# Patient Record
Sex: FEMALE | URBAN - METROPOLITAN AREA
[De-identification: names, ages, dates, MRNs, and addresses within clinical notes are randomized per-mention and may not be internally consistent; named-entity substitution may affect disease eponyms.]

---

## 2016-03-12 RX ORDER — BUDESONIDE AND FORMOTEROL FUMARATE DIHYDRATE 160; 4.5 UG/1; UG/1
2 AEROSOL RESPIRATORY (INHALATION)
Qty: 0 | Refills: 0 | COMMUNITY
Start: 2016-03-12

## 2016-03-30 RX ORDER — MIDODRINE HYDROCHLORIDE 2.5 MG/1
1 TABLET ORAL
Qty: 0 | Refills: 0 | COMMUNITY
Start: 2016-03-30

## 2017-10-17 ENCOUNTER — INPATIENT (INPATIENT)
Facility: HOSPITAL | Age: 82
LOS: 5 days | Discharge: HOME | DRG: 393 | End: 2017-10-23
Attending: INTERNAL MEDICINE | Admitting: INTERNAL MEDICINE
Payer: COMMERCIAL

## 2017-10-17 VITALS
SYSTOLIC BLOOD PRESSURE: 128 MMHG | TEMPERATURE: 98 F | HEART RATE: 77 BPM | RESPIRATION RATE: 19 BRPM | DIASTOLIC BLOOD PRESSURE: 78 MMHG

## 2017-10-17 DIAGNOSIS — I77.0 ARTERIOVENOUS FISTULA, ACQUIRED: Chronic | ICD-10-CM

## 2017-10-17 DIAGNOSIS — Z29.9 ENCOUNTER FOR PROPHYLACTIC MEASURES, UNSPECIFIED: ICD-10-CM

## 2017-10-17 DIAGNOSIS — Z96.652 PRESENCE OF LEFT ARTIFICIAL KNEE JOINT: Chronic | ICD-10-CM

## 2017-10-17 DIAGNOSIS — D49.0 NEOPLASM OF UNSPECIFIED BEHAVIOR OF DIGESTIVE SYSTEM: Chronic | ICD-10-CM

## 2017-10-17 DIAGNOSIS — K92.2 GASTROINTESTINAL HEMORRHAGE, UNSPECIFIED: ICD-10-CM

## 2017-10-17 DIAGNOSIS — Z95.3 PRESENCE OF XENOGENIC HEART VALVE: Chronic | ICD-10-CM

## 2017-10-17 DIAGNOSIS — N18.6 END STAGE RENAL DISEASE: ICD-10-CM

## 2017-10-17 DIAGNOSIS — I48.2 CHRONIC ATRIAL FIBRILLATION: ICD-10-CM

## 2017-10-17 DIAGNOSIS — Z98.89 OTHER SPECIFIED POSTPROCEDURAL STATES: Chronic | ICD-10-CM

## 2017-10-17 DIAGNOSIS — M1A.9XX0 CHRONIC GOUT, UNSPECIFIED, WITHOUT TOPHUS (TOPHI): ICD-10-CM

## 2017-10-17 DIAGNOSIS — K62.5 HEMORRHAGE OF ANUS AND RECTUM: ICD-10-CM

## 2017-10-17 LAB
ALBUMIN SERPL ELPH-MCNC: 4.1 G/DL — SIGNIFICANT CHANGE UP (ref 3.3–5)
ALP SERPL-CCNC: 108 U/L — SIGNIFICANT CHANGE UP (ref 40–120)
ALT FLD-CCNC: 15 U/L RC — SIGNIFICANT CHANGE UP (ref 10–45)
ANION GAP SERPL CALC-SCNC: 15 MMOL/L — SIGNIFICANT CHANGE UP (ref 5–17)
APTT BLD: 29.2 SEC — SIGNIFICANT CHANGE UP (ref 27.5–37.4)
AST SERPL-CCNC: 22 U/L — SIGNIFICANT CHANGE UP (ref 10–40)
BASE EXCESS BLDV CALC-SCNC: 4.6 MMOL/L — HIGH (ref -2–2)
BASOPHILS # BLD AUTO: 0.1 K/UL — SIGNIFICANT CHANGE UP (ref 0–0.2)
BASOPHILS NFR BLD AUTO: 0.9 % — SIGNIFICANT CHANGE UP (ref 0–2)
BILIRUB SERPL-MCNC: 0.6 MG/DL — SIGNIFICANT CHANGE UP (ref 0.2–1.2)
BLD GP AB SCN SERPL QL: NEGATIVE — SIGNIFICANT CHANGE UP
BUN SERPL-MCNC: 34 MG/DL — HIGH (ref 7–23)
CA-I SERPL-SCNC: 1.11 MMOL/L — LOW (ref 1.12–1.3)
CALCIUM SERPL-MCNC: 8.6 MG/DL — SIGNIFICANT CHANGE UP (ref 8.4–10.5)
CHLORIDE BLDV-SCNC: 100 MMOL/L — SIGNIFICANT CHANGE UP (ref 96–108)
CHLORIDE SERPL-SCNC: 96 MMOL/L — SIGNIFICANT CHANGE UP (ref 96–108)
CO2 BLDV-SCNC: 33 MMOL/L — HIGH (ref 22–30)
CO2 SERPL-SCNC: 29 MMOL/L — SIGNIFICANT CHANGE UP (ref 22–31)
CREAT SERPL-MCNC: 5.84 MG/DL — HIGH (ref 0.5–1.3)
EOSINOPHIL # BLD AUTO: 0.2 K/UL — SIGNIFICANT CHANGE UP (ref 0–0.5)
EOSINOPHIL NFR BLD AUTO: 3.6 % — SIGNIFICANT CHANGE UP (ref 0–6)
GAS PNL BLDV: 141 MMOL/L — SIGNIFICANT CHANGE UP (ref 136–145)
GAS PNL BLDV: SIGNIFICANT CHANGE UP
GAS PNL BLDV: SIGNIFICANT CHANGE UP
GLUCOSE BLDV-MCNC: 86 MG/DL — SIGNIFICANT CHANGE UP (ref 70–99)
GLUCOSE SERPL-MCNC: 88 MG/DL — SIGNIFICANT CHANGE UP (ref 70–99)
HCO3 BLDV-SCNC: 31 MMOL/L — HIGH (ref 21–29)
HCT VFR BLD CALC: 40.4 % — SIGNIFICANT CHANGE UP (ref 34.5–45)
HCT VFR BLDA CALC: 39 % — SIGNIFICANT CHANGE UP (ref 39–50)
HGB BLD CALC-MCNC: 12.7 G/DL — SIGNIFICANT CHANGE UP (ref 11.5–15.5)
HGB BLD-MCNC: 12.8 G/DL — SIGNIFICANT CHANGE UP (ref 11.5–15.5)
INR BLD: 1.19 RATIO — HIGH (ref 0.88–1.16)
LACTATE BLDV-MCNC: 1.4 MMOL/L — SIGNIFICANT CHANGE UP (ref 0.7–2)
LYMPHOCYTES # BLD AUTO: 1.3 K/UL — SIGNIFICANT CHANGE UP (ref 1–3.3)
LYMPHOCYTES # BLD AUTO: 22.6 % — SIGNIFICANT CHANGE UP (ref 13–44)
MCHC RBC-ENTMCNC: 28.4 PG — SIGNIFICANT CHANGE UP (ref 27–34)
MCHC RBC-ENTMCNC: 31.7 GM/DL — LOW (ref 32–36)
MCV RBC AUTO: 89.6 FL — SIGNIFICANT CHANGE UP (ref 80–100)
MONOCYTES # BLD AUTO: 0.7 K/UL — SIGNIFICANT CHANGE UP (ref 0–0.9)
MONOCYTES NFR BLD AUTO: 11.8 % — SIGNIFICANT CHANGE UP (ref 2–14)
NEUTROPHILS # BLD AUTO: 3.6 K/UL — SIGNIFICANT CHANGE UP (ref 1.8–7.4)
NEUTROPHILS NFR BLD AUTO: 61.2 % — SIGNIFICANT CHANGE UP (ref 43–77)
OTHER CELLS CSF MANUAL: 5 ML/DL — LOW (ref 18–22)
PCO2 BLDV: 56 MMHG — HIGH (ref 35–50)
PH BLDV: 7.36 — SIGNIFICANT CHANGE UP (ref 7.35–7.45)
PLATELET # BLD AUTO: 122 K/UL — LOW (ref 150–400)
PO2 BLDV: 21 MMHG — LOW (ref 25–45)
POTASSIUM BLDV-SCNC: 4.4 MMOL/L — SIGNIFICANT CHANGE UP (ref 3.5–5)
POTASSIUM SERPL-MCNC: 4.5 MMOL/L — SIGNIFICANT CHANGE UP (ref 3.5–5.3)
POTASSIUM SERPL-SCNC: 4.5 MMOL/L — SIGNIFICANT CHANGE UP (ref 3.5–5.3)
PROT SERPL-MCNC: 9.3 G/DL — HIGH (ref 6–8.3)
PROTHROM AB SERPL-ACNC: 12.9 SEC — HIGH (ref 9.8–12.7)
RBC # BLD: 4.51 M/UL — SIGNIFICANT CHANGE UP (ref 3.8–5.2)
RBC # FLD: 15.4 % — HIGH (ref 10.3–14.5)
RH IG SCN BLD-IMP: POSITIVE — SIGNIFICANT CHANGE UP
SAO2 % BLDV: 29 % — LOW (ref 67–88)
SODIUM SERPL-SCNC: 140 MMOL/L — SIGNIFICANT CHANGE UP (ref 135–145)
WBC # BLD: 5.8 K/UL — SIGNIFICANT CHANGE UP (ref 3.8–10.5)
WBC # FLD AUTO: 5.8 K/UL — SIGNIFICANT CHANGE UP (ref 3.8–10.5)

## 2017-10-17 PROCEDURE — 93010 ELECTROCARDIOGRAM REPORT: CPT

## 2017-10-17 PROCEDURE — 99223 1ST HOSP IP/OBS HIGH 75: CPT | Mod: AI,GC

## 2017-10-17 PROCEDURE — 71010: CPT | Mod: 26

## 2017-10-17 PROCEDURE — 99285 EMERGENCY DEPT VISIT HI MDM: CPT | Mod: 25

## 2017-10-17 RX ORDER — MIDODRINE HYDROCHLORIDE 2.5 MG/1
5 TABLET ORAL DAILY
Qty: 0 | Refills: 0 | Status: DISCONTINUED | OUTPATIENT
Start: 2017-10-17 | End: 2017-10-23

## 2017-10-17 RX ORDER — GABAPENTIN 400 MG/1
100 CAPSULE ORAL AT BEDTIME
Qty: 0 | Refills: 0 | Status: DISCONTINUED | OUTPATIENT
Start: 2017-10-17 | End: 2017-10-23

## 2017-10-17 RX ORDER — ASPIRIN/CALCIUM CARB/MAGNESIUM 324 MG
81 TABLET ORAL DAILY
Qty: 0 | Refills: 0 | Status: DISCONTINUED | OUTPATIENT
Start: 2017-10-17 | End: 2017-10-18

## 2017-10-17 RX ORDER — MONTELUKAST 4 MG/1
10 TABLET, CHEWABLE ORAL DAILY
Qty: 0 | Refills: 0 | Status: DISCONTINUED | OUTPATIENT
Start: 2017-10-17 | End: 2017-10-23

## 2017-10-17 RX ORDER — SEVELAMER CARBONATE 2400 MG/1
800 POWDER, FOR SUSPENSION ORAL
Qty: 0 | Refills: 0 | Status: DISCONTINUED | OUTPATIENT
Start: 2017-10-17 | End: 2017-10-23

## 2017-10-17 RX ORDER — ALLOPURINOL 300 MG
100 TABLET ORAL DAILY
Qty: 0 | Refills: 0 | Status: DISCONTINUED | OUTPATIENT
Start: 2017-10-17 | End: 2017-10-23

## 2017-10-17 RX ORDER — BUDESONIDE AND FORMOTEROL FUMARATE DIHYDRATE 160; 4.5 UG/1; UG/1
2 AEROSOL RESPIRATORY (INHALATION)
Qty: 0 | Refills: 0 | Status: DISCONTINUED | OUTPATIENT
Start: 2017-10-17 | End: 2017-10-23

## 2017-10-17 RX ORDER — CHOLECALCIFEROL (VITAMIN D3) 125 MCG
1000 CAPSULE ORAL DAILY
Qty: 0 | Refills: 0 | Status: DISCONTINUED | OUTPATIENT
Start: 2017-10-17 | End: 2017-10-23

## 2017-10-17 NOTE — H&P ADULT - ASSESSMENT
84 F w/ HTN, ESRD on HD (TTS), mitral stenosis s/p bioprosthetic MVR, severe TR s/p tricuspid valve repair, remote hx of colon cancer s/p resection, and afib on coumadin present with rectal bleeding in the setting of supratherapeutic INR. Likely lower GIB, differential includes diverticular bleed, internal hemorrhoids, vs. recurrence of colon cancer.

## 2017-10-17 NOTE — H&P ADULT - PSH
A-V fistula  Left A-V fistula for HD  Colon tumor  tumor removed from colon   H/O total knee replacement, left    H/O:  section    History of mitral valve replacement with bioprosthetic valve

## 2017-10-17 NOTE — H&P ADULT - PROBLEM SELECTOR PLAN 3
-s/p full HD session today. Will consult Dr. Schaefer to arrange HD while inpatient.  -c/w midodrine on HD days and calcium/phosphorus binders. -s/p full HD session today. Will consult Dr. Schaefer to arrange HD while inpatient on TTS.   -c/w midodrine on HD days and calcium/phosphorus binders.

## 2017-10-17 NOTE — H&P ADULT - PROBLEM SELECTOR PLAN 2
-ChadsVasc score of 3 and pt w/ bioprosthetic valve. No indication for bridging now given subtherapeutic INR. Will hold a/c in the setting of active GIB.   -Rate controlled w/ ppm -ChadsVasc score of 3 and pt w/ bioprosthetic valve. INR reportedly supratherapeutic last week now subtherapeutic after holding coumadin. No indication for bridging given relatively low ChadsVasc. Dose coumadin when GIB resolves.   -Rate controlled w/ ppm

## 2017-10-17 NOTE — ED ADULT NURSE NOTE - PMH
AV fistula  left  Chronic Renal Failure (ICD9 585.9)  on HD (T, Th, Sat)  ESRD on hemodialysis    Gout    History of Gout (ICD9 V12.2)    History of Pneumonia (ICD9 V12.61)    HTN (hypertension)    HTN (Hypertension) (ICD9 401.9)    Pneumonia

## 2017-10-17 NOTE — H&P ADULT - NSHPLABSRESULTS_GEN_ALL_CORE
12.8   5.8   )-----------( 122      ( 17 Oct 2017 18:04 )             40.4     10-17    140  |  96  |  34<H>  ----------------------------<  88  4.5   |  29  |  5.84<H>    Ca    8.6      17 Oct 2017 18:04    TPro  9.3<H>  /  Alb  4.1  /  TBili  0.6  /  DBili  x   /  AST  22  /  ALT  15  /  AlkPhos  108  10-17          LIVER FUNCTIONS - ( 17 Oct 2017 18:04 )  Alb: 4.1 g/dL / Pro: 9.3 g/dL / ALK PHOS: 108 U/L / ALT: 15 U/L RC / AST: 22 U/L / GGT: x             PT/INR - ( 17 Oct 2017 18:04 )   PT: 12.9 sec;   INR: 1.19 ratio         PTT - ( 17 Oct 2017 18:04 )  PTT:29.2 sec    EKG: v-paced

## 2017-10-17 NOTE — ED ADULT NURSE REASSESSMENT NOTE - NS ED NURSE REASSESS COMMENT FT1
Report given to change of shift RN.  Easy work of breathing.  Med lock intact.  Pt resting comfortably. NAD.  Family at bedside.

## 2017-10-17 NOTE — H&P ADULT - HISTORY OF PRESENT ILLNESS
84 F w/ HTN, ESRD on HD (TTS), mitral stenosis s/p bioprosthetic MVR, severe TR s/p tricuspid valve repair, and afib on coumadin present with rectal bleeding since last Wednesday. Pt reports that she was called by her PCP on Wednesday and informed that her INR was elevated in the 9s and told to stop taking coumadin. She experienced her first episode of rectal bleeding on the same day, reports blood color is maroon/cherry colored. Denies bright red blood or pain with defecation. No abdominal pain, nausea, vomiting, or hematemesis. Denies NSAID use aside from aspirin. Denies chest pain, lightheadedness, palpitations, or SOB. 84 F w/ HTN, ESRD on HD (TTS), mitral stenosis s/p bioprosthetic MVR, severe TR s/p tricuspid valve repair, remote hx of colon cancer s/p resection, and afib on coumadin present with rectal bleeding since last Wednesday. Pt reports that she was called by her PCP on Wednesday and informed that her INR was elevated in the 9s and told to stop taking coumadin. She experienced her first episode of rectal bleeding on the same day, reports blood color is maroon/cherry colored. Denies bright red blood, melena, or pain with defecation. No abdominal pain, nausea, vomiting, or hematemesis. Denies NSAID use aside from aspirin. Denies chest pain, lightheadedness, palpitations, or SOB. Bleeding worsened this past week and pt Initially presented to urgent care today when she was found to be FOBT positive and referred to ED for further management. She endorses diarrhea with up to 3 soft BMs/day. No recent antibiotic use. No fevers, chills, or sweats.     In the ED:  Vital Signs Last 24 Hrs  T(C): 36.5 (17 Oct 2017 22:54), Max: 37 (17 Oct 2017 22:01)  T(F): 97.7 (17 Oct 2017 22:54), Max: 98.6 (17 Oct 2017 22:01)  HR: 65 (17 Oct 2017 22:54) (62 - 77)  BP: 107/65 (17 Oct 2017 22:54) (104/61 - 128/78)  RR: 17 (17 Oct 2017 22:54) (17 - 19)  SpO2: 98% (17 Oct 2017 22:54) (95% - 100%) 84 F w/ HTN, ESRD on HD (TTS), mitral stenosis s/p bioprosthetic MVR, severe TR s/p tricuspid valve repair, remote hx of colon cancer s/p resection, and afib on coumadin present with rectal bleeding since last Wednesday. Pt reports that she was called by her PCP on Wednesday and informed that her INR was elevated in the 9s and told to stop taking coumadin. She experienced her first episode of rectal bleeding on the same day, reports blood color is maroon/cherry colored. Stool is mixed with blood and pt also endorses blood on toilet paper. Denies bright red blood, melena, or pain with defecation. No abdominal pain, nausea, vomiting, or hematemesis. Denies NSAID use aside from aspirin. Denies chest pain, lightheadedness, palpitations, or SOB. Bleeding worsened this past week and pt Initially presented to urgent care today when she was found to be FOBT positive and referred to ED for further management. She endorses diarrhea with up to 3 soft BMs/day. No recent antibiotic use. No fevers, chills, or sweats.     In the ED:  Vital Signs Last 24 Hrs  T(C): 36.5 (17 Oct 2017 22:54), Max: 37 (17 Oct 2017 22:01)  T(F): 97.7 (17 Oct 2017 22:54), Max: 98.6 (17 Oct 2017 22:01)  HR: 65 (17 Oct 2017 22:54) (62 - 77)  BP: 107/65 (17 Oct 2017 22:54) (104/61 - 128/78)  RR: 17 (17 Oct 2017 22:54) (17 - 19)  SpO2: 98% (17 Oct 2017 22:54) (95% - 100%)

## 2017-10-17 NOTE — ED PROVIDER NOTE - MEDICAL DECISION MAKING DETAILS
84 year old female past medical history HTN, ESRD on dialysis T, TH, S, gout, colon ca in past without chemo or radiation, who presents to the ED for rectal bleeding. Concern for GI bleed with supratherapeutic INR, check labwork. and admit, no pain at this time, continue to monitor.

## 2017-10-17 NOTE — ED PROVIDER NOTE - ATTENDING CONTRIBUTION TO CARE
[facility]  84y female AVR on coumadin. colon ca sp resection, no chemo/rt, HTN, pt comes to with INR 9last week, Held since Now comes to ed co rectal bleeding 5d. Seen at urgent care and referred to ed. PE WDWN female awake alert speech fluent ncat chest scant kameron wheeze CV no rgm, Abd soft +bs neuro no focal defects.    Pavan Butts MD, Facep

## 2017-10-17 NOTE — ED PROVIDER NOTE - NONTENDER LOCATION
right lower quadrant/left upper quadrant/right costovertebral angle/right upper quadrant/left costovertebral angle/left lower quadrant

## 2017-10-17 NOTE — H&P ADULT - ATTENDING COMMENTS
Attending addendum:  Patient seen and examined, I agree with the above assessment and plan with any changes indicated below.    84 F w/ HTN, ESRD on HD (TTS), mitral stenosis s/p bioprosthetic MVR, severe TR s/p tricuspid valve repair, remote hx of colon cancer s/p resection, and afib on coumadin present with rectal bleeding since last Wednesday. Initially with supratherapeutic INR of 9 Attending addendum:  Patient seen and examined, I agree with the above assessment and plan with any changes indicated below.    84 F w/ HTN, ESRD on HD (TTS), mitral stenosis s/p bioprosthetic MVR, severe TR s/p tricuspid valve repair, remote hx of colon cancer s/p resection, and afib on coumadin present with rectal bleeding since last Wednesday. Initially with supratherapeutic INR of 9 and asked to hold coumadin as outpatient. Recently, noted BRBPR without any other associated symptoms.                             12.8   5.4   )-----------( 170      ( 18 Oct 2017 03:17 )             39.1       10-18    141  |  99  |  42<H>  ----------------------------<  92  4.5   |  25  |  6.78<H>    Ca    8.1<L>      18 Oct 2017 04:45  Phos  4.8     10-18  Mg     2.5     10-18    TPro  9.3<H>  /  Alb  4.1  /  TBili  0.6  /  DBili  x   /  AST  22  /  ALT  15  /  AlkPhos  108  10-17                  PT/INR - ( 18 Oct 2017 03:17 )   PT: 12.4 sec;   INR: 1.14 ratio         PTT - ( 17 Oct 2017 18:04 )  PTT:29.2 sec    Lactate Trend  10-18 @ 03:17 Lactate:1.9     I personally interpreted this patient's labs. They were notable for stable hemoglobin, relatively unremarkable electrolytes, and elevated creatinine consistent with known ESRD.  I personally interpreted this patient's imaging. CXR was notable for bilateral opacities overall unchanged from prior  I personally interpreted this patient's ECG. It showed v-paced rhythm, unchanged from prior    #Plan:  GIB:  - GI consult in AM, NPO for possible ?sigmoidoscopy given BRBPR and likely distal source vs colonoscopy  - Trend Hgb q8h x 1 then daily if stable  - 2PIVs  - Active T&S  - No transfusion at this time    Anticoagulation:  - Continue to hold coumadin  - AHA 2017 guidelines do not give recommendation for prolonged anticoagulation for bioprosthetic valves, though limited data for patients with Afib. Given concern for active bleed, risks outweight benefits. Can continue daily aspirin as per guidelines, restart anticoagulation as soon as able from bleeding aspect    Remainder of plan as per H&P.

## 2017-10-17 NOTE — H&P ADULT - PROBLEM SELECTOR PLAN 1
-Likely lower GIB, differential includes diverticular bleed, internal hemorrhoids, vs. recurrence of colon cancer. Unlikely to be external hemorrhoids as rectal exam done in the ED w/ no reported hemorrhoids and pt w/ no complaints of pain on defecation.   -Hemodynamically stable, H/H stable. Will trend CBC q8h, transfuse Hb < 7. GI consult for possible scope, NPO for now  -Two large bore IVs, active type and screen.

## 2017-10-17 NOTE — ED ADULT NURSE NOTE - OBJECTIVE STATEMENT
83 y/o F to ED with family c/o BRBPR since Thursday with supratherapeutic INR last week, coumadin held.  A&Ox4.  Denies dizziness/vision changes.  Easy work of breathing. Lungs slight wheeze on exhal on auscultation.  Denies chest pain, numbness/tingling or palpitations.  +fistula to L arm, dialyzed T, R, Sa. Moves all extremities strong.  med lock 18 R AC placed.  Labs drawn.  Abd soft round nontender. No n/v/d.  Pt denies change in eating habits.  Safety maintained.  Will continue to monitor.

## 2017-10-17 NOTE — H&P ADULT - FAMILY HISTORY
Family history of anemia, father     Sibling  Still living? Unknown  Family history of heart disease, Age at diagnosis: Age Unknown

## 2017-10-17 NOTE — ED PROVIDER NOTE - OBJECTIVE STATEMENT
84 year old female past medical history HTN, ESRD on dialysis T, TH, S, gout, colon ca in past without chemo or radiation, who presents to the ED for rectal bleeding. Has had supratherapeutic INR found by primary medical doctor told to hold coumadin, has been having bright red blood for 3 days as well but now worsening so went to urgent care. Was found tyson guaiac positive and told to come to ED. Reports no pain. No fevers, chills, dizziness, lightheadedness, chest pain, shortness of breath.     Dr. Emerald Subramanian

## 2017-10-17 NOTE — ED ADULT NURSE NOTE - PSH
A-V fistula  Left A-V fistula for HD  AV fistula  left  Colon tumor  removed in   Colon tumor  tumor removed from colon   H/O total knee replacement, left    H/O:  section    H/O:  Section    History of knee replacement, total, left

## 2017-10-17 NOTE — H&P ADULT - PMH
Chronic atrial fibrillation    ESRD on hemodialysis    Gout    Mitral valve stenosis, unspecified etiology    Tricuspid valve insufficiency, unspecified etiology

## 2017-10-17 NOTE — H&P ADULT - NSHPREVIEWOFSYSTEMS_GEN_ALL_CORE
CONSTITUTIONAL:  No weight loss, fever, chills, weakness or fatigue.  HEENT:  Eyes:  No visual loss, blurred vision, double vision or yellow sclerae. Ears, Nose, Throat:  No hearing loss, sneezing, congestion, runny nose or sore throat.  SKIN:  No rash or itching.  CARDIOVASCULAR:  No chest pain, chest pressure or chest discomfort. No palpitations or edema.  RESPIRATORY:  No shortness of breath, cough or sputum.  GASTROINTESTINAL:  No nausea, vomiting or diarrhea. No abdominal pain. +rectal bleeding  GENITOURINARY:  No longer makes urine  NEUROLOGICAL:  No headache, dizziness, syncope.   MUSCULOSKELETAL:  No muscle, back pain, joint pain or stiffness.  HEMATOLOGIC:  No bruising.

## 2017-10-17 NOTE — H&P ADULT - NSHPPHYSICALEXAM_GEN_ALL_CORE
GENERAL APPEARANCE: Well developed, well nourished, alert and cooperative. NAD.   HEENT:  NC/AT, clear conjunctiva  NECK: Neck supple, non-tender without lymphadenopathy, masses  CARDIAC: Normal S1 and S2. No S3, S4 or murmurs. Rhythm is regular.  LUNGS: Clear to auscultation and percussion without rales, rhonchi, wheezing or diminished breath sounds.  ABDOMEN: Soft, nondistended, nontender. No guarding or rebound. Normoactive bowel sounds  MUSKULOSKELETAL: R knee wrapped in brace  EXTREMITIES: No edema.  NEUROLOGICAL: No focal neurologic deficit. Strength and sensation symmetric and intact throughout.   SKIN: Skin clean, dry, intact  PSYCHIATRIC: AOx3.Normal affect and behavior.

## 2017-10-18 DIAGNOSIS — N18.6 END STAGE RENAL DISEASE: ICD-10-CM

## 2017-10-18 DIAGNOSIS — Z95.2 PRESENCE OF PROSTHETIC HEART VALVE: ICD-10-CM

## 2017-10-18 DIAGNOSIS — M10.9 GOUT, UNSPECIFIED: ICD-10-CM

## 2017-10-18 DIAGNOSIS — K92.2 GASTROINTESTINAL HEMORRHAGE, UNSPECIFIED: ICD-10-CM

## 2017-10-18 DIAGNOSIS — I48.91 UNSPECIFIED ATRIAL FIBRILLATION: ICD-10-CM

## 2017-10-18 LAB
ANION GAP SERPL CALC-SCNC: 14 MMOL/L — SIGNIFICANT CHANGE UP (ref 5–17)
ANION GAP SERPL CALC-SCNC: 17 MMOL/L — SIGNIFICANT CHANGE UP (ref 5–17)
BUN SERPL-MCNC: 42 MG/DL — HIGH (ref 7–23)
BUN SERPL-MCNC: 42 MG/DL — HIGH (ref 7–23)
CALCIUM SERPL-MCNC: 8 MG/DL — LOW (ref 8.4–10.5)
CALCIUM SERPL-MCNC: 8.1 MG/DL — LOW (ref 8.4–10.5)
CHLORIDE SERPL-SCNC: 98 MMOL/L — SIGNIFICANT CHANGE UP (ref 96–108)
CHLORIDE SERPL-SCNC: 99 MMOL/L — SIGNIFICANT CHANGE UP (ref 96–108)
CO2 SERPL-SCNC: 25 MMOL/L — SIGNIFICANT CHANGE UP (ref 22–31)
CO2 SERPL-SCNC: 25 MMOL/L — SIGNIFICANT CHANGE UP (ref 22–31)
CREAT SERPL-MCNC: 6.58 MG/DL — HIGH (ref 0.5–1.3)
CREAT SERPL-MCNC: 6.78 MG/DL — HIGH (ref 0.5–1.3)
GLUCOSE SERPL-MCNC: 108 MG/DL — HIGH (ref 70–99)
GLUCOSE SERPL-MCNC: 92 MG/DL — SIGNIFICANT CHANGE UP (ref 70–99)
HCT VFR BLD CALC: 38 % — SIGNIFICANT CHANGE UP (ref 34.5–45)
HCT VFR BLD CALC: 39.1 % — SIGNIFICANT CHANGE UP (ref 34.5–45)
HGB BLD-MCNC: 12.8 G/DL — SIGNIFICANT CHANGE UP (ref 11.5–15.5)
HGB BLD-MCNC: 12.9 G/DL — SIGNIFICANT CHANGE UP (ref 11.5–15.5)
INR BLD: 1.14 RATIO — SIGNIFICANT CHANGE UP (ref 0.88–1.16)
LACTATE SERPL-SCNC: 1.9 MMOL/L — SIGNIFICANT CHANGE UP (ref 0.7–2)
MAGNESIUM SERPL-MCNC: 2.5 MG/DL — SIGNIFICANT CHANGE UP (ref 1.6–2.6)
MCHC RBC-ENTMCNC: 29.1 PG — SIGNIFICANT CHANGE UP (ref 27–34)
MCHC RBC-ENTMCNC: 30.4 PG — SIGNIFICANT CHANGE UP (ref 27–34)
MCHC RBC-ENTMCNC: 32.8 GM/DL — SIGNIFICANT CHANGE UP (ref 32–36)
MCHC RBC-ENTMCNC: 34.1 GM/DL — SIGNIFICANT CHANGE UP (ref 32–36)
MCV RBC AUTO: 88.5 FL — SIGNIFICANT CHANGE UP (ref 80–100)
MCV RBC AUTO: 89 FL — SIGNIFICANT CHANGE UP (ref 80–100)
PHOSPHATE SERPL-MCNC: 4.8 MG/DL — HIGH (ref 2.5–4.5)
PLATELET # BLD AUTO: 128 K/UL — LOW (ref 150–400)
PLATELET # BLD AUTO: 170 K/UL — SIGNIFICANT CHANGE UP (ref 150–400)
POTASSIUM SERPL-MCNC: 4.5 MMOL/L — SIGNIFICANT CHANGE UP (ref 3.5–5.3)
POTASSIUM SERPL-MCNC: 8.3 MMOL/L — CRITICAL HIGH (ref 3.5–5.3)
POTASSIUM SERPL-SCNC: 4.5 MMOL/L — SIGNIFICANT CHANGE UP (ref 3.5–5.3)
POTASSIUM SERPL-SCNC: 8.3 MMOL/L — CRITICAL HIGH (ref 3.5–5.3)
PROTHROM AB SERPL-ACNC: 12.4 SEC — SIGNIFICANT CHANGE UP (ref 9.8–12.7)
RBC # BLD: 4.27 M/UL — SIGNIFICANT CHANGE UP (ref 3.8–5.2)
RBC # BLD: 4.41 M/UL — SIGNIFICANT CHANGE UP (ref 3.8–5.2)
RBC # FLD: 15.1 % — HIGH (ref 10.3–14.5)
RBC # FLD: 15.5 % — HIGH (ref 10.3–14.5)
SODIUM SERPL-SCNC: 137 MMOL/L — SIGNIFICANT CHANGE UP (ref 135–145)
SODIUM SERPL-SCNC: 141 MMOL/L — SIGNIFICANT CHANGE UP (ref 135–145)
WBC # BLD: 5.1 K/UL — SIGNIFICANT CHANGE UP (ref 3.8–10.5)
WBC # BLD: 5.4 K/UL — SIGNIFICANT CHANGE UP (ref 3.8–10.5)
WBC # FLD AUTO: 5.1 K/UL — SIGNIFICANT CHANGE UP (ref 3.8–10.5)
WBC # FLD AUTO: 5.4 K/UL — SIGNIFICANT CHANGE UP (ref 3.8–10.5)

## 2017-10-18 PROCEDURE — 99222 1ST HOSP IP/OBS MODERATE 55: CPT | Mod: GC

## 2017-10-18 PROCEDURE — 99233 SBSQ HOSP IP/OBS HIGH 50: CPT | Mod: GC

## 2017-10-18 RX ORDER — SOD SULF/SODIUM/NAHCO3/KCL/PEG
1000 SOLUTION, RECONSTITUTED, ORAL ORAL EVERY 4 HOURS
Qty: 0 | Refills: 0 | Status: COMPLETED | OUTPATIENT
Start: 2017-10-18 | End: 2017-10-18

## 2017-10-18 RX ADMIN — Medication 10 MILLIGRAM(S): at 17:58

## 2017-10-18 RX ADMIN — Medication 100 MILLIGRAM(S): at 13:16

## 2017-10-18 RX ADMIN — Medication 10 MILLIGRAM(S): at 23:07

## 2017-10-18 RX ADMIN — BUDESONIDE AND FORMOTEROL FUMARATE DIHYDRATE 2 PUFF(S): 160; 4.5 AEROSOL RESPIRATORY (INHALATION) at 17:58

## 2017-10-18 RX ADMIN — Medication 1 TABLET(S): at 13:17

## 2017-10-18 RX ADMIN — GABAPENTIN 100 MILLIGRAM(S): 400 CAPSULE ORAL at 23:07

## 2017-10-18 RX ADMIN — Medication 1000 MILLILITER(S): at 16:51

## 2017-10-18 RX ADMIN — MONTELUKAST 10 MILLIGRAM(S): 4 TABLET, CHEWABLE ORAL at 13:17

## 2017-10-18 RX ADMIN — SEVELAMER CARBONATE 800 MILLIGRAM(S): 2400 POWDER, FOR SUSPENSION ORAL at 13:17

## 2017-10-18 RX ADMIN — SEVELAMER CARBONATE 800 MILLIGRAM(S): 2400 POWDER, FOR SUSPENSION ORAL at 16:52

## 2017-10-18 RX ADMIN — BUDESONIDE AND FORMOTEROL FUMARATE DIHYDRATE 2 PUFF(S): 160; 4.5 AEROSOL RESPIRATORY (INHALATION) at 06:31

## 2017-10-18 RX ADMIN — Medication 1000 MILLILITER(S): at 23:06

## 2017-10-18 RX ADMIN — Medication 1000 UNIT(S): at 13:16

## 2017-10-18 NOTE — PROGRESS NOTE ADULT - PROBLEM SELECTOR PLAN 3
-Patient with documented history of atrial fibrillation, althought PCP's office states AC is for the mitral valve  -Will hold Coumadin in the setting of GI bleed  -[id number] score is 3, patient is a candidate for anticoagulation for stroke risk. Will resume once cleared from GI standpoint

## 2017-10-18 NOTE — PROGRESS NOTE ADULT - ASSESSMENT
84 F w/ HTN, ESRD on HD (TTS), mitral stenosis s/p bioprosthetic MVR, severe TR s/p tricuspid valve repair on Coumadin, and remote hx of colon cancer s/p resection who presents with rectal bleeding in the setting of supratherapeutic INR. Likely lower GIB, differential includes diverticular bleed, internal hemorrhoids, vs. recurrence of colon cancer.

## 2017-10-18 NOTE — CONSULT NOTE ADULT - ATTENDING COMMENTS
Hematochezia in setting of elevated INR of 9, still ongoing after 7 days although INR yesterday on admission 1.19.  No signif. blood loss as Hb 12.8, last known 11 g/dL, despite initially very high INR.  Last colonoscopy 7 yrs ago after colon cancer. Has thrombocytopenia.  - colonoscopy tomorrow, coordinate with dialysis  - hold coumadin for now

## 2017-10-18 NOTE — PROGRESS NOTE ADULT - PROBLEM SELECTOR PLAN 1
-Patient with reported GI bleed, red blood per rectum multiple times, now with stable hemoglobin at 12.8 x 2  -Will monitor CBC q12 after one more repeat  -Hemodynamically stable  -GI consulted, NPO for now pending recommendations

## 2017-10-18 NOTE — CONSULT NOTE ADULT - ASSESSMENT
84 F w/ HTN, ESRD on HD (Tues, Thurs, Sat), mitral stenosis s/p bioprosthetic MVR (placed 3/17/16, confirmed with TTE), severe TR s/p tricuspid valve repair on coumadin (primarily for valve), remote hx of colon cancer s/p resection, and afib (not on AC) presents with hematochezia since last Wednesday. After stopping coumadin, pt's bm's have become less frequent but continue to be bloody. INR since admission subtherapeutic. No bm today. GI consulted for continued workup.    Plan:    GIB  - red blood per rectum multiple times x1 week but reducing in frequency, no bm today  - hx of colon cancer, last colonoscopy was normal 7 years ago  - last endoscopy was normal 4 years ago.  - hbg stable at 12.8, repeat one more time and if stable, q12 cbc's  - pt hemodynamically stable  - may need colonoscopy given hx cancer, pending discussion with fellow and attending    Biomechanical valve placed 3/17/16:  - coumadin for mitral valve primarily, not for afib  - as per guidelines, AC not necessarily indicated beyond 3 months.   - heparin bridge not required to restart coumadin as valve is not mechanical  - pending discussion with fellow and attending about restarting anticoagulation    Atrial fibrillation  - AC not for afib as per PCP's office  - CHADSVASC score 3  - pending discussion with fellow and attending for resuming coumadin    Other medical problems  - per primary team    Pending discussion with fellow and attending    Leo Morales  Pager [phone] 84 F w/ HTN, ESRD on HD (Tues, Thurs, Sat), mitral stenosis s/p bioprosthetic MVR (placed 3/17/16, confirmed with TTE), severe TR s/p tricuspid valve repair on coumadin (primarily for valve), remote hx of colon cancer s/p resection, and afib (not on AC) presents with hematochezia since last Wednesday. After stopping coumadin, pt's bm's have become less frequent but continue to be bloody. INR since admission subtherapeutic. No bm today. GI consulted for continued workup.    Plan:    GIB  - red blood per rectum multiple times x1 week but reducing in frequency, no bm today  - hx of colon cancer, last colonoscopy was normal 7 years ago  - last endoscopy was normal 4 years ago.  - hbg stable at 12.8, repeat one more time and if stable, q12 cbc's  - pt hemodynamically stable  - please hold anticoagulation today  - pt may have clears today  - colonoscopy tomorrow am, NPO @ midnight    Biomechanical valve placed 3/17/16:  - coumadin for mitral valve primarily, not for afib as per pt and PCP's office  - as per guidelines, AC not necessarily indicated beyond 3 months.   - heparin bridge not required to restart coumadin as valve is not mechanical  - please hold anticoagulation today    Atrial fibrillation  - AC not for afib as per PCP's office  - CHADSVASC score 3  - please hold anticoagulation today    Other medical problems  - per primary team    Plan discussed with Fellow, Dr. Rain. Pending discussion with attending Dr. Luzma Morales  Pager [phone] 84 F w/ HTN, ESRD on HD (Tues, Thurs, Sat), mitral stenosis s/p bioprosthetic MVR (placed 3/17/16, confirmed with TTE), severe TR s/p tricuspid valve repair on coumadin (primarily for valve), remote hx of colon cancer s/p resection, and afib (not on AC) presents with hematochezia since last Wednesday. After stopping coumadin, pt's bm's have become less frequent but continue to be bloody. INR since admission subtherapeutic. No bm today. GI consulted for continued workup.    Plan:    GIB  - red blood per rectum multiple times x1 week but reducing in frequency, no bm today  - hx of colon cancer, last colonoscopy was normal 7 years ago  - last endoscopy was normal 4 years ago.  - hbg stable at 12.8, repeat one more time and if stable, q12 cbc's  - pt hemodynamically stable  - please hold anticoagulation today  - pt may have clears today  - colonoscopy tomorrow am, NPO @ midnight    Biomechanical valve placed 3/17/16:  - coumadin for mitral valve primarily, not for afib as per pt and PCP's office  - as per guidelines, AC not necessarily indicated beyond 3 months.   - heparin bridge not required to restart coumadin as valve is not mechanical  - please hold anticoagulation today    Atrial fibrillation  - AC not for afib as per PCP's office  - CHADSVASC score 3  - please hold anticoagulation today    Other medical problems  - per primary team    Plan discussed with Fellow. Pending discussion with attending Dr. Luzma Morales  Pager [phone]

## 2017-10-18 NOTE — CONSULT NOTE ADULT - SUBJECTIVE AND OBJECTIVE BOX
[facility] Consult Note Nephrology - CONSULTATION NOTE    84 F w/ HTN, ESRD on HD (TTS), mitral stenosis s/p bioprosthetic MVR, severe TR s/p tricuspid valve repair, remote hx of colon cancer s/p resection, and afib on coumadin present with rectal bleeding since last Wednesday. Pt reports that she was called by her PCP on Wednesday and informed that her INR was elevated in the 9s and told to stop taking coumadin. She experienced her first episode of rectal bleeding on the same day, reports blood color is maroon/cherry colored. Stool is mixed with blood and pt also endorses blood on toilet paper. Denies bright red blood, melena, or pain with defecation. No abdominal pain, nausea, vomiting, or hematemesis. Denies NSAID use aside from aspirin. Denies chest pain, lightheadedness, palpitations, or SOB. Bleeding worsened this past week and pt Initially presented to urgent care today when she was found to be FOBT positive and referred to ED for further management. She endorses diarrhea with up to 3 soft BMs/day. No recent antibiotic use. No fevers, chills, or sweats.       Pt on HD TTS with last HD yesterday; currently denies any f/c/n/v/d/c/sob/cp  bmp reviewed- not hyperkalemic and bp stable; no signs of volume overload  In the ED:  Vital Signs Last 24 Hrs  T(C): 36.5 (17 Oct 2017 22:54), Max: 37 (17 Oct 2017 22:01)  T(F): 97.7 (17 Oct 2017 22:54), Max: 98.6 (17 Oct 2017 22:01)  HR: 65 (17 Oct 2017 22:54) (62 - 77)  BP: 107/65 (17 Oct 2017 22:54) (104/61 - 128/78)  RR: 17 (17 Oct 2017 22:54) (17 - 19)  SpO2: 98% (17 Oct 2017 22:54) (95% - 100%)    PAST MEDICAL & SURGICAL HISTORY:  Chronic atrial fibrillation  Tricuspid valve insufficiency, unspecified etiology  Mitral valve stenosis, unspecified etiology  Gout  ESRD on hemodialysis  History of mitral valve replacement with bioprosthetic valve  H/O total knee replacement, left  H/O:  section  Colon tumor: tumor removed from colon   A-V fistula: Left A-V fistula for HD    Epogen (Unknown)    Home Medications Reviewed  Hospital Medications:   MEDICATIONS  (STANDING):  allopurinol 100 milliGRAM(s) Oral daily  aspirin enteric coated 81 milliGRAM(s) Oral daily  buDESOnide 160 MICROgram(s)/formoterol 4.5 MICROgram(s) Inhaler 2 Puff(s) Inhalation two times a day  cholecalciferol 1000 Unit(s) Oral daily  gabapentin 100 milliGRAM(s) Oral at bedtime  montelukast 10 milliGRAM(s) Oral daily  multivitamin 1 Tablet(s) Oral daily  sevelamer hydrochloride 800 milliGRAM(s) Oral three times a day with meals    SOCIAL HISTORY:  Denies ETOh,Smoking,   FAMILY HISTORY:  Family history of heart disease (Sibling)  Family history of anemia: father    REVIEW OF SYSTEMS:  CONSTITUTIONAL: No weakness, fevers or chills  EYES/ENT: No visual changes;  No vertigo or throat pain   NECK: No pain or stiffness  RESPIRATORY: No cough, wheezing, hemoptysis; No shortness of breath  CARDIOVASCULAR: No chest pain or palpitations.  GASTROINTESTINAL: No abdominal or epigastric pain. No nausea, vomiting, or hematemesis; No diarrhea or constipation. +++hematochezia.  GENITOURINARY: No dysuria, frequency, foamy urine, urinary urgency, incontinence or hematuria  NEUROLOGICAL: No numbness or weakness  SKIN: No itching, burning, rashes, or lesions   VASCULAR: No bilateral lower extremity edema.   All other review of systems is negative unless indicated above.    VITALS:  T(F): 98.1 (10-18-17 @ 04:55), Max: 98.6 (10-17-17 @ 22:01)  HR: 15 (10-18-17 @ 06:31)  BP: 113/66 (10-18-17 @ 04:55)  RR: 17 (10-18-17 @ 04:55)  SpO2: 97% (10-18-17 @ 06:31)  Wt(kg): --    Height (cm): 157.48 (10-17 @ 22:54)  Weight (kg): 80.6 (10-17 @ 22:54)  BMI (kg/m2): 32.5 (10-17 @ 22:54)  BSA (m2): 1.82 (10-17 @ 22:54)  PHYSICAL EXAM:  Constitutional: NAD  HEENT: anicteric sclera, oropharynx clear, MMM  Neck: No JVD  Respiratory: CTAB, no wheezes, rales or rhonchi  Cardiovascular: S1, S2, RRR  Gastrointestinal: BS+, soft, NT/ND  Extremities: No cyanosis or clubbing. No peripheral edema  Neurological: A/O x 3, no focal deficits  Psychiatric: Normal mood, normal affect  : No CVA tenderness. No ortiz.   Skin: No rashes  Vascular Access: left wrist AVF + thrill    LABS:  10-18    141  |  99  |  42<H>  ----------------------------<  92  4.5   |  25  |  6.78<H>    Ca    8.1<L>      18 Oct 2017 04:45  Phos  4.8     10-  Mg     2.5     10-18    TPro  9.3<H>  /  Alb  4.1  /  TBili  0.6  /  DBili      /  AST  22  /  ALT  15  /  AlkPhos  108  10-    Creatinine Trend: 6.78 <--, 6.58 <--, 5.84 <--                        12.8   5.4   )-----------( 170      ( 18 Oct 2017 03:17 )             39.1     Urine Studies:      RADIOLOGY & ADDITIONAL STUDIES:

## 2017-10-18 NOTE — PROGRESS NOTE ADULT - SUBJECTIVE AND OBJECTIVE BOX
Patient is a 84y old  Female who presents with a chief complaint of Rectal bleeding (17 Oct 2017 23:09)      INTERVAL HPI/OVERNIGHT EVENTS: Admitted overnight. Patient denies abdominal pain, nausea, and vomiting. She reports that she has not had any further episodes of bleeding.    T(C): 36.7 (10-18-17 @ 04:55), Max: 37 (10-17-17 @ 22:01)  HR: 15 (10-18-17 @ 06:31) (15 - 77)  BP: 113/66 (10-18-17 @ 04:55) (104/61 - 128/78)  RR: 17 (10-18-17 @ 04:55) (17 - 19)  SpO2: 97% (10-18-17 @ 06:31) (95% - 100%)  Wt(kg): --  I&O's Summary      LABS:                        12.8   5.4   )-----------( 170      ( 18 Oct 2017 03:17 )             39.1     10-18    141  |  99  |  42<H>  ----------------------------<  92  4.5   |  25  |  6.78<H>    Ca    8.1<L>      18 Oct 2017 04:45  Phos  4.8     10-18  Mg     2.5     10-18    TPro  9.3<H>  /  Alb  4.1  /  TBili  0.6  /  DBili  x   /  AST  22  /  ALT  15  /  AlkPhos  108  10-17    PT/INR - ( 18 Oct 2017 03:17 )   PT: 12.4 sec;   INR: 1.14 ratio         PTT - ( 17 Oct 2017 18:04 )  PTT:29.2 sec    CAPILLARY BLOOD GLUCOSE              REVIEW OF SYSTEMS:  CONSTITUTIONAL: No fever, weight loss, or fatigue  EYES: No eye pain, visual disturbances, or discharge  ENMT:  No difficulty hearing, tinnitus, vertigo; No sinus or throat pain  NECK: No pain or stiffness  BREASTS: No pain, masses, or nipple discharge  RESPIRATORY: No cough, wheezing, chills or hemoptysis; No shortness of breath  CARDIOVASCULAR: No chest pain, palpitations, dizziness, or leg swelling  GASTROINTESTINAL: No abdominal or epigastric pain.  GENITOURINARY: No dysuria, frequency, hematuria, or incontinence  NEUROLOGICAL: No headaches, memory loss, loss of strength, numbness, or tremors  SKIN: No itching, burning, rashes, or lesions   LYMPH NODES: No enlarged glands  ENDOCRINE: No heat or cold intolerance; No hair loss  MUSCULOSKELETAL: No joint pain or swelling; No muscle, back, or extremity pain  PSYCHIATRIC: No depression, anxiety, mood swings, or difficulty sleeping  HEME/LYMPH: No easy bruising, or bleeding gums  ALLERY AND IMMUNOLOGIC: No hives or eczema    RADIOLOGY & ADDITIONAL TESTS:    Imaging Personally Reviewed:  [ ] YES  [x ] NO    Consultant(s) Notes Reviewed:  [ ] YES  [x ] NO    PHYSICAL EXAM:  GENERAL: NAD, well-groomed, well-developed  HEAD:  Atraumatic, Normocephalic  EYES: EOMI, PERRLA  ENMT: No tonsillar erythema, exudates, or enlargement; Moist mucous membranes, Good dentition, No lesions  NECK: Supple  NERVOUS SYSTEM:  Alert & Oriented X3, answers questions appropriately  CHEST/LUNG: Clear to auscultation bilaterally; No rales, rhonchi, wheezing, or rubs  HEART: Regular rate and rhythm; No murmurs, rubs, or gallops  ABDOMEN: Soft, Nontender, Nondistended; Bowel sounds present  EXTREMITIES:  2+ Peripheral Pulses, No clubbing, cyanosis, or edema  LYMPH: No lymphadenopathy noted  SKIN: No rashes or lesions    Care Discussed with Consultants/Other Providers [ ] YES  [x ] NO Patient is a 84y old  Female who presents with a chief complaint of Rectal bleeding (17 Oct 2017 23:09)      INTERVAL HPI/OVERNIGHT EVENTS: Admitted overnight. Patient denies abdominal pain, nausea, and vomiting. She reports that she has not had any further episodes of bleeding.    T(C): 36.7 (10-18-17 @ 04:55), Max: 37 (10-17-17 @ 22:01)  HR: 15 (10-18-17 @ 06:31) (15 - 77)  BP: 113/66 (10-18-17 @ 04:55) (104/61 - 128/78)  RR: 17 (10-18-17 @ 04:55) (17 - 19)  SpO2: 97% (10-18-17 @ 06:31) (95% - 100%)  Wt(kg): --  I&O's Summary      LABS:                        12.8   5.4   )-----------( 170      ( 18 Oct 2017 03:17 )             39.1     10-18    141  |  99  |  42<H>  ----------------------------<  92  4.5   |  25  |  6.78<H>    Ca    8.1<L>      18 Oct 2017 04:45  Phos  4.8     10-18  Mg     2.5     10-18    TPro  9.3<H>  /  Alb  4.1  /  TBili  0.6  /  DBili  x   /  AST  22  /  ALT  15  /  AlkPhos  108  10-17    PT/INR - ( 18 Oct 2017 03:17 )   PT: 12.4 sec;   INR: 1.14 ratio         PTT - ( 17 Oct 2017 18:04 )  PTT:29.2 sec    CAPILLARY BLOOD GLUCOSE              REVIEW OF SYSTEMS:  CONSTITUTIONAL: No fever, weight loss, or fatigue  EYES: No eye pain, visual disturbances, or discharge  ENMT:  No difficulty hearing, tinnitus, vertigo; No sinus or throat pain  NECK: No pain or stiffness  BREASTS: No pain, masses, or nipple discharge  RESPIRATORY: No cough, wheezing, chills or hemoptysis; No shortness of breath  CARDIOVASCULAR: No chest pain, palpitations, dizziness, or leg swelling  GASTROINTESTINAL: No abdominal or epigastric pain.  GENITOURINARY: No dysuria, frequency, hematuria, or incontinence  NEUROLOGICAL: No headaches, memory loss, loss of strength, numbness, or tremors  SKIN: No itching, burning, rashes, or lesions   LYMPH NODES: No enlarged glands  ENDOCRINE: No heat or cold intolerance; No hair loss  MUSCULOSKELETAL: No joint pain or swelling; No muscle, back, or extremity pain  PSYCHIATRIC: No depression, anxiety, mood swings, or difficulty sleeping  HEME/LYMPH: No easy bruising, or bleeding gums  ALLERY AND IMMUNOLOGIC: No hives or eczema    RADIOLOGY & ADDITIONAL TESTS:    Imaging Personally Reviewed:  [ ] YES  [x ] NO    Consultant(s) Notes Reviewed:  [ x ] YES  [ ] NO - Renal and Gastroenterology    PHYSICAL EXAM:  GENERAL: NAD, well-groomed, well-developed  HEAD:  Atraumatic, Normocephalic  EYES: EOMI, PERRLA  ENMT: No tonsillar erythema, exudates, or enlargement; Moist mucous membranes, Good dentition, No lesions  NECK: Supple  NERVOUS SYSTEM:  Alert & Oriented X3, answers questions appropriately  CHEST/LUNG: Clear to auscultation bilaterally; No rales, rhonchi, wheezing, or rubs  HEART: Regular rate and rhythm; No murmurs, rubs, or gallops  ABDOMEN: Soft, Nontender, Nondistended; Bowel sounds present  EXTREMITIES:  2+ Peripheral Pulses, No clubbing, cyanosis, or edema  LYMPH: No lymphadenopathy noted  SKIN: No rashes or lesions    Care Discussed with Consultants/Other Providers [ ] YES  [x ] NO

## 2017-10-18 NOTE — CONSULT NOTE ADULT - SUBJECTIVE AND OBJECTIVE BOX
Chief Complaint:  Patient is a 84y old  Female who presents with a chief complaint of Rectal bleeding (17 Oct 2017 23:09)      HPI:  84 F w/ HTN, ESRD on HD (Tues, Thurs, Sat), mitral stenosis s/p bioprosthetic MVR (placed 3/17/16, confirmed with TTE), severe TR s/p tricuspid valve repair on coumadin (primarily for valve), remote hx of colon cancer s/p resection, and afib (not on AC) presents with hematochezia since last Wednesday. Pt reports that she was called by her PCP on Wednesday and informed that her INR was elevated in the s and told to stop taking coumadin. Pt's normal coumadin dosing schedule is 7mg daily Monday through Friday then 6mg daily on Saturday and , and endorses strict compliance, She experienced her first episode hematochezia on the same day, also endorses blood on toilet paper. Denies pain with defecation. No abdominal pain, nausea, vomiting, or hematemesis. Denies NSAID use aside from aspirin. Denies chest pain, lightheadedness, palpitations, SOB, fevers, chills, unintentional weight loss. Bleeding worsened this past week and pt Initially presented to urgent care today when she was found to be FOBT positive and referred to ED for further management. She endorses diarrhea with up to 3 soft BMs/day, yesterday was 2x/day and pt endorses reduction in frequency of BMs. No recent antibiotic use. Denies dietary changes aside from consumption of 1 full grapefruit per day for the last 3 weeks. Denies other ROS.      Allergies:  Epogen (Unknown)      Home Medications:    Hospital Medications:  allopurinol 100 milliGRAM(s) Oral daily  aspirin enteric coated 81 milliGRAM(s) Oral daily  buDESOnide 160 MICROgram(s)/formoterol 4.5 MICROgram(s) Inhaler 2 Puff(s) Inhalation two times a day  cholecalciferol 1000 Unit(s) Oral daily  gabapentin 100 milliGRAM(s) Oral at bedtime  midodrine 5 milliGRAM(s) Oral daily PRN  montelukast 10 milliGRAM(s) Oral daily  multivitamin 1 Tablet(s) Oral daily  sevelamer hydrochloride 800 milliGRAM(s) Oral three times a day with meals      PMHX/PSHX:  Chronic atrial fibrillation  Tricuspid valve insufficiency, unspecified etiology  Mitral valve stenosis, unspecified etiology  Pneumonia  HTN (hypertension)  Gout  AV fistula  ESRD on hemodialysis  A-V Fistula  History of Gout (ICD9 V12.2)  History of Gout (ICD9 V12.2)  History of Pneumonia (ICD9 V12.61)  HTN (Hypertension) (ICD9 401.9)  HTN (Hypertension) (ICD9 401.9)  HTN (Hypertension) (ICD9 401.9)  Chronic Renal Failure (ICD9 585.9)  Chronic Renal Failure (ICD9 585.9)  History of mitral valve replacement with bioprosthetic valve  H/O total knee replacement, left  H/O:  section  Colon tumor  AV fistula  Colon tumor  History of knee replacement, total, left  A-V fistula  H/O:  Section      Family history:  Family history of heart disease (Sibling)  Family history of anemia  Family history of heart disease (Sibling)  Family history of anemia  No pertinent family history in first degree relatives      Social History:     ROS:     General:  No wt loss, fevers, chills, night sweats, fatigue,   Eyes:  Good vision, no reported pain  ENT:  No sore throat, pain, runny nose, dysphagia  CV:  No pain, palpitations, hypo/hypertension  Resp:  No dyspnea, cough, tachypnea, wheezing  GI:  See HPI  :  No pain, bleeding, incontinence, nocturia  Muscle:  No pain, weakness  Neuro:  No weakness, tingling, memory problems  Psych:  No fatigue, insomnia, mood problems, depression  Endocrine:  No polyuria, polydipsia, cold/heat intolerance  Heme:  No petechiae, ecchymosis, easy bruisability  Skin:  No rash, edema      PHYSICAL EXAM:     GENERAL:  Appears stated age, well-groomed, well-nourished, no distress  HEENT:  NC/AT,  conjunctivae clear and pink,  no JVD  CHEST:  Full & symmetric excursion, no increased effort, breath sounds clear  HEART:  Regular rhythm, S1, S2, no murmur/rub/S3/S4, no abdominal bruit, no edema  ABDOMEN:  Soft, non-tender, non-distended, normoactive bowel sounds,  no masses , no hepatosplenomegaly  EXTREMITIES:  no cyanosis,clubbing or edema  SKIN:  No rash/erythema/ecchymoses/petechiae/wounds/abscess/warm/dry  NEURO:  Alert, oriented    Vital Signs:  Vital Signs Last 24 Hrs  T(C): 36.7 (18 Oct 2017 04:55), Max: 37 (17 Oct 2017 22:01)  T(F): 98.1 (18 Oct 2017 04:55), Max: 98.6 (17 Oct 2017 22:01)  HR: 15 (18 Oct 2017 06:31) (15 - 77)  BP: 113/66 (18 Oct 2017 04:55) (104/61 - 128/78)  BP(mean): --  RR: 17 (18 Oct 2017 04:55) (17 - 19)  SpO2: 97% (18 Oct 2017 06:31) (95% - 100%)  Daily Height in cm: 157.48 (17 Oct 2017 22:54)    Daily     LABS:                        12.8   5.4   )-----------( 170      ( 18 Oct 2017 03:17 )             39.1     10-18    141  |  99  |  42<H>  ----------------------------<  92  4.5   |  25  |  6.78<H>    Ca    8.1<L>      18 Oct 2017 04:45  Phos  4.8     10-18  Mg     2.5     10-18    TPro  9.3<H>  /  Alb  4.1  /  TBili  0.6  /  DBili  x   /  AST  22  /  ALT  15  /  AlkPhos  108  10-17    LIVER FUNCTIONS - ( 17 Oct 2017 18:04 )  Alb: 4.1 g/dL / Pro: 9.3 g/dL / ALK PHOS: 108 U/L / ALT: 15 U/L RC / AST: 22 U/L / GGT: x           PT/INR - ( 18 Oct 2017 03:17 )   PT: 12.4 sec;   INR: 1.14 ratio         PTT - ( 17 Oct 2017 18:04 )  PTT:29.2 sec        Imaging: Chief Complaint:  Patient is a 84y old  Female who presents with a chief complaint of Rectal bleeding (17 Oct 2017 23:09)      HPI:  84 F w/ HTN, ESRD on HD (Tues, Thurs, Sat), mitral stenosis s/p bioprosthetic MVR (placed 3/17/16, confirmed with TTE), severe TR s/p tricuspid valve repair on coumadin (primarily for valve as per PCP's office), remote hx of colon cancer s/p resection, and afib (not on AC for this reason as per PCP's office) presents with blood per rectum mixed with formed stool since last Wednesday. Pt reports that she was called by her PCP on Wednesday and informed that her INR was elevated in the s and told to stop taking coumadin. Pt's normal coumadin dosing schedule is 7mg daily Monday through Friday then 6mg daily on Saturday and , and endorses strict compliance. She experienced her first episode of blood mixed with stool per rectum on the same day, also endorses blood on toilet paper. Denies pain with defecation. She endorses diarrhea with up to 3 soft BMs/day, yesterday was 2x/day and pt endorses reduction in frequency of BMs. No recent antibiotic use. Denies dietary changes aside from consumption of 1 full grapefruit per day for the last 3 weeks. No abdominal pain, nausea, vomiting, or hematemesis. Denies NSAID use aside from aspirin. Denies chest pain, lightheadedness, palpitations, SOB, fevers, chills, unintentional weight loss.  Denies other ROS.      Allergies:  Epogen (Unknown)      Home Medications:    Hospital Medications:  allopurinol 100 milliGRAM(s) Oral daily  aspirin enteric coated 81 milliGRAM(s) Oral daily  buDESOnide 160 MICROgram(s)/formoterol 4.5 MICROgram(s) Inhaler 2 Puff(s) Inhalation two times a day  cholecalciferol 1000 Unit(s) Oral daily  gabapentin 100 milliGRAM(s) Oral at bedtime  midodrine 5 milliGRAM(s) Oral daily PRN  montelukast 10 milliGRAM(s) Oral daily  multivitamin 1 Tablet(s) Oral daily  sevelamer hydrochloride 800 milliGRAM(s) Oral three times a day with meals      PMHX/PSHX:  Chronic atrial fibrillation  Tricuspid valve insufficiency, unspecified etiology  Mitral valve stenosis, unspecified etiology  Pneumonia  HTN (hypertension)  Gout  AV fistula  ESRD on hemodialysis  A-V Fistula  History of Gout (ICD9 V12.2)  History of Gout (ICD9 V12.2)  History of Pneumonia (ICD9 V12.61)  HTN (Hypertension) (ICD9 401.9)  HTN (Hypertension) (ICD9 401.9)  HTN (Hypertension) (ICD9 401.9)  Chronic Renal Failure (ICD9 585.9)  Chronic Renal Failure (ICD9 585.9)  History of mitral valve replacement with bioprosthetic valve  H/O total knee replacement, left  H/O:  section  Colon tumor  AV fistula  Colon tumor  History of knee replacement, total, left  A-V fistula  H/O:  Section      Family history:  Family history of heart disease (Sibling)  Family history of anemia  Family history of heart disease (Sibling)  Family history of anemia  No pertinent family history in first degree relatives      Social History:     ROS:     General:  No wt loss, fevers, chills, night sweats, fatigue,   Eyes:  Good vision, no reported pain  ENT:  No sore throat, pain, runny nose, dysphagia  CV:  No pain, palpitations, hypo/hypertension  Resp:  No dyspnea, cough, tachypnea, wheezing  GI:  See HPI  :  No pain, bleeding, incontinence, nocturia  Muscle:  No pain, weakness  Neuro:  No weakness, tingling, memory problems  Psych:  No fatigue, insomnia, mood problems, depression  Endocrine:  No polyuria, polydipsia, cold/heat intolerance  Heme:  No petechiae, ecchymosis, easy bruisability  Skin:  No rash, edema      PHYSICAL EXAM:     GENERAL:  Appears stated age, well-groomed, well-nourished, no distress  HEENT:  NC/AT,  conjunctivae clear and pink,  no JVD  CHEST:  Full & symmetric excursion, no increased effort, breath sounds clear  HEART:  Regular rhythm, S1, S2, no murmur/rub/S3/S4, no abdominal bruit, no edema  ABDOMEN:  Soft, non-tender, non-distended, normoactive bowel sounds,  no masses , no hepatosplenomegaly  EXTREMITIES:  no cyanosis,clubbing or edema  SKIN:  No rash/erythema/ecchymoses/petechiae/wounds/abscess/warm/dry  NEURO:  Alert, oriented    Vital Signs:  Vital Signs Last 24 Hrs  T(C): 36.7 (18 Oct 2017 04:55), Max: 37 (17 Oct 2017 22:01)  T(F): 98.1 (18 Oct 2017 04:55), Max: 98.6 (17 Oct 2017 22:01)  HR: 15 (18 Oct 2017 06:31) (15 - 77)  BP: 113/66 (18 Oct 2017 04:55) (104/61 - 128/78)  BP(mean): --  RR: 17 (18 Oct 2017 04:55) (17 - 19)  SpO2: 97% (18 Oct 2017 06:31) (95% - 100%)  Daily Height in cm: 157.48 (17 Oct 2017 22:54)    Daily     LABS:                        12.8   5.4   )-----------( 170      ( 18 Oct 2017 03:17 )             39.1     10-18    141  |  99  |  42<H>  ----------------------------<  92  4.5   |  25  |  6.78<H>    Ca    8.1<L>      18 Oct 2017 04:45  Phos  4.8     10-18  Mg     2.5     10-18    TPro  9.3<H>  /  Alb  4.1  /  TBili  0.6  /  DBili  x   /  AST  22  /  ALT  15  /  AlkPhos  108  10-17    LIVER FUNCTIONS - ( 17 Oct 2017 18:04 )  Alb: 4.1 g/dL / Pro: 9.3 g/dL / ALK PHOS: 108 U/L / ALT: 15 U/L RC / AST: 22 U/L / GGT: x           PT/INR - ( 18 Oct 2017 03:17 )   PT: 12.4 sec;   INR: 1.14 ratio         PTT - ( 17 Oct 2017 18:04 )  PTT:29.2 sec        Imaging:

## 2017-10-19 LAB
ALT FLD-CCNC: 13 U/L RC — SIGNIFICANT CHANGE UP (ref 10–45)
ANION GAP SERPL CALC-SCNC: 20 MMOL/L — HIGH (ref 5–17)
APTT BLD: 29.5 SEC — SIGNIFICANT CHANGE UP (ref 27.5–37.4)
BUN SERPL-MCNC: 53 MG/DL — HIGH (ref 7–23)
CALCIUM SERPL-MCNC: 9.4 MG/DL — SIGNIFICANT CHANGE UP (ref 8.4–10.5)
CHLORIDE SERPL-SCNC: 100 MMOL/L — SIGNIFICANT CHANGE UP (ref 96–108)
CO2 SERPL-SCNC: 24 MMOL/L — SIGNIFICANT CHANGE UP (ref 22–31)
CREAT SERPL-MCNC: 9.07 MG/DL — HIGH (ref 0.5–1.3)
GLUCOSE SERPL-MCNC: 86 MG/DL — SIGNIFICANT CHANGE UP (ref 70–99)
HAV IGM SER-ACNC: SIGNIFICANT CHANGE UP
HBV CORE AB SER-ACNC: SIGNIFICANT CHANGE UP
HBV CORE IGM SER-ACNC: SIGNIFICANT CHANGE UP
HBV SURFACE AB SER-ACNC: 74.8 MIU/ML — SIGNIFICANT CHANGE UP
HBV SURFACE AG SER-ACNC: SIGNIFICANT CHANGE UP
HCT VFR BLD CALC: 42 % — SIGNIFICANT CHANGE UP (ref 34.5–45)
HCV AB S/CO SERPL IA: 0.12 S/CO — SIGNIFICANT CHANGE UP
HCV AB SERPL-IMP: SIGNIFICANT CHANGE UP
HGB BLD-MCNC: 13.2 G/DL — SIGNIFICANT CHANGE UP (ref 11.5–15.5)
INR BLD: 1.09 RATIO — SIGNIFICANT CHANGE UP (ref 0.88–1.16)
MAGNESIUM SERPL-MCNC: 2.6 MG/DL — SIGNIFICANT CHANGE UP (ref 1.6–2.6)
MCHC RBC-ENTMCNC: 28.1 PG — SIGNIFICANT CHANGE UP (ref 27–34)
MCHC RBC-ENTMCNC: 31.5 GM/DL — LOW (ref 32–36)
MCV RBC AUTO: 89.4 FL — SIGNIFICANT CHANGE UP (ref 80–100)
PHOSPHATE SERPL-MCNC: 6.1 MG/DL — HIGH (ref 2.5–4.5)
PLATELET # BLD AUTO: 156 K/UL — SIGNIFICANT CHANGE UP (ref 150–400)
POTASSIUM SERPL-MCNC: 4.7 MMOL/L — SIGNIFICANT CHANGE UP (ref 3.5–5.3)
POTASSIUM SERPL-SCNC: 4.7 MMOL/L — SIGNIFICANT CHANGE UP (ref 3.5–5.3)
PROTHROM AB SERPL-ACNC: 11.8 SEC — SIGNIFICANT CHANGE UP (ref 9.8–12.7)
RBC # BLD: 4.7 M/UL — SIGNIFICANT CHANGE UP (ref 3.8–5.2)
RBC # FLD: 15.1 % — HIGH (ref 10.3–14.5)
SODIUM SERPL-SCNC: 144 MMOL/L — SIGNIFICANT CHANGE UP (ref 135–145)
WBC # BLD: 5.5 K/UL — SIGNIFICANT CHANGE UP (ref 3.8–10.5)
WBC # FLD AUTO: 5.5 K/UL — SIGNIFICANT CHANGE UP (ref 3.8–10.5)

## 2017-10-19 PROCEDURE — 99233 SBSQ HOSP IP/OBS HIGH 50: CPT | Mod: GC

## 2017-10-19 PROCEDURE — 44391 COLONOSCOPY FOR BLEEDING: CPT | Mod: GC

## 2017-10-19 RX ORDER — ACETAMINOPHEN 500 MG
650 TABLET ORAL ONCE
Qty: 0 | Refills: 0 | Status: COMPLETED | OUTPATIENT
Start: 2017-10-19 | End: 2017-10-19

## 2017-10-19 RX ADMIN — Medication 650 MILLIGRAM(S): at 22:48

## 2017-10-19 RX ADMIN — Medication 650 MILLIGRAM(S): at 23:18

## 2017-10-19 RX ADMIN — MONTELUKAST 10 MILLIGRAM(S): 4 TABLET, CHEWABLE ORAL at 22:48

## 2017-10-19 RX ADMIN — BUDESONIDE AND FORMOTEROL FUMARATE DIHYDRATE 2 PUFF(S): 160; 4.5 AEROSOL RESPIRATORY (INHALATION) at 05:57

## 2017-10-19 RX ADMIN — GABAPENTIN 100 MILLIGRAM(S): 400 CAPSULE ORAL at 22:48

## 2017-10-19 NOTE — PROGRESS NOTE ADULT - SUBJECTIVE AND OBJECTIVE BOX
Patient is a 84y old  Female who presents with a chief complaint of Rectal bleeding (17 Oct 2017 23:09)      INTERVAL HPI/OVERNIGHT EVENTS: No acute events. Could not draw evening CBC as patient was having very frequent bowel movement from bowel prep. She denies abdominal pain, nausea.    T(C): 35.7 (10-19-17 @ 04:11), Max: 36.3 (10-18-17 @ 21:55)  HR: 89 (10-19-17 @ 05:58) (62 - 89)  BP: 137/71 (10-19-17 @ 04:11) (115/70 - 137/71)  RR: 18 (10-19-17 @ 04:11) (18 - 20)  SpO2: 93% (10-19-17 @ 05:58) (93% - 100%)  Wt(kg): --  I&O's Summary    18 Oct 2017 07:01  -  19 Oct 2017 07:00  --------------------------------------------------------  IN: 920 mL / OUT: 0 mL / NET: 920 mL        LABS:                        12.9   5.1   )-----------( 128      ( 18 Oct 2017 11:43 )             38.0     10-18    141  |  99  |  42<H>  ----------------------------<  92  4.5   |  25  |  6.78<H>    Ca    8.1<L>      18 Oct 2017 04:45  Phos  4.8     10-18  Mg     2.5     10-18    TPro  9.3<H>  /  Alb  4.1  /  TBili  0.6  /  DBili  x   /  AST  22  /  ALT  15  /  AlkPhos  108  10-17    PT/INR - ( 18 Oct 2017 03:17 )   PT: 12.4 sec;   INR: 1.14 ratio         PTT - ( 17 Oct 2017 18:04 )  PTT:29.2 sec    CAPILLARY BLOOD GLUCOSE              REVIEW OF SYSTEMS:  CONSTITUTIONAL: No fever, weight loss, or fatigue  EYES: No eye pain, visual disturbances, or discharge  ENMT:  No difficulty hearing, tinnitus, vertigo; No sinus or throat pain  NECK: No pain or stiffness  RESPIRATORY: No cough, wheezing, chills or hemoptysis; No shortness of breath  CARDIOVASCULAR: No chest pain, palpitations, dizziness, or leg swelling  GASTROINTESTINAL: No abdominal or epigastric pain. No nausea, vomiting, or hematemesis. +Diarrhea overnight with bowel prep  GENITOURINARY: No dysuria, frequency, hematuria, or incontinence  NEUROLOGICAL: No headaches, memory loss, loss of strength, numbness, or tremors  SKIN: No itching, burning, rashes, or lesions   LYMPH NODES: No enlarged glands  ENDOCRINE: No heat or cold intolerance; No hair loss  MUSCULOSKELETAL: No joint pain or swelling; No muscle, back, or extremity pain  PSYCHIATRIC: No depression, anxiety, mood swings, or difficulty sleeping  HEME/LYMPH: No easy bruising, or bleeding gums  ALLERY AND IMMUNOLOGIC: No hives or eczema    RADIOLOGY & ADDITIONAL TESTS:    Imaging Personally Reviewed:  [ ] YES  [x ] NO    Consultant(s) Notes Reviewed:  [x ] YES  [ ] NO, GI, Nephrology    PHYSICAL EXAM:  GENERAL: NAD, well-groomed, well-developed  HEAD:  Atraumatic, Normocephalic  EYES: EOMI, PERRLA, conjunctiva and sclera clear  ENMT: No tonsillar erythema, exudates, or enlargement; Moist mucous membranes, Good dentition, No lesions  NECK: Supple, No JVD, Normal thyroid  NERVOUS SYSTEM:  Alert & Oriented X3, Good concentration  CHEST/LUNG: Clear to auscultation bilaterally; No rales, rhonchi, wheezing, or rubs  HEART: Regular rate and rhythm; No murmurs, rubs, or gallops  ABDOMEN: Soft, Nontender, Nondistended; Bowel sounds present  EXTREMITIES:  2+ Peripheral Pulses, No clubbing, cyanosis, or edema  LYMPH: No lymphadenopathy noted  SKIN: No rashes or lesions    Care Discussed with Consultants/Other Providers [x ] YES  [ ] NO, Nephrology Patient is a 84y old  Female who presents with a chief complaint of Rectal bleeding (17 Oct 2017 23:09)      INTERVAL HPI/OVERNIGHT EVENTS: No acute events. pt reports that she did see some blood clots in her stool. Could not draw evening CBC as patient was having very frequent bowel movement from bowel prep. She denies abdominal pain, nausea.      T(C): 35.7 (10-19-17 @ 04:11), Max: 36.3 (10-18-17 @ 21:55)  HR: 89 (10-19-17 @ 05:58) (62 - 89)  BP: 137/71 (10-19-17 @ 04:11) (115/70 - 137/71)  RR: 18 (10-19-17 @ 04:11) (18 - 20)  SpO2: 93% (10-19-17 @ 05:58) (93% - 100%)  Wt(kg): --  I&O's Summary    18 Oct 2017 07:01  -  19 Oct 2017 07:00  --------------------------------------------------------  IN: 920 mL / OUT: 0 mL / NET: 920 mL        LABS:                        12.9   5.1   )-----------( 128      ( 18 Oct 2017 11:43 )             38.0     10-18    141  |  99  |  42<H>  ----------------------------<  92  4.5   |  25  |  6.78<H>    Ca    8.1<L>      18 Oct 2017 04:45  Phos  4.8     10-18  Mg     2.5     10-18    TPro  9.3<H>  /  Alb  4.1  /  TBili  0.6  /  DBili  x   /  AST  22  /  ALT  15  /  AlkPhos  108  10-17    PT/INR - ( 18 Oct 2017 03:17 )   PT: 12.4 sec;   INR: 1.14 ratio         PTT - ( 17 Oct 2017 18:04 )  PTT:29.2 sec    CAPILLARY BLOOD GLUCOSE      REVIEW OF SYSTEMS:  CONSTITUTIONAL: No fever, weight loss, or fatigue  EYES: No eye pain, visual disturbances, or discharge  ENMT:  No difficulty hearing, tinnitus, vertigo; No sinus or throat pain  NECK: No pain or stiffness  RESPIRATORY: No cough, wheezing, chills or hemoptysis; No shortness of breath  CARDIOVASCULAR: No chest pain, palpitations, dizziness, or leg swelling  GASTROINTESTINAL: No abdominal or epigastric pain. No nausea, vomiting, or hematemesis. +Diarrhea overnight with bowel prep  GENITOURINARY: No dysuria, frequency, hematuria, or incontinence  NEUROLOGICAL: No headaches, memory loss, loss of strength, numbness, or tremors  SKIN: No itching, burning, rashes, or lesions   LYMPH NODES: No enlarged glands  ENDOCRINE: No heat or cold intolerance; No hair loss  MUSCULOSKELETAL: No joint pain or swelling; No muscle, back, or extremity pain  PSYCHIATRIC: No depression, anxiety, mood swings, or difficulty sleeping  HEME/LYMPH: No easy bruising, or bleeding gums  ALLERY AND IMMUNOLOGIC: No hives or eczema    RADIOLOGY & ADDITIONAL TESTS:    Imaging Personally Reviewed:  [ ] YES  [x ] NO    Consultant(s) Notes Reviewed:  [x ] YES  [ ] NO, GI, Nephrology    PHYSICAL EXAM:  GENERAL: NAD, well-groomed, well-developed  HEAD:  Atraumatic, Normocephalic  EYES: EOMI, PERRLA, conjunctiva and sclera clear  ENMT: No tonsillar erythema, exudates, or enlargement; Moist mucous membranes, Good dentition, No lesions  NECK: Supple, No JVD, Normal thyroid  NERVOUS SYSTEM:  Alert & Oriented X3, Good concentration  CHEST/LUNG: Clear to auscultation bilaterally; No rales, rhonchi, wheezing, or rubs  HEART: Regular rate and rhythm; No murmurs, rubs, or gallops  ABDOMEN: Soft, Nontender, Nondistended; Bowel sounds present  EXTREMITIES:  2+ Peripheral Pulses, No clubbing, cyanosis, or edema  LYMPH: No lymphadenopathy noted  SKIN: No rashes or lesions    Care Discussed with Consultants/Other Providers [x ] YES  [ ] NO, Nephrology

## 2017-10-19 NOTE — PROGRESS NOTE ADULT - PROBLEM SELECTOR PLAN 1
-Patient with reported GI bleed, red blood per rectum multiple times, now with stable hemoglobin at 12.8 x 3  -Will monitor CBC q12 after one more repeat, could not draw overnight, no active bleeding reported  -Hemodynamically stable  -Plan for colonoscopy today, has been NPO since midnight -Patient with reported GI bleed, red blood per rectum multiple times, now with stable hemoglobin at 12.8 x 3  -Will monitor CBC q12 pending colonoscopy results, could not draw overnight, no active bleeding reported  -Hemodynamically stable  -Plan for colonoscopy today, has been NPO since midnight

## 2017-10-19 NOTE — PROGRESS NOTE ADULT - ASSESSMENT
84 F w/ HTN, ESRD on HD (TTS), mitral stenosis s/p bioprosthetic MVR, severe TR s/p tricuspid valve repair, remote hx of colon cancer s/p resection, and afib on coumadin present with rectal bleeding

## 2017-10-19 NOTE — PROGRESS NOTE ADULT - PROBLEM SELECTOR PLAN 4
-Patient with ESRD on HD, patient's nephrologist is Dr. Yaya Garcia with [facility] nephrology, previously with Dr. Schaefer  -Plan for dialysis today per Dr. Galeano, recommendations appreciated  -Hyperkalemic overnight, but specimen grossly hemolyzed, repeat showed normal potassium  -HD T/Th/S  -Renal diet once cleared to have diet

## 2017-10-19 NOTE — PROVIDER CONTACT NOTE (OTHER) - SITUATION
Unable to draw patients 11pm blood levels due to pt taking moviprep causing her to go to bathroom too frequently.

## 2017-10-19 NOTE — PROGRESS NOTE ADULT - SUBJECTIVE AND OBJECTIVE BOX
Pre-Endoscopy Evaluation      Referring Physician:  Dr. Reyes                                Procedure: Colonoscopy    Indication for Procedure: GIB    Pertinent History: 84 year old female with hx of  HTN, ESRD on HD (TTS), mitral stenosis s/p bioprosthetic MVR, severe TR s/p tricuspid valve repair, remote hx of colon cancer s/p resection, and Afib on coumadin present with Hematochezia      Sedation by Anesthesia [X]    PAST MEDICAL & SURGICAL HISTORY:  Chronic atrial fibrillation  Tricuspid valve insufficiency, unspecified etiology  Mitral valve stenosis, unspecified etiology  Gout  ESRD on hemodialysis  History of mitral valve replacement with bioprosthetic valve  H/O total knee replacement, left  H/O:  section  Colon tumor: tumor removed from colon   A-V fistula: Left A-V fistula for HD      PMH of Gastroparesis [ ]  Gastric Surgery [ ]  Gastric Outlet Obstruction [ ]    Allergies:    Epogen (Unknown)    Intolerances:    Latex allergy: [ ] yes [X] no    Medications:MEDICATIONS  (STANDING):  allopurinol 100 milliGRAM(s) Oral daily  buDESOnide 160 MICROgram(s)/formoterol 4.5 MICROgram(s) Inhaler 2 Puff(s) Inhalation two times a day  cholecalciferol 1000 Unit(s) Oral daily  gabapentin 100 milliGRAM(s) Oral at bedtime  montelukast 10 milliGRAM(s) Oral daily  multivitamin 1 Tablet(s) Oral daily  sevelamer hydrochloride 800 milliGRAM(s) Oral three times a day with meals    MEDICATIONS  (PRN):  midodrine 5 milliGRAM(s) Oral daily PRN On Tue/Thurs/Sat pre-HD      Smoking: [ ] yes  [X] no    AICD/PPM: [ ] yes   [X] no    Pertinent lab data:                        13.2   5.5   )-----------( 156      ( 19 Oct 2017 09:45 )             42.0     10-    144  |  100  |  53<H>  ----------------------------<  86  4.7   |  24  |  9.07<H>    Ca    9.4      19 Oct 2017 10:12  Phos  6.1     10-  Mg     2.6     10-    TPro  x   /  Alb  x   /  TBili  x   /  DBili  x   /  AST  x   /  ALT  13  /  AlkPhos  x   10-    PT/INR - ( 19 Oct 2017 10:13 )   PT: 11.8 sec;   INR: 1.09 ratio      PTT - ( 19 Oct 2017 10:13 )  PTT:29.5 sec    < from: Limited Transthoracic Echo (16 @ 16:31) >    Patient name: JOSE C WATTS  YOB: 1933   Age: 83 (F)   MR#: [id number]  Study Date: 3/28/2016  Location: [address] [place], [place] [id number]: [facility]  Study quality: Technically difficult  Referring Physician: Kodi Yoo MD  Blood Pressure: 118/56 mmHg  Height: 5ft 5in  Weight: 170 lb  BSA: 1.9 m2  ------------------------------------------------------------------------  PROCEDURE: Limited transthoracic echocardiogram with 2-D.  M-Mode and spectral and color flow Doppler.  INDICATION: Pericardial effusion (noninflammatory) (I31.3)  ------------------------------------------------------------------------  Observations:  Mitral Valve: Bioprosthetic mitral valve replacement. Mean  transmitral valve gradient equals 7 mm Hg,which is  elevated even in the setting of a prosthetic valve.  Aortic Valve/Aorta: Normal trileaflet aortic valve.  Normal aortic root, aortic arch and descending thoracic  aorta.  Left Ventricle: Hyperdynamic left ventricle. Septal motion  consistent with conduction defect/ post-operative state.  Right Heart: Right atrium not well visualized. The right  ventricle is not well visualized. Normal tricuspid valve.  Normal pulmonic valve.  Pericardium/Pleura: Normal pericardium with no pericardial  effusion.  Hemodynamic: Estimated right atrial pressure is 8 mm Hg.  ------------------------------------------------------------------------  Conclusions:  1. Bioprosthetic mitral valve replacement.  2. Hyperdynamic left ventricle. Septal motion consistent  with conduction defect/ post-operative state.  3. Normal pericardium with no pericardial effusion.  ------------------------------------------------------------------------  Confirmed on  3/28/2016 - 17:58:02 by Hammad Shi MD  ------------------------------------------------------------------------      Physical Examination:  Daily Height in cm: 157.48 (19 Oct 2017 09:06)    Daily   Vital Signs Last 24 Hrs  T(C): 36.4 (19 Oct 2017 09:06), Max: 36.4 (19 Oct 2017 09:06)  T(F): 97.5 (19 Oct 2017 09:06), Max: 97.5 (19 Oct 2017 09:06)  HR: 62 (19 Oct 2017 09:06) (62 - 89)  BP: 134/75 (19 Oct 2017 09:06) (115/70 - 137/71)  BP(mean): --  RR: 20 (19 Oct 2017 09:06) (18 - 20)  SpO2: 98% (19 Oct 2017 09:06) (93% - 100%)      Constitutional: NAD    HEENT: PERRLA, EOMI,       Neck:  No JVD    Respiratory: CTAB/L    Cardiovascular: S1 and S2    Gastrointestinal: BS+, soft, NT/ND    Extremities: No peripheral edema    Neurological: A/O x 3    : No Acosta    Skin: No rashes    Comments:    ASA Class: I [ ]  II [ ]  III [ ]  IV [X]    The patient is a suitable candidate for the planned procedure unless box checked [ ]  No, explain: Pre-Endoscopy Evaluation      Referring Physician:  Dr. Reyes                                Procedure: Colonoscopy    Indication for Procedure: GIB    Pertinent History: 84 year old female with hx of  HTN, ESRD on HD (TTS), mitral stenosis s/p bioprosthetic MVR, severe TR s/p tricuspid valve repair, remote hx of colon cancer s/p resection, and Afib on coumadin present with Hematochezia      Sedation by Anesthesia [X]    PAST MEDICAL & SURGICAL HISTORY:  Chronic atrial fibrillation  Tricuspid valve insufficiency, unspecified etiology  Mitral valve stenosis, unspecified etiology  Gout  ESRD on hemodialysis  History of mitral valve replacement with bioprosthetic valve  H/O total knee replacement, left  H/O:  section  Colon tumor: tumor removed from colon   A-V fistula: Left A-V fistula for HD      PMH of Gastroparesis [ ]  Gastric Surgery [ ]  Gastric Outlet Obstruction [ ]    Allergies:    Epogen (Unknown)    Intolerances:    Latex allergy: [ ] yes [X] no    Medications:MEDICATIONS  (STANDING):  allopurinol 100 milliGRAM(s) Oral daily  buDESOnide 160 MICROgram(s)/formoterol 4.5 MICROgram(s) Inhaler 2 Puff(s) Inhalation two times a day  cholecalciferol 1000 Unit(s) Oral daily  gabapentin 100 milliGRAM(s) Oral at bedtime  montelukast 10 milliGRAM(s) Oral daily  multivitamin 1 Tablet(s) Oral daily  sevelamer hydrochloride 800 milliGRAM(s) Oral three times a day with meals    MEDICATIONS  (PRN):  midodrine 5 milliGRAM(s) Oral daily PRN On Tue/Thurs/Sat pre-HD      Smoking: [ ] yes  [X] no    AICD/PPM: [X] yes   [ ] no    Pertinent lab data:                        13.2   5.5   )-----------( 156      ( 19 Oct 2017 09:45 )             42.0     10-    144  |  100  |  53<H>  ----------------------------<  86  4.7   |  24  |  9.07<H>    Ca    9.4      19 Oct 2017 10:12  Phos  6.1     10-  Mg     2.6     10-    TPro  x   /  Alb  x   /  TBili  x   /  DBili  x   /  AST  x   /  ALT  13  /  AlkPhos  x   10-    PT/INR - ( 19 Oct 2017 10:13 )   PT: 11.8 sec;   INR: 1.09 ratio      PTT - ( 19 Oct 2017 10:13 )  PTT:29.5 sec    < from: Limited Transthoracic Echo (16 @ 16:31) >    Patient name: JOSE C WATTS  YOB: 1933   Age: 83 (F)   MR#: [id number]  Study Date: 3/28/2016  Location: [address] [place], [place] [id number]: [facility]  Study quality: Technically difficult  Referring Physician: Kodi Yoo MD  Blood Pressure: 118/56 mmHg  Height: 5ft 5in  Weight: 170 lb  BSA: 1.9 m2  ------------------------------------------------------------------------  PROCEDURE: Limited transthoracic echocardiogram with 2-D.  M-Mode and spectral and color flow Doppler.  INDICATION: Pericardial effusion (noninflammatory) (I31.3)  ------------------------------------------------------------------------  Observations:  Mitral Valve: Bioprosthetic mitral valve replacement. Mean  transmitral valve gradient equals 7 mm Hg,which is  elevated even in the setting of a prosthetic valve.  Aortic Valve/Aorta: Normal trileaflet aortic valve.  Normal aortic root, aortic arch and descending thoracic  aorta.  Left Ventricle: Hyperdynamic left ventricle. Septal motion  consistent with conduction defect/ post-operative state.  Right Heart: Right atrium not well visualized. The right  ventricle is not well visualized. Normal tricuspid valve.  Normal pulmonic valve.  Pericardium/Pleura: Normal pericardium with no pericardial  effusion.  Hemodynamic: Estimated right atrial pressure is 8 mm Hg.  ------------------------------------------------------------------------  Conclusions:  1. Bioprosthetic mitral valve replacement.  2. Hyperdynamic left ventricle. Septal motion consistent  with conduction defect/ post-operative state.  3. Normal pericardium with no pericardial effusion.  ------------------------------------------------------------------------  Confirmed on  3/28/2016 - 17:58:02 by Hammad Shi MD  ------------------------------------------------------------------------      Physical Examination:  Daily Height in cm: 157.48 (19 Oct 2017 09:06)    Daily   Vital Signs Last 24 Hrs  T(C): 36.4 (19 Oct 2017 09:06), Max: 36.4 (19 Oct 2017 09:06)  T(F): 97.5 (19 Oct 2017 09:06), Max: 97.5 (19 Oct 2017 09:06)  HR: 62 (19 Oct 2017 09:06) (62 - 89)  BP: 134/75 (19 Oct 2017 09:06) (115/70 - 137/71)  BP(mean): --  RR: 20 (19 Oct 2017 09:06) (18 - 20)  SpO2: 98% (19 Oct 2017 09:06) (93% - 100%)      Constitutional: NAD    HEENT: PERRLA, EOMI,       Neck:  No JVD    Respiratory: CTAB/L    Cardiovascular: S1 and S2    Gastrointestinal: BS+, soft, NT/ND    Extremities: No peripheral edema    Neurological: A/O x 3    : No Acosta    Skin: No rashes    Comments:    ASA Class: I [ ]  II [ ]  III [ ]  IV [X]    The patient is a suitable candidate for the planned procedure unless box checked [ ]  No, explain:

## 2017-10-19 NOTE — PROGRESS NOTE ADULT - SUBJECTIVE AND OBJECTIVE BOX
Patient seen and examined  awaiting colonscopy today   denies any f/c/n/v/d/c    Epogen (Unknown)    Hospital Medications:   MEDICATIONS  (STANDING):  allopurinol 100 milliGRAM(s) Oral daily  buDESOnide 160 MICROgram(s)/formoterol 4.5 MICROgram(s) Inhaler 2 Puff(s) Inhalation two times a day  cholecalciferol 1000 Unit(s) Oral daily  gabapentin 100 milliGRAM(s) Oral at bedtime  montelukast 10 milliGRAM(s) Oral daily  multivitamin 1 Tablet(s) Oral daily  sevelamer hydrochloride 800 milliGRAM(s) Oral three times a day with meals      VITALS:  T(F): 97.5 (10-19-17 @ 09:06), Max: 97.5 (10-19-17 @ 09:06)  HR: 62 (10-19-17 @ 09:06)  BP: 134/75 (10-19-17 @ 09:06)  RR: 20 (10-19-17 @ 09:06)  SpO2: 98% (10-19-17 @ 09:06)  Wt(kg): --    10-18 @ 07:01  -  10-19 @ 07:00  --------------------------------------------------------  IN: 920 mL / OUT: 0 mL / NET: 920 mL      Height (cm): 157.48 (10-19 @ 09:06)  Weight (kg): 80.6 (10-19 @ 09:06)  BMI (kg/m2): 32.5 (10-19 @ 09:06)  BSA (m2): 1.82 (10-19 @ 09:06)  PHYSICAL EXAM:  Constitutional: NAD  HEENT: anicteric sclera, oropharynx clear, MMM  Neck: No JVD  Respiratory: b/l rhonchi  Cardiovascular: S1, S2, RRR  Gastrointestinal: BS+, soft, NT/ND  Extremities: No cyanosis or clubbing. No peripheral edema  Neurological: A/O x 3, no focal deficits  Psychiatric: Normal mood, normal affect  : No CVA tenderness. No ortiz.   Skin: No rashes  Vascular Access:+ avf + thrill    LABS:  10-19    144  |  100  |  53<H>  ----------------------------<  86  4.7   |  24  |  9.07<H>    Ca    9.4      19 Oct 2017 10:12  Phos  6.1     10-19  Mg     2.6     10-19    TPro      /  Alb      /  TBili      /  DBili      /  AST      /  ALT  13  /  AlkPhos      10-19    Creatinine Trend: 9.07 <--, 6.78 <--, 6.58 <--, 5.84 <--                        13.2   5.5   )-----------( 156      ( 19 Oct 2017 09:45 )             42.0     Urine Studies:      RADIOLOGY & ADDITIONAL STUDIES:

## 2017-10-19 NOTE — PROGRESS NOTE ADULT - PROBLEM SELECTOR PLAN 3
-Patient with documented history of atrial fibrillation, althought PCP's office states AC is for the mitral valve  -Will hold Coumadin in the setting of GI bleed  -[id number] score is 3, patient is a candidate for anticoagulation for stroke risk. Will resume once cleared from GI standpoint -Patient with documented history of atrial fibrillation, althought PCP's office states AC is for the mitral valve  -Will hold Coumadin in the setting of GI bleed  -[id number] score is 3, patient is a candidate for anticoagulation for stroke risk. -Will resume once cleared from GI standpoint

## 2017-10-20 LAB
ANION GAP SERPL CALC-SCNC: 17 MMOL/L — SIGNIFICANT CHANGE UP (ref 5–17)
APTT BLD: 28.5 SEC — SIGNIFICANT CHANGE UP (ref 27.5–37.4)
APTT BLD: 30.3 SEC — SIGNIFICANT CHANGE UP (ref 27.5–37.4)
BLD GP AB SCN SERPL QL: NEGATIVE — SIGNIFICANT CHANGE UP
BUN SERPL-MCNC: 26 MG/DL — HIGH (ref 7–23)
CALCIUM SERPL-MCNC: 8.5 MG/DL — SIGNIFICANT CHANGE UP (ref 8.4–10.5)
CHLORIDE SERPL-SCNC: 98 MMOL/L — SIGNIFICANT CHANGE UP (ref 96–108)
CO2 SERPL-SCNC: 26 MMOL/L — SIGNIFICANT CHANGE UP (ref 22–31)
CREAT SERPL-MCNC: 5.67 MG/DL — HIGH (ref 0.5–1.3)
GLUCOSE SERPL-MCNC: 82 MG/DL — SIGNIFICANT CHANGE UP (ref 70–99)
HCT VFR BLD CALC: 37 % — SIGNIFICANT CHANGE UP (ref 34.5–45)
HCT VFR BLD CALC: 37.2 % — SIGNIFICANT CHANGE UP (ref 34.5–45)
HCT VFR BLD CALC: 39.2 % — SIGNIFICANT CHANGE UP (ref 34.5–45)
HGB BLD-MCNC: 11.2 G/DL — LOW (ref 11.5–15.5)
HGB BLD-MCNC: 11.8 G/DL — SIGNIFICANT CHANGE UP (ref 11.5–15.5)
HGB BLD-MCNC: 12.5 G/DL — SIGNIFICANT CHANGE UP (ref 11.5–15.5)
INR BLD: 1.09 RATIO — SIGNIFICANT CHANGE UP (ref 0.88–1.16)
INR BLD: 1.12 RATIO — SIGNIFICANT CHANGE UP (ref 0.88–1.16)
MAGNESIUM SERPL-MCNC: 2.1 MG/DL — SIGNIFICANT CHANGE UP (ref 1.6–2.6)
MCHC RBC-ENTMCNC: 26.7 PG — LOW (ref 27–34)
MCHC RBC-ENTMCNC: 28.2 PG — SIGNIFICANT CHANGE UP (ref 27–34)
MCHC RBC-ENTMCNC: 28.4 PG — SIGNIFICANT CHANGE UP (ref 27–34)
MCHC RBC-ENTMCNC: 30.1 GM/DL — LOW (ref 32–36)
MCHC RBC-ENTMCNC: 31.8 GM/DL — LOW (ref 32–36)
MCHC RBC-ENTMCNC: 31.9 GM/DL — LOW (ref 32–36)
MCV RBC AUTO: 88.6 FL — SIGNIFICANT CHANGE UP (ref 80–100)
MCV RBC AUTO: 88.7 FL — SIGNIFICANT CHANGE UP (ref 80–100)
MCV RBC AUTO: 89 FL — SIGNIFICANT CHANGE UP (ref 80–100)
PHOSPHATE SERPL-MCNC: 4.4 MG/DL — SIGNIFICANT CHANGE UP (ref 2.5–4.5)
PLATELET # BLD AUTO: 112 K/UL — LOW (ref 150–400)
PLATELET # BLD AUTO: 125 K/UL — LOW (ref 150–400)
PLATELET # BLD AUTO: 135 K/UL — LOW (ref 150–400)
POTASSIUM SERPL-MCNC: 4.2 MMOL/L — SIGNIFICANT CHANGE UP (ref 3.5–5.3)
POTASSIUM SERPL-SCNC: 4.2 MMOL/L — SIGNIFICANT CHANGE UP (ref 3.5–5.3)
PROTHROM AB SERPL-ACNC: 11.9 SEC — SIGNIFICANT CHANGE UP (ref 9.8–12.7)
PROTHROM AB SERPL-ACNC: 12.1 SEC — SIGNIFICANT CHANGE UP (ref 9.8–12.7)
RBC # BLD: 4.18 M/UL — SIGNIFICANT CHANGE UP (ref 3.8–5.2)
RBC # BLD: 4.2 M/UL — SIGNIFICANT CHANGE UP (ref 3.8–5.2)
RBC # BLD: 4.41 M/UL — SIGNIFICANT CHANGE UP (ref 3.8–5.2)
RBC # FLD: 14.8 % — HIGH (ref 10.3–14.5)
RBC # FLD: 14.9 % — HIGH (ref 10.3–14.5)
RBC # FLD: 15 % — HIGH (ref 10.3–14.5)
RH IG SCN BLD-IMP: POSITIVE — SIGNIFICANT CHANGE UP
SODIUM SERPL-SCNC: 141 MMOL/L — SIGNIFICANT CHANGE UP (ref 135–145)
WBC # BLD: 4.6 K/UL — SIGNIFICANT CHANGE UP (ref 3.8–10.5)
WBC # BLD: 4.8 K/UL — SIGNIFICANT CHANGE UP (ref 3.8–10.5)
WBC # BLD: 6 K/UL — SIGNIFICANT CHANGE UP (ref 3.8–10.5)
WBC # FLD AUTO: 4.6 K/UL — SIGNIFICANT CHANGE UP (ref 3.8–10.5)
WBC # FLD AUTO: 4.8 K/UL — SIGNIFICANT CHANGE UP (ref 3.8–10.5)
WBC # FLD AUTO: 6 K/UL — SIGNIFICANT CHANGE UP (ref 3.8–10.5)

## 2017-10-20 PROCEDURE — 99232 SBSQ HOSP IP/OBS MODERATE 35: CPT | Mod: GC

## 2017-10-20 PROCEDURE — 99233 SBSQ HOSP IP/OBS HIGH 50: CPT | Mod: GC

## 2017-10-20 RX ORDER — WARFARIN SODIUM 2.5 MG/1
5 TABLET ORAL ONCE
Qty: 0 | Refills: 0 | Status: COMPLETED | OUTPATIENT
Start: 2017-10-20 | End: 2017-10-20

## 2017-10-20 RX ORDER — ASPIRIN/CALCIUM CARB/MAGNESIUM 324 MG
81 TABLET ORAL DAILY
Qty: 0 | Refills: 0 | Status: DISCONTINUED | OUTPATIENT
Start: 2017-10-20 | End: 2017-10-23

## 2017-10-20 RX ADMIN — WARFARIN SODIUM 5 MILLIGRAM(S): 2.5 TABLET ORAL at 21:56

## 2017-10-20 RX ADMIN — SEVELAMER CARBONATE 800 MILLIGRAM(S): 2400 POWDER, FOR SUSPENSION ORAL at 13:48

## 2017-10-20 RX ADMIN — GABAPENTIN 100 MILLIGRAM(S): 400 CAPSULE ORAL at 21:56

## 2017-10-20 RX ADMIN — BUDESONIDE AND FORMOTEROL FUMARATE DIHYDRATE 2 PUFF(S): 160; 4.5 AEROSOL RESPIRATORY (INHALATION) at 05:32

## 2017-10-20 RX ADMIN — BUDESONIDE AND FORMOTEROL FUMARATE DIHYDRATE 2 PUFF(S): 160; 4.5 AEROSOL RESPIRATORY (INHALATION) at 17:49

## 2017-10-20 RX ADMIN — Medication 1000 UNIT(S): at 10:49

## 2017-10-20 RX ADMIN — MONTELUKAST 10 MILLIGRAM(S): 4 TABLET, CHEWABLE ORAL at 21:56

## 2017-10-20 RX ADMIN — Medication 1 TABLET(S): at 10:49

## 2017-10-20 RX ADMIN — SEVELAMER CARBONATE 800 MILLIGRAM(S): 2400 POWDER, FOR SUSPENSION ORAL at 09:20

## 2017-10-20 RX ADMIN — Medication 100 MILLIGRAM(S): at 10:49

## 2017-10-20 RX ADMIN — SEVELAMER CARBONATE 800 MILLIGRAM(S): 2400 POWDER, FOR SUSPENSION ORAL at 17:49

## 2017-10-20 NOTE — PROGRESS NOTE ADULT - ASSESSMENT
84 F w/ HTN, ESRD on HD (Tues, Thurs, Sat), mitral stenosis s/p bioprosthetic MVR (placed 3/17/16, confirmed with TTE), severe TR s/p tricuspid valve repair on coumadin (primarily for valve), remote hx of colon cancer s/p resection, and afib (not on AC) presents with hematochezia since last Wednesday. After stopping coumadin, pt's bm's have become less frequent but continue to be bloody. INR since admission subtherapeutic. Normal bm today. Colonoscopy performed. Found diverticulosis in the sigmoid colon. Blood in the rectum and in the sigmoid colon. 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      Plan:    GIB  - hx of colon cancer, last colonoscopy was normal 7 years ago  - last endoscopy was normal 4 years ago.  - Hemostasis achieved from colonoscopy on 10/19 via clipping of Dieulafoy lesion.  - hbg trending down, please continue trending CBC  - resume previous diet  - pt hemodynamically stable  - Follow up GI as outpatient; repeat colonoscopy 6-8 weeks  - OK to resume AC      Biomechanical valve placed 3/17/16:  - coumadin for mitral valve primarily, not for afib as per pt and PCP's office  - as per guidelines, AC not necessarily indicated beyond 3 months.   - heparin bridge not required to restart coumadin as valve is not mechanical  - OK to resume AC    Atrial fibrillation  - AC not for afib as per PCP's office  - CHADSVASC score 3  - OK to resume AC    Other medical problems  - per primary team    Thank you for the consult, please call back with additional questions.    Plan discussed with fellow.    Leo Morales  Pager [phone] 84 F w/ HTN, ESRD on HD (Tues, Thurs, Sat), mitral stenosis s/p bioprosthetic MVR (placed 3/17/16, confirmed with TTE), severe TR s/p tricuspid valve repair on coumadin (primarily for valve), remote hx of colon cancer s/p resection, and afib (not on AC) presents with hematochezia since last Wednesday. After stopping coumadin, pt's bm's have become less frequent but continue to be bloody. INR since admission subtherapeutic. Normal bm today. Colonoscopy performed. Found diverticulosis in the sigmoid colon. Blood in the rectum and in the sigmoid colon. 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      Plan:    GIB  - hx of colon cancer, last colonoscopy was normal 7 years ago  - last endoscopy was normal 4 years ago.  - Hemostasis achieved from colonoscopy on 10/19 via clipping of Dieulafoy lesion.  - hbg trending down, please continue trending CBC  - resume previous diet  - pt hemodynamically stable    - OK to resume AC      Biomechanical valve placed 3/17/16:  - coumadin for mitral valve primarily, not for afib as per pt and PCP's office  - as per guidelines, AC not necessarily indicated beyond 3 months.   - heparin bridge not required to restart coumadin as valve is not mechanical  - OK to resume AC    Atrial fibrillation  - AC not for afib as per PCP's office  - CHADSVASC score 3  - OK to resume AC    Other medical problems  - per primary team    Thank you for the consult, please call back with additional questions.    Plan discussed with fellow.    Leo Morales  Pager [phone]

## 2017-10-20 NOTE — PROGRESS NOTE ADULT - SUBJECTIVE AND OBJECTIVE BOX
Chief Complaint:  Patient is a 84y old  Female who presents with a chief complaint of Rectal bleeding (17 Oct 2017 23:09)      Interval Events:     Pt seen and examined at bedside. Endorses bowel movement of brown color. Denies any recurrent bleeding per rectum. Denies pain. Denies f/c/n/v/d/cp/sob    Allergies:  Epogen (Unknown)      Hospital Medications:  allopurinol 100 milliGRAM(s) Oral daily  buDESOnide 160 MICROgram(s)/formoterol 4.5 MICROgram(s) Inhaler 2 Puff(s) Inhalation two times a day  cholecalciferol 1000 Unit(s) Oral daily  gabapentin 100 milliGRAM(s) Oral at bedtime  midodrine 5 milliGRAM(s) Oral daily PRN  montelukast 10 milliGRAM(s) Oral daily  multivitamin 1 Tablet(s) Oral daily  sevelamer hydrochloride 800 milliGRAM(s) Oral three times a day with meals      PMHX/PSHX:  Chronic atrial fibrillation  Tricuspid valve insufficiency, unspecified etiology  Mitral valve stenosis, unspecified etiology  Pneumonia  HTN (hypertension)  Gout  AV fistula  ESRD on hemodialysis  A-V Fistula  History of Gout (ICD9 V12.2)  History of Gout (ICD9 V12.2)  History of Pneumonia (ICD9 V12.61)  HTN (Hypertension) (ICD9 401.9)  HTN (Hypertension) (ICD9 401.9)  HTN (Hypertension) (ICD9 401.9)  Chronic Renal Failure (ICD9 585.9)  Chronic Renal Failure (ICD9 585.9)  History of mitral valve replacement with bioprosthetic valve  H/O total knee replacement, left  H/O:  section  Colon tumor  AV fistula  Colon tumor  History of knee replacement, total, left  A-V fistula  H/O:  Section      Family history:  Family history of heart disease (Sibling)  Family history of anemia  Family history of heart disease (Sibling)  Family history of anemia  No pertinent family history in first degree relatives      ROS:     General:  No wt loss, fevers, chills, night sweats, fatigue,   Eyes:  Good vision, no reported pain  ENT:  No sore throat, pain, runny nose, dysphagia  CV:  No pain, palpitations, hypo/hypertension  Resp:  No dyspnea, cough, tachypnea, wheezing  GI:  See HPI  :  No pain, bleeding, incontinence, nocturia  Muscle:  No pain, weakness  Neuro:  No weakness, tingling, memory problems  Psych:  No fatigue, insomnia, mood problems, depression  Endocrine:  No polyuria, polydipsia, cold/heat intolerance  Heme:  No petechiae, ecchymosis, easy bruisability  Skin:  No rash, edema      PHYSICAL EXAM:     GENERAL:  Appears stated age, well-groomed, well-nourished, no distress  HEENT:  NC/AT,  conjunctivae clear, sclera -anicteric  CHEST:  Full & symmetric excursion, no increased effort, breath sounds clear  HEART:  Regular rhythm, S1, S2, no murmur/rub/S3/S4,  no edema  ABDOMEN:  Soft, non-tender, non-distended, normoactive bowel sounds,  no masses ,no hepato-splenomegaly,   RECTUM: No blood per rectum  EXTREMITIES:  no cyanosis,clubbing or edema  SKIN:  No rash/erythema/ecchymoses/petechiae/wounds/abscess/warm/dry  NEURO:  Alert, oriented    Vital Signs:  Vital Signs Last 24 Hrs  T(C): 36.7 (20 Oct 2017 04:59), Max: 36.7 (20 Oct 2017 04:59)  T(F): 98.1 (20 Oct 2017 04:59), Max: 98.1 (20 Oct 2017 04:59)  HR: 12 (20 Oct 2017 05:33) (12 - 62)  BP: 99/55 (20 Oct 2017 04:59) (95/53 - 133/70)  BP(mean): --  RR: 18 (20 Oct 2017 04:59) (16 - 18)  SpO2: 98% (20 Oct 2017 05:33) (95% - 100%)  Daily     Daily Weight in k.6 (19 Oct 2017 21:45)    LABS:                        11.2   4.6   )-----------( 125      ( 20 Oct 2017 08:12 )             37.2     10-20    141  |  98  |  26<H>  ----------------------------<  82  4.2   |  26  |  5.67<H>    Ca    8.5      20 Oct 2017 08:12  Phos  4.4     10-20  Mg     2.1     10-20    TPro  x   /  Alb  x   /  TBili  x   /  DBili  x   /  AST  x   /  ALT  13  /  AlkPhos  x   10-    LIVER FUNCTIONS - ( 19 Oct 2017 10:12 )  Alb: x     / Pro: x     / ALK PHOS: x     / ALT: 13 U/L RC / AST: x     / GGT: x           PT/INR - ( 20 Oct 2017 08:12 )   PT: 12.1 sec;   INR: 1.12 ratio         PTT - ( 20 Oct 2017 08:12 )  PTT:28.5 sec        Imaging:

## 2017-10-20 NOTE — PROGRESS NOTE ADULT - PROBLEM SELECTOR PLAN 1
-Patient with reported GI bleed, red blood per rectum multiple times, no further episodes  -Patient s/p colonoscopy yesterday, actively bleeding lesion seen and clipped  -Case discussed with GI this AM, patient ok to resume regular diet with repeat colonoscopy in 6-8 weeks. Will also need to be monitored inpatient as Coumadin and aspirin are resumed  -Will repeat CBC this afternoon as patient will need INR and T+S expires at approximately 6pm. Then continue q12hr as Coumadin is resumed, patient's Hgb is trending down (11.8->11.2)  -Hemodynamically stable

## 2017-10-20 NOTE — PROGRESS NOTE ADULT - PROBLEM SELECTOR PLAN 4
-Patient with ESRD on HD, patient's nephrologist is Dr. Yaya Garcia with [facility] nephrology, previously with Dr. Schaefer  -Received HD yesterday  -HD T/Th/S  -Renal diet

## 2017-10-20 NOTE — PROGRESS NOTE ADULT - PROBLEM SELECTOR PLAN 3
-Patient with documented history of atrial fibrillation, althought PCP's office states AC is for the mitral valve  -Will hold Coumadin in the setting of GI bleed  -[id number] score is 3, patient is a candidate for anticoagulation for stroke risk. -Will resume today pending repeat CBC

## 2017-10-20 NOTE — PROGRESS NOTE ADULT - SUBJECTIVE AND OBJECTIVE BOX
Patient is a 84y old  Female who presents with a chief complaint of Rectal bleeding (17 Oct 2017 23:09)      INTERVAL HPI/OVERNIGHT EVENTS: Patient had colonoscopy yesterday, bleeding lesion clipped. Patient has not had a BM since procedure, endorses mild abdominal pain. She is passing gas. Denies visible blood from rectum. Denies nausea    T(C): 36.7 (10-20-17 @ 04:59), Max: 36.7 (10-20-17 @ 04:59)  HR: 12 (10-20-17 @ 05:33) (12 - 62)  BP: 99/55 (10-20-17 @ 04:59) (95/53 - 133/70)  RR: 18 (10-20-17 @ 04:59) (16 - 18)  SpO2: 98% (10-20-17 @ 05:33) (95% - 100%)  Wt(kg): --  I&O's Summary    19 Oct 2017 07:01  -  20 Oct 2017 07:00  --------------------------------------------------------  IN: 700 mL / OUT: 1800 mL / NET: -1100 mL        LABS:                        11.2   4.6   )-----------( 125      ( 20 Oct 2017 08:12 )             37.2     10-20    141  |  98  |  26<H>  ----------------------------<  82  4.2   |  26  |  5.67<H>    Ca    8.5      20 Oct 2017 08:12  Phos  4.4     10-20  Mg     2.1     10-20    TPro  x   /  Alb  x   /  TBili  x   /  DBili  x   /  AST  x   /  ALT  13  /  AlkPhos  x   10-19    PT/INR - ( 20 Oct 2017 08:12 )   PT: 12.1 sec;   INR: 1.12 ratio         PTT - ( 20 Oct 2017 08:12 )  PTT:28.5 sec    CAPILLARY BLOOD GLUCOSE              REVIEW OF SYSTEMS:  CONSTITUTIONAL: No fever, weight loss, or fatigue  EYES: No eye pain, visual disturbances, or discharge  ENMT:  No difficulty hearing, tinnitus, vertigo; No sinus or throat pain  NECK: No pain or stiffness  RESPIRATORY: No cough, wheezing, chills or hemoptysis; No shortness of breath  CARDIOVASCULAR: No chest pain, palpitations, dizziness, or leg swelling  GASTROINTESTINAL: +Mild abdominal pain. No nausea, vomiting, or hematemesis; No diarrhea or constipation. No melena or hematochezia.  GENITOURINARY: No dysuria, frequency, hematuria, or incontinence  NEUROLOGICAL: No headaches, memory loss, loss of strength, numbness, or tremors  SKIN: No itching, burning, rashes, or lesions   LYMPH NODES: No enlarged glands  ENDOCRINE: No heat or cold intolerance; No hair loss  MUSCULOSKELETAL: No joint pain or swelling; No muscle, back, or extremity pain  PSYCHIATRIC: No depression, anxiety, mood swings, or difficulty sleeping  HEME/LYMPH: No easy bruising, or bleeding gums  ALLERY AND IMMUNOLOGIC: No hives or eczema    RADIOLOGY & ADDITIONAL TESTS:    Imaging Personally Reviewed:  [x ] YES  [ ] NO, Colonoscopy    Consultant(s) Notes Reviewed:  [x ] YES  [ ] NO, Renal    PHYSICAL EXAM:  GENERAL: NAD, laying in bed  HEAD:  Atraumatic, Normocephalic  EYES: EOMI, PERRLA, conjunctiva and sclera clear  ENMT: No tonsillar erythema, exudates, or enlargement  NECK: Supple  NERVOUS SYSTEM:  Alert & Oriented X3, Good concentration  CHEST/LUNG: Clear to auscultation bilaterally; No rales, rhonchi, wheezing, or rubs  HEART: Regular rate and rhythm; No murmurs, rubs, or gallops  ABDOMEN: Soft, Nontender, Nondistended; Bowel sounds present  EXTREMITIES:  2+ Peripheral Pulses, No clubbing, cyanosis, or edema  LYMPH: No lymphadenopathy noted  SKIN: No rashes or lesions    Care Discussed with Consultants/Other Providers [ x] YES  [ ] NO, GI Patient is a 84y old  Female who presents with a chief complaint of Rectal bleeding (17 Oct 2017 23:09)      INTERVAL HPI/OVERNIGHT EVENTS: Patient had colonoscopy yesterday, bleeding lesion clipped. Patient has not had a BM since procedure, endorses mild abdominal pain. She is passing gas. Denies visible blood from rectum. Denies nausea.    T(C): 36.7 (10-20-17 @ 04:59), Max: 36.7 (10-20-17 @ 04:59)  HR: 12 (10-20-17 @ 05:33) (12 - 62)  BP: 99/55 (10-20-17 @ 04:59) (95/53 - 133/70)  RR: 18 (10-20-17 @ 04:59) (16 - 18)  SpO2: 98% (10-20-17 @ 05:33) (95% - 100%)  Wt(kg): --  I&O's Summary    19 Oct 2017 07:01  -  20 Oct 2017 07:00  --------------------------------------------------------  IN: 700 mL / OUT: 1800 mL / NET: -1100 mL        LABS:                        11.2   4.6   )-----------( 125      ( 20 Oct 2017 08:12 )             37.2     10-20    141  |  98  |  26<H>  ----------------------------<  82  4.2   |  26  |  5.67<H>    Ca    8.5      20 Oct 2017 08:12  Phos  4.4     10-20  Mg     2.1     10-20    TPro  x   /  Alb  x   /  TBili  x   /  DBili  x   /  AST  x   /  ALT  13  /  AlkPhos  x   10-19    PT/INR - ( 20 Oct 2017 08:12 )   PT: 12.1 sec;   INR: 1.12 ratio         PTT - ( 20 Oct 2017 08:12 )  PTT:28.5 sec    CAPILLARY BLOOD GLUCOSE          REVIEW OF SYSTEMS:  CONSTITUTIONAL: No fever, weight loss, or fatigue  EYES: No eye pain, visual disturbances, or discharge  ENMT:  No difficulty hearing, tinnitus, vertigo; No sinus or throat pain  NECK: No pain or stiffness  RESPIRATORY: No cough, wheezing, chills or hemoptysis; No shortness of breath  CARDIOVASCULAR: No chest pain, palpitations, dizziness, or leg swelling  GASTROINTESTINAL: +Mild abdominal pain. No nausea, vomiting, or hematemesis; No diarrhea or constipation. No melena or hematochezia.  GENITOURINARY: No dysuria, frequency, hematuria, or incontinence  NEUROLOGICAL: No headaches, memory loss, loss of strength, numbness, or tremors  SKIN: No itching, burning, rashes, or lesions   LYMPH NODES: No enlarged glands  ENDOCRINE: No heat or cold intolerance; No hair loss  MUSCULOSKELETAL: No joint pain or swelling; No muscle, back, or extremity pain  PSYCHIATRIC: No depression, anxiety, mood swings, or difficulty sleeping  HEME/LYMPH: No easy bruising, or bleeding gums  ALLERY AND IMMUNOLOGIC: No hives or eczema    RADIOLOGY & ADDITIONAL TESTS:    Imaging Personally Reviewed:  [x ] YES  [ ] NO, Colonoscopy    Consultant(s) Notes Reviewed:  [x ] YES  [ ] NO, Renal    PHYSICAL EXAM:  GENERAL: NAD, laying in bed  HEAD:  Atraumatic, Normocephalic  EYES: EOMI, PERRLA, conjunctiva and sclera clear  ENMT: No tonsillar erythema, exudates, or enlargement  NECK: Supple  NERVOUS SYSTEM:  Alert & Oriented X3, Good concentration  CHEST/LUNG: Clear to auscultation bilaterally; No rales, rhonchi, wheezing, or rubs  HEART: Regular rate and rhythm; No murmurs, rubs, or gallops  ABDOMEN: Soft, Nontender, Nondistended; Bowel sounds present  EXTREMITIES:  2+ Peripheral Pulses, No clubbing, cyanosis, or edema  LYMPH: No lymphadenopathy noted  SKIN: No rashes or lesions    Care Discussed with Consultants/Other Providers [ x] YES  [ ] NO, GI

## 2017-10-20 NOTE — PROGRESS NOTE ADULT - SUBJECTIVE AND OBJECTIVE BOX
Patient seen and examined  no complaints  s/p endoscopy with clipping of bleeding lesion    Epogen (Unknown)    Hospital Medications:   MEDICATIONS  (STANDING):  allopurinol 100 milliGRAM(s) Oral daily  buDESOnide 160 MICROgram(s)/formoterol 4.5 MICROgram(s) Inhaler 2 Puff(s) Inhalation two times a day  cholecalciferol 1000 Unit(s) Oral daily  gabapentin 100 milliGRAM(s) Oral at bedtime  montelukast 10 milliGRAM(s) Oral daily  multivitamin 1 Tablet(s) Oral daily  sevelamer hydrochloride 800 milliGRAM(s) Oral three times a day with meals        VITALS:  T(F): 98.1 (10-20-17 @ 04:59), Max: 98.1 (10-20-17 @ 04:59)  HR: 12 (10-20-17 @ 05:33)  BP: 99/55 (10-20-17 @ 04:59)  RR: 18 (10-20-17 @ 04:59)  SpO2: 98% (10-20-17 @ 05:33)  Wt(kg): --    10-19 @ 07:01  -  10-20 @ 07:00  --------------------------------------------------------  IN: 700 mL / OUT: 1800 mL / NET: -1100 mL        PHYSICAL EXAM:  Constitutional: NAD  HEENT: anicteric sclera, oropharynx clear, MMM  Neck: No JVD  Respiratory: b/l rhonchi  Cardiovascular: S1, S2, RRR  Gastrointestinal: BS+, soft, NT/ND  Extremities: 2+ peripheral edema  Neurological: A/O x 3, no focal deficits  Psychiatric: Normal mood, normal affect      LABS:  10-20    141  |  98  |  26<H>  ----------------------------<  82  4.2   |  26  |  5.67<H>    Ca    8.5      20 Oct 2017 08:12  Phos  4.4     10-20  Mg     2.1     10-20    TPro      /  Alb      /  TBili      /  DBili      /  AST      /  ALT  13  /  AlkPhos      10-19    Creatinine Trend: 5.67 <--, 9.07 <--, 6.78 <--, 6.58 <--, 5.84 <--                        11.2   4.6   )-----------( 125      ( 20 Oct 2017 08:12 )             37.2     Urine Studies:      RADIOLOGY & ADDITIONAL STUDIES:

## 2017-10-20 NOTE — PROGRESS NOTE ADULT - ASSESSMENT
84 F w/ HTN, ESRD on HD (TTS), mitral stenosis s/p bioprosthetic MVR, severe TR s/p tricuspid valve repair, remote hx of colon cancer s/p resection, and afib on coumadin presents with rectal bleeding s/p colonoscopy with clipping of lesion

## 2017-10-21 DIAGNOSIS — I10 ESSENTIAL (PRIMARY) HYPERTENSION: ICD-10-CM

## 2017-10-21 LAB
ANION GAP SERPL CALC-SCNC: 16 MMOL/L — SIGNIFICANT CHANGE UP (ref 5–17)
APTT BLD: 27.8 SEC — SIGNIFICANT CHANGE UP (ref 27.5–37.4)
BUN SERPL-MCNC: 46 MG/DL — HIGH (ref 7–23)
CALCIUM SERPL-MCNC: 8.5 MG/DL — SIGNIFICANT CHANGE UP (ref 8.4–10.5)
CHLORIDE SERPL-SCNC: 97 MMOL/L — SIGNIFICANT CHANGE UP (ref 96–108)
CO2 SERPL-SCNC: 27 MMOL/L — SIGNIFICANT CHANGE UP (ref 22–31)
CREAT SERPL-MCNC: 7.49 MG/DL — HIGH (ref 0.5–1.3)
GLUCOSE SERPL-MCNC: 108 MG/DL — HIGH (ref 70–99)
HCT VFR BLD CALC: 33 % — LOW (ref 34.5–45)
HCT VFR BLD CALC: 34.1 % — LOW (ref 34.5–45)
HGB BLD-MCNC: 10.7 G/DL — LOW (ref 11.5–15.5)
HGB BLD-MCNC: 11 G/DL — LOW (ref 11.5–15.5)
INR BLD: 1.05 RATIO — SIGNIFICANT CHANGE UP (ref 0.88–1.16)
MAGNESIUM SERPL-MCNC: 2.2 MG/DL — SIGNIFICANT CHANGE UP (ref 1.6–2.6)
MCHC RBC-ENTMCNC: 28.6 PG — SIGNIFICANT CHANGE UP (ref 27–34)
MCHC RBC-ENTMCNC: 28.7 PG — SIGNIFICANT CHANGE UP (ref 27–34)
MCHC RBC-ENTMCNC: 32.3 GM/DL — SIGNIFICANT CHANGE UP (ref 32–36)
MCHC RBC-ENTMCNC: 32.4 GM/DL — SIGNIFICANT CHANGE UP (ref 32–36)
MCV RBC AUTO: 88.5 FL — SIGNIFICANT CHANGE UP (ref 80–100)
MCV RBC AUTO: 88.6 FL — SIGNIFICANT CHANGE UP (ref 80–100)
PHOSPHATE SERPL-MCNC: 5.8 MG/DL — HIGH (ref 2.5–4.5)
PLATELET # BLD AUTO: 126 K/UL — LOW (ref 150–400)
PLATELET # BLD AUTO: 130 K/UL — LOW (ref 150–400)
POTASSIUM SERPL-MCNC: 4.4 MMOL/L — SIGNIFICANT CHANGE UP (ref 3.5–5.3)
POTASSIUM SERPL-SCNC: 4.4 MMOL/L — SIGNIFICANT CHANGE UP (ref 3.5–5.3)
PROTHROM AB SERPL-ACNC: 11.5 SEC — SIGNIFICANT CHANGE UP (ref 9.8–12.7)
RBC # BLD: 3.73 M/UL — LOW (ref 3.8–5.2)
RBC # BLD: 3.85 M/UL — SIGNIFICANT CHANGE UP (ref 3.8–5.2)
RBC # FLD: 14.8 % — HIGH (ref 10.3–14.5)
RBC # FLD: 14.8 % — HIGH (ref 10.3–14.5)
SODIUM SERPL-SCNC: 140 MMOL/L — SIGNIFICANT CHANGE UP (ref 135–145)
WBC # BLD: 5.4 K/UL — SIGNIFICANT CHANGE UP (ref 3.8–10.5)
WBC # BLD: 5.6 K/UL — SIGNIFICANT CHANGE UP (ref 3.8–10.5)
WBC # FLD AUTO: 5.4 K/UL — SIGNIFICANT CHANGE UP (ref 3.8–10.5)
WBC # FLD AUTO: 5.6 K/UL — SIGNIFICANT CHANGE UP (ref 3.8–10.5)

## 2017-10-21 PROCEDURE — 99233 SBSQ HOSP IP/OBS HIGH 50: CPT | Mod: GC

## 2017-10-21 RX ORDER — WARFARIN SODIUM 2.5 MG/1
6 TABLET ORAL ONCE
Qty: 0 | Refills: 0 | Status: COMPLETED | OUTPATIENT
Start: 2017-10-21 | End: 2017-10-21

## 2017-10-21 RX ORDER — WARFARIN SODIUM 2.5 MG/1
5 TABLET ORAL ONCE
Qty: 0 | Refills: 0 | Status: DISCONTINUED | OUTPATIENT
Start: 2017-10-21 | End: 2017-10-21

## 2017-10-21 RX ADMIN — Medication 81 MILLIGRAM(S): at 12:02

## 2017-10-21 RX ADMIN — GABAPENTIN 100 MILLIGRAM(S): 400 CAPSULE ORAL at 21:26

## 2017-10-21 RX ADMIN — SEVELAMER CARBONATE 800 MILLIGRAM(S): 2400 POWDER, FOR SUSPENSION ORAL at 08:50

## 2017-10-21 RX ADMIN — Medication 1000 UNIT(S): at 12:01

## 2017-10-21 RX ADMIN — MONTELUKAST 10 MILLIGRAM(S): 4 TABLET, CHEWABLE ORAL at 21:26

## 2017-10-21 RX ADMIN — Medication 100 MILLIGRAM(S): at 12:01

## 2017-10-21 RX ADMIN — WARFARIN SODIUM 6 MILLIGRAM(S): 2.5 TABLET ORAL at 21:26

## 2017-10-21 RX ADMIN — SEVELAMER CARBONATE 800 MILLIGRAM(S): 2400 POWDER, FOR SUSPENSION ORAL at 13:42

## 2017-10-21 RX ADMIN — SEVELAMER CARBONATE 800 MILLIGRAM(S): 2400 POWDER, FOR SUSPENSION ORAL at 18:49

## 2017-10-21 RX ADMIN — BUDESONIDE AND FORMOTEROL FUMARATE DIHYDRATE 2 PUFF(S): 160; 4.5 AEROSOL RESPIRATORY (INHALATION) at 05:42

## 2017-10-21 RX ADMIN — Medication 1 TABLET(S): at 12:01

## 2017-10-21 NOTE — PROGRESS NOTE ADULT - PROBLEM SELECTOR PLAN 1
on schedule for hd today w/2k bath, dec uf 1-1.5kg as tolerated, low bath temp  trend bmp  bp rel low, asymp. watch closely  c/w renal deit

## 2017-10-21 NOTE — PROGRESS NOTE ADULT - PROBLEM SELECTOR PLAN 1
no further episodes  monitor cbc daily  renal diet  f/u GI for repeat colonoscopy no further episodes  monitor cbc daily  renal diet  f/u GI for repeat colonoscopy outpt

## 2017-10-21 NOTE — PROGRESS NOTE ADULT - SUBJECTIVE AND OBJECTIVE BOX
Patient is a 84y old  Female who presents with a chief complaint of Rectal bleeding (17 Oct 2017 23:09)      SUBJECTIVE / OVERNIGHT EVENTS:  No acute events overnight. Had 3 BM yesterday, brown stool; no further melena/ hematochezia. Tolerating renal diet, no nausea, vomiting. Denies CP, SOB, fever, dysuria, diarrhea.    MEDICATIONS  (STANDING):  allopurinol 100 milliGRAM(s) Oral daily  aspirin  chewable 81 milliGRAM(s) Oral daily  buDESOnide 160 MICROgram(s)/formoterol 4.5 MICROgram(s) Inhaler 2 Puff(s) Inhalation two times a day  cholecalciferol 1000 Unit(s) Oral daily  gabapentin 100 milliGRAM(s) Oral at bedtime  montelukast 10 milliGRAM(s) Oral daily  multivitamin 1 Tablet(s) Oral daily  sevelamer hydrochloride 800 milliGRAM(s) Oral three times a day with meals  warfarin 5 milliGRAM(s) Oral once    MEDICATIONS  (PRN):  midodrine 5 milliGRAM(s) Oral daily PRN On Tue/Thurs/Sat pre-HD        CAPILLARY BLOOD GLUCOSE        I&O's Summary    20 Oct 2017 07:01  -  21 Oct 2017 07:00  --------------------------------------------------------  IN: 480 mL / OUT: 0 mL / NET: 480 mL        PHYSICAL EXAM:  GENERAL: nad, resting comfortably in bed  EYES:  NECK:   CHEST/LUNG: CTAB no w/r/r  HEART: RRR no m/g/r  ABDOMEN: soft NT ND  EXTREMITIES:  no edema BL  PSYCH: nl affect  NEUROLOGY:   SKIN: no ulcers, rashes    LABS:                        11.0   5.4   )-----------( 126      ( 21 Oct 2017 03:46 )             34.1     10-    140  |  97  |  46<H>  ----------------------------<  108<H>  4.4   |  27  |  7.49<H>    Ca    8.5      21 Oct 2017 04:53  Phos  5.8     10-  Mg     2.2     10-    TPro  x   /  Alb  x   /  TBili  x   /  DBili  x   /  AST  x   /  ALT  13  /  AlkPhos  x   10-19    PT/INR - ( 21 Oct 2017 04:53 )   PT: 11.5 sec;   INR: 1.05 ratio         PTT - ( 21 Oct 2017 04:53 )  PTT:27.8 sec          RADIOLOGY & ADDITIONAL TESTS:    Imaging Personally Reviewed:  Yes    Consultant(s) Notes Reviewed:      Care Discussed with Consultants/Other Providers:    Assessment and Plan   PAST MEDICAL & SURGICAL HISTORY:  Chronic atrial fibrillation  Tricuspid valve insufficiency, unspecified etiology  Mitral valve stenosis, unspecified etiology  Pneumonia  HTN (hypertension)  Gout  AV fistula: left  ESRD on hemodialysis  A-V Fistula: left  History of Gout (ICD9 V12.2)  History of Gout (ICD9 V12.2)  History of Pneumonia (ICD9 V12.61)  HTN (Hypertension) (ICD9 401.9)  HTN (Hypertension) (ICD9 401.9)  HTN (Hypertension) (ICD9 401.9)  Chronic Renal Failure (ICD9 585.9): on HD (T, , Sat)  Chronic Renal Failure (ICD9 585.9)  History of mitral valve replacement with bioprosthetic valve  H/O total knee replacement, left  H/O:  section  Colon tumor: removed in   AV fistula: left  Colon tumor: tumor removed from colon   History of knee replacement, total, left  A-V fistula: Left A-V fistula for HD  H/O:  Section Patient is a 84y old  Female who presents with a chief complaint of Rectal bleeding (17 Oct 2017 23:09)      SUBJECTIVE / OVERNIGHT EVENTS:  No acute events overnight. Had 3 BM yesterday, brown stool; no further melena/ hematochezia. Tolerating renal diet, no nausea, vomiting. Denies CP, SOB, fever, dysuria, diarrhea.    MEDICATIONS  (STANDING):  allopurinol 100 milliGRAM(s) Oral daily  aspirin  chewable 81 milliGRAM(s) Oral daily  buDESOnide 160 MICROgram(s)/formoterol 4.5 MICROgram(s) Inhaler 2 Puff(s) Inhalation two times a day  cholecalciferol 1000 Unit(s) Oral daily  gabapentin 100 milliGRAM(s) Oral at bedtime  montelukast 10 milliGRAM(s) Oral daily  multivitamin 1 Tablet(s) Oral daily  sevelamer hydrochloride 800 milliGRAM(s) Oral three times a day with meals  warfarin 5 milliGRAM(s) Oral once    MEDICATIONS  (PRN):  midodrine 5 milliGRAM(s) Oral daily PRN On Tue/Thurs/Sat pre-HD        CAPILLARY BLOOD GLUCOSE        I&O's Summary    20 Oct 2017 07:01  -  21 Oct 2017 07:00  --------------------------------------------------------  IN: 480 mL / OUT: 0 mL / NET: 480 mL      T(C): 36.5 (10-21 @ 16:30), Max: 36.7 (10-20 @ 21:32)   HR: 59   BP: 115/54   RR: 17   SpO2: 98%    PHYSICAL EXAM:  GENERAL: nad, resting comfortably in bed  CHEST/LUNG: CTAB no w/r/r  HEART: RRR no m/g/r  ABDOMEN: soft NT ND  EXTREMITIES:  no edema BL  PSYCH: nl affect  NEUROLOGY:   SKIN: no ulcers, rashes    LABS:                        11.0   5.4   )-----------( 126      ( 21 Oct 2017 03:46 )             34.1     10-21    140  |  97  |  46<H>  ----------------------------<  108<H>  4.4   |  27  |  7.49<H>    Ca    8.5      21 Oct 2017 04:53  Phos  5.8     10-21  Mg     2.2     10-21    TPro  x   /  Alb  x   /  TBili  x   /  DBili  x   /  AST  x   /  ALT  13  /  AlkPhos  x   10-19    PT/INR - ( 21 Oct 2017 04:53 )   PT: 11.5 sec;   INR: 1.05 ratio         PTT - ( 21 Oct 2017 04:53 )  PTT:27.8 sec          RADIOLOGY & ADDITIONAL TESTS:    Imaging Personally Reviewed:  Yes    Consultant(s) Notes Reviewed:      Care Discussed with Consultants/Other Providers:

## 2017-10-21 NOTE — PROGRESS NOTE ADULT - ASSESSMENT
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

## 2017-10-21 NOTE — PROGRESS NOTE ADULT - ASSESSMENT
84 F w/ HTN, ESRD on HD (TTS), mitral stenosis s/p bioprosthetic MVR, severe TR s/p tricuspid valve repair, remote hx of colon cancer s/p resection, and afib on coumadin presents with rectal bleeding s/p colonoscopy with clipping of lesion. Renal following for HD    labs reviewed

## 2017-10-21 NOTE — PROGRESS NOTE ADULT - SUBJECTIVE AND OBJECTIVE BOX
Patient seen and examined bedside  no complaints today. denied sob      Epogen (Unknown)    Hospital Medications:   MEDICATIONS  (STANDING):  allopurinol 100 milliGRAM(s) Oral daily  buDESOnide 160 MICROgram(s)/formoterol 4.5 MICROgram(s) Inhaler 2 Puff(s) Inhalation two times a day  cholecalciferol 1000 Unit(s) Oral daily  gabapentin 100 milliGRAM(s) Oral at bedtime  montelukast 10 milliGRAM(s) Oral daily  multivitamin 1 Tablet(s) Oral daily  sevelamer hydrochloride 800 milliGRAM(s) Oral three times a day with meals    VITALS:  Vital Signs Last 24 Hrs  T(C): 36.6 (21 Oct 2017 15:24), Max: 36.7 (20 Oct 2017 21:32)  T(F): 97.8 (21 Oct 2017 15:24), Max: 98.1 (20 Oct 2017 21:32)  HR: 67 (21 Oct 2017 15:24) (15 - 67)  BP: 96/58 (21 Oct 2017 15:24) (91/57 - 96/58)  BP(mean): --  RR: 18 (21 Oct 2017 15:24) (18 - 18)  SpO2: 99% (21 Oct 2017 15:24) (96% - 100%)    PHYSICAL EXAM:  Constitutional: NAD  HEENT: anicteric sclera, oropharynx clear, MMM  Neck: No JVD  Respiratory: b/l rhonchi  Cardiovascular: S1, S2, RRR  Gastrointestinal: BS+, soft, NT/ND  Extremities: 2+ peripheral edema  Neurological: A/O x 3, no focal deficits  Psychiatric: Normal mood, normal affect  ACCESS: LFA AVF +thrill    LABS:  10-21    140  |  97  |  46<H>  ----------------------------<  108<H>  4.4   |  27  |  7.49<H>    Ca    8.5      21 Oct 2017 04:53  Phos  5.8     10-21  Mg     2.2     10-21                          11.0   5.4   )-----------( 126      ( 21 Oct 2017 03:46 )             34.1       Urine Studies:      RADIOLOGY & ADDITIONAL STUDIES:

## 2017-10-22 ENCOUNTER — TRANSCRIPTION ENCOUNTER (OUTPATIENT)
Age: 82
End: 2017-10-22

## 2017-10-22 LAB
ANION GAP SERPL CALC-SCNC: 16 MMOL/L — SIGNIFICANT CHANGE UP (ref 5–17)
APTT BLD: 23.4 SEC — LOW (ref 27.5–37.4)
BUN SERPL-MCNC: 29 MG/DL — HIGH (ref 7–23)
CALCIUM SERPL-MCNC: 8.9 MG/DL — SIGNIFICANT CHANGE UP (ref 8.4–10.5)
CHLORIDE SERPL-SCNC: 95 MMOL/L — LOW (ref 96–108)
CO2 SERPL-SCNC: 26 MMOL/L — SIGNIFICANT CHANGE UP (ref 22–31)
CREAT SERPL-MCNC: 5.16 MG/DL — HIGH (ref 0.5–1.3)
GLUCOSE SERPL-MCNC: 89 MG/DL — SIGNIFICANT CHANGE UP (ref 70–99)
HCT VFR BLD CALC: 35.1 % — SIGNIFICANT CHANGE UP (ref 34.5–45)
HGB BLD-MCNC: 11.3 G/DL — LOW (ref 11.5–15.5)
INR BLD: 1.03 RATIO — SIGNIFICANT CHANGE UP (ref 0.88–1.16)
MAGNESIUM SERPL-MCNC: 2.1 MG/DL — SIGNIFICANT CHANGE UP (ref 1.6–2.6)
MCHC RBC-ENTMCNC: 28.6 PG — SIGNIFICANT CHANGE UP (ref 27–34)
MCHC RBC-ENTMCNC: 32.2 GM/DL — SIGNIFICANT CHANGE UP (ref 32–36)
MCV RBC AUTO: 88.9 FL — SIGNIFICANT CHANGE UP (ref 80–100)
PHOSPHATE SERPL-MCNC: 4.4 MG/DL — SIGNIFICANT CHANGE UP (ref 2.5–4.5)
PLATELET # BLD AUTO: 127 K/UL — LOW (ref 150–400)
POTASSIUM SERPL-MCNC: 4.2 MMOL/L — SIGNIFICANT CHANGE UP (ref 3.5–5.3)
POTASSIUM SERPL-SCNC: 4.2 MMOL/L — SIGNIFICANT CHANGE UP (ref 3.5–5.3)
PROTHROM AB SERPL-ACNC: 11.1 SEC — SIGNIFICANT CHANGE UP (ref 9.8–12.7)
RBC # BLD: 3.94 M/UL — SIGNIFICANT CHANGE UP (ref 3.8–5.2)
RBC # FLD: 14.8 % — HIGH (ref 10.3–14.5)
SODIUM SERPL-SCNC: 137 MMOL/L — SIGNIFICANT CHANGE UP (ref 135–145)
WBC # BLD: 6.1 K/UL — SIGNIFICANT CHANGE UP (ref 3.8–10.5)
WBC # FLD AUTO: 6.1 K/UL — SIGNIFICANT CHANGE UP (ref 3.8–10.5)

## 2017-10-22 RX ORDER — WARFARIN SODIUM 2.5 MG/1
6 TABLET ORAL ONCE
Qty: 0 | Refills: 0 | Status: COMPLETED | OUTPATIENT
Start: 2017-10-22 | End: 2017-10-22

## 2017-10-22 RX ADMIN — Medication 100 MILLIGRAM(S): at 11:21

## 2017-10-22 RX ADMIN — BUDESONIDE AND FORMOTEROL FUMARATE DIHYDRATE 2 PUFF(S): 160; 4.5 AEROSOL RESPIRATORY (INHALATION) at 17:24

## 2017-10-22 RX ADMIN — BUDESONIDE AND FORMOTEROL FUMARATE DIHYDRATE 2 PUFF(S): 160; 4.5 AEROSOL RESPIRATORY (INHALATION) at 05:55

## 2017-10-22 RX ADMIN — SEVELAMER CARBONATE 800 MILLIGRAM(S): 2400 POWDER, FOR SUSPENSION ORAL at 18:11

## 2017-10-22 RX ADMIN — MONTELUKAST 10 MILLIGRAM(S): 4 TABLET, CHEWABLE ORAL at 22:33

## 2017-10-22 RX ADMIN — GABAPENTIN 100 MILLIGRAM(S): 400 CAPSULE ORAL at 22:32

## 2017-10-22 RX ADMIN — Medication 1 TABLET(S): at 11:18

## 2017-10-22 RX ADMIN — SEVELAMER CARBONATE 800 MILLIGRAM(S): 2400 POWDER, FOR SUSPENSION ORAL at 09:35

## 2017-10-22 RX ADMIN — WARFARIN SODIUM 6 MILLIGRAM(S): 2.5 TABLET ORAL at 22:33

## 2017-10-22 RX ADMIN — Medication 1000 UNIT(S): at 11:18

## 2017-10-22 RX ADMIN — SEVELAMER CARBONATE 800 MILLIGRAM(S): 2400 POWDER, FOR SUSPENSION ORAL at 13:59

## 2017-10-22 NOTE — DISCHARGE NOTE ADULT - HOSPITAL COURSE
84 year-old F with PMH ESRD on HD, bioprosthetic mitral valve, and atrial fibrillation who presented to the hospital with rectal bleeding. The patient was admitted and her hemoglobin remained stable, never requiring a transfusion. GI was consulted and a colonoscopy was performed. This showed an actively bleeding lesion which was clipped. The patient's Coumadin was resumed and she had no further episodes of bleeding. Her hemodialysis was maintained over the course of her stay as per her outpatient schedule. She is now medically stable and cleared for discharge. 84 year-old F with PMH ESRD on HD, bioprosthetic mitral valve, and atrial fibrillation who presented to the hospital with rectal bleeding. The patient was admitted and her hemoglobin remained stable, never requiring a transfusion. GI was consulted and a colonoscopy was performed. This showed an actively bleeding lesion (Dieulafoy's lesion) in the rectum which was clipped. The patient's Coumadin was resumed and she had no further episodes of bleeding. Her hemodialysis was maintained over the course of her stay as per her outpatient schedule. She is now medically stable and cleared for discharge.

## 2017-10-22 NOTE — DISCHARGE NOTE ADULT - PLAN OF CARE
Follow up with a gastroenterologist You were admitted to the hospital with rectal bleeding. You had a colonoscopy which showed a bleeding lesion that was clipped. You will need to follow up with a gastroenterologist and have a repeat colonoscopy in 6-8 weeks. You can follow up with either a [facility] gastroenterologist or receive a referral from your primary care doctor. If you have more bleeding per rectum, please call your doctor immediately and/or present to an ER. Please follow up with your primary doctor in 1-2 weeks. Continue Coumadin You have a history of atrial fibrillation. You had a GI bleed so your Coumadin was held. It was restarted after your colonoscopy and you did not have any bleeding. Please continue to take Coumadin at your original dosage and follow up with your PCP this week for an INR check. If you develop bleeding, please stop your Coumadin and call your doctor. Continue dialysis You have a history of end stage renal disease. Please continue to have hemodialysis on Tuesdays, Thursdays, and Saturdays. Please follow up with your nephrologist after discharge. You were admitted to the hospital with rectal bleeding. You had a colonoscopy which showed a bleeding lesion that was clipped. You will need to follow up with a gastroenterologist and have a repeat colonoscopy in 6-8 weeks. You can follow up with either a [facility] gastroenterologist or receive a referral from your primary care doctor. If you have more bleeding per rectum, please call your doctor immediately and/or present to an ER. Please follow up with your primary doctor this week. You have a history of atrial fibrillation. You had a GI bleed so your Coumadin was held. It was restarted after your colonoscopy and you did not have any bleeding. Please continue to take Coumadin at your original dosage and follow up with your Primary Care Provider (PCP) this week for an INR check. If you develop bleeding, please stop your Coumadin and call your doctor. You were admitted to the hospital with rectal bleeding. You had a colonoscopy which showed a bleeding lesion that was clipped. You will need to follow up with the gastroenterologist who saw you here, Dr. Segal, and have a repeat colonoscopy in 6-8 weeks. If you have more bleeding per rectum, please call your doctor immediately and/or present to an ER. Please follow up with your primary doctor, Dr. Emerald Espana, this week.

## 2017-10-22 NOTE — PROGRESS NOTE ADULT - PROBLEM SELECTOR PLAN 1
-Patient with reported GI bleed, red blood per rectum multiple times, no further episodes  -Patient s/p colonoscopy yesterday, actively bleeding lesion seen and clipped  -Repeat colonoscopy in 6-8 weeks  -No further bleeding, CBC daily during Coumadin re-initiation

## 2017-10-22 NOTE — DISCHARGE NOTE ADULT - CARE PROVIDER_API CALL
Sedrick Segal), Internal Medicine  [address] [place] [address]  Phone: [phone]  Fax: [phone] Sedrick Segal), Internal Medicine  [address] [place] [address]  Phone: [phone]  Fax: [phone]    Emerald Espana  [address] [place] [address]  Phone: [phone]  Fax: (       -

## 2017-10-22 NOTE — DISCHARGE NOTE ADULT - CARE PLAN
Principal Discharge DX:	GI bleed  Goal:	Follow up with a gastroenterologist  Instructions for follow-up, activity and diet:	You were admitted to the hospital with rectal bleeding. You had a colonoscopy which showed a bleeding lesion that was clipped. You will need to follow up with a gastroenterologist and have a repeat colonoscopy in 6-8 weeks. You can follow up with either a [facility] gastroenterologist or receive a referral from your primary care doctor. If you have more bleeding per rectum, please call your doctor immediately and/or present to an ER. Please follow up with your primary doctor in 1-2 weeks.  Secondary Diagnosis:	Atrial fibrillation  Goal:	Continue Coumadin  Instructions for follow-up, activity and diet:	You have a history of atrial fibrillation. You had a GI bleed so your Coumadin was held. It was restarted after your colonoscopy and you did not have any bleeding. Please continue to take Coumadin at your original dosage and follow up with your PCP this week for an INR check. If you develop bleeding, please stop your Coumadin and call your doctor.  Secondary Diagnosis:	ESRD on hemodialysis  Goal:	Continue dialysis  Instructions for follow-up, activity and diet:	You have a history of end stage renal disease. Please continue to have hemodialysis on Tuesdays, Thursdays, and Saturdays. Please follow up with your nephrologist after discharge. Principal Discharge DX:	GI bleed  Goal:	Follow up with a gastroenterologist  Instructions for follow-up, activity and diet:	You were admitted to the hospital with rectal bleeding. You had a colonoscopy which showed a bleeding lesion that was clipped. You will need to follow up with a gastroenterologist and have a repeat colonoscopy in 6-8 weeks. You can follow up with either a [facility] gastroenterologist or receive a referral from your primary care doctor. If you have more bleeding per rectum, please call your doctor immediately and/or present to an ER. Please follow up with your primary doctor this week.  Secondary Diagnosis:	Atrial fibrillation  Goal:	Continue Coumadin  Instructions for follow-up, activity and diet:	You have a history of atrial fibrillation. You had a GI bleed so your Coumadin was held. It was restarted after your colonoscopy and you did not have any bleeding. Please continue to take Coumadin at your original dosage and follow up with your Primary Care Provider (PCP) this week for an INR check. If you develop bleeding, please stop your Coumadin and call your doctor.  Secondary Diagnosis:	ESRD on hemodialysis  Goal:	Continue dialysis  Instructions for follow-up, activity and diet:	You have a history of end stage renal disease. Please continue to have hemodialysis on Tuesdays, Thursdays, and Saturdays. Please follow up with your nephrologist after discharge. Principal Discharge DX:	GI bleed  Goal:	Follow up with a gastroenterologist  Instructions for follow-up, activity and diet:	You were admitted to the hospital with rectal bleeding. You had a colonoscopy which showed a bleeding lesion that was clipped. You will need to follow up with the gastroenterologist who saw you here, Dr. Segal, and have a repeat colonoscopy in 6-8 weeks. If you have more bleeding per rectum, please call your doctor immediately and/or present to an ER. Please follow up with your primary doctor, Dr. Emerald Espana, this week.  Secondary Diagnosis:	Atrial fibrillation  Goal:	Continue Coumadin  Instructions for follow-up, activity and diet:	You have a history of atrial fibrillation. You had a GI bleed so your Coumadin was held. It was restarted after your colonoscopy and you did not have any bleeding. Please continue to take Coumadin at your original dosage and follow up with your Primary Care Provider (PCP) this week for an INR check. If you develop bleeding, please stop your Coumadin and call your doctor.  Secondary Diagnosis:	ESRD on hemodialysis  Goal:	Continue dialysis  Instructions for follow-up, activity and diet:	You have a history of end stage renal disease. Please continue to have hemodialysis on Tuesdays, Thursdays, and Saturdays. Please follow up with your nephrologist after discharge.

## 2017-10-22 NOTE — PROGRESS NOTE ADULT - ATTENDING COMMENTS
No further bleeding, resume anticoagulation.
pt a/w lower GI bleed s/p colonoscopy with cliping of a lesion. We have restarted pt's coumadin without any bleeding. If continues to be stable without any bleeding then will plan for discharge tomorrow.

## 2017-10-22 NOTE — DISCHARGE NOTE ADULT - PROVIDER TOKENS
DEWAYNE:'[id number]' TOKEN:'[id number]',FREE:[LAST:[Jovi],FIRST:[Emerald],PHONE:[phone]],FAX:[(   [phone]    -],ADDRESS:[[phone] [place] [address]]]

## 2017-10-22 NOTE — PROGRESS NOTE ADULT - PROBLEM SELECTOR PLAN 3
-Patient with documented history of atrial fibrillation, although PCP's office states AC is for the mitral valve  -Coumadin resumed inpatient, no signs of bleeding  -[id number] score is 3, patient is a candidate for anticoagulation for stroke risk.

## 2017-10-22 NOTE — PROGRESS NOTE ADULT - SUBJECTIVE AND OBJECTIVE BOX
Patient is a 84y old  Female who presents with a chief complaint of Rectal bleeding (17 Oct 2017 23:09)      INTERVAL HPI/OVERNIGHT EVENTS: No acute events overnight. Reports that she had a BM last night, no blood present. She denies abdominal pain, CP, and SOB.    T(C): 37.1 (10-22-17 @ 04:48), Max: 37.1 (10-22-17 @ 04:48)  HR: 64 (10-22-17 @ 05:56) (57 - 67)  BP: 105/54 (10-22-17 @ 04:48) (96/58 - 119/68)  RR: 17 (10-22-17 @ 04:48) (16 - 18)  SpO2: 98% (10-22-17 @ 05:56) (97% - 100%)  Wt(kg): --  I&O's Summary    21 Oct 2017 07:01  -  22 Oct 2017 07:00  --------------------------------------------------------  IN: 600 mL / OUT: 1500 mL / NET: -900 mL        LABS:                        11.3   6.1   )-----------( 127      ( 22 Oct 2017 06:23 )             35.1     10-22    137  |  95<L>  |  29<H>  ----------------------------<  89  4.2   |  26  |  5.16<H>    Ca    8.9      22 Oct 2017 06:23  Phos  4.4     10-22  Mg     2.1     10-22      PT/INR - ( 22 Oct 2017 06:23 )   PT: 11.1 sec;   INR: 1.03 ratio         PTT - ( 22 Oct 2017 06:23 )  PTT:23.4 sec    CAPILLARY BLOOD GLUCOSE              REVIEW OF SYSTEMS:  CONSTITUTIONAL: No fever, weight loss, or fatigue  EYES: No eye pain, visual disturbances, or discharge  ENMT:  No difficulty hearing, tinnitus, vertigo; No sinus or throat pain  NECK: No pain or stiffness  RESPIRATORY: No cough, wheezing, chills or hemoptysis; No shortness of breath  CARDIOVASCULAR: No chest pain, palpitations, dizziness, or leg swelling  GASTROINTESTINAL: No abdominal or epigastric pain. No nausea, vomiting, or hematemesis. No melena or hematochezia.  GENITOURINARY: No dysuria, frequency, hematuria, or incontinence  NEUROLOGICAL: No headaches, memory loss, loss of strength, numbness, or tremors  SKIN: No itching, burning, rashes, or lesions   LYMPH NODES: No enlarged glands  ENDOCRINE: No heat or cold intolerance; No hair loss  MUSCULOSKELETAL: No joint pain or swelling; No muscle, back, or extremity pain  PSYCHIATRIC: No depression, anxiety, mood swings, or difficulty sleeping  HEME/LYMPH: No easy bruising, or bleeding gums  ALLERY AND IMMUNOLOGIC: No hives or eczema    RADIOLOGY & ADDITIONAL TESTS:    Imaging Personally Reviewed:  [ ] YES  [x ] NO    Consultant(s) Notes Reviewed:  [ x] YES  [ ] NO, Nephrology    PHYSICAL EXAM:  GENERAL: NAD, laying in bed  HEAD:  Atraumatic, Normocephalic  EYES: EOMI, PERRLA, conjunctiva and sclera clear  ENMT: No tonsillar erythema, exudates, or enlargement.  NECK: Supple  NERVOUS SYSTEM:  Alert & Oriented X3, Good concentration; Motor Strength 5/5 B/L upper and lower extremities.  CHEST/LUNG: Clear to auscultation bilaterally; No rales, rhonchi, wheezing, or rubs  HEART: Irregularly irregular  ABDOMEN: Soft, Nontender, Nondistended; Bowel sounds present  EXTREMITIES:  2+ Peripheral Pulses  LYMPH: No lymphadenopathy noted  SKIN: No rashes or lesions    Care Discussed with Consultants/Other Providers [ ] YES  [ x] NO

## 2017-10-22 NOTE — PROGRESS NOTE ADULT - ASSESSMENT
84 F w/ HTN, ESRD on HD (TTS), mitral stenosis s/p bioprosthetic MVR, severe TR s/p tricuspid valve repair on Coumadin, and remote hx of colon cancer s/p resection who presents with rectal bleeding in the setting of supratherapeutic INR. Patient was found to have bleeding lesion on colonoscopy, s/p clip.

## 2017-10-22 NOTE — DISCHARGE NOTE ADULT - PATIENT PORTAL LINK FT
“You can access the [facility] Patient Portal, offered by Health system, by registering with the following website: [email or web address]”

## 2017-10-22 NOTE — DISCHARGE NOTE ADULT - MEDICATION SUMMARY - MEDICATIONS TO TAKE
I will START or STAY ON the medications listed below when I get home from the hospital:    aspirin 81 mg oral delayed release tablet  -- 1 tab(s) by mouth once a day  -- Indication: For Coronary Artery Disease Prophylaxis    Coumadin 6 mg oral tablet  -- 6mg QD on weekends and 7mg QD on weekdays  -- Indication: For Atrial fibrillation    gabapentin 100 mg oral capsule  -- 1 cap(s) by mouth once a day (at bedtime)  -- Indication: For Pain    allopurinol 300 mg oral tablet  -- 1 tab(s) by mouth once a day  -- Indication: For Gout    budesonide-formoterol 160 mcg-4.5 mcg/inh inhalation aerosol  --  inhaled   -- Indication: For Shortness of Breath    montelukast 10 mg oral tablet  -- 1 tab(s) by mouth once a day  -- Indication: For Shortness of breath    midodrine 5 mg oral tablet  -- 1 tab(s) by mouth once a day TTS before HD  -- Indication: For Blood Pressure Support    Fosrenol 1000 mg oral tablet, chewable  -- 1 tab(s) by mouth 3 times a day  -- Indication: For ESRD on hemodialysis    sevelamer hydrochloride 800 mg oral tablet  -- 3 tab(s) by mouth 3 times a day (with meals)  -- Indication: For ESRD on hemodialysis    [place] Caps Vitamin B Complex with C and Folic Acid oral capsule  -- 1 cap(s) by mouth once a day  -- Indication: For ESRD on hemodialysis    Vitamin D3 1000 intl units oral tablet  -- 1 tab(s) by mouth once a day  -- Indication: For ESRD on hemodialysis I will START or STAY ON the medications listed below when I get home from the hospital:    aspirin 81 mg oral delayed release tablet  -- 1 tab(s) by mouth once a day  -- Indication: For Coronary Artery Disease Prophylaxis    Coumadin 6 mg oral tablet  -- 6mg once a day on weekends and 7mg once a day on weekdays  -- Indication: For Atrial fibrillation    gabapentin 100 mg oral capsule  -- 1 cap(s) by mouth once a day (at bedtime)  -- Indication: For Pain    allopurinol 300 mg oral tablet  -- 1 tab(s) by mouth once a day  -- Indication: For Gout    budesonide-formoterol 160 mcg-4.5 mcg/inh inhalation aerosol  -- 2 puff(s) inhaled 2 times a day  -- Indication: For Shortness of Breath    montelukast 10 mg oral tablet  -- 1 tab(s) by mouth once a day  -- Indication: For Shortness of breath    midodrine 5 mg oral tablet  -- 1 tab(s) by mouth once a day Tuesday Thursday and Saturday before HD  -- Indication: For Blood pressure support    Fosrenol 1000 mg oral tablet, chewable  -- 1 tab(s) by mouth 3 times a day  -- Indication: For ESRD on hemodialysis    sevelamer hydrochloride 800 mg oral tablet  -- 3 tab(s) by mouth 3 times a day (with meals)  -- Indication: For ESRD on hemodialysis    [place] Caps Vitamin B Complex with C and Folic Acid oral capsule  -- 1 cap(s) by mouth once a day  -- Indication: For ESRD on hemodialysis    Vitamin D3 1000 intl units oral tablet  -- 1 tab(s) by mouth once a day  -- Indication: For ESRD on hemodialysis I will START or STAY ON the medications listed below when I get home from the hospital:    aspirin 81 mg oral delayed release tablet  -- 1 tab(s) by mouth once a day  -- Indication: For History of mitral valve replacement with bioprosthetic valve    Coumadin 6 mg oral tablet  -- 6mg once a day on weekends and 7mg once a day on weekdays  -- Indication: For Atrial fibrillation    gabapentin 100 mg oral capsule  -- 1 cap(s) by mouth once a day (at bedtime)  -- Indication: For Neuropathic pain    allopurinol 300 mg oral tablet  -- 1 tab(s) by mouth once a day  -- Indication: For Gout    budesonide-formoterol 160 mcg-4.5 mcg/inh inhalation aerosol  -- 2 puff(s) inhaled 2 times a day  -- Indication: For Shortness of breath    montelukast 10 mg oral tablet  -- 1 tab(s) by mouth once a day  -- Indication: For Shortness of breath    midodrine 5 mg oral tablet  -- 1 tab(s) by mouth once a day Tuesday Thursday and Saturday before HD  -- Indication: For Blood pressure support    Fosrenol 1000 mg oral tablet, chewable  -- 1 tab(s) by mouth 3 times a day  -- Indication: For ESRD on hemodialysis    sevelamer hydrochloride 800 mg oral tablet  -- 3 tab(s) by mouth 3 times a day (with meals)  -- Indication: For ESRD on hemodialysis    [place] Caps Vitamin B Complex with C and Folic Acid oral capsule  -- 1 cap(s) by mouth once a day  -- Indication: For ESRD on hemodialysis    Vitamin D3 1000 intl units oral tablet  -- 1 tab(s) by mouth once a day  -- Indication: For Supplement I will START or STAY ON the medications listed below when I get home from the hospital:    aspirin 81 mg oral delayed release tablet  -- 1 tab(s) by mouth once a day  -- Indication: For History of mitral valve replacement with bioprosthetic valve    Coumadin 6 mg oral tablet  -- 6mg once a day on weekends and 7mg once a day on weekdays  -- Indication: For Atrial fibrillation    gabapentin 100 mg oral capsule  -- 1 cap(s) by mouth once a day (at bedtime)  -- Indication: For Neuropathic pain    allopurinol 300 mg oral tablet  -- 1 tab(s) by mouth once a day  -- Indication: For Gout    budesonide-formoterol 160 mcg-4.5 mcg/inh inhalation aerosol  -- 2 puff(s) inhaled 2 times a day  -- Indication: For Shortness of breath    montelukast 10 mg oral tablet  -- 1 tab(s) by mouth once a day  -- Indication: For Shortness of breath    midodrine 5 mg oral tablet  -- 1 tab(s) by mouth once a day Tuesday Thursday and Saturday before HD  -- Indication: For Blood pressure support    Fosrenol 1000 mg oral tablet, chewable  -- 1 tab(s) by mouth 3 times a day  -- Indication: For ESRD on hemodialysis    sevelamer hydrochloride 800 mg oral tablet  -- 3 tab(s) by mouth 3 times a day (with meals)  -- Indication: For ESRD on hemodialysis    Renal Caps oral capsule  -- 1 cap(s) by mouth once a day  -- Indication: For ESRD on hemodialysis    Vitamin D3 1000 intl units oral tablet  -- 1 tab(s) by mouth once a day  -- Indication: For Supplement

## 2017-10-22 NOTE — DISCHARGE NOTE ADULT - ADDITIONAL INSTRUCTIONS
Please schedule an appointment to follow up with your Primary Care Provider (PCP), Dr. Segal THIS week and have your INR (Coumadin levels) checked.  Continue to take your Coumadin as previously scheduled (6 mg on weekends and 7 mg on weekdays) until then. Please schedule an appointment to follow up with your Primary Care Provider (PCP), Dr. Emerald Espana ([phone]), THIS week and have your INR (Coumadin levels) checked.  Continue to take your Coumadin as previously scheduled (6 mg on weekends and 7 mg on weekdays) until then.  Please follow up with Gastroenterology, Dr. Sedrick Segal ([phone]) for follow up. You should also have a repeat colonoscopy in 6-8 weeks.

## 2017-10-23 VITALS
DIASTOLIC BLOOD PRESSURE: 59 MMHG | TEMPERATURE: 98 F | SYSTOLIC BLOOD PRESSURE: 97 MMHG | OXYGEN SATURATION: 100 % | HEART RATE: 68 BPM | RESPIRATION RATE: 18 BRPM

## 2017-10-23 LAB
APTT BLD: 28.4 SEC — SIGNIFICANT CHANGE UP (ref 27.5–37.4)
INR BLD: 1.08 RATIO — SIGNIFICANT CHANGE UP (ref 0.88–1.16)
PROTHROM AB SERPL-ACNC: 11.8 SEC — SIGNIFICANT CHANGE UP (ref 9.8–12.7)

## 2017-10-23 PROCEDURE — 99239 HOSP IP/OBS DSCHRG MGMT >30: CPT

## 2017-10-23 PROCEDURE — 99233 SBSQ HOSP IP/OBS HIGH 50: CPT | Mod: GC

## 2017-10-23 RX ORDER — LANTHANUM CARBONATE 750 MG/1
1 TABLET, CHEWABLE ORAL
Qty: 30 | Refills: 0
Start: 2017-10-23

## 2017-10-23 RX ORDER — MIDODRINE HYDROCHLORIDE 2.5 MG/1
1 TABLET ORAL
Qty: 5 | Refills: 0
Start: 2017-10-23

## 2017-10-23 RX ORDER — MIDODRINE HYDROCHLORIDE 2.5 MG/1
2 TABLET ORAL
Qty: 0 | Refills: 0 | DISCHARGE
Start: 2017-10-23

## 2017-10-23 RX ORDER — GABAPENTIN 400 MG/1
1 CAPSULE ORAL
Qty: 10 | Refills: 0
Start: 2017-10-23

## 2017-10-23 RX ORDER — CHOLECALCIFEROL (VITAMIN D3) 125 MCG
1 CAPSULE ORAL
Qty: 10 | Refills: 0
Start: 2017-10-23

## 2017-10-23 RX ORDER — GABAPENTIN 400 MG/1
1 CAPSULE ORAL
Qty: 0 | Refills: 0 | COMMUNITY

## 2017-10-23 RX ORDER — CHOLECALCIFEROL (VITAMIN D3) 125 MCG
1 CAPSULE ORAL
Qty: 0 | Refills: 0 | COMMUNITY

## 2017-10-23 RX ORDER — WARFARIN SODIUM 2.5 MG/1
1 TABLET ORAL
Qty: 0 | Refills: 0 | COMMUNITY

## 2017-10-23 RX ORDER — WARFARIN SODIUM 2.5 MG/1
1 TABLET ORAL
Qty: 0 | Refills: 0 | DISCHARGE
Start: 2017-10-23

## 2017-10-23 RX ORDER — LANTHANUM CARBONATE 750 MG/1
1 TABLET, CHEWABLE ORAL
Qty: 0 | Refills: 0 | COMMUNITY

## 2017-10-23 RX ORDER — BUDESONIDE AND FORMOTEROL FUMARATE DIHYDRATE 160; 4.5 UG/1; UG/1
2 AEROSOL RESPIRATORY (INHALATION)
Qty: 1 | Refills: 0
Start: 2017-10-23

## 2017-10-23 RX ORDER — ASPIRIN/CALCIUM CARB/MAGNESIUM 324 MG
1 TABLET ORAL
Qty: 10 | Refills: 0
Start: 2017-10-23

## 2017-10-23 RX ORDER — SEVELAMER CARBONATE 2400 MG/1
3 POWDER, FOR SUSPENSION ORAL
Qty: 90 | Refills: 0
Start: 2017-10-23

## 2017-10-23 RX ORDER — ALLOPURINOL 300 MG
1 TABLET ORAL
Qty: 10 | Refills: 0
Start: 2017-10-23

## 2017-10-23 RX ORDER — WARFARIN SODIUM 2.5 MG/1
1 TABLET ORAL
Qty: 5 | Refills: 0
Start: 2017-10-23

## 2017-10-23 RX ORDER — MONTELUKAST 4 MG/1
1 TABLET, CHEWABLE ORAL
Qty: 10 | Refills: 0
Start: 2017-10-23

## 2017-10-23 RX ADMIN — SEVELAMER CARBONATE 800 MILLIGRAM(S): 2400 POWDER, FOR SUSPENSION ORAL at 10:02

## 2017-10-23 RX ADMIN — Medication 100 MILLIGRAM(S): at 11:17

## 2017-10-23 RX ADMIN — SEVELAMER CARBONATE 800 MILLIGRAM(S): 2400 POWDER, FOR SUSPENSION ORAL at 13:08

## 2017-10-23 RX ADMIN — Medication 1000 UNIT(S): at 11:17

## 2017-10-23 RX ADMIN — Medication 81 MILLIGRAM(S): at 11:17

## 2017-10-23 RX ADMIN — Medication 1 TABLET(S): at 11:17

## 2017-10-23 RX ADMIN — BUDESONIDE AND FORMOTEROL FUMARATE DIHYDRATE 2 PUFF(S): 160; 4.5 AEROSOL RESPIRATORY (INHALATION) at 06:39

## 2017-10-23 NOTE — PROGRESS NOTE ADULT - PROBLEM SELECTOR PROBLEM 1
GI bleed
ESRD on hemodialysis
GI bleed

## 2017-10-23 NOTE — PROGRESS NOTE ADULT - PROBLEM SELECTOR PLAN 3
- Patient with documented history of atrial fibrillation, although PCP's office states AC is for the mitral valve  - C/w Coumadin at patient's home dose, no signs of bleeding  - Pt not on rate control medicine at home but is rate controlled

## 2017-10-23 NOTE — PROGRESS NOTE ADULT - ASSESSMENT
84 F w/ HTN, ESRD on HD (TTS), mitral stenosis s/p bioprosthetic MVR, severe TR s/p tricuspid valve repair on Coumadin, and remote hx of colon cancer s/p resection who presents with rectal bleeding in the setting of supratherapeutic INR. 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

## 2017-10-23 NOTE — PROGRESS NOTE ADULT - PROBLEM SELECTOR PLAN 1
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 In the setting of supratherapeutic INR.  - Coumadin resumed at home dose without further GI bleeding  - INR currently subtherapeutic. Will need INR check and PCP follow up both within the week

## 2017-10-23 NOTE — PROGRESS NOTE ADULT - PROBLEM SELECTOR PROBLEM 3
Atrial fibrillation
Heart valve replaced

## 2017-10-23 NOTE — PROGRESS NOTE ADULT - PROBLEM SELECTOR PLAN 4
- HD per outpatient schedule, T/T/S  - Will let [facility] know to resume patient's HD upon discharge today

## 2017-10-23 NOTE — PROGRESS NOTE ADULT - PROBLEM SELECTOR PROBLEM 2
Heart valve replaced
Anemia in chronic kidney disease, on chronic dialysis
Atrial fibrillation
Heart valve replaced

## 2017-10-23 NOTE — PROGRESS NOTE ADULT - SUBJECTIVE AND OBJECTIVE BOX
Patient is a 84y old  Female who presents with a chief complaint of Rectal bleeding (22 Oct 2017 13:09)      SUBJECTIVE / OVERNIGHT EVENTS: No complaints overnight. Patient has not had BM in 2 days but denies N/V, satiety, or bloating. No GI bleeding No lightheadedness or dizziness. Pt notes her BPs run low.    Gen: negative for fevers, chills  HEENT: no blurred vision, tinnitus, or vertigo  Resp: no wheezing, dyspnea, pleuritic chest pain, hemoptysis, or orthopnea  CV: no chest painor palpitations  GI: no nausea, vomiting, abdominal pain, diarrhea, constipation, melena, or hematochezia  : no dysuria, hematuria, or incontinence  MSK: no arthralgias, joint swelling, or myalgias  Neuro: no focal deficits, confusion, weakness, dizziness, tremors, or seizures  Skin: no rash, lesions, or edema    MEDICATIONS  (STANDING):  allopurinol 100 milliGRAM(s) Oral daily  aspirin  chewable 81 milliGRAM(s) Oral daily  buDESOnide 160 MICROgram(s)/formoterol 4.5 MICROgram(s) Inhaler 2 Puff(s) Inhalation two times a day  cholecalciferol 1000 Unit(s) Oral daily  gabapentin 100 milliGRAM(s) Oral at bedtime  montelukast 10 milliGRAM(s) Oral daily  multivitamin 1 Tablet(s) Oral daily  sevelamer hydrochloride 800 milliGRAM(s) Oral three times a day with meals    MEDICATIONS  (PRN):  midodrine 5 milliGRAM(s) Oral daily PRN On Tue/Thurs/Sat pre-HD        CAPILLARY BLOOD GLUCOSE        I&O's Summary    22 Oct 2017 07:01  -  23 Oct 2017 07:00  --------------------------------------------------------  IN: 1080 mL / OUT: 0 mL / NET: 1080 mL        PHYSICAL EXAM:  GENERAL: NAD, well-developed  HEAD:  Atraumatic  EYES: EOMI, PERRL, conjunctiva and sclera clear  NECK: Supple, No JVD  CHEST/LUNG: Clear to auscultation bilaterally; No wheezes, rales, or rhonchi  HEART: Regular rate and rhythm; No murmurs, rubs, or gallops  ABDOMEN: Soft, Nontender, Nondistended; Bowel sounds present  EXTREMITIES:  2+ Peripheral Pulses, No clubbing, cyanosis, or edema  NEUROLOGY: A&O x3  SKIN: No rashes or lesions    LABS:                        11.3   6.1   )-----------( 127      ( 22 Oct 2017 06:23 )             35.1     10-22    137  |  95<L>  |  29<H>  ----------------------------<  89  4.2   |  26  |  5.16<H>    Ca    8.9      22 Oct 2017 06:23  Phos  4.4     10-22  Mg     2.1     10-22      PT/INR - ( 23 Oct 2017 06:54 )   PT: 11.8 sec;   INR: 1.08 ratio         PTT - ( 23 Oct 2017 06:54 )  PTT:28.4 sec          RADIOLOGY & ADDITIONAL TESTS:    Imaging Personally Reviewed: none    Consultant(s) Notes Reviewed:  none    Care Discussed with Consultants/Other Providers: none

## 2017-10-23 NOTE — PROGRESS NOTE ADULT - PROBLEM SELECTOR PLAN 5
- C/w allopurinol, renally dosed
-C/w allopurinol, renally dosed
bp stable off medications

## 2017-10-23 NOTE — PROGRESS NOTE ADULT - PROBLEM SELECTOR PROBLEM 4
ESRD on hemodialysis

## 2017-10-23 NOTE — PROGRESS NOTE ADULT - PROBLEM SELECTOR PLAN 2
- Patient with bioprosthetic mitral valve replacement  - Follow up with PCP and cardiologist
-Patient with bioprosthetic mitral valve replacement  -Follow up with PCP and cardiologist
-Patient with bioprosthetic mitral valve replacement, spoke with another physician at PCP's office (her PCP Dr. Espana was not available). Patient takes Coumadin for the valve primarily.  -The valve is bioprosthetic and was placed on 3/17/16. TTE from 3/21/16 reviewed, confirmed. As patient has had valve replacement for over 1 year, anticoagulation would not necessarily be indicated (usually only for first 3 months)  -Unclear guidelines after first 3 months. Given stable hemoglobin, will continue antiplatelet therapy with aspirin and resume Coumadin once cleared from a GI standpoint. Will not require heparin bridge as valve is not mechanical
combination of GIB and CKD  hold yosvany as hgb above goal  f/u colonoscopy
combination of GIB and CKD  hold yosvany as hgb above goal  s/p colonscopy with clipping of bleeding lesion
combination of GIB and CKD  yosvany on hold. hgb at goal. monitor H/H  s/p colonscopy with clipping of bleeding lesion. f/u w/GI
combination of GIB and CKD  yosvany on hold. hgb at goal. monitor H/H  s/p colonscopy with clipping of bleeding lesion. f/u w/GI
dose coumadin  will likely need different dose than her home regimen  counseled to keep amt of vit K in diet consistent

## 2017-10-23 NOTE — PROGRESS NOTE ADULT - SUBJECTIVE AND OBJECTIVE BOX
Patient seen and examined  no complaints    Epogen (Unknown)    [facility] Medications:   MEDICATIONS  (STANDING):  allopurinol 100 milliGRAM(s) Oral daily  aspirin  chewable 81 milliGRAM(s) Oral daily  buDESOnide 160 MICROgram(s)/formoterol 4.5 MICROgram(s) Inhaler 2 Puff(s) Inhalation two times a day  cholecalciferol 1000 Unit(s) Oral daily  gabapentin 100 milliGRAM(s) Oral at bedtime  montelukast 10 milliGRAM(s) Oral daily  multivitamin 1 Tablet(s) Oral daily  sevelamer hydrochloride 800 milliGRAM(s) Oral three times a day with meals      VITALS:  T(F): 98.2 (10-23-17 @ 05:01), Max: 98.2 (10-23-17 @ 05:01)  HR: 68 (10-23-17 @ 06:40)  BP: 96/55 (10-23-17 @ 05:01)  RR: 18 (10-23-17 @ 05:01)  SpO2: 97% (10-23-17 @ 06:40)  Wt(kg): --    10-22 @ 07:01  -  10-23 @ 07:00  --------------------------------------------------------  IN: 1080 mL / OUT: 0 mL / NET: 1080 mL        PHYSICAL EXAM:  Constitutional: NAD  HEENT: anicteric sclera, oropharynx clear, MMM  Neck: No JVD  Respiratory: b/l rhonchi  Cardiovascular: S1, S2, RRR  Gastrointestinal: BS+, soft, NT/ND  Extremities: 1+peripheral edema  Neurological: A/O x 3, no focal deficits  Vascular Access: left wrist avf + thrill    LABS:  10-22    137  |  95<L>  |  29<H>  ----------------------------<  89  4.2   |  26  |  5.16<H>    Ca    8.9      22 Oct 2017 06:23  Phos  4.4     10-22  Mg     2.1     10-22      Creatinine Trend: 5.16 <--, 7.49 <--, 5.67 <--, 9.07 <--, 6.78 <--, 6.58 <--, 5.84 <--                        11.3   6.1   )-----------( 127      ( 22 Oct 2017 06:23 )             35.1     Urine Studies:      RADIOLOGY & ADDITIONAL STUDIES:

## 2017-10-23 NOTE — PROGRESS NOTE ADULT - PROVIDER SPECIALTY LIST ADULT
Gastroenterology
Internal Medicine
Nephrology
Gastroenterology
Internal Medicine

## 2017-12-19 PROCEDURE — 84100 ASSAY OF PHOSPHORUS: CPT

## 2017-12-19 PROCEDURE — 84295 ASSAY OF SERUM SODIUM: CPT

## 2017-12-19 PROCEDURE — 82435 ASSAY OF BLOOD CHLORIDE: CPT

## 2017-12-19 PROCEDURE — 86901 BLOOD TYPING SEROLOGIC RH(D): CPT

## 2017-12-19 PROCEDURE — 82330 ASSAY OF CALCIUM: CPT

## 2017-12-19 PROCEDURE — 86803 HEPATITIS C AB TEST: CPT

## 2017-12-19 PROCEDURE — 94640 AIRWAY INHALATION TREATMENT: CPT

## 2017-12-19 PROCEDURE — 99261: CPT

## 2017-12-19 PROCEDURE — 71045 X-RAY EXAM CHEST 1 VIEW: CPT

## 2017-12-19 PROCEDURE — 82947 ASSAY GLUCOSE BLOOD QUANT: CPT

## 2017-12-19 PROCEDURE — 85730 THROMBOPLASTIN TIME PARTIAL: CPT

## 2017-12-19 PROCEDURE — 80048 BASIC METABOLIC PNL TOTAL CA: CPT

## 2017-12-19 PROCEDURE — 85610 PROTHROMBIN TIME: CPT

## 2017-12-19 PROCEDURE — 93005 ELECTROCARDIOGRAM TRACING: CPT

## 2017-12-19 PROCEDURE — 85014 HEMATOCRIT: CPT

## 2017-12-19 PROCEDURE — 87340 HEPATITIS B SURFACE AG IA: CPT

## 2017-12-19 PROCEDURE — 83605 ASSAY OF LACTIC ACID: CPT

## 2017-12-19 PROCEDURE — 86709 HEPATITIS A IGM ANTIBODY: CPT

## 2017-12-19 PROCEDURE — 84132 ASSAY OF SERUM POTASSIUM: CPT

## 2017-12-19 PROCEDURE — 82272 OCCULT BLD FECES 1-3 TESTS: CPT

## 2017-12-19 PROCEDURE — 86705 HEP B CORE ANTIBODY IGM: CPT

## 2017-12-19 PROCEDURE — 86850 RBC ANTIBODY SCREEN: CPT

## 2017-12-19 PROCEDURE — 83735 ASSAY OF MAGNESIUM: CPT

## 2017-12-19 PROCEDURE — 85027 COMPLETE CBC AUTOMATED: CPT

## 2017-12-19 PROCEDURE — 86706 HEP B SURFACE ANTIBODY: CPT

## 2017-12-19 PROCEDURE — 80053 COMPREHEN METABOLIC PANEL: CPT

## 2017-12-19 PROCEDURE — 86704 HEP B CORE ANTIBODY TOTAL: CPT

## 2017-12-19 PROCEDURE — 99285 EMERGENCY DEPT VISIT HI MDM: CPT | Mod: 25

## 2017-12-19 PROCEDURE — 86900 BLOOD TYPING SEROLOGIC ABO: CPT

## 2017-12-19 PROCEDURE — 84460 ALANINE AMINO (ALT) (SGPT): CPT

## 2017-12-19 PROCEDURE — 82803 BLOOD GASES ANY COMBINATION: CPT

## 2018-04-13 ENCOUNTER — EMERGENCY (EMERGENCY)
Facility: HOSPITAL | Age: 83
LOS: 1 days | Discharge: HOME | End: 2018-04-13
Attending: EMERGENCY MEDICINE
Payer: COMMERCIAL

## 2018-04-13 VITALS
DIASTOLIC BLOOD PRESSURE: 74 MMHG | SYSTOLIC BLOOD PRESSURE: 164 MMHG | HEART RATE: 76 BPM | RESPIRATION RATE: 24 BRPM | OXYGEN SATURATION: 98 % | TEMPERATURE: 98 F

## 2018-04-13 DIAGNOSIS — Z96.652 PRESENCE OF LEFT ARTIFICIAL KNEE JOINT: Chronic | ICD-10-CM

## 2018-04-13 DIAGNOSIS — I77.0 ARTERIOVENOUS FISTULA, ACQUIRED: Chronic | ICD-10-CM

## 2018-04-13 DIAGNOSIS — Z95.3 PRESENCE OF XENOGENIC HEART VALVE: Chronic | ICD-10-CM

## 2018-04-13 DIAGNOSIS — Z98.89 OTHER SPECIFIED POSTPROCEDURAL STATES: Chronic | ICD-10-CM

## 2018-04-13 DIAGNOSIS — D49.0 NEOPLASM OF UNSPECIFIED BEHAVIOR OF DIGESTIVE SYSTEM: Chronic | ICD-10-CM

## 2018-04-13 LAB
ALBUMIN SERPL ELPH-MCNC: 4.2 G/DL — SIGNIFICANT CHANGE UP (ref 3.3–5)
ALP SERPL-CCNC: 153 U/L — HIGH (ref 40–120)
ALT FLD-CCNC: 40 U/L RC — SIGNIFICANT CHANGE UP (ref 10–45)
ANION GAP SERPL CALC-SCNC: 21 MMOL/L — HIGH (ref 5–17)
APTT BLD: 48.8 SEC — HIGH (ref 27.5–37.4)
AST SERPL-CCNC: 57 U/L — HIGH (ref 10–40)
BASOPHILS # BLD AUTO: 0 K/UL — SIGNIFICANT CHANGE UP (ref 0–0.2)
BASOPHILS NFR BLD AUTO: 0 % — SIGNIFICANT CHANGE UP (ref 0–2)
BILIRUB SERPL-MCNC: 0.3 MG/DL — SIGNIFICANT CHANGE UP (ref 0.2–1.2)
BUN SERPL-MCNC: 60 MG/DL — HIGH (ref 7–23)
CALCIUM SERPL-MCNC: 9.6 MG/DL — SIGNIFICANT CHANGE UP (ref 8.4–10.5)
CHLORIDE SERPL-SCNC: 94 MMOL/L — LOW (ref 96–108)
CO2 SERPL-SCNC: 25 MMOL/L — SIGNIFICANT CHANGE UP (ref 22–31)
CREAT SERPL-MCNC: 6.58 MG/DL — HIGH (ref 0.5–1.3)
EOSINOPHIL # BLD AUTO: 0 K/UL — SIGNIFICANT CHANGE UP (ref 0–0.5)
EOSINOPHIL NFR BLD AUTO: 0.2 % — SIGNIFICANT CHANGE UP (ref 0–6)
GAS PNL BLDV: SIGNIFICANT CHANGE UP
GLUCOSE SERPL-MCNC: 115 MG/DL — HIGH (ref 70–99)
HCT VFR BLD CALC: 39 % — SIGNIFICANT CHANGE UP (ref 34.5–45)
HGB BLD-MCNC: 12.2 G/DL — SIGNIFICANT CHANGE UP (ref 11.5–15.5)
INR BLD: 6.72 RATIO — CRITICAL HIGH (ref 0.88–1.16)
LYMPHOCYTES # BLD AUTO: 1.4 K/UL — SIGNIFICANT CHANGE UP (ref 1–3.3)
LYMPHOCYTES # BLD AUTO: 10.3 % — LOW (ref 13–44)
MCHC RBC-ENTMCNC: 27.9 PG — SIGNIFICANT CHANGE UP (ref 27–34)
MCHC RBC-ENTMCNC: 31.3 GM/DL — LOW (ref 32–36)
MCV RBC AUTO: 89.2 FL — SIGNIFICANT CHANGE UP (ref 80–100)
MONOCYTES # BLD AUTO: 0.4 K/UL — SIGNIFICANT CHANGE UP (ref 0–0.9)
MONOCYTES NFR BLD AUTO: 3.2 % — SIGNIFICANT CHANGE UP (ref 2–14)
NEUTROPHILS # BLD AUTO: 12 K/UL — HIGH (ref 1.8–7.4)
NEUTROPHILS NFR BLD AUTO: 86.4 % — HIGH (ref 43–77)
PLATELET # BLD AUTO: 221 K/UL — SIGNIFICANT CHANGE UP (ref 150–400)
POTASSIUM SERPL-MCNC: 4.4 MMOL/L — SIGNIFICANT CHANGE UP (ref 3.5–5.3)
POTASSIUM SERPL-SCNC: 4.4 MMOL/L — SIGNIFICANT CHANGE UP (ref 3.5–5.3)
PROT SERPL-MCNC: 9.4 G/DL — HIGH (ref 6–8.3)
PROTHROM AB SERPL-ACNC: 75.4 SEC — HIGH (ref 9.8–12.7)
RBC # BLD: 4.37 M/UL — SIGNIFICANT CHANGE UP (ref 3.8–5.2)
RBC # FLD: 15.4 % — HIGH (ref 10.3–14.5)
SODIUM SERPL-SCNC: 140 MMOL/L — SIGNIFICANT CHANGE UP (ref 135–145)
WBC # BLD: 13.9 K/UL — HIGH (ref 3.8–10.5)
WBC # FLD AUTO: 13.9 K/UL — HIGH (ref 3.8–10.5)

## 2018-04-13 PROCEDURE — 82330 ASSAY OF CALCIUM: CPT

## 2018-04-13 PROCEDURE — 84132 ASSAY OF SERUM POTASSIUM: CPT

## 2018-04-13 PROCEDURE — 80053 COMPREHEN METABOLIC PANEL: CPT

## 2018-04-13 PROCEDURE — 82803 BLOOD GASES ANY COMBINATION: CPT

## 2018-04-13 PROCEDURE — 83605 ASSAY OF LACTIC ACID: CPT

## 2018-04-13 PROCEDURE — 85730 THROMBOPLASTIN TIME PARTIAL: CPT

## 2018-04-13 PROCEDURE — 82272 OCCULT BLD FECES 1-3 TESTS: CPT

## 2018-04-13 PROCEDURE — 99284 EMERGENCY DEPT VISIT MOD MDM: CPT

## 2018-04-13 PROCEDURE — 85610 PROTHROMBIN TIME: CPT

## 2018-04-13 PROCEDURE — 71046 X-RAY EXAM CHEST 2 VIEWS: CPT

## 2018-04-13 PROCEDURE — 82435 ASSAY OF BLOOD CHLORIDE: CPT

## 2018-04-13 PROCEDURE — 94640 AIRWAY INHALATION TREATMENT: CPT

## 2018-04-13 PROCEDURE — 85014 HEMATOCRIT: CPT

## 2018-04-13 PROCEDURE — 82947 ASSAY GLUCOSE BLOOD QUANT: CPT

## 2018-04-13 PROCEDURE — 84295 ASSAY OF SERUM SODIUM: CPT

## 2018-04-13 PROCEDURE — 99283 EMERGENCY DEPT VISIT LOW MDM: CPT | Mod: 25

## 2018-04-13 PROCEDURE — 85027 COMPLETE CBC AUTOMATED: CPT

## 2018-04-13 RX ORDER — SODIUM CHLORIDE 9 MG/ML
500 INJECTION INTRAMUSCULAR; INTRAVENOUS; SUBCUTANEOUS ONCE
Qty: 0 | Refills: 0 | Status: COMPLETED | OUTPATIENT
Start: 2018-04-13 | End: 2018-04-13

## 2018-04-13 RX ORDER — PHYTONADIONE (VIT K1) 5 MG
2.5 TABLET ORAL ONCE
Qty: 0 | Refills: 0 | Status: COMPLETED | OUTPATIENT
Start: 2018-04-13 | End: 2018-04-13

## 2018-04-13 RX ORDER — IPRATROPIUM/ALBUTEROL SULFATE 18-103MCG
3 AEROSOL WITH ADAPTER (GRAM) INHALATION ONCE
Qty: 0 | Refills: 0 | Status: COMPLETED | OUTPATIENT
Start: 2018-04-13 | End: 2018-04-13

## 2018-04-13 RX ADMIN — SODIUM CHLORIDE 1000 MILLILITER(S): 9 INJECTION INTRAMUSCULAR; INTRAVENOUS; SUBCUTANEOUS at 22:40

## 2018-04-13 RX ADMIN — Medication 3 MILLILITER(S): at 22:09

## 2018-04-13 RX ADMIN — Medication 2.5 MILLIGRAM(S): at 23:15

## 2018-04-13 NOTE — ED PROVIDER NOTE - PROGRESS NOTE DETAILS
Lactate elevated to 3.1. Will give 500cc bolus and recheck VBG. attending Nanda: pt improved after nebs. Awaiting repeat lactate. Changed abx from levaquin to doxy- script sent to her pharmacy.

## 2018-04-13 NOTE — ED PROVIDER NOTE - OBJECTIVE STATEMENT
84yo F with a history of ESRD on HD, bioprosthetic mitral valve, and Afib on Coumadin s/p PPM presenting with elevated INR. Pt had her INR drawn yesterday at her PCP's office, which returned elevated at 7, and pt was told to come to the ER. Pt denies any dark stools, BRBPR, nausea, vomiting, abdominal pain or hematuria. She states that the last time her INR was this elevated, she developed rectal bleeding. She was recently diagnosed with pneumonia and has been taking Levaquin (today day 4/7). Denies SOB, chest pain, palpitations, fevers or chills.    PMD: Emerald Espana

## 2018-04-13 NOTE — ED PROVIDER NOTE - ATTENDING CONTRIBUTION TO CARE
attending Nanda: 85yF ESRD on HD, bioprosthetic mitral valve, Afib on Coumadin s/p PPM sent in for  elevated INR of 7. Pt asymptomatic. Denies dark stools, BRBPR, nausea, vomiting, abdominal pain or hematuria. Recently diagnosed with pneumonia and has been taking Levaquin (today day 4/7) with significant improvement of symptoms. On exam, well-appearing, mild diffuse wheezing, brown stool guiaic negative. Will obtain repeat labs, cxr, nebs and reassess.

## 2018-04-13 NOTE — ED ADULT NURSE NOTE - OBJECTIVE STATEMENT
Pt presents to ED awake, alert and ambulatory with rolling walker, sent in by her doctor d/t elevated INR. Pt states she has been getting treatment for pneumonia and has been on Levaquin for the past 3 days. Pt reports feeling better since taking antibiotics. Pt denies bleeding from anywhere. No dark stools. No lightheadedness or dizziness, no SOB or chest pain. Pt appears well, with respirations even and unlabored. Skin color normal for race. Slight wheezing auscultated.

## 2018-04-14 VITALS
HEART RATE: 70 BPM | RESPIRATION RATE: 20 BRPM | DIASTOLIC BLOOD PRESSURE: 64 MMHG | OXYGEN SATURATION: 100 % | SYSTOLIC BLOOD PRESSURE: 138 MMHG

## 2018-04-14 LAB — GAS PNL BLDV: SIGNIFICANT CHANGE UP

## 2018-04-14 PROCEDURE — 71046 X-RAY EXAM CHEST 2 VIEWS: CPT | Mod: 26

## 2018-04-14 RX ORDER — ALBUTEROL 90 UG/1
2 AEROSOL, METERED ORAL
Qty: 1 | Refills: 0
Start: 2018-04-14 | End: 2018-05-13

## 2018-04-19 NOTE — ED PROVIDER NOTE - NS ED MD EM SELECTION
Detail Level: Zone Male Completion Statement: After discussing his treatment course we decided to discontinue isotretinoin therapy at this time. He shouldn't donate blood for one month after the last dose. He should call with any new symptoms of depression. Are Labs Available For Review?: No- Labs Defered This Month Pounds Preamble Statement (Weight Entered In Details Tab): Reported Weight in pounds: Completed Therapy?: No Patient Weight (Optional But Required For Cumulative Dose-Numbers And Decimals Only): 125 Weight Units: pounds Female Completion Statement: After discussing her treatment course we decided to discontinue isotretinoin therapy at this time. I explained that she would need to continue her birth control methods for at least one month after the last dosage. She should also get a pregnancy test one month after the last dose. She shouldn't donate blood for one month after the last dose. She should call with any new symptoms of depression. Kilograms Preamble Statement (Weight Entered In Details Tab): Reported Weight in kilograms: Dosing Month 2 (Required For Cumulative Dosing): 30mg BID Months Of Therapy Completed: 2 Display Individual Monthly Dosage In The Note (If Yes Will Display All Dosages Which Are Not N/A): yes Dosing Month 1 (Required For Cumulative Dosing): 40mg Daily 47407 Exp Problem Focused - Mod. Complex

## 2018-08-27 ENCOUNTER — APPOINTMENT (OUTPATIENT)
Dept: VASCULAR SURGERY | Facility: CLINIC | Age: 83
End: 2018-08-27
Payer: MEDICARE

## 2018-08-27 VITALS
TEMPERATURE: 97.9 F | HEART RATE: 92 BPM | BODY MASS INDEX: 29.31 KG/M2 | DIASTOLIC BLOOD PRESSURE: 70 MMHG | SYSTOLIC BLOOD PRESSURE: 152 MMHG | WEIGHT: 175.93 LBS | HEIGHT: 65 IN

## 2018-08-27 DIAGNOSIS — Z79.01 LONG TERM (CURRENT) USE OF ANTICOAGULANTS: ICD-10-CM

## 2018-08-27 PROCEDURE — 99203 OFFICE O/P NEW LOW 30 MIN: CPT

## 2019-01-01 ENCOUNTER — RESULT REVIEW (OUTPATIENT)
Age: 84
End: 2019-01-01

## 2019-01-01 ENCOUNTER — APPOINTMENT (OUTPATIENT)
Dept: ORTHOPEDIC SURGERY | Facility: CLINIC | Age: 84
End: 2019-01-01
Payer: COMMERCIAL

## 2019-01-01 ENCOUNTER — TRANSCRIPTION ENCOUNTER (OUTPATIENT)
Age: 84
End: 2019-01-01

## 2019-01-01 ENCOUNTER — APPOINTMENT (OUTPATIENT)
Dept: THORACIC SURGERY | Facility: HOSPITAL | Age: 84
End: 2019-01-01

## 2019-01-01 ENCOUNTER — OUTPATIENT (OUTPATIENT)
Dept: OUTPATIENT SERVICES | Facility: HOSPITAL | Age: 84
LOS: 1 days | End: 2019-01-01

## 2019-01-01 ENCOUNTER — OUTPATIENT (OUTPATIENT)
Dept: OUTPATIENT SERVICES | Facility: HOSPITAL | Age: 84
LOS: 1 days | Discharge: HOME | End: 2019-01-01
Payer: MEDICARE

## 2019-01-01 ENCOUNTER — NON-APPOINTMENT (OUTPATIENT)
Age: 84
End: 2019-01-01

## 2019-01-01 ENCOUNTER — OUTPATIENT (OUTPATIENT)
Dept: OUTPATIENT SERVICES | Facility: HOSPITAL | Age: 84
LOS: 1 days | End: 2019-01-01
Payer: MEDICARE

## 2019-01-01 ENCOUNTER — APPOINTMENT (OUTPATIENT)
Dept: INTERVENTIONAL RADIOLOGY/VASCULAR | Facility: CLINIC | Age: 84
End: 2019-01-01
Payer: MEDICARE

## 2019-01-01 ENCOUNTER — APPOINTMENT (OUTPATIENT)
Dept: THORACIC SURGERY | Facility: CLINIC | Age: 84
End: 2019-01-01
Payer: MEDICARE

## 2019-01-01 ENCOUNTER — APPOINTMENT (OUTPATIENT)
Dept: THORACIC SURGERY | Facility: CLINIC | Age: 84
End: 2019-01-01

## 2019-01-01 ENCOUNTER — INPATIENT (INPATIENT)
Facility: HOSPITAL | Age: 84
LOS: 5 days | Discharge: NURSING, REHAB OR LONG-TERM CARE | End: 2019-12-06
Attending: HOSPITALIST | Admitting: HOSPITALIST
Payer: MEDICARE

## 2019-01-01 ENCOUNTER — APPOINTMENT (OUTPATIENT)
Dept: OPHTHALMOLOGY | Facility: CLINIC | Age: 84
End: 2019-01-01
Payer: MEDICARE

## 2019-01-01 ENCOUNTER — EMERGENCY (EMERGENCY)
Facility: HOSPITAL | Age: 84
LOS: 1 days | Discharge: HOME | End: 2019-01-01
Attending: STUDENT IN AN ORGANIZED HEALTH CARE EDUCATION/TRAINING PROGRAM | Admitting: STUDENT IN AN ORGANIZED HEALTH CARE EDUCATION/TRAINING PROGRAM
Payer: MEDICARE

## 2019-01-01 VITALS
SYSTOLIC BLOOD PRESSURE: 127 MMHG | TEMPERATURE: 98 F | RESPIRATION RATE: 16 BRPM | HEART RATE: 70 BPM | DIASTOLIC BLOOD PRESSURE: 48 MMHG

## 2019-01-01 VITALS
HEART RATE: 61 BPM | TEMPERATURE: 99 F | DIASTOLIC BLOOD PRESSURE: 63 MMHG | OXYGEN SATURATION: 100 % | RESPIRATION RATE: 16 BRPM | SYSTOLIC BLOOD PRESSURE: 158 MMHG

## 2019-01-01 VITALS
OXYGEN SATURATION: 98 % | DIASTOLIC BLOOD PRESSURE: 60 MMHG | SYSTOLIC BLOOD PRESSURE: 118 MMHG | WEIGHT: 175.93 LBS | RESPIRATION RATE: 16 BRPM | TEMPERATURE: 98 F | HEIGHT: 63 IN | HEART RATE: 64 BPM

## 2019-01-01 VITALS
OXYGEN SATURATION: 98 % | TEMPERATURE: 98 F | HEART RATE: 60 BPM | DIASTOLIC BLOOD PRESSURE: 55 MMHG | RESPIRATION RATE: 17 BRPM | SYSTOLIC BLOOD PRESSURE: 131 MMHG

## 2019-01-01 VITALS
RESPIRATION RATE: 16 BRPM | WEIGHT: 180.78 LBS | BODY MASS INDEX: 30.86 KG/M2 | DIASTOLIC BLOOD PRESSURE: 66 MMHG | HEIGHT: 64 IN | OXYGEN SATURATION: 97 % | SYSTOLIC BLOOD PRESSURE: 123 MMHG | HEART RATE: 75 BPM

## 2019-01-01 VITALS
BODY MASS INDEX: 30.11 KG/M2 | TEMPERATURE: 98.5 F | RESPIRATION RATE: 18 BRPM | DIASTOLIC BLOOD PRESSURE: 50 MMHG | OXYGEN SATURATION: 96 % | SYSTOLIC BLOOD PRESSURE: 98 MMHG | HEIGHT: 64 IN | WEIGHT: 176.37 LBS | HEART RATE: 68 BPM

## 2019-01-01 VITALS
HEART RATE: 61 BPM | RESPIRATION RATE: 14 BRPM | TEMPERATURE: 98 F | DIASTOLIC BLOOD PRESSURE: 49 MMHG | OXYGEN SATURATION: 95 % | SYSTOLIC BLOOD PRESSURE: 100 MMHG | HEIGHT: 63 IN | WEIGHT: 175.93 LBS

## 2019-01-01 VITALS
HEART RATE: 78 BPM | OXYGEN SATURATION: 98 % | TEMPERATURE: 98 F | RESPIRATION RATE: 17 BRPM | SYSTOLIC BLOOD PRESSURE: 122 MMHG | DIASTOLIC BLOOD PRESSURE: 44 MMHG

## 2019-01-01 VITALS
OXYGEN SATURATION: 95 % | DIASTOLIC BLOOD PRESSURE: 53 MMHG | HEART RATE: 60 BPM | RESPIRATION RATE: 20 BRPM | SYSTOLIC BLOOD PRESSURE: 125 MMHG

## 2019-01-01 DIAGNOSIS — N18.6 END STAGE RENAL DISEASE: ICD-10-CM
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
## 2019-01-01 DIAGNOSIS — M54.9 DORSALGIA, UNSPECIFIED: ICD-10-CM

## 2019-01-01 DIAGNOSIS — R91.8 OTHER NONSPECIFIC ABNORMAL FINDING OF LUNG FIELD: ICD-10-CM

## 2019-01-01 DIAGNOSIS — I77.0 ARTERIOVENOUS FISTULA, ACQUIRED: Chronic | ICD-10-CM

## 2019-01-01 DIAGNOSIS — D49.0 NEOPLASM OF UNSPECIFIED BEHAVIOR OF DIGESTIVE SYSTEM: Chronic | ICD-10-CM

## 2019-01-01 DIAGNOSIS — Z78.9 OTHER SPECIFIED HEALTH STATUS: ICD-10-CM

## 2019-01-01 DIAGNOSIS — Z96.652 PRESENCE OF LEFT ARTIFICIAL KNEE JOINT: Chronic | ICD-10-CM

## 2019-01-01 DIAGNOSIS — Z86.79 PERSONAL HISTORY OF OTHER DISEASES OF THE CIRCULATORY SYSTEM: ICD-10-CM

## 2019-01-01 DIAGNOSIS — Z95.3 PRESENCE OF XENOGENIC HEART VALVE: Chronic | ICD-10-CM

## 2019-01-01 DIAGNOSIS — I27.20 PULMONARY HYPERTENSION, UNSPECIFIED: ICD-10-CM

## 2019-01-01 DIAGNOSIS — Z98.890 OTHER SPECIFIED POSTPROCEDURAL STATES: Chronic | ICD-10-CM

## 2019-01-01 DIAGNOSIS — C79.9 SECONDARY MALIGNANT NEOPLASM OF UNSPECIFIED SITE: ICD-10-CM

## 2019-01-01 DIAGNOSIS — M89.9 DISORDER OF BONE, UNSPECIFIED: ICD-10-CM

## 2019-01-01 DIAGNOSIS — M75.81 OTHER SHOULDER LESIONS, RIGHT SHOULDER: ICD-10-CM

## 2019-01-01 DIAGNOSIS — Z02.9 ENCOUNTER FOR ADMINISTRATIVE EXAMINATIONS, UNSPECIFIED: ICD-10-CM

## 2019-01-01 DIAGNOSIS — Z99.2 DEPENDENCE ON RENAL DIALYSIS: ICD-10-CM

## 2019-01-01 DIAGNOSIS — Z98.89 OTHER SPECIFIED POSTPROCEDURAL STATES: Chronic | ICD-10-CM

## 2019-01-01 DIAGNOSIS — M25.561 PAIN IN RIGHT KNEE: ICD-10-CM

## 2019-01-01 DIAGNOSIS — I95.89 OTHER HYPOTENSION: ICD-10-CM

## 2019-01-01 DIAGNOSIS — Z79.899 OTHER LONG TERM (CURRENT) DRUG THERAPY: ICD-10-CM

## 2019-01-01 DIAGNOSIS — R04.2 HEMOPTYSIS: ICD-10-CM

## 2019-01-01 DIAGNOSIS — G89.29 DORSALGIA, UNSPECIFIED: ICD-10-CM

## 2019-01-01 DIAGNOSIS — I48.91 UNSPECIFIED ATRIAL FIBRILLATION: ICD-10-CM

## 2019-01-01 DIAGNOSIS — Z63.4 DISAPPEARANCE AND DEATH OF FAMILY MEMBER: ICD-10-CM

## 2019-01-01 DIAGNOSIS — I50.9 HEART FAILURE, UNSPECIFIED: ICD-10-CM

## 2019-01-01 DIAGNOSIS — D68.9 COAGULATION DEFECT, UNSPECIFIED: ICD-10-CM

## 2019-01-01 DIAGNOSIS — Z87.39 PERSONAL HISTORY OF OTHER DISEASES OF THE MUSCULOSKELETAL SYSTEM AND CONNECTIVE TISSUE: ICD-10-CM

## 2019-01-01 DIAGNOSIS — I48.20 CHRONIC ATRIAL FIBRILLATION, UNSPECIFIED: ICD-10-CM

## 2019-01-01 DIAGNOSIS — I07.1 RHEUMATIC TRICUSPID INSUFFICIENCY: ICD-10-CM

## 2019-01-01 DIAGNOSIS — Z29.9 ENCOUNTER FOR PROPHYLACTIC MEASURES, UNSPECIFIED: ICD-10-CM

## 2019-01-01 DIAGNOSIS — S52.532D COLLES' FRACTURE OF LEFT RADIUS, SUBSEQUENT ENCOUNTER FOR CLOSED FRACTURE WITH ROUTINE HEALING: ICD-10-CM

## 2019-01-01 DIAGNOSIS — R05 COUGH: ICD-10-CM

## 2019-01-01 DIAGNOSIS — D64.9 ANEMIA, UNSPECIFIED: ICD-10-CM

## 2019-01-01 LAB
ACID FAST STN SPEC: SIGNIFICANT CHANGE UP
ALBUMIN SERPL ELPH-MCNC: 3.1 G/DL — LOW (ref 3.3–5)
ALBUMIN SERPL ELPH-MCNC: 3.2 G/DL — LOW (ref 3.3–5)
ALBUMIN SERPL ELPH-MCNC: 3.5 G/DL — SIGNIFICANT CHANGE UP (ref 3.3–5)
ALBUMIN SERPL ELPH-MCNC: 3.7 G/DL — SIGNIFICANT CHANGE UP (ref 3.3–5)
ALBUMIN SERPL ELPH-MCNC: 3.8 G/DL — SIGNIFICANT CHANGE UP (ref 3.3–5)
ALBUMIN SERPL ELPH-MCNC: 4.1 G/DL — SIGNIFICANT CHANGE UP (ref 3.3–5)
ALP SERPL-CCNC: 120 U/L — SIGNIFICANT CHANGE UP (ref 40–120)
ALP SERPL-CCNC: 122 U/L — HIGH (ref 40–120)
ALP SERPL-CCNC: 124 U/L — HIGH (ref 40–120)
ALP SERPL-CCNC: 130 U/L — HIGH (ref 40–120)
ALP SERPL-CCNC: 132 U/L — HIGH (ref 40–120)
ALP SERPL-CCNC: 145 U/L — HIGH (ref 40–120)
ALT FLD-CCNC: 11 U/L — SIGNIFICANT CHANGE UP (ref 4–33)
ALT FLD-CCNC: 12 U/L — SIGNIFICANT CHANGE UP (ref 4–33)
ALT FLD-CCNC: 13 U/L — SIGNIFICANT CHANGE UP (ref 4–33)
ALT FLD-CCNC: 14 U/L — SIGNIFICANT CHANGE UP (ref 4–33)
ALT FLD-CCNC: 17 U/L — SIGNIFICANT CHANGE UP (ref 4–33)
ALT FLD-CCNC: 8 U/L — SIGNIFICANT CHANGE UP (ref 4–33)
ANION GAP SERPL CALC-SCNC: 10 MMO/L — SIGNIFICANT CHANGE UP (ref 7–14)
ANION GAP SERPL CALC-SCNC: 13 MMO/L — SIGNIFICANT CHANGE UP (ref 7–14)
ANION GAP SERPL CALC-SCNC: 14 MMO/L — SIGNIFICANT CHANGE UP (ref 7–14)
ANION GAP SERPL CALC-SCNC: 15 MMO/L — HIGH (ref 7–14)
ANION GAP SERPL CALC-SCNC: 15 MMO/L — HIGH (ref 7–14)
ANION GAP SERPL CALC-SCNC: 16 MMO/L — HIGH (ref 7–14)
ANION GAP SERPL CALC-SCNC: 17 MMO/L — HIGH (ref 7–14)
APTT BLD: 34.6 SEC — SIGNIFICANT CHANGE UP (ref 27.5–36.3)
APTT BLD: 40.6 SEC — HIGH (ref 27.5–36.3)
APTT BLD: 46 SEC — HIGH (ref 27.5–36.3)
APTT BLD: 50 SEC — HIGH (ref 27.5–36.3)
APTT BLD: 53.1 SEC — HIGH (ref 27.5–36.3)
APTT BLD: 53.4 SEC — HIGH (ref 27.5–36.3)
AST SERPL-CCNC: 11 U/L — SIGNIFICANT CHANGE UP (ref 4–32)
AST SERPL-CCNC: 14 U/L — SIGNIFICANT CHANGE UP (ref 4–32)
AST SERPL-CCNC: 14 U/L — SIGNIFICANT CHANGE UP (ref 4–32)
AST SERPL-CCNC: 18 U/L — SIGNIFICANT CHANGE UP (ref 4–32)
AST SERPL-CCNC: 23 U/L — SIGNIFICANT CHANGE UP (ref 4–32)
AST SERPL-CCNC: 25 U/L — SIGNIFICANT CHANGE UP (ref 4–32)
BACTERIA NPH CULT: SIGNIFICANT CHANGE UP
BACTERIA SPT RESP CULT: SIGNIFICANT CHANGE UP
BASOPHILS # BLD AUTO: 0.02 K/UL — SIGNIFICANT CHANGE UP (ref 0–0.2)
BASOPHILS # BLD AUTO: 0.03 K/UL — SIGNIFICANT CHANGE UP (ref 0–0.2)
BASOPHILS NFR BLD AUTO: 0.3 % — SIGNIFICANT CHANGE UP (ref 0–2)
BASOPHILS NFR BLD AUTO: 0.4 % — SIGNIFICANT CHANGE UP (ref 0–2)
BASOPHILS NFR BLD AUTO: 0.6 % — SIGNIFICANT CHANGE UP (ref 0–2)
BILIRUB DIRECT SERPL-MCNC: < 0.2 MG/DL — SIGNIFICANT CHANGE UP (ref 0.1–0.2)
BILIRUB SERPL-MCNC: 0.4 MG/DL — SIGNIFICANT CHANGE UP (ref 0.2–1.2)
BILIRUB SERPL-MCNC: 0.4 MG/DL — SIGNIFICANT CHANGE UP (ref 0.2–1.2)
BILIRUB SERPL-MCNC: 0.5 MG/DL — SIGNIFICANT CHANGE UP (ref 0.2–1.2)
BILIRUB SERPL-MCNC: 0.6 MG/DL — SIGNIFICANT CHANGE UP (ref 0.2–1.2)
BLD GP AB SCN SERPL QL: NEGATIVE — SIGNIFICANT CHANGE UP
BLD GP AB SCN SERPL QL: NEGATIVE — SIGNIFICANT CHANGE UP
BUN SERPL-MCNC: 18 MG/DL — SIGNIFICANT CHANGE UP (ref 7–23)
BUN SERPL-MCNC: 18 MG/DL — SIGNIFICANT CHANGE UP (ref 7–23)
BUN SERPL-MCNC: 25 MG/DL — HIGH (ref 7–23)
BUN SERPL-MCNC: 31 MG/DL — HIGH (ref 7–23)
BUN SERPL-MCNC: 33 MG/DL — HIGH (ref 7–23)
BUN SERPL-MCNC: 36 MG/DL — HIGH (ref 7–23)
BUN SERPL-MCNC: 43 MG/DL — HIGH (ref 7–23)
BUN SERPL-MCNC: 50 MG/DL — HIGH (ref 7–23)
BUN SERPL-MCNC: 56 MG/DL — HIGH (ref 7–23)
BUN SERPL-MCNC: 64 MG/DL — HIGH (ref 7–23)
BUN SERPL-MCNC: 66 MG/DL — HIGH (ref 7–23)
CALCIUM SERPL-MCNC: 10.1 MG/DL — SIGNIFICANT CHANGE UP (ref 8.4–10.5)
CALCIUM SERPL-MCNC: 8.7 MG/DL — SIGNIFICANT CHANGE UP (ref 8.4–10.5)
CALCIUM SERPL-MCNC: 8.7 MG/DL — SIGNIFICANT CHANGE UP (ref 8.4–10.5)
CALCIUM SERPL-MCNC: 8.9 MG/DL — SIGNIFICANT CHANGE UP (ref 8.4–10.5)
CALCIUM SERPL-MCNC: 8.9 MG/DL — SIGNIFICANT CHANGE UP (ref 8.4–10.5)
CALCIUM SERPL-MCNC: 9.1 MG/DL — SIGNIFICANT CHANGE UP (ref 8.4–10.5)
CALCIUM SERPL-MCNC: 9.2 MG/DL — SIGNIFICANT CHANGE UP (ref 8.4–10.5)
CALCIUM SERPL-MCNC: 9.5 MG/DL — SIGNIFICANT CHANGE UP (ref 8.4–10.5)
CALCIUM SERPL-MCNC: 9.8 MG/DL — SIGNIFICANT CHANGE UP (ref 8.4–10.5)
CHLORIDE SERPL-SCNC: 100 MMOL/L — SIGNIFICANT CHANGE UP (ref 98–107)
CHLORIDE SERPL-SCNC: 92 MMOL/L — LOW (ref 98–107)
CHLORIDE SERPL-SCNC: 93 MMOL/L — LOW (ref 98–107)
CHLORIDE SERPL-SCNC: 94 MMOL/L — LOW (ref 98–107)
CHLORIDE SERPL-SCNC: 96 MMOL/L — LOW (ref 98–107)
CHLORIDE SERPL-SCNC: 97 MMOL/L — LOW (ref 98–107)
CHLORIDE SERPL-SCNC: 99 MMOL/L — SIGNIFICANT CHANGE UP (ref 98–107)
CO2 SERPL-SCNC: 23 MMOL/L — SIGNIFICANT CHANGE UP (ref 22–31)
CO2 SERPL-SCNC: 23 MMOL/L — SIGNIFICANT CHANGE UP (ref 22–31)
CO2 SERPL-SCNC: 24 MMOL/L — SIGNIFICANT CHANGE UP (ref 22–31)
CO2 SERPL-SCNC: 25 MMOL/L — SIGNIFICANT CHANGE UP (ref 22–31)
CO2 SERPL-SCNC: 26 MMOL/L — SIGNIFICANT CHANGE UP (ref 22–31)
CO2 SERPL-SCNC: 28 MMOL/L — SIGNIFICANT CHANGE UP (ref 22–31)
CO2 SERPL-SCNC: 28 MMOL/L — SIGNIFICANT CHANGE UP (ref 22–31)
CREAT SERPL-MCNC: 4.31 MG/DL — HIGH (ref 0.5–1.3)
CREAT SERPL-MCNC: 4.47 MG/DL — HIGH (ref 0.5–1.3)
CREAT SERPL-MCNC: 4.47 MG/DL — HIGH (ref 0.5–1.3)
CREAT SERPL-MCNC: 4.96 MG/DL — HIGH (ref 0.5–1.3)
CREAT SERPL-MCNC: 5.33 MG/DL — HIGH (ref 0.5–1.3)
CREAT SERPL-MCNC: 5.95 MG/DL — HIGH (ref 0.5–1.3)
CREAT SERPL-MCNC: 6.6 MG/DL — HIGH (ref 0.5–1.3)
CREAT SERPL-MCNC: 7.23 MG/DL — HIGH (ref 0.5–1.3)
CREAT SERPL-MCNC: 7.27 MG/DL — HIGH (ref 0.5–1.3)
CREAT SERPL-MCNC: 7.7 MG/DL — HIGH (ref 0.5–1.3)
CREAT SERPL-MCNC: 9.4 MG/DL — HIGH (ref 0.5–1.3)
CULTURE - ACID FAST SMEAR CONCENTRATED: SIGNIFICANT CHANGE UP
EOSINOPHIL # BLD AUTO: 0.11 K/UL — SIGNIFICANT CHANGE UP (ref 0–0.5)
EOSINOPHIL # BLD AUTO: 0.13 K/UL — SIGNIFICANT CHANGE UP (ref 0–0.5)
EOSINOPHIL # BLD AUTO: 0.17 K/UL — SIGNIFICANT CHANGE UP (ref 0–0.5)
EOSINOPHIL # BLD AUTO: 0.17 K/UL — SIGNIFICANT CHANGE UP (ref 0–0.5)
EOSINOPHIL # BLD AUTO: 0.22 K/UL — SIGNIFICANT CHANGE UP (ref 0–0.5)
EOSINOPHIL NFR BLD AUTO: 1.6 % — SIGNIFICANT CHANGE UP (ref 0–6)
EOSINOPHIL NFR BLD AUTO: 2.3 % — SIGNIFICANT CHANGE UP (ref 0–6)
EOSINOPHIL NFR BLD AUTO: 3.2 % — SIGNIFICANT CHANGE UP (ref 0–6)
EOSINOPHIL NFR BLD AUTO: 3.2 % — SIGNIFICANT CHANGE UP (ref 0–6)
EOSINOPHIL NFR BLD AUTO: 4.8 % — SIGNIFICANT CHANGE UP (ref 0–6)
FUNGUS SPEC QL CULT: SIGNIFICANT CHANGE UP
GLUCOSE BLDV-MCNC: 93 MG/DL — SIGNIFICANT CHANGE UP (ref 70–99)
GLUCOSE SERPL-MCNC: 102 MG/DL — HIGH (ref 70–99)
GLUCOSE SERPL-MCNC: 121 MG/DL — HIGH (ref 70–99)
GLUCOSE SERPL-MCNC: 66 MG/DL — LOW (ref 70–99)
GLUCOSE SERPL-MCNC: 78 MG/DL — SIGNIFICANT CHANGE UP (ref 70–99)
GLUCOSE SERPL-MCNC: 83 MG/DL — SIGNIFICANT CHANGE UP (ref 70–99)
GLUCOSE SERPL-MCNC: 85 MG/DL — SIGNIFICANT CHANGE UP (ref 70–99)
GLUCOSE SERPL-MCNC: 85 MG/DL — SIGNIFICANT CHANGE UP (ref 70–99)
GLUCOSE SERPL-MCNC: 86 MG/DL — SIGNIFICANT CHANGE UP (ref 70–99)
GLUCOSE SERPL-MCNC: 91 MG/DL — SIGNIFICANT CHANGE UP (ref 70–99)
GLUCOSE SERPL-MCNC: 93 MG/DL — SIGNIFICANT CHANGE UP (ref 70–99)
GLUCOSE SERPL-MCNC: 95 MG/DL — SIGNIFICANT CHANGE UP (ref 70–99)
GRAM STN SPT: SIGNIFICANT CHANGE UP
HBV CORE AB SER-ACNC: NONREACTIVE — SIGNIFICANT CHANGE UP
HBV SURFACE AB SER-ACNC: 59.5 MLU/ML — SIGNIFICANT CHANGE UP
HBV SURFACE AG SER-ACNC: NEGATIVE — SIGNIFICANT CHANGE UP
HCT VFR BLD CALC: 29.7 % — LOW (ref 34.5–45)
HCT VFR BLD CALC: 30.8 % — LOW (ref 34.5–45)
HCT VFR BLD CALC: 32.2 % — LOW (ref 34.5–45)
HCT VFR BLD CALC: 32.3 % — LOW (ref 34.5–45)
HCT VFR BLD CALC: 33.4 % — LOW (ref 34.5–45)
HCT VFR BLD CALC: 33.4 % — LOW (ref 34.5–45)
HCT VFR BLD CALC: 33.7 % — LOW (ref 34.5–45)
HCT VFR BLD CALC: 35.6 % — SIGNIFICANT CHANGE UP (ref 34.5–45)
HCT VFR BLD CALC: 36.6 % — SIGNIFICANT CHANGE UP (ref 34.5–45)
HCT VFR BLD CALC: 36.9 % — SIGNIFICANT CHANGE UP (ref 34.5–45)
HCV AB S/CO SERPL IA: 0.14 S/CO — SIGNIFICANT CHANGE UP (ref 0–0.99)
HCV AB SERPL-IMP: SIGNIFICANT CHANGE UP
HGB BLD-MCNC: 10.5 G/DL — LOW (ref 11.5–15.5)
HGB BLD-MCNC: 10.6 G/DL — LOW (ref 11.5–15.5)
HGB BLD-MCNC: 8.6 G/DL — LOW (ref 11.5–15.5)
HGB BLD-MCNC: 8.9 G/DL — LOW (ref 11.5–15.5)
HGB BLD-MCNC: 9.1 G/DL — LOW (ref 11.5–15.5)
HGB BLD-MCNC: 9.1 G/DL — LOW (ref 11.5–15.5)
HGB BLD-MCNC: 9.5 G/DL — LOW (ref 11.5–15.5)
HGB BLD-MCNC: 9.6 G/DL — LOW (ref 11.5–15.5)
HGB BLD-MCNC: 9.6 G/DL — LOW (ref 11.5–15.5)
HGB BLD-MCNC: 9.9 G/DL — LOW (ref 11.5–15.5)
IMM GRANULOCYTES NFR BLD AUTO: 0.2 % — SIGNIFICANT CHANGE UP (ref 0–1.5)
IMM GRANULOCYTES NFR BLD AUTO: 0.3 % — SIGNIFICANT CHANGE UP (ref 0–1.5)
IMM GRANULOCYTES NFR BLD AUTO: 0.4 % — SIGNIFICANT CHANGE UP (ref 0–1.5)
IMM GRANULOCYTES NFR BLD AUTO: 0.5 % — SIGNIFICANT CHANGE UP (ref 0–1.5)
IMM GRANULOCYTES NFR BLD AUTO: 0.6 % — SIGNIFICANT CHANGE UP (ref 0–1.5)
INR BLD: 1.56 — HIGH (ref 0.88–1.17)
INR BLD: 1.58 — HIGH (ref 0.88–1.17)
INR BLD: 1.93 — HIGH (ref 0.88–1.17)
INR BLD: 1.94 — HIGH (ref 0.88–1.17)
INR BLD: 2.1 — HIGH (ref 0.88–1.17)
INR BLD: 3.17 — HIGH (ref 0.88–1.17)
INR BLD: 4.11 — HIGH (ref 0.88–1.17)
INR BLD: 5.64 — CRITICAL HIGH (ref 0.88–1.17)
INR BLD: 5.76 — CRITICAL HIGH (ref 0.88–1.17)
LYMPHOCYTES # BLD AUTO: 1.04 K/UL — SIGNIFICANT CHANGE UP (ref 1–3.3)
LYMPHOCYTES # BLD AUTO: 1.14 K/UL — SIGNIFICANT CHANGE UP (ref 1–3.3)
LYMPHOCYTES # BLD AUTO: 1.16 K/UL — SIGNIFICANT CHANGE UP (ref 1–3.3)
LYMPHOCYTES # BLD AUTO: 1.27 K/UL — SIGNIFICANT CHANGE UP (ref 1–3.3)
LYMPHOCYTES # BLD AUTO: 1.76 K/UL — SIGNIFICANT CHANGE UP (ref 1–3.3)
LYMPHOCYTES # BLD AUTO: 19.5 % — SIGNIFICANT CHANGE UP (ref 13–44)
LYMPHOCYTES # BLD AUTO: 20.7 % — SIGNIFICANT CHANGE UP (ref 13–44)
LYMPHOCYTES # BLD AUTO: 24 % — SIGNIFICANT CHANGE UP (ref 13–44)
LYMPHOCYTES # BLD AUTO: 24.7 % — SIGNIFICANT CHANGE UP (ref 13–44)
LYMPHOCYTES # BLD AUTO: 25.6 % — SIGNIFICANT CHANGE UP (ref 13–44)
MAGNESIUM SERPL-MCNC: 2.1 MG/DL — SIGNIFICANT CHANGE UP (ref 1.6–2.6)
MAGNESIUM SERPL-MCNC: 2.2 MG/DL — SIGNIFICANT CHANGE UP (ref 1.6–2.6)
MAGNESIUM SERPL-MCNC: 2.3 MG/DL — SIGNIFICANT CHANGE UP (ref 1.6–2.6)
MAGNESIUM SERPL-MCNC: 2.4 MG/DL — SIGNIFICANT CHANGE UP (ref 1.6–2.6)
MAGNESIUM SERPL-MCNC: 2.4 MG/DL — SIGNIFICANT CHANGE UP (ref 1.6–2.6)
MCHC RBC-ENTMCNC: 24.3 PG — LOW (ref 27–34)
MCHC RBC-ENTMCNC: 24.4 PG — LOW (ref 27–34)
MCHC RBC-ENTMCNC: 24.5 PG — LOW (ref 27–34)
MCHC RBC-ENTMCNC: 24.5 PG — LOW (ref 27–34)
MCHC RBC-ENTMCNC: 24.7 PG — LOW (ref 27–34)
MCHC RBC-ENTMCNC: 24.9 PG — LOW (ref 27–34)
MCHC RBC-ENTMCNC: 25 PG — LOW (ref 27–34)
MCHC RBC-ENTMCNC: 25.1 PG — LOW (ref 27–34)
MCHC RBC-ENTMCNC: 27.8 % — LOW (ref 32–36)
MCHC RBC-ENTMCNC: 28.2 % — LOW (ref 32–36)
MCHC RBC-ENTMCNC: 28.2 % — LOW (ref 32–36)
MCHC RBC-ENTMCNC: 28.3 % — LOW (ref 32–36)
MCHC RBC-ENTMCNC: 28.5 % — LOW (ref 32–36)
MCHC RBC-ENTMCNC: 28.7 % — LOW (ref 32–36)
MCHC RBC-ENTMCNC: 28.7 % — LOW (ref 32–36)
MCHC RBC-ENTMCNC: 28.9 % — LOW (ref 32–36)
MCHC RBC-ENTMCNC: 29 % — LOW (ref 32–36)
MCHC RBC-ENTMCNC: 29 % — LOW (ref 32–36)
MCV RBC AUTO: 85.3 FL — SIGNIFICANT CHANGE UP (ref 80–100)
MCV RBC AUTO: 85.4 FL — SIGNIFICANT CHANGE UP (ref 80–100)
MCV RBC AUTO: 85.8 FL — SIGNIFICANT CHANGE UP (ref 80–100)
MCV RBC AUTO: 86.3 FL — SIGNIFICANT CHANGE UP (ref 80–100)
MCV RBC AUTO: 86.7 FL — SIGNIFICANT CHANGE UP (ref 80–100)
MCV RBC AUTO: 86.9 FL — SIGNIFICANT CHANGE UP (ref 80–100)
MCV RBC AUTO: 87 FL — SIGNIFICANT CHANGE UP (ref 80–100)
MCV RBC AUTO: 87.4 FL — SIGNIFICANT CHANGE UP (ref 80–100)
MCV RBC AUTO: 88.1 FL — SIGNIFICANT CHANGE UP (ref 80–100)
MCV RBC AUTO: 89.2 FL — SIGNIFICANT CHANGE UP (ref 80–100)
MONOCYTES # BLD AUTO: 0.63 K/UL — SIGNIFICANT CHANGE UP (ref 0–0.9)
MONOCYTES # BLD AUTO: 0.7 K/UL — SIGNIFICANT CHANGE UP (ref 0–0.9)
MONOCYTES # BLD AUTO: 0.74 K/UL — SIGNIFICANT CHANGE UP (ref 0–0.9)
MONOCYTES # BLD AUTO: 0.77 K/UL — SIGNIFICANT CHANGE UP (ref 0–0.9)
MONOCYTES # BLD AUTO: 0.82 K/UL — SIGNIFICANT CHANGE UP (ref 0–0.9)
MONOCYTES NFR BLD AUTO: 11.8 % — SIGNIFICANT CHANGE UP (ref 2–14)
MONOCYTES NFR BLD AUTO: 11.9 % — SIGNIFICANT CHANGE UP (ref 2–14)
MONOCYTES NFR BLD AUTO: 12.5 % — SIGNIFICANT CHANGE UP (ref 2–14)
MONOCYTES NFR BLD AUTO: 14.5 % — HIGH (ref 2–14)
MONOCYTES NFR BLD AUTO: 16 % — HIGH (ref 2–14)
NEUTROPHILS # BLD AUTO: 2.46 K/UL — SIGNIFICANT CHANGE UP (ref 1.8–7.4)
NEUTROPHILS # BLD AUTO: 3.06 K/UL — SIGNIFICANT CHANGE UP (ref 1.8–7.4)
NEUTROPHILS # BLD AUTO: 3.44 K/UL — SIGNIFICANT CHANGE UP (ref 1.8–7.4)
NEUTROPHILS # BLD AUTO: 3.57 K/UL — SIGNIFICANT CHANGE UP (ref 1.8–7.4)
NEUTROPHILS # BLD AUTO: 4.14 K/UL — SIGNIFICANT CHANGE UP (ref 1.8–7.4)
NEUTROPHILS NFR BLD AUTO: 53.3 % — SIGNIFICANT CHANGE UP (ref 43–77)
NEUTROPHILS NFR BLD AUTO: 57.7 % — SIGNIFICANT CHANGE UP (ref 43–77)
NEUTROPHILS NFR BLD AUTO: 60.3 % — SIGNIFICANT CHANGE UP (ref 43–77)
NEUTROPHILS NFR BLD AUTO: 63.6 % — SIGNIFICANT CHANGE UP (ref 43–77)
NEUTROPHILS NFR BLD AUTO: 64.7 % — SIGNIFICANT CHANGE UP (ref 43–77)
NON-GYNECOLOGICAL CYTOLOGY STUDY: SIGNIFICANT CHANGE UP
NRBC # FLD: 0 K/UL — SIGNIFICANT CHANGE UP (ref 0–0)
NRBC # FLD: 0.02 K/UL — SIGNIFICANT CHANGE UP (ref 0–0)
NT-PROBNP SERPL-SCNC: SIGNIFICANT CHANGE UP PG/ML
PHOSPHATE SERPL-MCNC: 4 MG/DL — SIGNIFICANT CHANGE UP (ref 2.5–4.5)
PHOSPHATE SERPL-MCNC: 4.5 MG/DL — SIGNIFICANT CHANGE UP (ref 2.5–4.5)
PHOSPHATE SERPL-MCNC: 4.8 MG/DL — HIGH (ref 2.5–4.5)
PHOSPHATE SERPL-MCNC: 5.7 MG/DL — HIGH (ref 2.5–4.5)
PLATELET # BLD AUTO: 167 K/UL — SIGNIFICANT CHANGE UP (ref 150–400)
PLATELET # BLD AUTO: 168 K/UL — SIGNIFICANT CHANGE UP (ref 150–400)
PLATELET # BLD AUTO: 168 K/UL — SIGNIFICANT CHANGE UP (ref 150–400)
PLATELET # BLD AUTO: 178 K/UL — SIGNIFICANT CHANGE UP (ref 150–400)
PLATELET # BLD AUTO: 180 K/UL — SIGNIFICANT CHANGE UP (ref 150–400)
PLATELET # BLD AUTO: 182 K/UL — SIGNIFICANT CHANGE UP (ref 150–400)
PLATELET # BLD AUTO: 184 K/UL — SIGNIFICANT CHANGE UP (ref 150–400)
PLATELET # BLD AUTO: 186 K/UL — SIGNIFICANT CHANGE UP (ref 150–400)
PLATELET # BLD AUTO: 191 K/UL — SIGNIFICANT CHANGE UP (ref 150–400)
PLATELET # BLD AUTO: 204 K/UL — SIGNIFICANT CHANGE UP (ref 150–400)
PMV BLD: 10.3 FL — SIGNIFICANT CHANGE UP (ref 7–13)
PMV BLD: 10.5 FL — SIGNIFICANT CHANGE UP (ref 7–13)
PMV BLD: 10.7 FL — SIGNIFICANT CHANGE UP (ref 7–13)
PMV BLD: 10.7 FL — SIGNIFICANT CHANGE UP (ref 7–13)
PMV BLD: 11.1 FL — SIGNIFICANT CHANGE UP (ref 7–13)
PMV BLD: 11.4 FL — SIGNIFICANT CHANGE UP (ref 7–13)
PMV BLD: 11.6 FL — SIGNIFICANT CHANGE UP (ref 7–13)
PMV BLD: 11.6 FL — SIGNIFICANT CHANGE UP (ref 7–13)
PMV BLD: 11.8 FL — SIGNIFICANT CHANGE UP (ref 7–13)
PMV BLD: 11.9 FL — SIGNIFICANT CHANGE UP (ref 7–13)
POTASSIUM BLDV-SCNC: 4.1 MMOL/L — SIGNIFICANT CHANGE UP (ref 3.4–4.5)
POTASSIUM SERPL-MCNC: 4 MMOL/L — SIGNIFICANT CHANGE UP (ref 3.5–5.3)
POTASSIUM SERPL-MCNC: 4.1 MMOL/L — SIGNIFICANT CHANGE UP (ref 3.5–5.3)
POTASSIUM SERPL-MCNC: 4.2 MMOL/L — SIGNIFICANT CHANGE UP (ref 3.5–5.3)
POTASSIUM SERPL-MCNC: 4.4 MMOL/L — SIGNIFICANT CHANGE UP (ref 3.5–5.3)
POTASSIUM SERPL-MCNC: 4.4 MMOL/L — SIGNIFICANT CHANGE UP (ref 3.5–5.3)
POTASSIUM SERPL-MCNC: 4.5 MMOL/L — SIGNIFICANT CHANGE UP (ref 3.5–5.3)
POTASSIUM SERPL-MCNC: 4.7 MMOL/L — SIGNIFICANT CHANGE UP (ref 3.5–5.3)
POTASSIUM SERPL-MCNC: 4.9 MMOL/L — SIGNIFICANT CHANGE UP (ref 3.5–5.3)
POTASSIUM SERPL-MCNC: 5 MMOL/L — SIGNIFICANT CHANGE UP (ref 3.5–5.3)
POTASSIUM SERPL-MCNC: 5.1 MMOL/L — SIGNIFICANT CHANGE UP (ref 3.5–5.3)
POTASSIUM SERPL-MCNC: 5.2 MMOL/L — SIGNIFICANT CHANGE UP (ref 3.5–5.3)
POTASSIUM SERPL-SCNC: 4 MMOL/L — SIGNIFICANT CHANGE UP (ref 3.5–5.3)
POTASSIUM SERPL-SCNC: 4.1 MMOL/L — SIGNIFICANT CHANGE UP (ref 3.5–5.3)
POTASSIUM SERPL-SCNC: 4.2 MMOL/L — SIGNIFICANT CHANGE UP (ref 3.5–5.3)
POTASSIUM SERPL-SCNC: 4.4 MMOL/L — SIGNIFICANT CHANGE UP (ref 3.5–5.3)
POTASSIUM SERPL-SCNC: 4.4 MMOL/L — SIGNIFICANT CHANGE UP (ref 3.5–5.3)
POTASSIUM SERPL-SCNC: 4.5 MMOL/L — SIGNIFICANT CHANGE UP (ref 3.5–5.3)
POTASSIUM SERPL-SCNC: 4.7 MMOL/L — SIGNIFICANT CHANGE UP (ref 3.5–5.3)
POTASSIUM SERPL-SCNC: 4.9 MMOL/L — SIGNIFICANT CHANGE UP (ref 3.5–5.3)
POTASSIUM SERPL-SCNC: 5 MMOL/L — SIGNIFICANT CHANGE UP (ref 3.5–5.3)
POTASSIUM SERPL-SCNC: 5.1 MMOL/L — SIGNIFICANT CHANGE UP (ref 3.5–5.3)
POTASSIUM SERPL-SCNC: 5.2 MMOL/L — SIGNIFICANT CHANGE UP (ref 3.5–5.3)
PROT SERPL-MCNC: 7.7 G/DL — SIGNIFICANT CHANGE UP (ref 6–8.3)
PROT SERPL-MCNC: 7.8 G/DL — SIGNIFICANT CHANGE UP (ref 6–8.3)
PROT SERPL-MCNC: 8.4 G/DL — HIGH (ref 6–8.3)
PROT SERPL-MCNC: 8.8 G/DL — HIGH (ref 6–8.3)
PROT SERPL-MCNC: 9.1 G/DL — HIGH (ref 6–8.3)
PROT SERPL-MCNC: 9.9 G/DL — HIGH (ref 6–8.3)
PROTHROM AB SERPL-ACNC: 17.5 SEC — HIGH (ref 9.8–13.1)
PROTHROM AB SERPL-ACNC: 18.3 SEC — HIGH (ref 9.8–13.1)
PROTHROM AB SERPL-ACNC: 21.9 SEC — HIGH (ref 9.8–13.1)
PROTHROM AB SERPL-ACNC: 22.6 SEC — HIGH (ref 9.8–13.1)
PROTHROM AB SERPL-ACNC: 24.5 SEC — HIGH (ref 9.8–13.1)
PROTHROM AB SERPL-ACNC: 36.5 SEC — HIGH (ref 9.8–13.1)
PROTHROM AB SERPL-ACNC: 49 SEC — HIGH (ref 9.8–13.1)
PROTHROM AB SERPL-ACNC: 67.5 SEC — HIGH (ref 9.8–13.1)
PROTHROM AB SERPL-ACNC: 67.8 SEC — HIGH (ref 9.8–13.1)
RBC # BLD: 3.46 M/UL — LOW (ref 3.8–5.2)
RBC # BLD: 3.61 M/UL — LOW (ref 3.8–5.2)
RBC # BLD: 3.62 M/UL — LOW (ref 3.8–5.2)
RBC # BLD: 3.73 M/UL — LOW (ref 3.8–5.2)
RBC # BLD: 3.82 M/UL — SIGNIFICANT CHANGE UP (ref 3.8–5.2)
RBC # BLD: 3.84 M/UL — SIGNIFICANT CHANGE UP (ref 3.8–5.2)
RBC # BLD: 3.88 M/UL — SIGNIFICANT CHANGE UP (ref 3.8–5.2)
RBC # BLD: 4.04 M/UL — SIGNIFICANT CHANGE UP (ref 3.8–5.2)
RBC # BLD: 4.22 M/UL — SIGNIFICANT CHANGE UP (ref 3.8–5.2)
RBC # BLD: 4.32 M/UL — SIGNIFICANT CHANGE UP (ref 3.8–5.2)
RBC # FLD: 18 % — HIGH (ref 10.3–14.5)
RBC # FLD: 18.4 % — HIGH (ref 10.3–14.5)
RBC # FLD: 18.4 % — HIGH (ref 10.3–14.5)
RBC # FLD: 18.5 % — HIGH (ref 10.3–14.5)
RBC # FLD: 18.6 % — HIGH (ref 10.3–14.5)
RBC # FLD: 18.7 % — HIGH (ref 10.3–14.5)
RBC # FLD: 18.7 % — HIGH (ref 10.3–14.5)
RBC # FLD: 18.9 % — HIGH (ref 10.3–14.5)
RBC # FLD: 18.9 % — HIGH (ref 10.3–14.5)
RBC # FLD: 19.3 % — HIGH (ref 10.3–14.5)
RH IG SCN BLD-IMP: POSITIVE — SIGNIFICANT CHANGE UP
RH IG SCN BLD-IMP: POSITIVE — SIGNIFICANT CHANGE UP
SODIUM SERPL-SCNC: 131 MMOL/L — LOW (ref 135–145)
SODIUM SERPL-SCNC: 132 MMOL/L — LOW (ref 135–145)
SODIUM SERPL-SCNC: 134 MMOL/L — LOW (ref 135–145)
SODIUM SERPL-SCNC: 134 MMOL/L — LOW (ref 135–145)
SODIUM SERPL-SCNC: 135 MMOL/L — SIGNIFICANT CHANGE UP (ref 135–145)
SODIUM SERPL-SCNC: 136 MMOL/L — SIGNIFICANT CHANGE UP (ref 135–145)
SODIUM SERPL-SCNC: 136 MMOL/L — SIGNIFICANT CHANGE UP (ref 135–145)
SODIUM SERPL-SCNC: 137 MMOL/L — SIGNIFICANT CHANGE UP (ref 135–145)
SODIUM SERPL-SCNC: 143 MMOL/L — SIGNIFICANT CHANGE UP (ref 135–145)
SPECIMEN SOURCE: SIGNIFICANT CHANGE UP
TROPONIN T, HIGH SENSITIVITY: 225 NG/L — CRITICAL HIGH (ref ?–14)
TROPONIN T, HIGH SENSITIVITY: 227 NG/L — CRITICAL HIGH (ref ?–14)
TROPONIN T, HIGH SENSITIVITY: 233 NG/L — CRITICAL HIGH (ref ?–14)
WBC # BLD: 4.37 K/UL — SIGNIFICANT CHANGE UP (ref 3.8–10.5)
WBC # BLD: 4.62 K/UL — SIGNIFICANT CHANGE UP (ref 3.8–10.5)
WBC # BLD: 5 K/UL — SIGNIFICANT CHANGE UP (ref 3.8–10.5)
WBC # BLD: 5.3 K/UL — SIGNIFICANT CHANGE UP (ref 3.8–10.5)
WBC # BLD: 5.32 K/UL — SIGNIFICANT CHANGE UP (ref 3.8–10.5)
WBC # BLD: 5.44 K/UL — SIGNIFICANT CHANGE UP (ref 3.8–10.5)
WBC # BLD: 5.5 K/UL — SIGNIFICANT CHANGE UP (ref 3.8–10.5)
WBC # BLD: 5.61 K/UL — SIGNIFICANT CHANGE UP (ref 3.8–10.5)
WBC # BLD: 6.07 K/UL — SIGNIFICANT CHANGE UP (ref 3.8–10.5)
WBC # BLD: 6.87 K/UL — SIGNIFICANT CHANGE UP (ref 3.8–10.5)
WBC # FLD AUTO: 4.37 K/UL — SIGNIFICANT CHANGE UP (ref 3.8–10.5)
WBC # FLD AUTO: 4.62 K/UL — SIGNIFICANT CHANGE UP (ref 3.8–10.5)
WBC # FLD AUTO: 5 K/UL — SIGNIFICANT CHANGE UP (ref 3.8–10.5)
WBC # FLD AUTO: 5.3 K/UL — SIGNIFICANT CHANGE UP (ref 3.8–10.5)
WBC # FLD AUTO: 5.32 K/UL — SIGNIFICANT CHANGE UP (ref 3.8–10.5)
WBC # FLD AUTO: 5.44 K/UL — SIGNIFICANT CHANGE UP (ref 3.8–10.5)
WBC # FLD AUTO: 5.5 K/UL — SIGNIFICANT CHANGE UP (ref 3.8–10.5)
WBC # FLD AUTO: 5.61 K/UL — SIGNIFICANT CHANGE UP (ref 3.8–10.5)
WBC # FLD AUTO: 6.07 K/UL — SIGNIFICANT CHANGE UP (ref 3.8–10.5)
WBC # FLD AUTO: 6.87 K/UL — SIGNIFICANT CHANGE UP (ref 3.8–10.5)

## 2019-01-01 PROCEDURE — 99239 HOSP IP/OBS DSCHRG MGMT >30: CPT

## 2019-01-01 PROCEDURE — 31625 BRONCHOSCOPY W/BIOPSY(S): CPT

## 2019-01-01 PROCEDURE — 73110 X-RAY EXAM OF WRIST: CPT | Mod: LT

## 2019-01-01 PROCEDURE — 99284 EMERGENCY DEPT VISIT MOD MDM: CPT | Mod: GC

## 2019-01-01 PROCEDURE — 99223 1ST HOSP IP/OBS HIGH 75: CPT | Mod: GC

## 2019-01-01 PROCEDURE — 93971 EXTREMITY STUDY: CPT | Mod: 26

## 2019-01-01 PROCEDURE — 73562 X-RAY EXAM OF KNEE 3: CPT | Mod: 26,RT

## 2019-01-01 PROCEDURE — 12345: CPT | Mod: NC

## 2019-01-01 PROCEDURE — 99233 SBSQ HOSP IP/OBS HIGH 50: CPT

## 2019-01-01 PROCEDURE — 70486 CT MAXILLOFACIAL W/O DYE: CPT | Mod: 26

## 2019-01-01 PROCEDURE — 99205 OFFICE O/P NEW HI 60 MIN: CPT

## 2019-01-01 PROCEDURE — 88342 IMHCHEM/IMCYTCHM 1ST ANTB: CPT | Mod: 26

## 2019-01-01 PROCEDURE — 88305 TISSUE EXAM BY PATHOLOGIST: CPT | Mod: 26

## 2019-01-01 PROCEDURE — 20225 BONE BIOPSY TROCAR/NDL DEEP: CPT

## 2019-01-01 PROCEDURE — 31624 DX BRONCHOSCOPE/LAVAGE: CPT

## 2019-01-01 PROCEDURE — 99203 OFFICE O/P NEW LOW 30 MIN: CPT

## 2019-01-01 PROCEDURE — 88360 TUMOR IMMUNOHISTOCHEM/MANUAL: CPT | Mod: 26,59

## 2019-01-01 PROCEDURE — 71045 X-RAY EXAM CHEST 1 VIEW: CPT | Mod: 26

## 2019-01-01 PROCEDURE — 99213 OFFICE O/P EST LOW 20 MIN: CPT

## 2019-01-01 PROCEDURE — 99214 OFFICE O/P EST MOD 30 MIN: CPT

## 2019-01-01 PROCEDURE — 88112 CYTOPATH CELL ENHANCE TECH: CPT | Mod: 26,59

## 2019-01-01 PROCEDURE — 70450 CT HEAD/BRAIN W/O DYE: CPT | Mod: 26

## 2019-01-01 PROCEDURE — 99204 OFFICE O/P NEW MOD 45 MIN: CPT

## 2019-01-01 PROCEDURE — 88341 IMHCHEM/IMCYTCHM EA ADD ANTB: CPT | Mod: 26

## 2019-01-01 PROCEDURE — 73030 X-RAY EXAM OF SHOULDER: CPT | Mod: 26,RT

## 2019-01-01 PROCEDURE — 77012 CT SCAN FOR NEEDLE BIOPSY: CPT | Mod: 26

## 2019-01-01 PROCEDURE — 88173 CYTOPATH EVAL FNA REPORT: CPT | Mod: 26

## 2019-01-01 PROCEDURE — 72125 CT NECK SPINE W/O DYE: CPT | Mod: 26

## 2019-01-01 RX ORDER — POLYETHYLENE GLYCOL 3350 17 G/17G
17 POWDER, FOR SOLUTION ORAL
Qty: 0 | Refills: 0 | DISCHARGE
Start: 2019-01-01

## 2019-01-01 RX ORDER — IPRATROPIUM/ALBUTEROL SULFATE 18-103MCG
3 AEROSOL WITH ADAPTER (GRAM) INHALATION ONCE
Refills: 0 | Status: COMPLETED | OUTPATIENT
Start: 2019-01-01 | End: 2019-01-01

## 2019-01-01 RX ORDER — OXYCODONE AND ACETAMINOPHEN 5; 325 MG/1; MG/1
1 TABLET ORAL EVERY 6 HOURS
Refills: 0 | Status: DISCONTINUED | OUTPATIENT
Start: 2019-01-01 | End: 2019-01-01

## 2019-01-01 RX ORDER — ACETAMINOPHEN 500 MG
650 TABLET ORAL ONCE
Refills: 0 | Status: COMPLETED | OUTPATIENT
Start: 2019-01-01 | End: 2019-01-01

## 2019-01-01 RX ORDER — GABAPENTIN 400 MG/1
100 CAPSULE ORAL AT BEDTIME
Refills: 0 | Status: DISCONTINUED | OUTPATIENT
Start: 2019-01-01 | End: 2019-01-01

## 2019-01-01 RX ORDER — WARFARIN SODIUM 2.5 MG/1
1 TABLET ORAL
Qty: 0 | Refills: 0 | DISCHARGE
Start: 2019-01-01

## 2019-01-01 RX ORDER — ACETAMINOPHEN 500 MG
325 TABLET ORAL EVERY 4 HOURS
Refills: 0 | Status: DISCONTINUED | OUTPATIENT
Start: 2019-01-01 | End: 2019-01-01

## 2019-01-01 RX ORDER — INFLUENZA VIRUS VACCINE 15; 15; 15; 15 UG/.5ML; UG/.5ML; UG/.5ML; UG/.5ML
0.5 SUSPENSION INTRAMUSCULAR ONCE
Refills: 0 | Status: DISCONTINUED | OUTPATIENT
Start: 2019-01-01 | End: 2019-01-01

## 2019-01-01 RX ORDER — SEVELAMER CARBONATE 2400 MG/1
800 POWDER, FOR SUSPENSION ORAL
Refills: 0 | Status: DISCONTINUED | OUTPATIENT
Start: 2019-01-01 | End: 2019-01-01

## 2019-01-01 RX ORDER — WARFARIN SODIUM 2.5 MG/1
5 TABLET ORAL ONCE
Refills: 0 | Status: COMPLETED | OUTPATIENT
Start: 2019-01-01 | End: 2019-01-01

## 2019-01-01 RX ORDER — MIDODRINE HYDROCHLORIDE 2.5 MG/1
1 TABLET ORAL
Qty: 0 | Refills: 0 | DISCHARGE
Start: 2019-01-01

## 2019-01-01 RX ORDER — ALBUTEROL SULFATE 2.5 MG/3ML
(2.5 MG/3ML) SOLUTION RESPIRATORY (INHALATION)
Refills: 0 | Status: ACTIVE | COMMUNITY

## 2019-01-01 RX ORDER — SODIUM CHLORIDE 9 MG/ML
1000 INJECTION, SOLUTION INTRAVENOUS
Refills: 0 | Status: DISCONTINUED | OUTPATIENT
Start: 2019-01-01 | End: 2019-01-01

## 2019-01-01 RX ORDER — MULTIVIT-MIN/FOLIC/VIT K/LYCOP 400-300MCG
25 MCG TABLET ORAL
Refills: 0 | Status: ACTIVE | COMMUNITY

## 2019-01-01 RX ORDER — POLYETHYLENE GLYCOL 3350 17 G/17G
17 POWDER, FOR SOLUTION ORAL DAILY
Refills: 0 | Status: DISCONTINUED | OUTPATIENT
Start: 2019-01-01 | End: 2019-01-01

## 2019-01-01 RX ORDER — SENNA PLUS 8.6 MG/1
2 TABLET ORAL AT BEDTIME
Refills: 0 | Status: DISCONTINUED | OUTPATIENT
Start: 2019-01-01 | End: 2019-01-01

## 2019-01-01 RX ORDER — SENNA PLUS 8.6 MG/1
2 TABLET ORAL
Qty: 0 | Refills: 0 | DISCHARGE
Start: 2019-01-01

## 2019-01-01 RX ORDER — ACETAMINOPHEN 500 MG
1 TABLET ORAL
Qty: 0 | Refills: 0 | DISCHARGE
Start: 2019-01-01

## 2019-01-01 RX ORDER — MIDODRINE HYDROCHLORIDE 2.5 MG/1
10 TABLET ORAL
Refills: 0 | Status: DISCONTINUED | OUTPATIENT
Start: 2019-01-01 | End: 2019-01-01

## 2019-01-01 RX ORDER — WARFARIN SODIUM 2.5 MG/1
7.5 TABLET ORAL ONCE
Refills: 0 | Status: COMPLETED | OUTPATIENT
Start: 2019-01-01 | End: 2019-01-01

## 2019-01-01 RX ORDER — CHLORHEXIDINE GLUCONATE 213 G/1000ML
1 SOLUTION TOPICAL DAILY
Refills: 0 | Status: DISCONTINUED | OUTPATIENT
Start: 2019-01-01 | End: 2019-01-01

## 2019-01-01 RX ORDER — WARFARIN 4 MG/1
TABLET ORAL
Refills: 0 | Status: DISCONTINUED | COMMUNITY
End: 2019-01-01

## 2019-01-01 RX ORDER — MORPHINE SULFATE 50 MG/1
2 CAPSULE, EXTENDED RELEASE ORAL ONCE
Refills: 0 | Status: DISCONTINUED | OUTPATIENT
Start: 2019-01-01 | End: 2019-01-01

## 2019-01-01 RX ORDER — ASCORBIC ACID 500 MG
TABLET ORAL
Refills: 0 | Status: ACTIVE | COMMUNITY

## 2019-01-01 RX ORDER — WARFARIN SODIUM 2.5 MG/1
6 TABLET ORAL ONCE
Refills: 0 | Status: COMPLETED | OUTPATIENT
Start: 2019-01-01 | End: 2019-01-01

## 2019-01-01 RX ORDER — ALLOPURINOL 300 MG
300 TABLET ORAL DAILY
Refills: 0 | Status: DISCONTINUED | OUTPATIENT
Start: 2019-01-01 | End: 2019-01-01

## 2019-01-01 RX ORDER — GABAPENTIN 100 MG/1
100 CAPSULE ORAL
Refills: 0 | Status: ACTIVE | COMMUNITY

## 2019-01-01 RX ORDER — MUPIROCIN 20 MG/G
1 OINTMENT TOPICAL
Refills: 0 | Status: DISCONTINUED | OUTPATIENT
Start: 2019-01-01 | End: 2019-01-01

## 2019-01-01 RX ORDER — WARFARIN SODIUM 2.5 MG/1
1 TABLET ORAL
Qty: 0 | Refills: 0 | DISCHARGE

## 2019-01-01 RX ADMIN — SEVELAMER CARBONATE 800 MILLIGRAM(S): 2400 POWDER, FOR SUSPENSION ORAL at 14:44

## 2019-01-01 RX ADMIN — Medication 300 MILLIGRAM(S): at 13:02

## 2019-01-01 RX ADMIN — WARFARIN SODIUM 7.5 MILLIGRAM(S): 2.5 TABLET ORAL at 17:05

## 2019-01-01 RX ADMIN — Medication 3 MILLILITER(S): at 19:48

## 2019-01-01 RX ADMIN — SEVELAMER CARBONATE 800 MILLIGRAM(S): 2400 POWDER, FOR SUSPENSION ORAL at 17:49

## 2019-01-01 RX ADMIN — OXYCODONE AND ACETAMINOPHEN 1 TABLET(S): 5; 325 TABLET ORAL at 10:33

## 2019-01-01 RX ADMIN — SEVELAMER CARBONATE 800 MILLIGRAM(S): 2400 POWDER, FOR SUSPENSION ORAL at 17:05

## 2019-01-01 RX ADMIN — CHLORHEXIDINE GLUCONATE 1 APPLICATION(S): 213 SOLUTION TOPICAL at 10:39

## 2019-01-01 RX ADMIN — SEVELAMER CARBONATE 800 MILLIGRAM(S): 2400 POWDER, FOR SUSPENSION ORAL at 08:39

## 2019-01-01 RX ADMIN — SENNA PLUS 2 TABLET(S): 8.6 TABLET ORAL at 21:14

## 2019-01-01 RX ADMIN — SEVELAMER CARBONATE 800 MILLIGRAM(S): 2400 POWDER, FOR SUSPENSION ORAL at 12:07

## 2019-01-01 RX ADMIN — GABAPENTIN 100 MILLIGRAM(S): 400 CAPSULE ORAL at 21:40

## 2019-01-01 RX ADMIN — Medication 300 MILLIGRAM(S): at 14:44

## 2019-01-01 RX ADMIN — Medication 1 DROP(S): at 06:03

## 2019-01-01 RX ADMIN — SEVELAMER CARBONATE 800 MILLIGRAM(S): 2400 POWDER, FOR SUSPENSION ORAL at 13:02

## 2019-01-01 RX ADMIN — Medication 1 DROP(S): at 22:53

## 2019-01-01 RX ADMIN — SEVELAMER CARBONATE 800 MILLIGRAM(S): 2400 POWDER, FOR SUSPENSION ORAL at 08:41

## 2019-01-01 RX ADMIN — Medication 325 MILLIGRAM(S): at 14:36

## 2019-01-01 RX ADMIN — SEVELAMER CARBONATE 800 MILLIGRAM(S): 2400 POWDER, FOR SUSPENSION ORAL at 09:38

## 2019-01-01 RX ADMIN — POLYETHYLENE GLYCOL 3350 17 GRAM(S): 17 POWDER, FOR SOLUTION ORAL at 10:34

## 2019-01-01 RX ADMIN — Medication 300 MILLIGRAM(S): at 13:07

## 2019-01-01 RX ADMIN — SEVELAMER CARBONATE 800 MILLIGRAM(S): 2400 POWDER, FOR SUSPENSION ORAL at 18:09

## 2019-01-01 RX ADMIN — Medication 325 MILLIGRAM(S): at 19:12

## 2019-01-01 RX ADMIN — WARFARIN SODIUM 6 MILLIGRAM(S): 2.5 TABLET ORAL at 22:28

## 2019-01-01 RX ADMIN — OXYCODONE AND ACETAMINOPHEN 1 TABLET(S): 5; 325 TABLET ORAL at 21:13

## 2019-01-01 RX ADMIN — GABAPENTIN 100 MILLIGRAM(S): 400 CAPSULE ORAL at 21:14

## 2019-01-01 RX ADMIN — CHLORHEXIDINE GLUCONATE 1 APPLICATION(S): 213 SOLUTION TOPICAL at 13:07

## 2019-01-01 RX ADMIN — OXYCODONE AND ACETAMINOPHEN 1 TABLET(S): 5; 325 TABLET ORAL at 13:07

## 2019-01-01 RX ADMIN — CHLORHEXIDINE GLUCONATE 1 APPLICATION(S): 213 SOLUTION TOPICAL at 16:10

## 2019-01-01 RX ADMIN — SEVELAMER CARBONATE 800 MILLIGRAM(S): 2400 POWDER, FOR SUSPENSION ORAL at 12:00

## 2019-01-01 RX ADMIN — GABAPENTIN 100 MILLIGRAM(S): 400 CAPSULE ORAL at 22:53

## 2019-01-01 RX ADMIN — SENNA PLUS 2 TABLET(S): 8.6 TABLET ORAL at 21:52

## 2019-01-01 RX ADMIN — OXYCODONE AND ACETAMINOPHEN 1 TABLET(S): 5; 325 TABLET ORAL at 22:14

## 2019-01-01 RX ADMIN — OXYCODONE AND ACETAMINOPHEN 1 TABLET(S): 5; 325 TABLET ORAL at 11:33

## 2019-01-01 RX ADMIN — Medication 300 MILLIGRAM(S): at 12:07

## 2019-01-01 RX ADMIN — MIDODRINE HYDROCHLORIDE 10 MILLIGRAM(S): 2.5 TABLET ORAL at 08:38

## 2019-01-01 RX ADMIN — SEVELAMER CARBONATE 800 MILLIGRAM(S): 2400 POWDER, FOR SUSPENSION ORAL at 08:00

## 2019-01-01 RX ADMIN — SEVELAMER CARBONATE 800 MILLIGRAM(S): 2400 POWDER, FOR SUSPENSION ORAL at 09:00

## 2019-01-01 RX ADMIN — POLYETHYLENE GLYCOL 3350 17 GRAM(S): 17 POWDER, FOR SOLUTION ORAL at 13:07

## 2019-01-01 RX ADMIN — MIDODRINE HYDROCHLORIDE 10 MILLIGRAM(S): 2.5 TABLET ORAL at 14:40

## 2019-01-01 RX ADMIN — WARFARIN SODIUM 5 MILLIGRAM(S): 2.5 TABLET ORAL at 18:09

## 2019-01-01 RX ADMIN — CHLORHEXIDINE GLUCONATE 1 APPLICATION(S): 213 SOLUTION TOPICAL at 12:07

## 2019-01-01 RX ADMIN — MUPIROCIN 1 APPLICATION(S): 20 OINTMENT TOPICAL at 17:06

## 2019-01-01 RX ADMIN — GABAPENTIN 100 MILLIGRAM(S): 400 CAPSULE ORAL at 21:52

## 2019-01-01 RX ADMIN — Medication 325 MILLIGRAM(S): at 19:42

## 2019-01-01 RX ADMIN — SEVELAMER CARBONATE 800 MILLIGRAM(S): 2400 POWDER, FOR SUSPENSION ORAL at 08:38

## 2019-01-01 RX ADMIN — GABAPENTIN 100 MILLIGRAM(S): 400 CAPSULE ORAL at 21:57

## 2019-01-01 RX ADMIN — SEVELAMER CARBONATE 800 MILLIGRAM(S): 2400 POWDER, FOR SUSPENSION ORAL at 13:55

## 2019-01-01 RX ADMIN — Medication 650 MILLIGRAM(S): at 19:48

## 2019-01-01 RX ADMIN — OXYCODONE AND ACETAMINOPHEN 1 TABLET(S): 5; 325 TABLET ORAL at 17:05

## 2019-01-01 RX ADMIN — Medication 1 DROP(S): at 10:19

## 2019-01-01 RX ADMIN — SENNA PLUS 2 TABLET(S): 8.6 TABLET ORAL at 22:53

## 2019-01-01 RX ADMIN — MORPHINE SULFATE 2 MILLIGRAM(S): 50 CAPSULE, EXTENDED RELEASE ORAL at 00:24

## 2019-01-01 RX ADMIN — MORPHINE SULFATE 2 MILLIGRAM(S): 50 CAPSULE, EXTENDED RELEASE ORAL at 23:54

## 2019-01-01 RX ADMIN — SENNA PLUS 2 TABLET(S): 8.6 TABLET ORAL at 21:57

## 2019-01-01 RX ADMIN — Medication 300 MILLIGRAM(S): at 10:35

## 2019-01-01 RX ADMIN — SEVELAMER CARBONATE 800 MILLIGRAM(S): 2400 POWDER, FOR SUSPENSION ORAL at 13:07

## 2019-01-01 RX ADMIN — Medication 1 DROP(S): at 14:38

## 2019-01-01 SDOH — SOCIAL STABILITY - SOCIAL INSECURITY: DISSAPEARANCE AND DEATH OF FAMILY MEMBER: Z63.4

## 2019-04-05 ENCOUNTER — EMERGENCY (EMERGENCY)
Facility: HOSPITAL | Age: 84
LOS: 1 days | Discharge: HOME | End: 2019-04-05
Attending: EMERGENCY MEDICINE | Admitting: EMERGENCY MEDICINE
Payer: MEDICARE

## 2019-04-05 VITALS
TEMPERATURE: 98 F | OXYGEN SATURATION: 98 % | HEART RATE: 81 BPM | SYSTOLIC BLOOD PRESSURE: 150 MMHG | DIASTOLIC BLOOD PRESSURE: 69 MMHG | RESPIRATION RATE: 17 BRPM

## 2019-04-05 VITALS
HEART RATE: 63 BPM | RESPIRATION RATE: 16 BRPM | SYSTOLIC BLOOD PRESSURE: 124 MMHG | DIASTOLIC BLOOD PRESSURE: 56 MMHG | OXYGEN SATURATION: 100 % | TEMPERATURE: 98 F

## 2019-04-05 DIAGNOSIS — Z96.652 PRESENCE OF LEFT ARTIFICIAL KNEE JOINT: Chronic | ICD-10-CM

## 2019-04-05 DIAGNOSIS — Z98.89 OTHER SPECIFIED POSTPROCEDURAL STATES: Chronic | ICD-10-CM

## 2019-04-05 DIAGNOSIS — D49.0 NEOPLASM OF UNSPECIFIED BEHAVIOR OF DIGESTIVE SYSTEM: Chronic | ICD-10-CM

## 2019-04-05 DIAGNOSIS — I77.0 ARTERIOVENOUS FISTULA, ACQUIRED: Chronic | ICD-10-CM

## 2019-04-05 DIAGNOSIS — Z95.3 PRESENCE OF XENOGENIC HEART VALVE: Chronic | ICD-10-CM

## 2019-04-05 PROBLEM — I07.1 RHEUMATIC TRICUSPID INSUFFICIENCY: Chronic | Status: ACTIVE | Noted: 2017-10-17

## 2019-04-05 PROBLEM — I05.0 RHEUMATIC MITRAL STENOSIS: Chronic | Status: ACTIVE | Noted: 2017-10-17

## 2019-04-05 LAB
ALBUMIN SERPL ELPH-MCNC: 4.4 G/DL — SIGNIFICANT CHANGE UP (ref 3.3–5)
ALP SERPL-CCNC: 107 U/L — SIGNIFICANT CHANGE UP (ref 40–120)
ALT FLD-CCNC: 16 U/L — SIGNIFICANT CHANGE UP (ref 4–33)
ANION GAP SERPL CALC-SCNC: 18 MMO/L — HIGH (ref 7–14)
APTT BLD: 44.2 SEC — HIGH (ref 27.5–36.3)
AST SERPL-CCNC: 21 U/L — SIGNIFICANT CHANGE UP (ref 4–32)
BASOPHILS # BLD AUTO: 0.02 K/UL — SIGNIFICANT CHANGE UP (ref 0–0.2)
BASOPHILS NFR BLD AUTO: 0.3 % — SIGNIFICANT CHANGE UP (ref 0–2)
BILIRUB SERPL-MCNC: 0.5 MG/DL — SIGNIFICANT CHANGE UP (ref 0.2–1.2)
BUN SERPL-MCNC: 52 MG/DL — HIGH (ref 7–23)
CALCIUM SERPL-MCNC: 9.4 MG/DL — SIGNIFICANT CHANGE UP (ref 8.4–10.5)
CHLORIDE SERPL-SCNC: 95 MMOL/L — LOW (ref 98–107)
CO2 SERPL-SCNC: 23 MMOL/L — SIGNIFICANT CHANGE UP (ref 22–31)
CREAT SERPL-MCNC: 6.71 MG/DL — HIGH (ref 0.5–1.3)
EOSINOPHIL # BLD AUTO: 0.03 K/UL — SIGNIFICANT CHANGE UP (ref 0–0.5)
EOSINOPHIL NFR BLD AUTO: 0.5 % — SIGNIFICANT CHANGE UP (ref 0–6)
GLUCOSE SERPL-MCNC: 100 MG/DL — HIGH (ref 70–99)
HCT VFR BLD CALC: 40.5 % — SIGNIFICANT CHANGE UP (ref 34.5–45)
HGB BLD-MCNC: 11.7 G/DL — SIGNIFICANT CHANGE UP (ref 11.5–15.5)
IMM GRANULOCYTES NFR BLD AUTO: 0.5 % — SIGNIFICANT CHANGE UP (ref 0–1.5)
INR BLD: 3.24 — HIGH (ref 0.88–1.17)
LYMPHOCYTES # BLD AUTO: 0.94 K/UL — LOW (ref 1–3.3)
LYMPHOCYTES # BLD AUTO: 14.6 % — SIGNIFICANT CHANGE UP (ref 13–44)
MCHC RBC-ENTMCNC: 25.7 PG — LOW (ref 27–34)
MCHC RBC-ENTMCNC: 28.9 % — LOW (ref 32–36)
MCV RBC AUTO: 88.8 FL — SIGNIFICANT CHANGE UP (ref 80–100)
MONOCYTES # BLD AUTO: 0.68 K/UL — SIGNIFICANT CHANGE UP (ref 0–0.9)
MONOCYTES NFR BLD AUTO: 10.5 % — SIGNIFICANT CHANGE UP (ref 2–14)
NEUTROPHILS # BLD AUTO: 4.76 K/UL — SIGNIFICANT CHANGE UP (ref 1.8–7.4)
NEUTROPHILS NFR BLD AUTO: 73.6 % — SIGNIFICANT CHANGE UP (ref 43–77)
NRBC # FLD: 0 K/UL — SIGNIFICANT CHANGE UP (ref 0–0)
PLATELET # BLD AUTO: 218 K/UL — SIGNIFICANT CHANGE UP (ref 150–400)
PMV BLD: 12.5 FL — SIGNIFICANT CHANGE UP (ref 7–13)
POTASSIUM SERPL-MCNC: 4.4 MMOL/L — SIGNIFICANT CHANGE UP (ref 3.5–5.3)
POTASSIUM SERPL-SCNC: 4.4 MMOL/L — SIGNIFICANT CHANGE UP (ref 3.5–5.3)
PROT SERPL-MCNC: 9.6 G/DL — HIGH (ref 6–8.3)
PROTHROM AB SERPL-ACNC: 37.3 SEC — HIGH (ref 9.8–13.1)
RBC # BLD: 4.56 M/UL — SIGNIFICANT CHANGE UP (ref 3.8–5.2)
RBC # FLD: 16.6 % — HIGH (ref 10.3–14.5)
SODIUM SERPL-SCNC: 136 MMOL/L — SIGNIFICANT CHANGE UP (ref 135–145)
WBC # BLD: 6.46 K/UL — SIGNIFICANT CHANGE UP (ref 3.8–10.5)
WBC # FLD AUTO: 6.46 K/UL — SIGNIFICANT CHANGE UP (ref 3.8–10.5)

## 2019-04-05 PROCEDURE — 29125 APPL SHORT ARM SPLINT STATIC: CPT | Mod: LT

## 2019-04-05 PROCEDURE — 70450 CT HEAD/BRAIN W/O DYE: CPT | Mod: 26

## 2019-04-05 PROCEDURE — 73030 X-RAY EXAM OF SHOULDER: CPT | Mod: 26,RT

## 2019-04-05 PROCEDURE — 99284 EMERGENCY DEPT VISIT MOD MDM: CPT | Mod: 25

## 2019-04-05 PROCEDURE — 73130 X-RAY EXAM OF HAND: CPT | Mod: 26,LT

## 2019-04-05 PROCEDURE — 73110 X-RAY EXAM OF WRIST: CPT | Mod: 26,LT

## 2019-04-05 PROCEDURE — 72125 CT NECK SPINE W/O DYE: CPT | Mod: 26

## 2019-04-05 PROCEDURE — 70486 CT MAXILLOFACIAL W/O DYE: CPT | Mod: 26

## 2019-04-05 PROCEDURE — 73080 X-RAY EXAM OF ELBOW: CPT | Mod: 26,LT

## 2019-04-05 RX ORDER — OXYCODONE AND ACETAMINOPHEN 5; 325 MG/1; MG/1
1 TABLET ORAL ONCE
Qty: 0 | Refills: 0 | Status: DISCONTINUED | OUTPATIENT
Start: 2019-04-05 | End: 2019-04-05

## 2019-04-05 RX ADMIN — OXYCODONE AND ACETAMINOPHEN 1 TABLET(S): 5; 325 TABLET ORAL at 13:05

## 2019-04-05 NOTE — ED PROVIDER NOTE - PROGRESS NOTE DETAILS
Pt seen by ortho recommend volar splint, pt to remove for dialysis and instructed how. Ortho clinic to likely switch to velcro splint. Precautions reviewed.

## 2019-04-05 NOTE — ED PROVIDER NOTE - OBJECTIVE STATEMENT
86 year old female that has a history of ESRD on HD and takes coumadin was brought to the ED after a fall yesterday. She was getting out of her dialysis transportation yesterday when she tripped and fell on the ramp. She hit her left wrist and her left face. No LOC, no neck pain, no chest pain, no abd pain, no nausea, no vomiting, no weakness. On exam: GCS=15, aaox3, swelling to the left face, EOMI, PERRL, no c/t/l spine tenderness, lungs cta, rrr, abd soft, nt. left wrist swelling, deformity, tenderness to the distal radius, dialysis fistula in the left forearm.

## 2019-04-05 NOTE — ED ADULT NURSE NOTE - OBJECTIVE STATEMENT
Pt presenting to room 5 AxOx4 with c/o left wrist pain s/p mechanical fall yesterday. PMH of CKD and cardiac stents- pt on Eloquis and Coumadin. Pt c/o wrist pain 10/10 since 2 days. Pt states she tripped while getting out of cab yesterday- hit her lower lip and right arm, shoulder. Pt denies loosing consciousness, denies lightheadedness/dizziness/H/A, chills, fever. Pt ambulatory at baseline with a  walker- denies any difficulty walking since fall. Pt has full ROM in all extremities except for left wrist appearing evidently deformed. Pt also on dialysis with left AV fistula present- received dialysis Tuesday, Thursday, Saturday. IV established in RAC with 20g, labs drawn and sent, MD at bedside, will continue to monitor.

## 2019-04-05 NOTE — CONSULT NOTE ADULT - SUBJECTIVE AND OBJECTIVE BOX
86y Femalepresents c/o L wrist pain sp mechanical fall. Denies Headstrike/LOC. Denies numbness, tingling paresthesias in affected extremity. Able to ambulate after fall. No other orthopedic injuries at this time.    PAST MEDICAL & SURGICAL HISTORY:  Chronic atrial fibrillation  Tricuspid valve insufficiency, unspecified etiology  Mitral valve stenosis, unspecified etiology  Gout  ESRD on hemodialysis  History of mitral valve replacement with bioprosthetic valve  H/O total knee replacement, left  H/O:  section  Colon tumor: tumor removed from colon   A-V fistula: Left A-V fistula for HD    MEDICATIONS  (STANDING):    Allergies    Epogen (Unknown)    Intolerances                            11.7   6.46  )-----------( 218      ( 2019 12:55 )             40.5     2019 12:55    136    |  95     |  52     ----------------------------<  100    4.4     |  23     |  6.71     Ca    9.4        2019 12:55    TPro  9.6    /  Alb  4.4    /  TBili  0.5    /  DBili  x      /  AST  21     /  ALT  16     /  AlkPhos  107    2019 12:55    PT/INR - ( 2019 12:55 )   PT: 37.3 SEC;   INR: 3.24          PTT - ( 2019 12:55 )  PTT:44.2 SEC    Imaging: XR was personally reviewed and demonstrates L minimally displaced distal radius fracture    Vital Signs Last 24 Hrs  T(C): 36.7 (19 @ 15:28), Max: 36.8 (19 @ 12:08)  T(F): 98 (19 @ 15:28), Max: 98.3 (19 @ 12:08)  HR: 63 (19 @ 15:28) (60 - 81)  BP: 124/56 (19 @ 15:28) (124/56 - 150/69)  BP(mean): --  RR: 16 (19 @ 15:28) (16 - 17)  SpO2: 100% (19 @ 15:28) (98% - 100%)  Gen: NAD  LUE: Skin intact, ttp about wrist just above dialysis AV fistula, no gross deformity of the wrist, no ecchymosis, +ain/pin/m/r/u function, SILT C5-T1, radial pulse intact, compartments soft, brisk cap refill. DRUJ stable, no pain over the scaphoid, no ttp over elbow, full elbow sup/pro/flex/exten without pain    Secondary Survey: Full ROM of unaffected extremities, SILT globally, compartments soft, no bony TTP over bony prominences, no calf TTP, able to SLR with bilateral LE, no TTP along axial spine    A/P: 86y Female w L distal radius fracture sp placement of volar slab by ED for distal radius fracture. Patient has dialysis AV fistula by fracture site, thus no sugartong or cast placed.  Recommended removable wrist splint but none present in ED  NWB MAC in sling for comfort   Ice, elevate extremity  Active movement of fingers encouraged  Follow-up in clinic for removal splint  Discussed with Dr. Al

## 2019-04-05 NOTE — ED PROVIDER NOTE - NSFOLLOWUPINSTRUCTIONS_ED_ALL_ED_FT
Please follow up at the [facility] [id number], [place] [address]

## 2019-04-05 NOTE — ED ADULT NURSE REASSESSMENT NOTE - NS ED NURSE REASSESS COMMENT FT1
Pt at baseline mental status, received back from XRay. Pt does not appear to be in any acute distress at this time, respirations even and unlabored. Pt verbalizes decrease in pain since receiving Percocet. Pt resting comfortably in bed, waiting for XRay results.

## 2019-04-05 NOTE — ED PROVIDER NOTE - MUSCULOSKELETAL, MLM
Spine appears normal, range of motion is not limited, tenderness and swelling to the left distal radius, normal rom fo the fingers, fistula to the left forearm.

## 2019-04-05 NOTE — ED ADULT TRIAGE NOTE - CHIEF COMPLAINT QUOTE
Pt states she fell out of access a ride van yesterday after dialysis. States she fell face forward. Ecchymosis and swelling to upper lip and left cheek. Pt on Coumadin

## 2019-04-05 NOTE — ED ADULT NURSE NOTE - INTERVENTIONS DEFINITIONS
[place] to call system/Non-slip footwear when patient is off stretcher/Call bell, personal items and telephone within reach

## 2019-04-05 NOTE — ED PROVIDER NOTE - CARE PLAN
Principal Discharge DX:	Wrist fracture, closed, left, initial encounter  Secondary Diagnosis:	Head injuries, initial encounter  Secondary Diagnosis:	Facial contusion, initial encounter

## 2019-04-09 ENCOUNTER — APPOINTMENT (OUTPATIENT)
Age: 84
End: 2019-04-09
Payer: MEDICARE

## 2019-04-09 ENCOUNTER — APPOINTMENT (OUTPATIENT)
Dept: ORTHOPEDIC SURGERY | Facility: CLINIC | Age: 84
End: 2019-04-09

## 2019-04-09 VITALS — HEIGHT: 64 IN

## 2019-04-09 PROCEDURE — 99203 OFFICE O/P NEW LOW 30 MIN: CPT

## 2019-04-09 RX ORDER — MIDODRINE HYDROCHLORIDE 5 MG/1
5 TABLET ORAL
Refills: 0 | Status: ACTIVE | COMMUNITY

## 2019-04-09 NOTE — HISTORY OF PRESENT ILLNESS
[de-identified] : Patient is a RHD 86 year female who presents c/o pain involving the left wrist after a fall that occurred on 04/04/2019. She was evaluated the following day at [facility] where xrays were taken and revealed an acute slightly impacted distal radial metaphyseal fracture and slightly displaced ulnar styloid process base fracture. She was placed in a short arm splint and advised to followup here. The symptoms were of sudden onset. The pain today is constant/intermittent and aching in quality.  She presents today with her daughters. She has an AV fistula on the left forearm for her 3x a week dialysis treatment.

## 2019-04-09 NOTE — DISCUSSION/SUMMARY
[de-identified] : The underlying pathophysiology was reviewed with the patient. Treatment options were discussed including; observation, NSAIDS, and brace immobilization. \par \par Due to the presence of the fistula, the patient and her daughters opted for a removal wrist brace over a splint. She was placed in a left wrist brace and advised to keep the area immobilized until her followup area. The brace may be removed for dialysis treatment. \par NSAIDs as tolerated. \par Gentle ROM exercises were encouraged. \par Follow up in 4 weeks for repeat x-rays. \par

## 2019-04-09 NOTE — PHYSICAL EXAM
[de-identified] : Patient is WDWN, alert, and in no acute distress. Breathing is unlabored. She is grossly oriented to person, place, and time. \par \par Left Wrist: Short arm splint was removed. There is tenderness to palpation over the wrist with associated edema in the hand and fingers. The ROM is limited due to pain. Full motion in the fingers with pain. There is no joint instability on provocative testing. AV fistula port present volarly over the forearm. Neurovascularly intact. \par Left hand edema,3cm tip to palm distance on maximum flexion.\par  [de-identified] : Xrays of the left wrist were obtained from an outside medical facility and revealed an acute slightly impacted distal radial metaphyseal fracture and slightly displaced ulnar styloid process base fracture. No intra-articular involvement.

## 2019-04-09 NOTE — END OF VISIT
[FreeTextEntry3] : I, Aden Phillips MD, ordering physician, have read and attest that all the information, medical decision making and discharge instructions within are true and accurate.

## 2019-05-07 ENCOUNTER — APPOINTMENT (OUTPATIENT)
Dept: ORTHOPEDIC SURGERY | Facility: CLINIC | Age: 84
End: 2019-05-07
Payer: COMMERCIAL

## 2019-05-07 DIAGNOSIS — M25.511 PAIN IN RIGHT SHOULDER: ICD-10-CM

## 2019-05-07 PROCEDURE — 73030 X-RAY EXAM OF SHOULDER: CPT | Mod: RT

## 2019-05-07 PROCEDURE — 99213 OFFICE O/P EST LOW 20 MIN: CPT

## 2019-05-07 PROCEDURE — 73110 X-RAY EXAM OF WRIST: CPT | Mod: LT

## 2019-05-07 RX ORDER — SEVELAMER CARBONATE 2400 MG/1
POWDER, FOR SUSPENSION ORAL
Refills: 0 | Status: ACTIVE | COMMUNITY

## 2019-05-07 NOTE — DISCUSSION/SUMMARY
[de-identified] : Due to the presence of the fistula, the patient and her daughters opted for a removal wrist brace over a splint. She was placed in a left wrist brace and advised to keep the area immobilized until her followup area. The brace may be removed for dialysis treatment. \par NSAIDs as tolerated. \par Gentle ROM exercises were encouraged. \par \par The patient wishes to proceed with physical therapy for the right shoulder. A script was given. \par Topical analgesics as needed for the right shoulder. \par Follow up in 6 weeks for repeat x-rays. \par

## 2019-05-07 NOTE — ADDENDUM
[FreeTextEntry1] : I, Joann Gould wrote this note acting as a scribe for Dr. Aden Phillips on May 07, 2019.

## 2019-05-07 NOTE — PHYSICAL EXAM
[de-identified] : Patient is WDWN, alert, and in no acute distress. Breathing is unlabored. She is grossly oriented to person, place, and time. \par \par Left Wrist: There is tenderness to palpation over the wrist with associated edema in the hand and fingers. The ROM is limited due to pain. 3 cm tip to palm distance on maximum flexion. Full motion in the fingers with pain. There is no joint instability on provocative testing. AV fistula port present volarly over the forearm. Neurovascularly intact. \par \par Right Shoulder: \par Inspection/ Palpation: There is mild tenderness to palpation. No deformities or edema. \par Range of Motion: active flexion 30, passive flexion 90, abduction 90\par Strength: 4/5\par Stability: no joint instability on provocative testing. \par  [de-identified] : AP, transcapula, and axillary views of the right shoulder were obtained and revealed no evidence of fracture or dislocation. Additional findings revealed a pacemaker in place. \par \par AP, lateral and oblique views of the left wrist were obtained today and revealed an acute slightly impacted distal radial metaphyseal fracture and slightly displaced ulnar styloid process base fracture. No intra-articular involvement. Alignment is acceptable.\par \par

## 2019-05-07 NOTE — HISTORY OF PRESENT ILLNESS
[de-identified] : Patient is a RHD 86 year female who presents for a No Fault evaluation c/o pain involving the left wrist and right shoulder after a fall that occurred on 04/04/2019. The patient states that she was getting out of an [employer] vehicle when she tripped over the lift and fell. She was evaluated the following day at [facility] where xrays of the left wrist were taken and revealed an acute slightly impacted distal radial metaphyseal fracture and slightly displaced ulnar styloid process base fracture. She was placed in a short arm splint and advised to followup here. She has d/c use of the splint and has since been wearing a wrist brace. The patient is still having pain in both the wrist and right shoulder. She presents today with her daughters for repeat xrays. She has an AV fistula on the left forearm for her 3x a week dialysis treatment.

## 2019-07-09 PROBLEM — M75.81 TENDINITIS OF RIGHT ROTATOR CUFF: Status: ACTIVE | Noted: 2019-05-07

## 2019-07-09 NOTE — PHYSICAL EXAM
[de-identified] : Patient is WDWN, alert, and in no acute distress. Breathing is unlabored. She is grossly oriented to person, place, and time. \par \par Left Wrist: Wrist brace is on for examination. There is slight tenderness to palpation over the wrist with associated edema in the hand and fingers. The ROM is limited due to pain. 3 cm tip to palm distance on maximum flexion. Full motion in the fingers with pain. There is no joint instability on provocative testing. AV fistula port present volarly over the forearm. Neurovascularly intact.  [de-identified] : AP, lateral and oblique views of the left wrist were obtained today and revealed an acute slightly impacted distal radial metaphyseal fracture and slightly displaced ulnar styloid process base fracture. No intra-articular involvement. Alignment is acceptable. Fracture is fully healed. \par \par

## 2019-07-09 NOTE — ADDENDUM
[FreeTextEntry1] : I, Joann Gould wrote this note acting as a scribe for Dr. Aden Phillips on Jul 09, 2019.

## 2019-07-09 NOTE — DISCUSSION/SUMMARY
[de-identified] : She may d/c the removal brace when she is no longer experiencing pain. \par The patient wishes to proceed with physical therapy. A script was given. \par NSAIDs as tolerated. \par Follow up in 6 weeks for repeat x-rays. \par

## 2019-09-18 PROBLEM — R05 COUGH: Status: ACTIVE | Noted: 2019-01-01

## 2019-09-18 PROBLEM — R91.8 LUNG MASS: Status: ACTIVE | Noted: 2019-01-01

## 2019-09-18 PROBLEM — I07.1 TRICUSPID REGURGITATION: Status: RESOLVED | Noted: 2019-01-01 | Resolved: 2019-01-01

## 2019-09-18 PROBLEM — Z78.9 DENIES ALCOHOL CONSUMPTION: Status: ACTIVE | Noted: 2019-01-01
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
## 2019-09-18 PROBLEM — I27.20 PULMONARY HYPERTENSION: Status: ACTIVE | Noted: 2019-01-01

## 2019-09-18 PROBLEM — Z87.39 HISTORY OF ARTHRITIS: Status: RESOLVED | Noted: 2019-01-01 | Resolved: 2019-01-01

## 2019-09-18 PROBLEM — I50.9 CHF (CONGESTIVE HEART FAILURE): Status: ACTIVE | Noted: 2019-01-01

## 2019-09-18 PROBLEM — Z99.2 DIALYSIS PATIENT: Status: ACTIVE | Noted: 2019-04-09

## 2019-09-18 PROBLEM — Z86.79 HISTORY OF ATRIAL SEPTAL DEFECT: Status: RESOLVED | Noted: 2019-01-01 | Resolved: 2019-01-01

## 2019-09-18 PROBLEM — I48.91 ATRIAL FIBRILLATION: Status: ACTIVE | Noted: 2019-01-01

## 2019-09-18 PROBLEM — M54.9 CHRONIC BACK PAIN: Status: ACTIVE | Noted: 2019-01-01

## 2019-09-18 PROBLEM — Z63.4 WIDOWED: Status: ACTIVE | Noted: 2019-01-01

## 2019-09-18 PROBLEM — D64.9 ANEMIA: Status: ACTIVE | Noted: 2019-01-01

## 2019-09-18 PROBLEM — Z86.79 HISTORY OF MITRAL VALVE STENOSIS: Status: RESOLVED | Noted: 2019-01-01 | Resolved: 2019-01-01

## 2019-09-18 PROBLEM — Z78.9 POOR HISTORIAN: Status: RESOLVED | Noted: 2019-01-01 | Resolved: 2019-01-01

## 2019-09-18 PROBLEM — I95.89 CHRONIC HYPOTENSION: Status: ACTIVE | Noted: 2019-01-01

## 2019-09-23 NOTE — PHYSICAL EXAM
[General Appearance - Alert] : alert [General Appearance - In No Acute Distress] : in no acute distress [General Appearance - Well Nourished] : well nourished [General Appearance - Well Developed] : well developed [Sclera] : the sclera and conjunctiva were normal [Outer Ear] : the ears and nose were normal in appearance [Hearing Threshold Finger Rub Not [place]] : hearing was normal [Neck Appearance] : the appearance of the neck was normal [] : the neck was supple [Neck Cervical Mass (___cm)] : no neck mass was observed [Respiration, Rhythm And Depth] : normal respiratory rhythm and effort [Auscultation Breath Sounds / Voice Sounds] : lungs were clear to auscultation bilaterally [Heart Rate And Rhythm] : heart rate was normal and rhythm regular [Heart Sounds] : normal S1 and S2 [Examination Of The Chest] : the chest was normal in appearance [Abdomen Soft] : soft [Abdomen Tenderness] : non-tender [Cervical Lymph Nodes Enlarged Posterior Bilaterally] : posterior cervical [Cervical Lymph Nodes Enlarged Anterior Bilaterally] : anterior cervical [Supraclavicular Lymph Nodes Enlarged Bilaterally] : supraclavicular [No CVA Tenderness] : no ~M costovertebral angle tenderness [Skin Color & Pigmentation] : normal skin color and pigmentation [No Focal Deficits] : no focal deficits [Oriented To Time, Place, And Person] : oriented to person, place, and time [Impaired Insight] : insight and judgment were intact [Affect] : the affect was normal [FreeTextEntry1] : Left ARM AV fistila + bruit, + thrill

## 2019-09-23 NOTE — CONSULT LETTER
[Dear  ___] : Dear  [unfilled], [Consult Letter:] : I had the pleasure of evaluating your patient, [unfilled]. [Please see my note below.] : Please see my note below. [Consult Closing:] : Thank you very much for allowing me to participate in the care of this patient.  If you have any questions, please do not hesitate to contact me. [Sincerely,] : Sincerely, [FreeTextEntry2] : Dr. Ihsan Travis (Hem/Onc/Ref)\par Dr. Emerald Espana (PCP)\par Dr. Mitesh Seay (Card)\par Dr. John Mao(Pulm) [FreeTextEntry3] : \par \par \par Beto Clemente MD, FACS \par Chief, Division of Thoracic Surgery \par Director, Minimally Invasive Thoracic Surgery \par Department of Cardiovascular and Thoracic Surgery \par [facility] \par , Cardiovascular and Thoracic Surgery

## 2019-09-23 NOTE — ASSESSMENT
[FreeTextEntry1] : 86 year old female presenting for initial consultation today.  for evaluation of lung mass.  \par \par Chest CT on 7/22/19 revealed:\par - the tracheobronchial tree is patent proximally.  Bronchial mural calcification and thickening is noted with areas of mild bronchiectasis primarily lower lobes right greater than left.  Peribronchial nodularity likely reflects endobronchial debris.  There is a calcified granuloma within the right upper lobe.  Bibasal interlobular septal thickening is noted fibrosis with tractional changes. \par - there is an ill margined dense masslike consolidation within the RML most pronounced involving the lateral segment.  It spans approximately 8.3 x 5.7 x 6 cm, increased in extent when compared to 6/24/19.  The lesion extends to the right hilum and results in localized pleural and fissural thickening and retraction.\par - there is a 1.7 x 1 x 1.3 cm consolidation within the right posterior costophrenic angle medially.  A 0.4 cm extrapleural posterior mediastinal node is seen regionally.  These findings unchanged from CT of the abdomen on 9/18/15. \par - patient is post TVR and MVR.  Relatively extensive calcific atherosclerotic changes are present including  the aortic arch and native coronary arteries.  \par - 1.3 cm right paratracheal and right subcarinal nodes are redemonstrated, prominent fatty hilum within the  former node again seen.  No gross hilar adenopathy. \par \par I have reviewed the patient's medical records and diagnostic images during the time of this office visit, and I have made the following recommendation:\par 1.  PET/CT (Please check of if patient had PET/CT done at [facility] and obtain images/result).\par 2.  Schedule for flexible bronchoscopy, possible EBUS, possible mediastinoscopy.  The risks, benefits, and alternatives to the procedure were discussed with the patient at length. She verbalized understanding, and are in agreement with the above treatment plan.\par 3.  CT Scan of the head without contrast.\par 4.  Complete PFT's (check with pulmonologist Dr. John Mao to see if they were done).\par 5.  Patient instructed to obtain cardiac clearance prior to surgery.  She will need to stop the Coumadin prior to the procedure (to be determined by cardiology). \par \par Written by Yasemin Cunha NP, acting as a scribe for LUIS A DOS SANTOS MD.\par \par The documentation recorded by the scribe accurately reflects the service I personally performed and the decisions made by me. LUIS A DOS SANTOS MD\par

## 2019-09-23 NOTE — REVIEW OF SYSTEMS
[Feeling Tired] : feeling tired [Cough] : cough [SOB on Exertion] : shortness of breath during exertion [Incontinence] : incontinence [Heartburn] : heartburn [Arthralgias] : arthralgias [Joint Pain] : joint pain [Limb Weakness] : limb weakness [Difficulty Walking] : difficulty walking [Negative] : Heme/Lymph [Fever] : no fever [Chills] : no chills [Recent Weight Gain (___ Lbs)] : no recent weight gain [Feeling Poorly] : not feeling poorly [Recent Weight Loss (___ Lbs)] : no recent weight loss [Heart Rate Is Slow] : the heart rate was not slow [Heart Rate Is Fast] : the heart rate was not fast [Chest Pain] : no chest pain [Palpitations] : no palpitations [Lower Ext Edema] : no lower extremity edema [Leg Claudication] : no intermittent leg claudication [Abdominal Pain] : no abdominal pain [FreeTextEntry5] : + PPM, valve repair [FreeTextEntry6] : + cough at night, productive of regurgitated food [FreeTextEntry8] : oliguric [FreeTextEntry9] : Right knee arthritis [de-identified] : ambulates with rolling walker, +numbness to right hand, right hand weakness

## 2019-09-23 NOTE — HISTORY OF PRESENT ILLNESS
[FreeTextEntry1] : Ms. JOSE C WATTS is a 86 year old female presenting for initial consultation today.  She was referred by her Oncologist Dr. Travis for evaluation of a lung mass.  She is accompanied by her family who assisted in providing medical history. \par \par She saw her PCP in June with complaint of cough which was productive of brown sputum and was sent for CXR which revealed a lung mass.  Subsequent Chest CT and PET/CT was done.  She failed to bring the images/reports today for PET/CT.\par \par She is a non-smoker and PMHx includes ESRD on Hemodialysis since 2006, hx of AFIB (on Coumadin), PPM Implanted, Mitral stenosis/Tricuspid Regurgitation s/p annuloplasty (2016), chronic hypotension, pulmonary HTN, COPD, restrictive lung disease and anemia.\par \par Chest CT on 7/22/19 revealed:\par - the tracheobronchial tree is patent proximally.  Bronchial mural calcification and thickening is noted with areas of mild bronchiectasis primarily lower lobes right greater than left.  Peribronchial nodularity likely reflects endobronchial debris.  There is a calcified granuloma within the right upper lobe.  Bibasal interlobular septal thickening is noted fibrosis with tractional changes. \par - there is an ill margined dense masslike consolidation within the RML most pronounced involving the lateral segment.  It spans approximately 8.3 x 5.7 x 6 cm, increased in extent when compared to 6/24/19.  The lesion extends to the right hilum and results in localized pleural and fissural thickening and retraction.\par - there is a 1.7 x 1 x 1.3 cm consolidation within the right posterior costophrenic angle medially.  A 0.4 cm extrapleural posterior mediastinal node is seen regionally.  These findings unchanged from CT of the abdomen on 9/18/15. \par - patient is post TVR and MVR.  Relatively extensive calcific atherosclerotic changes are present including  the aortic arch and native coronary arteries.  \par - 1.3 cm right paratracheal and right subcarinal nodes are redemonstrated, prominent fatty hilum within the  former node again seen.  No gross hilar adenopathy. \par \par She is a poor historian and history was obtained with help from her daughters and medical records.  She complains of fatigue, shortness of breath with exertion and a cough.  She reports and episode of hemoptysis, now resolved.  She denies any fever, chills, chest pain, hemoptysis, or recent illness. \par

## 2019-09-24 PROBLEM — S52.532D CLOSED COLLES' FRACTURE OF LEFT RADIUS WITH ROUTINE HEALING, SUBSEQUENT ENCOUNTER: Status: ACTIVE | Noted: 2019-05-07

## 2019-09-24 NOTE — PHYSICAL EXAM
[de-identified] : Patient is WDWN, alert, and in no acute distress. Breathing is unlabored. She is grossly oriented to person, place, and time. \par \par Left Wrist: Wrist brace is on for examination. There is slight tenderness to palpation over the wrist with associated edema in the hand and fingers. The ROM is limited due to pain. 3 cm tip to palm distance on maximum flexion. Full motion in the fingers with pain. There is no joint instability on provocative testing. AV fistula port present volarly over the forearm. Neurovascularly intact.  [de-identified] : AP, lateral and oblique views of the left wrist were obtained today and revealed an acute slightly impacted distal radial metaphyseal fracture and slightly displaced ulnar styloid process base fracture. No intra-articular involvement. Alignment is acceptable. Fracture is fully healed. \par \par

## 2019-09-24 NOTE — DISCUSSION/SUMMARY
[de-identified] : Continue with PT. \par She may d/c the removal brace when she is no longer experiencing pain. \par NSAIDs as tolerated. \par Follow up as needed.

## 2019-09-24 NOTE — HISTORY OF PRESENT ILLNESS
[de-identified] : Patient is a RHD 86 year female who presents for a No Fault followup followup visit involving the left wrist after a fall that occurred on 04/04/2019. The patient states that she was getting out of an [employer] vehicle when she tripped over the lift and fell. She was evaluated the following day at [facility] where xrays of the left wrist were taken and revealed an acute slightly impacted distal radial metaphyseal fracture and slightly displaced ulnar styloid process base fracture. She was placed in a short arm splint and advised to followup here. She d/c use of the splint and has since been wearing a rigid support wrist brace. The patient is still having some pain in the wrist, although it is of a much lesser severity than initially. She has been receiving PT for the wrist and feels that she is making gradual improvements. She presents today for repeat xrays. \par Of note, she has an AV fistula on the left forearm for her 3x a week dialysis treatment.

## 2019-09-24 NOTE — ADDENDUM
[FreeTextEntry1] : I, Joann Gould wrote this note acting as a scribe for Dr. Aden Phillips on Sep 23, 2019.

## 2019-09-27 NOTE — H&P PST ADULT - ATTENDING COMMENTS
Attending addendum:  Patient seen and examined, I agree with the above assessment and plan with any changes indicated below.    84 F w/ HTN, ESRD on HD (TTS), mitral stenosis s/p bioprosthetic MVR, severe TR s/p tricuspid valve repair, remote hx of colon cancer s/p resection, and afib on coumadin present with rectal bleeding since last Wednesday. Initially with supratherapeutic INR of 9 and asked to hold coumadin as outpatient. Recently, noted BRBPR without any other associated symptoms.                             12.8   5.4   )-----------( 170      ( 18 Oct 2017 03:17 )             39.1       10-18    141  |  99  |  42<H>  ----------------------------<  92  4.5   |  25  |  6.78<H>    Ca    8.1<L>      18 Oct 2017 04:45  Phos  4.8     10-18  Mg     2.5     10-18    TPro  9.3<H>  /  Alb  4.1  /  TBili  0.6  /  DBili  x   /  AST  22  /  ALT  15  /  AlkPhos  108  10-17                  PT/INR - ( 18 Oct 2017 03:17 )   PT: 12.4 sec;   INR: 1.14 ratio         PTT - ( 17 Oct 2017 18:04 )  PTT:29.2 sec    Lactate Trend  10-18 @ 03:17 Lactate:1.9     I personally interpreted this patient's labs. They were notable for stable hemoglobin, relatively unremarkable electrolytes, and elevated creatinine consistent with known ESRD.  I personally interpreted this patient's imaging. CXR was notable for bilateral opacities overall unchanged from prior  I personally interpreted this patient's ECG. It showed v-paced rhythm, unchanged from prior    #Plan:  GIB:  - GI consult in AM, NPO for possible ?sigmoidoscopy given BRBPR and likely distal source vs colonoscopy  - Trend Hgb q8h x 1 then daily if stable  - 2PIVs  - Active T&S  - No transfusion at this time    Anticoagulation:  - Continue to hold coumadin  - AHA 2017 guidelines do not give recommendation for prolonged anticoagulation for bioprosthetic valves, though limited data for patients with Afib. Given concern for active bleed, risks outweight benefits. Can continue daily aspirin as per guidelines, restart anticoagulation as soon as able from bleeding aspect    Remainder of plan as per H&P.

## 2019-09-27 NOTE — H&P PST ADULT - RS GEN PE MLT RESP DETAILS PC
breath sounds equal/normal/airway patent/clear to auscultation bilaterally/good air movement/respirations non-labored clear to auscultation bilaterally/no rhonchi/breath sounds equal/normal/airway patent/respirations non-labored/good air movement/no rales

## 2019-09-27 NOTE — H&P PST ADULT - MUSCULOSKELETAL
No joint pain, swelling or deformity; no limitation of movement details… detailed exam walks with walker/decreased ROM due to pain/decreased ROM

## 2019-09-27 NOTE — H&P PST ADULT - HISTORY OF PRESENT ILLNESS
84 F w/ HTN, ESRD on HD (TTS), mitral stenosis s/p bioprosthetic MVR, severe TR s/p tricuspid valve repair, remote hx of colon cancer s/p resection, and afib on coumadin , presents for preop eval to have flexible bronchoscopy, possible endobronchial ultrasound with cytology on 1o/2/19. 84 F w/ HTN, ESRD on HD (TTS), mitral stenosis s/p bioprosthetic MVR, severe TR s/p tricuspid valve repair, remote hx of colon cancer s/p resection, and afib on coumadin , presents for preop eval to have flexible bronchoscopy, possible endobronchial ultrasound with cytology on 1o/2/19. Pt stated that she has been coughing and bringing up some blood and so had multiple testing and now scheduled for flexible bronch 84 F w/ HTN, ESRD on HD (TTS), mitral stenosis s/p bioprosthetic MVR, severe TR s/p tricuspid valve repair, Pacemaker in place,   remote hx of colon cancer s/p resection, and afib on coumadin , presents for preop eval to have flexible bronchoscopy, possible endobronchial ultrasound with cytology on 1o/2/19. Pt stated that she has been coughing and bringing up some blood and so had multiple testing and now scheduled for flexible bronch. 84 F w/ HTN, ESRD on HD (TTS), mitral stenosis s/p bioprosthetic MVR, severe TR s/p tricuspid valve repair, Pacemaker in place,   remote hx of colon cancer s/p resection, and afib on coumadin , presents for preop eval to have flexible bronchoscopy, possible endobronchial ultrasound with cytology on 1o/4/19. Pt stated that she has been coughing and bringing up some blood and so had multiple testing and now scheduled for flexible bronch.

## 2019-09-27 NOTE — H&P PST ADULT - NSICDXPASTMEDICALHX_GEN_ALL_CORE_FT
PAST MEDICAL HISTORY:  Chronic atrial fibrillation on coumadin    ESRD on hemodialysis     Gout     Mitral valve stenosis, unspecified etiology     Tricuspid valve insufficiency, unspecified etiology

## 2019-09-27 NOTE — H&P PST ADULT - NSANTHOSAYNRD_GEN_A_CORE
never tested/No. RALPH screening performed.  STOP BANG Legend: 0-2 = LOW Risk; 3-4 = INTERMEDIATE Risk; 5-8 = HIGH Risk

## 2019-09-27 NOTE — H&P PST ADULT - NSICDXPASTSURGICALHX_GEN_ALL_CORE_FT
PAST SURGICAL HISTORY:  A-V fistula Left A-V fistula for HD -    Colon tumor tumor removed from colon     H/O total knee replacement, left     H/O tricuspid valve repair     H/O:  section     History of mitral valve replacement with bioprosthetic valve

## 2019-09-27 NOTE — H&P PST ADULT - NSICDXFAMILYHX_GEN_ALL_CORE_FT
FAMILY HISTORY:  Family history of anemia, father    Sibling  Still living? Unknown  Family history of heart disease, Age at diagnosis: Age Unknown

## 2019-09-27 NOTE — H&P PST ADULT - NSICDXPROBLEM_GEN_ALL_CORE_FT
PROBLEM DIAGNOSES  Problem: Lung mass  Assessment and Plan:       R/O PROBLEM DIAGNOSES  Problem: Snoring  Assessment and Plan:     Problem: Pacemaker  Assessment and Plan: PROBLEM DIAGNOSES  Problem: Lung mass  Assessment and Plan:   Pt scheduled  to have flexible bronchoscopy, possible endobronchial ultrasound with cytology on 1o/2/19.   labs pending, EKG in chart.   Preop instructions provided to pt.   famotidine and chlorhexidine scrubs provided to pt with instructions        R/O PROBLEM DIAGNOSES  Problem: CRF (chronic renal failure)  Assessment and Plan: Pt on hemodialysis.  NS - IVF ordered, K+ ordered    Problem: Snoring  Assessment and Plan:   RALPH precautions recommended.  OR booking notified via fax.    Problem: Pacemaker  Assessment and Plan:   Pt with h/o mitral stenosis s/p bioprosthetic MVR, severe TR s/p tricuspid valve repair, Pacemaker in place,    Pt had cardiac clearance - will obtain copy   Pt advised to stop coumadin on 9/6/19 as per Dr. Clemente and Dr. Seay PROBLEM DIAGNOSES  Problem: Lung mass  Assessment and Plan:   Pt scheduled  to have flexible bronchoscopy, possible endobronchial ultrasound with cytology on 1o/4/19.   labs pending, EKG in chart.   Preop instructions provided to pt.   famotidine and chlorhexidine scrubs provided to pt with instructions        R/O PROBLEM DIAGNOSES  Problem: CRF (chronic renal failure)  Assessment and Plan: Pt on hemodialysis.  NS - IVF ordered, K+ ordered    Problem: Snoring  Assessment and Plan:   RALPH precautions recommended.  OR booking notified via fax.    Problem: Pacemaker  Assessment and Plan:   Pt with h/o mitral stenosis s/p bioprosthetic MVR, severe TR s/p tricuspid valve repair, Pacemaker in place,    Pt had cardiac clearance - will obtain copy   Pt advised to stop coumadin on 9/6/19 as per Dr. Clemente and Dr. Seay

## 2019-09-27 NOTE — H&P PST ADULT - CARDIOVASCULAR COMMENTS
h/o tricuspid and mitral valve repair, h/o pacemaker in place h/o tricuspid and mitral valve repair, h/o pacemaker in place right CW

## 2019-09-27 NOTE — H&P PST ADULT - ASSESSMENT
84 F w/ HTN, ESRD on HD (TTS), mitral stenosis s/p bioprosthetic MVR, severe TR s/p tricuspid valve repair, Pacemaker in place,   remote hx of colon cancer s/p resection, and afib on coumadin , presents for preop eval to have flexible bronchoscopy, possible endobronchial ultrasound with cytology on 1o/2/19. 84 F w/ HTN, ESRD on HD (TTS), mitral stenosis s/p bioprosthetic MVR, severe TR s/p tricuspid valve repair, Pacemaker in place,   remote hx of colon cancer s/p resection, and afib on coumadin , presents for preop eval to have flexible bronchoscopy, possible endobronchial ultrasound with cytology on 1o/4/19.

## 2019-10-02 NOTE — ASU DISCHARGE PLAN (ADULT/PEDIATRIC) - ASU DC SPECIAL INSTRUCTIONSFT
FOLLOW-UP:  1. Please call to make a follow-up appointment within one to two weeks of discharge with Dr. Clemente (See below for office information to call for an appointment)

## 2019-10-02 NOTE — ASU PATIENT PROFILE, ADULT - PSH
A-V fistula  Left A-V fistula for HD -  Colon tumor  tumor removed from colon   H/O total knee replacement, left    H/O tricuspid valve repair    H/O:  section    History of mitral valve replacement with bioprosthetic valve

## 2019-10-02 NOTE — ASU DISCHARGE PLAN (ADULT/PEDIATRIC) - CALL YOUR DOCTOR IF YOU HAVE ANY OF THE FOLLOWING:
Bleeding that does not stop Fever greater than (need to indicate Fahrenheit or Celsius)/Inability to tolerate liquids or foods/Bleeding that does not stop/any shortness of breath or chest pain

## 2019-10-02 NOTE — ASU PATIENT PROFILE, ADULT - PMH
Chronic atrial fibrillation  on coumadin  ESRD on hemodialysis    Gout    Mitral valve stenosis, unspecified etiology    Tricuspid valve insufficiency, unspecified etiology

## 2019-10-02 NOTE — ASU DISCHARGE PLAN (ADULT/PEDIATRIC) - CARE PROVIDER_API CALL
Beto Clemente)  Surgery; Thoracic Surgery  [address] [place] [address], [place] [address]  Phone: [phone]  Fax: [phone]  Follow Up Time:

## 2019-10-02 NOTE — BRIEF OPERATIVE NOTE - NSICDXBRIEFPROCEDURE_GEN_ALL_CORE_FT
PROCEDURES:  Bronchoscopy, with bronchial biopsy 02-Oct-2019 10:02:14  Oh Madden  Bronchoalveolar lavage 02-Oct-2019 10:02:02  Oh Madden  Bronchoscopy, flexible, by cardiothoracic surgery 02-Oct-2019 10:01:52  Oh Madden

## 2019-10-04 PROBLEM — I48.2 CHRONIC ATRIAL FIBRILLATION: Chronic | Status: ACTIVE | Noted: 2017-10-17

## 2019-10-16 PROBLEM — M89.9 BONE LESION: Status: ACTIVE | Noted: 2019-01-01

## 2019-10-16 NOTE — HISTORY OF PRESENT ILLNESS
[FreeTextEntry1] : 86 year old female with history of ESRD on hemodialysis since 2008, Afib (on Coumadin) PPM, mirtal stenosis/ Tricuspid regurgitation s.p annuloplasty (2016), pulmonary HTN, COPD, restrictive lung disease and anemia. In June pt complained of having productive cough with brown sputum and was seen by PCP who ordered chest xray that revealed lung mass. Subsequently then pt had CT and PET CT that revealed “hypermetabolic right middle lobe pulmonary mass, hypermetabolic lytic lesion in the right femur. Pt was evaluated by Dr. Clemente and he recommended bronchoscopy, possible EBUS possible mediastinoscopy. \par End of last month pt reports having a fall  pt s patient states that she was getting out of an [employer] vehicle when she tripped over the lift and fell. She was evaluated the following day at [facility] where xrays of the left wrist were taken and revealed an acute slightly impacted distal radial metaphyseal fracture and slightly displaced ulnar styloid process base fracture. She was placed in a short arm splint e has an AV fistula on the left forearm for her 3x a week dialysis treatment.\par \par Dr. Travis is pts oncologist. \par Patient states she has been having right knee pain.\par Patient sates she has been feeling well overall. Appetite has been good no unintentional weight loss. \par \par \par Pt is  referred to IR for possible right femur bone lesion biopsy\par \par Denies any recent SOB, CP, fever, chills, n/v/d.\par \par Dialysis T, Th, Sat. \par Pacemaker in place placed 2016. \par \par Patients cardiologist is Dr. Harjinder Chairez  [phone]. Patient aware to get clearance from her cardiologist to hold Coumadin five days before bx. \par

## 2019-10-16 NOTE — ASSESSMENT
[FreeTextEntry1] : 86 year old female with h/o right LE pain found to have a lytic hypermetabolic mass in the right proximal femur on PET/CT. PET/CT also found the right lung FDG avid mass. EBUS guide lung mass Bx demonstrated benign process. \par \par Patient is an appropriate candidate for CT guided right femur mass needle biopsy. \par \par The procedure, the risks of bleeding, infection, tumor seeding, neurological injury were discussed with the patient and her daughters and informed consent obtained.\par \par Patient is on Coumadin for AFib which she will hold for the procedure. \par She will need repeat blood work on the day of the procedure as she is on HD for ESRD.

## 2019-10-16 NOTE — PHYSICAL EXAM
[Alert] : alert [Normal Hearing] : hearing was normal [No Respiratory Distress] : no respiratory distress [Normal Rate] : heart rate was normal  [Not Tender] : non-tender [Soft] : abdomen soft [Not Distended] : not distended [Normal Gait] : normal gait [No Tremors] : no tremors [Oriented x3] : oriented to person, place, and time

## 2019-10-16 NOTE — CONSULT LETTER
[Dear  ___] : Dear  [unfilled], [Consult Letter:] : I had the pleasure of evaluating your patient, [unfilled]. [Please see my note below.] : Please see my note below. [Consult Closing:] : Thank you very much for allowing me to participate in the care of this patient.  If you have any questions, please do not hesitate to contact me. [Sincerely,] : Sincerely, [FreeTextEntry2] : Dr. Ihsan Travis

## 2019-11-13 NOTE — ED PROVIDER NOTE - ATTENDING CONTRIBUTION TO CARE
85yo F on coumadin with mechanical fall last night, + head injury, no LOC. fall was witnessed, was told by nurse not to come to ED. + significant left eye swelling and blurred vision, no pain with eye movement  exam with normal neuro exam  PERRL, EOMI, + supraorbital tenderness, swelling difficult to assess visual acuity  no other injury  + chronic right shoulder pain  labs, CT head/max facial, concern for blowout fx

## 2019-11-13 NOTE — ED ADULT NURSE REASSESSMENT NOTE - NS ED NURSE REASSESS COMMENT FT1
Pt resting comfortably in bed. Pt denies chest pain or SOB at this time. Pt states her headache has gotten better since arrival to ED. Will continue to monitor.

## 2019-11-13 NOTE — ED PROVIDER NOTE - NSFOLLOWUPCLINICS_GEN_ALL_ED_FT
[facility]  Ophthalmology  [address] [place] [address] [address]  [place] [address]  Phone: [phone]  Fax:   Follow Up Time:

## 2019-11-13 NOTE — ED ADULT NURSE NOTE - OBJECTIVE STATEMENT
Pt is an 86yr old female, A&Ox3 and ambulatory with assistance, complaining of a mechanical fall last night on Coumadin. Pt reports hitting her head and feeling lightheaded.. Left eye swollen shut noted. No LOC. +ROM in all extremities. Equal  strength bilaterally. AV fistula on right forearm. +Thrill. T/Th/Sat, last dialyzed 11/12. Denies chest pain, SOB, N/V, dysuria, abd. pain, fevers/chills, headache, and vision changes. NSR on the monitor. VS as noted. Will continue to monitor. Pt is an 86yr old female, A&Ox3 and ambulatory with assistance, complaining of a mechanical fall last night on Coumadin. Pt reports hitting her head and feeling lightheaded.. Left eye swollen shut noted. No LOC. +ROM in all extremities. Equal  strength bilaterally. AV fistula on left forearm. +Thrill. T/Th/Sat, last dialyzed 11/12. Denies chest pain, SOB, N/V, dysuria, abd. pain, fevers/chills, headache, and vision changes. NSR on the monitor. VS as noted. Will continue to monitor.

## 2019-11-13 NOTE — ED PROVIDER NOTE - PROGRESS NOTE DETAILS
Tete Parker, Resident: patient seen by Optho, has chronic changes, recommend Optho clinic follow up and artifical tears 4xday.

## 2019-11-13 NOTE — ED PROVIDER NOTE - EYES, MLM
R eye normal.  Sig swelling around L eye; pt unable to open L eye on her own due to swelling.  Once opened by examiner, pupil normal, +subconjunc hem, nl EOM but with pain.

## 2019-11-13 NOTE — ED PROVIDER NOTE - CLINICAL SUMMARY MEDICAL DECISION MAKING FREE TEXT BOX
Renu[facility]  Will image to look for fx or other internal injury.  May have orbital fx.  Clinically no apparent inferior rectus muscle entrapment.  Dispo pending.

## 2019-11-13 NOTE — ED ADULT TRIAGE NOTE - CHIEF COMPLAINT QUOTE
pt slipped and fell last night, states she lost her balance 2/2 chronic right knee pain. +head trauma no loc, +profound swelling to left eye, swollen shut, +headache, pmh esrd on coumadin, left AV fistula, txmnt t/th/sat.

## 2019-11-13 NOTE — ED ADULT NURSE NOTE - NSIMPLEMENTINTERV_GEN_ALL_ED
Implemented All Fall with Harm Risk Interventions:  [place] to call system. Call bell, personal items and telephone within reach. Instruct patient to call for assistance. Room bathroom lighting operational. Non-slip footwear when patient is off stretcher. Physically safe environment: no spills, clutter or unnecessary equipment. Stretcher in lowest position, wheels locked, appropriate side rails in place. Provide visual cue, wrist band, yellow gown, etc. Monitor gait and stability. Monitor for mental status changes and reorient to person, place, and time. Review medications for side effects contributing to fall risk. Reinforce activity limits and safety measures with patient and family. Provide visual clues: red socks.

## 2019-11-13 NOTE — ED PROVIDER NOTE - NSFOLLOWUPINSTRUCTIONS_ED_ALL_ED_FT
- Please take the artifical tears 4 times a day  - Follow up with the Ophthalmology doctors tomorrow int he clinic at the address noted.  - Come back to the ED for any worsening or concerning symtpoms

## 2019-11-13 NOTE — ED PROVIDER NOTE - OBJECTIVE STATEMENT
87 yo F PMH Bioprosthetic valve on warfarin, ESRD on HD T/R/Sa (on schedule) p/w facial injury.  Pt reports that she slipped due to chronic R knee pain last night and injured the area around her L eye.  She called a nurse from her insurance plan who advised that she place ice on injury and follow up with her doctor. 85 yo F PMH Bioprosthetic valve on warfarin, ESRD on HD T/R/Sa (on schedule) p/w facial injury.  Pt reports that she slipped due to chronic R knee pain last night and injured the area around her L eye.  She called a nurse from her insurance plan who advised that she place ice on injury and follow up with her doctor. She denies LOC.  She came in to ED due to worse swelling around her L eye.

## 2019-11-13 NOTE — ED PROVIDER NOTE - PATIENT PORTAL LINK FT
You can access the FollowMyHealth Patient Portal offered by [facility] by registering at the following website: [email or web address]. By joining [employer]’s FollowMyHealth portal, you will also be able to view your health information using other applications (apps) compatible with our system.

## 2019-11-14 NOTE — CONSULT NOTE ADULT - ASSESSMENT
Assessment and Plan  - left facial injury, no orbital fracture, no retrobulbar hematoma, ocular exam did not show any optic neuropathy. EOM is full, IOP normal, pupil is WNL. recommend ice pack 10 min per hour for 24-48 hours.   - chronic retinopathy, possible age related macular degeneration with one spot of intraretinal hemarrhage. recommend to follow up tomorrow morning eye clinic    Follow-Up:  Patient should follow up his/her ophthalmologist or in the [facility]  [address] [place] [address].  [place] [address] [phone]

## 2019-11-14 NOTE — CONSULT NOTE ADULT - SUBJECTIVE AND OBJECTIVE BOX
[facility] Ophthalmology Consult Note    HPI: 85 yo F PMH Bioprosthetic valve on warfarin, ESRD on HD T/R/Sa (on schedule) p/w facial injury.  Pt reports that she slipped due to chronic R knee pain last night and injured the area around her L eye.  She called a nurse from her insurance plan who advised that she place ice on injury and follow up with her doctor. She denies LOC.  She came in to ED due to worse swelling around her Left eye.  	    PMH: see HPI  Meds: In HPI  POcHx (including surgeries/lasers/trauma):  She did not know the diagnosis. But she was told she has some problem in the back of her eyes  Drops: none  FamHx: none  Allergies: NKDA    Mood and Affect Appropriate ( x ),  Oriented to Time, Place, and Person x 3 ( x )    Ophthalmology Exam    Visual acuity (sc): 20/200 OU  Pupils: PERRL OU, no APD  Ttono: 12 OU  Extraocular movements (EOMs): grossly full OU,   Confrontational Visual Field (CVF):  FULL OU  Color Plates: Only can read the control number OU    External:    Lids/Lashes/Lacrimal Ducts: Left periorbital edema causing shut down of the left eye, ecchymosis.  Sclera/Conjunctiva:  W+Q OD. os: subconj heme diffusely  Cornea: Cl OU  Anterior Chamber: D+Q OU   Iris:  Flat OU  Lens:  PCIOL    Fundus Exam: dilated with 1% tropicamide and 2.5% phenylephrine  Approval obtained from primary team for dilation  Patient aware that pupils can remained dilated for at least 4-6 hours  Exam performed with 20D lens    Vitreous: wnl OU  Disc, cup/disc: 0.4 OU  Macula:  Diffusely drusen in the posterior pole, OU symmetric   Vessels:  OD intraretinal hemorrhage in the inf of the optic nerve, white lesion which is probably atrophy or fibrotic tissue in the sup optic nerve.     CT scan:   No acute fracture is identified. The paranasal sinuses are well aerated.   The orbital rim is intact. No intraorbital hematoma is identified.   Extensive bilateral periorbital soft tissue swelling is present, left   greater than right.

## 2019-11-30 NOTE — ED PROVIDER NOTE - OBJECTIVE STATEMENT
86 F w/ HTN, ESRD on HD (TTS), mitral stenosis s/p bioprosthetic MVR, severe TR s/p tricuspid valve repair, remote hx of colon cancer s/p resection, and afib on coumadin, complaining of presumed bleeding from the tongue x 2 episodes, onset 2 days ago, resolving after approx 30 min each time, noted as 1-2 clots. Per family, it was noted to be coming from the middle of the tongue. Pt denies nausea/vomiting or hematemesis, denies melena or hematochezia. States she's been more short of breath with wheezing over the last few days without resolution with her inhaler. No known pulmonary disease. States she does not have asthma. Completed her dialysis session today. No fevers.       Nephrology: Follows up with Dr Martinez [phone] at [facility]

## 2019-11-30 NOTE — ED ADULT NURSE NOTE - OBJECTIVE STATEMENT
received pt in bed A and Ox 3  in NAD, resting comfortably, with no active bleeding noted, pt reports since last night developed bleeding from oral cavity, states  bleeding got worse after brushing her teeth last night, went to Dialysis today, ( goes T,Th,Sat) dialysis completed, pt denies any pain or discomfort, denies HA, changes in vision, as per family at bedside, pt is at baseline mental status.

## 2019-11-30 NOTE — ED PROVIDER NOTE - ATTENDING CONTRIBUTION TO CARE
DR. SUNSHINE, ATTENDING MD-  I performed a face to face bedside interview with the patient regarding history of present illness, review of symptoms and past medical history. I completed an independent physical exam.  I have discussed the patient's plan of care with the resident.   Documentation as above in the note.    85 y/o female h/o esrd on hd, htn, colon ca, afib on coumadin p/w c/o bleeding from tongue x2 days.  Family members finding clots on tongue.  No bld at nares.  No bld in oropharynx though pink sputum.  No lesion or wound on tongue.  Rales at bases b/l.  Appears less likely epistaxis or bld from tongue and more likely hemoptysis.  Likely fluid overload vs malignancy vs bronchitis.  Obtain cbc, cmp, coags, cxr, ekg, will admit for further care and evaluation.

## 2019-11-30 NOTE — ED PROVIDER NOTE - PRINCIPAL DIAGNOSIS
April 25, 2017    Suresh Jc  [address] [address] [address]             [facility] [address]  [facility] [address]  Phone: [phone] Dear Mr. Jc:    Good Afternoon,     I wanted to let you know that I have received your Diabetic Eye Exam and it has been added to your Health Maintenance.  Please let me know if there is anything else I may be able to help you with.     Thank you  Nubia CASTRO LPN  Care Coordination Department  [facility]  Phone Number: [phone]               
Hemoptysis

## 2019-11-30 NOTE — ED ADULT NURSE NOTE - NSIMPLEMENTINTERV_GEN_ALL_ED
Implemented All Fall with Harm Risk Interventions:  [place] to call system. Call bell, personal items and telephone within reach. Instruct patient to call for assistance. Room bathroom lighting operational. Non-slip footwear when patient is off stretcher. Physically safe environment: no spills, clutter or unnecessary equipment. Stretcher in lowest position, wheels locked, appropriate side rails in place. Provide visual cue, wrist band, yellow gown, etc. Monitor gait and stability. Monitor for mental status changes and reorient to person, place, and time. Review medications for side effects contributing to fall risk. Reinforce activity limits and safety measures with patient and family. Provide visual clues: red socks.

## 2019-11-30 NOTE — ED PROVIDER NOTE - PROGRESS NOTE DETAILS
[facility] PGY3: scant hemoptysis, pulm edema on xr, wheezes mildly improved, will admit, their nephrology group does not admit here.

## 2019-11-30 NOTE — ED PROVIDER NOTE - CLINICAL SUMMARY MEDICAL DECISION MAKING FREE TEXT BOX
86F ESRD on coumadin for afib, p/w 'bleeding from tongue', no evidence of bleeding in nares or mouth on exam, oropharynx clear but pt constantly coughing scant pink tinged sputum, cxr w/ concern for some pulm edema, pt possibly fluid overloaded w/ elevated inr contributing to todays symptoms and reported sob/ wheezing. will hold dose of coumadin, admit for possible repeat dialysis and monitoring of bleeding, consider reversal of inr if acute bleeding occurs.

## 2019-11-30 NOTE — ED PROVIDER NOTE - ENMT, MLM
Airway patent, Nasal mucosa clear. Mouth with normal mucosa. Throat has no vesicles, no oropharyngeal exudates and uvula is midline. No bleeding appreciated

## 2019-12-01 NOTE — H&P ADULT - PROBLEM SELECTOR PLAN 7
Transitions of Care Status:  1.  Name of PCP:  2.  PCP Contacted on Admission: [ ] Y    [ ] N    3.  PCP contacted at Discharge: [ ] Y    [ ] N    [ ] N/A  4.  Post-Discharge Appointment Date and Location:  5.  Summary of Handoff given to PCP: DVT ppx: SCDs given supratherapeutic INR  Diet: Renal

## 2019-12-01 NOTE — H&P ADULT - NSHPPHYSICALEXAM_GEN_ALL_CORE
General: WN/WD NAD  Neurology: A&Ox3, nonfocal, CASTILLO x 4  Eyes: PERRLA/ EOMI, Gross vision intact  ENT/Neck: Neck supple, trachea midline, No JVD, Gross hearing intact  Respiratory: CTA B/L, No wheezing, rales, rhonchi  CV: RRR, S1S2, no murmurs, rubs or gallops. L AV fistula with palpable thrill  Abdominal: Soft, NT, ND +BS,   Extremities: 1-2+ LE edema, + peripheral pulses  Skin: No Rashes, Hematoma, Ecchymosis T(C): 36.6 (12-01-19 @ 05:32), Max: 36.9 (11-30-19 @ 16:56)  HR: 60 (12-01-19 @ 05:32) (60 - 82)  BP: 116/57 (12-01-19 @ 05:32) (116/57 - 127/52)  RR: 16 (12-01-19 @ 05:32) (16 - 18)  SpO2: 100% (12-01-19 @ 05:32) (100% - 100%)    Constitutional: NAD, well-developed, well-nourished  Ears, Nose, Mouth, and Throat: normal external ears and nose, normal hearing, moist oral mucosa  Eyes: normal conjunctiva, EOMI, PERRL  Neck: supple, no JVD  Respiratory: Clear to auscultation bilaterally. No wheezes, rales or rhonchi. Normal respiratory effort  Cardiovascular: RRR, S1S2, no murmurs, rubs or gallops. L AV fistula with palpable thrill, 1-2+ LE edema  Gastrointestinal: soft, nontender, nondistended, +BS, no hernia  Skin: warm, dry, no rash  Neurologic: sensation grossly intact, CN grossly intact, non-focal exam  Musculoskeletal: no clubbing, no cyanosis, no joint swelling  Psychiatric: AOX3, appropriate mood, affect

## 2019-12-01 NOTE — H&P ADULT - PROBLEM SELECTOR PLAN 2
-Patient with recent IR guided biopsy on 11/1 of R femur demonstrates non-small cell carcinoma favoring squamous cell  -per outside PET report, patient has hypermetabolic RML pulmonary mass, hypermetabolic lesion in R femur  -per patient, she is not aware of biopsy results  -patient's oncologist is Dr. Travis; consult for his group in am if he has privileges at [facility]

## 2019-12-01 NOTE — H&P ADULT - NSHPSOCIALHISTORY_GEN_ALL_CORE
Patient lives at home with her daughter. She denies ever having been a smoker. No EtOH or illicit drugs.

## 2019-12-01 NOTE — H&P ADULT - NSHPREVIEWOFSYSTEMS_GEN_ALL_CORE
REVIEW OF SYSTEMS:    CONSTITUTIONAL: No weakness, fevers or chills  EYES/ENT: No visual changes;  No vertigo or throat pain   NECK: No pain or stiffness  RESPIRATORY: No cough, wheezing, + hemoptysis; No shortness of breath  CARDIOVASCULAR: No chest pain or palpitations  GASTROINTESTINAL: No abdominal or epigastric pain. No nausea, vomiting, or hematemesis; No diarrhea or constipation. No melena or hematochezia.  GENITOURINARY: No dysuria, frequency or hematuria  NEUROLOGICAL: No numbness or weakness  SKIN: No itching, rashes Constitutional Symptoms: No weakness, fevers, chills, weight loss  Eyes: No visual changes, headache, eye pain, double vision  Ears, Nose, Mouth, Throat: No runny nose, sinus pain, ear pain, tinnitus, sore throat, dysphagia, odynophagia  Cardiovascular: No chest pain, palpitations, edema  Respiratory: +cough, hemoptysis  Gastrointestinal: No abdominal pain, dysphagia, anorexia, nausea/vomiting, diarrhea/constipation, hematemesis, BRBPR, melena  Genitourinary: No dysuria, frequency, hematuria  Musculoskeletal: No joint pain, joint swelling, decreased ROM  Skin: No pruritus, rashes, lesions, wounds  Neurologic:  No seizures, headache, paraesthesia, numbness, limb weakness    Positives and pertinent negatives noted and all other systems negative.

## 2019-12-01 NOTE — PROGRESS NOTE ADULT - SUBJECTIVE AND OBJECTIVE BOX
[facility] Division of Hospital Medicine  Erin Chacon DO  Pager: [phone]  Other Times:  [phone]    Patient is a 86y old  Female who presents with a chief complaint of Hemoptysis (01 Dec 2019 09:55)    SUBJECTIVE / OVERNIGHT EVENTS:  Patient seen denying any further episodes. Previously had dyspnea, improved with nebulizer.   Complaining of Rt knee pain, had a recent fall 2 weeks ago.     ADDITIONAL REVIEW OF SYSTEMS:    MEDICATIONS  (STANDING):  allopurinol 300 milliGRAM(s) Oral daily  gabapentin 100 milliGRAM(s) Oral at bedtime  midodrine. 10 milliGRAM(s) Oral <User Schedule>  sevelamer carbonate 800 milliGRAM(s) Oral three times a day with meals    MEDICATIONS  (PRN):      CAPILLARY BLOOD GLUCOSE        I&O's Summary      PHYSICAL EXAM:  Vital Signs Last 24 Hrs  T(C): 36.8 (01 Dec 2019 08:35), Max: 36.9 (30 Nov 2019 16:56)  T(F): 98.3 (01 Dec 2019 08:35), Max: 98.4 (30 Nov 2019 16:56)  HR: 67 (01 Dec 2019 08:35) (60 - 82)  BP: 120/72 (01 Dec 2019 08:35) (116/57 - 127/52)  BP(mean): --  RR: 16 (01 Dec 2019 08:35) (16 - 18)  SpO2: 99% (01 Dec 2019 08:35) (99% - 100%)  CONSTITUTIONAL: NAD, elderly woman sitting up in bed, cooperative with exam  EYES: PERRLA; conjunctiva and sclera clear  ENMT: Moist oral mucosa, no pharyngeal injection or exudates; normal dentition  NECK: Supple, no palpable masses; no thyromegaly  RESPIRATORY: Normal respiratory effort; lungs are clear to auscultation bilaterally  CARDIOVASCULAR: Regular rate and rhythm, normal S1 and S2, no murmur/rub/gallop; No lower extremity edema; Peripheral pulses are 2+ bilaterally  ABDOMEN: Nontender to palpation, normoactive bowel sounds, no rebound/guarding; No hepatosplenomegaly  MUSCLOSKELETAL:  Normal gait; no clubbing or cyanosis of digits; no joint swelling or tenderness to palpation  PSYCH: A+O to person, place, and time; affect appropriate  NEUROLOGY: CN 2-12 are intact and symmetric; no gross sensory deficits;   SKIN: No rashes; no palpable lesions    LABS:                        9.1    4.62  )-----------( 178      ( 01 Dec 2019 06:10 )             32.3     12-01    134<L>  |  96<L>  |  25<H>  ----------------------------<  91  4.5   |  24  |  5.33<H>    Ca    8.7      01 Dec 2019 06:10  Phos  4.0     12-01  Mg     2.3     12-01    TPro  9.1<H>  /  Alb  3.8  /  TBili  0.6  /  DBili  x   /  AST  25  /  ALT  17  /  AlkPhos  145<H>  11-30    PT/INR - ( 01 Dec 2019 06:10 )   PT: 67.5 SEC;   INR: 5.76          PTT - ( 01 Dec 2019 06:10 )  PTT:53.4 SEC            RADIOLOGY & ADDITIONAL TESTS:  Results Reviewed:   Imaging Personally Reviewed:  Electrocardiogram Personally Reviewed:    COORDINATION OF CARE:  Care Discussed with Consultants/Other Providers [Y/N]:  Prior or Outpatient Records Reviewed [Y/N]:

## 2019-12-01 NOTE — H&P ADULT - PROBLEM SELECTOR PLAN 1
-Pt with complaint of hemoptysis, although she also complained of epistaxis and bleeding of tongue; therefore, unclear etiology of bleeding, although patient does have likely hemoptysis given hx of probably lung malignancy, as below  -s/p bronch on 10/2/19, which was negative for TB an fungal infections; EBUS was non-diagnostic  -bleeding in setting of supratherapeutic INR with of 5.64  -holding coumadin for now

## 2019-12-01 NOTE — PROGRESS NOTE ADULT - PROBLEM SELECTOR PLAN 2
-Patient with recent IR guided biopsy on 11/1 of R femur demonstrates non-small cell carcinoma favoring squamous cell  -per outside PET report, patient has hypermetabolic RML pulmonary mass, hypermetabolic lesion in R femur  -per patient, she is not aware of biopsy results  -patient's oncologist is Dr. Travis; consult for his group in am if he has privileges at [facility]

## 2019-12-01 NOTE — PHYSICAL THERAPY INITIAL EVALUATION ADULT - PERTINENT HX OF CURRENT PROBLEM, REHAB EVAL
Patient is 86 year old female admitted with history of ESRD on HD, PPM, HTN, COPD, presents for hemoptysis.

## 2019-12-01 NOTE — PROGRESS NOTE ADULT - PROBLEM SELECTOR PLAN 9
1.  Name of PCP: Javi Espana  2.  PCP Contacted on Admission: [ ] Y    [X] N    3.  PCP contacted at Discharge: [ ] Y    [ ] N    [ ] N/A  4.  Post-Discharge Appointment Date and Location:  5.  Summary of Handoff given to PCP:

## 2019-12-01 NOTE — H&P ADULT - HISTORY OF PRESENT ILLNESS
86 y.o. F with PMH of ESRD on HD on T/Th/Sat, Afib (on Coumadin), PPM Implanted, Mitral stenosis/Tricuspid Regurgitation s/p annuloplasty (2016), chronic hypotension, remote hx of colon Ca s/p resection, pulmonary HTN, COPD, restrictive lung disease and anemia who presents with complaint of hemoptysis. Of note, patient denies history of lung disease; those conditions, however, were noted upon chart review on [employer]. Patient endorses jordyn bloody sputum, with  noticeable clots that began today. Patient shared her symptoms with her visiting nurse, who advised her to present to ED. Patient is unreliable historian; she initially complained of epistaxis to triage nurse, then of tongue bleeding to ED provider, and now endorses hemoptysis. Patient states she has never had hemoptysis prior; however, patient is s/p bronch with EBUS on 10/2 for hemoptysis. BAL was negative for malignant cells and EBUS with core needle biopsy resulted as non-diagnostic. Pt did undergo a bone bx of her R hip with IR on 11/1/19 which resulted as likely squamous cell carcinoma. Pt states she has not received results of this biopsy. 86 y.o. F with PMH of ESRD on HD on T/Th/Sat, Afib (on Coumadin), PPM Implanted, Mitral stenosis/Tricuspid Regurgitation s/p annuloplasty (2016), chronic hypotension, remote hx of colon Ca s/p resection, pulmonary HTN, COPD, restrictive lung disease and anemia who presents with complaint of hemoptysis. Of note, patient denies history of lung disease; those conditions, however, were noted upon chart review on [employer]. Patient endorses jordyn bloody sputum, with  noticeable clots that began today. Patient shared her symptoms with her visiting nurse, who advised her to present to ED. Patient is unreliable historian; she initially complained of epistaxis to triage nurse, then of tongue bleeding to ED provider, and now endorses hemoptysis. Patient states she has never had hemoptysis prior; however, patient is s/p bronch with EBUS on 10/2 for hemoptysis. BAL was negative for malignant cells and EBUS with core needle biopsy resulted as non-diagnostic. Pt did undergo a bone bx of her R hip with IR on 11/1/19 which resulted as likely squamous cell carcinoma. Pt states she has not received results of this biopsy.     Of note, patient presented to ED s/p fall 1 week ago; imaging was negative for any fractures.     In the ED, VS 98.4, HR 70, /48, RR 16 with 100% O2 sat on RA. PLabs were notable for supratherapeutic INR of 5.64, troponin of 227, as well as pro-BNP of 33K.

## 2019-12-01 NOTE — H&P ADULT - PROBLEM SELECTOR PLAN 5
-Pt with supratherapeutic INR in setting of coumadin; however, it is odd that her PTT is elevated as well  -repeat coags in am

## 2019-12-01 NOTE — CONSULT NOTE ADULT - ATTENDING COMMENTS
Thank you for allowing me to participate in the care of your patient    For any question, call:  Cell # [phone]  Pager # [phone]  Callback # [phone]

## 2019-12-01 NOTE — PROGRESS NOTE ADULT - PROBLEM SELECTOR PLAN 5
Recent fall 2 weeks ago, reports in the process of obtaining insurance coverage for injections  - continue tylenol for mild, percocet for severe pain  - F/U XR Rt knee  - PT consult

## 2019-12-01 NOTE — PHYSICAL THERAPY INITIAL EVALUATION ADULT - PLANNED THERAPY INTERVENTIONS, PT EVAL
gait training/stairs training/strengthening/transfer training/balance training/bed mobility training

## 2019-12-01 NOTE — H&P ADULT - PROBLEM SELECTOR PLAN 8
Transitions of Care Status:  1.  Name of PCP:  2.  PCP Contacted on Admission: [ ] Y    [ ] N    3.  PCP contacted at Discharge: [ ] Y    [ ] N    [ ] N/A  4.  Post-Discharge Appointment Date and Location:  5.  Summary of Handoff given to PCP: 1.  Name of PCP: Javi Espana  2.  PCP Contacted on Admission: [ ] Y    [X] N    3.  PCP contacted at Discharge: [ ] Y    [ ] N    [ ] N/A  4.  Post-Discharge Appointment Date and Location:  5.  Summary of Handoff given to PCP:

## 2019-12-01 NOTE — CONSULT NOTE ADULT - PROBLEM SELECTOR RECOMMENDATION 9
Consent in chart  Due next for HD on Tuesday  Dose meds for ESRD  Renal diet  Daily BMP  Avoid nephrotoxins  PHos WNL  Holding Epo in light of possible active malignancy

## 2019-12-01 NOTE — CONSULT NOTE ADULT - SUBJECTIVE AND OBJECTIVE BOX
[facility] - KAYLEE Meier / KAYLEE Matthew / GRETTA Gillette/ KAYLEE Galeano/ KAYLEE Schaefer/ TRE Leary / ANTWON Serrano / RAGHU Alarcon  -------------------------------------------------------------------------------------------------------  The patient seen and examined today.  HPI:  86 y.o. F with PMH of ESRD on HD on /Sat, Afib (on Coumadin), PPM Implanted, Mitral stenosis/Tricuspid Regurgitation s/p annuloplasty (2016), chronic hypotension, remote hx of colon Ca s/p resection, pulmonary HTN, COPD, restrictive lung disease and anemia who presents with complaint of hemoptysis. Of note, patient denies history of lung disease; those conditions, however, were noted upon chart review on [employer]. Patient endorses jordyn bloody sputum, with  noticeable clots that began today. Patient shared her symptoms with her visiting nurse, who advised her to present to ED. Patient is unreliable historian; she initially complained of epistaxis to triage nurse, then of tongue bleeding to ED provider, and now endorses hemoptysis. Patient states she has never had hemoptysis prior; however, patient is s/p bronch with EBUS on 10/2 for hemoptysis. BAL was negative for malignant cells and EBUS with core needle biopsy resulted as non-diagnostic. Pt did undergo a bone bx of her R hip with IR on 19 which resulted as likely squamous cell carcinoma. Pt states she has not received results of this biopsy.     Of note, patient presented to ED s/p fall 1 week ago; imaging was negative for any fractures.     In the ED, VS 98.4, HR 70, /48, RR 16 with 100% O2 sat on RA. PLabs were notable for supratherapeutic INR of 5.64, troponin of 227, as well as pro-BNP of 33K. (01 Dec 2019 03:06)      PAST MEDICAL & SURGICAL HISTORY:  Chronic atrial fibrillation: on coumadin  Tricuspid valve insufficiency, unspecified etiology  Mitral valve stenosis, unspecified etiology  Gout  ESRD on hemodialysis  H/O tricuspid valve repair: 2016  History of mitral valve replacement with bioprosthetic valve  H/O total knee replacement, left:   H/O:  section  Colon tumor: tumor removed from colon   A-V fistula: Left A-V fistula for HD -    Allergies :- Epogen (Unknown)    Home Medications Reviewed  Hospital Medications:   MEDICATIONS  (STANDING):  allopurinol 300 milliGRAM(s) Oral daily  gabapentin 100 milliGRAM(s) Oral at bedtime  midodrine. 10 milliGRAM(s) Oral <User Schedule>  sevelamer carbonate 800 milliGRAM(s) Oral three times a day with meals    SOCIAL HISTORY:  Denies ETOh,Smoking,   FAMILY HISTORY:  Family history of heart disease (Sibling)  Family history of anemia: father      REVIEW OF SYSTEMS:  CONSTITUTIONAL: No weakness, fevers or chills  EYES/ENT: No visual changes;  No vertigo or throat pain   NECK: No pain or stiffness  RESPIRATORY: No cough, wheezing, hemoptysis; No shortness of breath  CARDIOVASCULAR: No chest pain or palpitations.  GASTROINTESTINAL: No abdominal or epigastric pain. No nausea, vomiting, or hematemesis; No diarrhea or constipation. No melena or hematochezia.  GENITOURINARY: No dysuria, frequency, foamy urine, urinary urgency, incontinence or hematuria  NEUROLOGICAL: No numbness or weakness  SKIN: No itching, burning, rashes, or lesions   VASCULAR: No bilateral lower extremity edema.   All other review of systems is negative unless indicated above.    VITALS:  T(F): 98.3 (19 @ 08:35), Max: 98.4 (19 @ 16:56)  HR: 67 (19 @ 08:35)  BP: 120/72 (19 @ 08:35)  RR: 16 (19 @ 08:35)  SpO2: 99% (19 @ 08:35)  Wt(kg): --        PHYSICAL EXAM:  Constitutional: NAD  HEENT: anicteric sclera, oropharynx clear, MMM  Neck: supple.   Respiratory: Bilateral equal breath sounds , no wheezes, no crackles  Cardiovascular: S1, S2, Regular, Murmur present.  Gastrointestinal: Bowel Sound present, soft, NT/ND  Extremities: No cyanosis or clubbing. No peripheral edema  Neurological: Alert and oriented x 3, no focal deficits  Psychiatric: Normal mood, normal affect  : No CVA tenderness. No ortiz.   Skin: No rashes    Data:      134<L>  |  96<L>  |  25<H>  ----------------------------<  91  4.5   |  24  |  5.33<H>    Ca    8.7      01 Dec 2019 06:10  Phos  4.0       Mg     2.3         TPro  9.1<H>  /  Alb  3.8  /  TBili  0.6  /  DBili      /  AST  25  /  ALT  17  /  AlkPhos  145<H>  1130    Creatinine Trend: 5.33 <--, 4.47 <--                        9.1    4.62  )-----------( 178      ( 01 Dec 2019 06:10 )             32.3     Urine Studies:

## 2019-12-01 NOTE — ED ADULT NURSE REASSESSMENT NOTE - NS ED NURSE REASSESS COMMENT FT1
Report received from night shift RN. Patient in 1B. Resting in stretcher, A&Ox4. Complaining of no pain or discomfort. Denies blood in urine or stool. No epistaxis noted at this time. Vital signs as noted. Patient instructed to ask for assistance when getting out of bed. Will continue to monitor.

## 2019-12-01 NOTE — H&P ADULT - NSHPLABSRESULTS_GEN_ALL_CORE
.                        9.6    5.00  )-----------( 184      ( 30 Nov 2019 19:58 )             33.4     Hgb Trend: 9.6<--  11-30    136  |  96<L>  |  18  ----------------------------<  85  4.4   |  26  |  4.47<H>    Ca    9.2      30 Nov 2019 19:58    TPro  9.1<H>  /  Alb  3.8  /  TBili  0.6  /  DBili  x   /  AST  25  /  ALT  17  /  AlkPhos  145<H>  11-30    Creatinine Trend: 4.47<--, 7.23<--, 5.95<--  PT/INR - ( 30 Nov 2019 19:58 )   PT: 67.8 SEC;   INR: 5.64          PTT - ( 30 Nov 2019 19:58 )  PTT:53.1 SEC    CXR with mild pulmonary edema.     EKG with V paced rhythm .                        9.6    5.00  )-----------( 184      ( 30 Nov 2019 19:58 )             33.4     Hgb Trend: 9.6<--  11-30    136  |  96<L>  |  18  ----------------------------<  85  4.4   |  26  |  4.47<H>    Ca    9.2      30 Nov 2019 19:58    TPro  9.1<H>  /  Alb  3.8  /  TBili  0.6  /  DBili  x   /  AST  25  /  ALT  17  /  AlkPhos  145<H>  11-30    Creatinine Trend: 4.47<--, 7.23<--, 5.95<--  PT/INR - ( 30 Nov 2019 19:58 )   PT: 67.8 SEC;   INR: 5.64          PTT - ( 30 Nov 2019 19:58 )  PTT:53.1 SEC    CXR with mild pulmonary edema.     EKG personally reviewed, V paced rhythm

## 2019-12-01 NOTE — PROGRESS NOTE ADULT - PROBLEM SELECTOR PLAN 1
Pt with complaint of hemoptysis, although she also complained of epistaxis and bleeding of tongue; therefore, unclear etiology of bleeding, although patient does have likely hemoptysis given hx of probably lung malignancy, as below  -s/p bronch on 10/2/19, which was negative for TB an fungal infections; EBUS was non-diagnostic  -bleeding in setting of supratherapeutic INR with of 5.64  -holding coumadin for now

## 2019-12-01 NOTE — H&P ADULT - PROBLEM SELECTOR PLAN 6
DVT ppx: SCDs given supratherapeutic INR  Diet: Renal -Pt unsure of medications, please call pharmacy to confirm med rec

## 2019-12-01 NOTE — H&P ADULT - ASSESSMENT
86 y.o. F with PMH of ESRD on HD on T/Th/Sat, Afib (on Coumadin), PPM Implanted, Mitral stenosis/Tricuspid Regurgitation s/p annuloplasty (2016), chronic hypotension, remote hx of colon Ca s/p resection, pulmonary HTN, COPD, restrictive lung disease and anemia who presents with complaint of hemoptysis.

## 2019-12-02 NOTE — PROGRESS NOTE ADULT - PROBLEM SELECTOR PLAN 2
-Patient with recent IR guided biopsy on 11/1 of R femur demonstrates non-small cell carcinoma favoring squamous cell  -per outside PET report, patient has hypermetabolic RML pulmonary mass, hypermetabolic lesion in R femur  -per patient, she is not aware of biopsy results  -patient's oncologist is Dr. Travis; consult for his group in am if he has privileges at [facility] -Patient with recent IR guided biopsy on 11/1 of R femur demonstrates non-small cell carcinoma favoring squamous cell  -per outside PET report, patient has hypermetabolic RML pulmonary mass, hypermetabolic lesion in R femur  -per patient, she is not aware of biopsy results  -case d/w patient's oncologist is Dr. Travis , will see pt in am, pt likely not a candidate  for chemo, DR Travis will evaluate if candidate for immunotherapy

## 2019-12-02 NOTE — PROGRESS NOTE ADULT - PROBLEM SELECTOR PLAN 1
Pt with complaint of hemoptysis, although she also complained of epistaxis and bleeding of tongue; therefore, unclear etiology of bleeding, although patient does have likely hemoptysis given hx of probably lung malignancy, as below  -s/p bronch on 10/2/19, which was negative for TB an fungal infections; EBUS was non-diagnostic  -bleeding in setting of supratherapeutic INR with of 5.64  -holding coumadin for now Pt with complaint of hemoptysis, although she also complained of epistaxis and bleeding of tongue; therefore, unclear etiology of bleeding, although patient does have likely hemoptysis given hx of probably lung malignancy, as below  -s/p bronch on 10/2/19, which was negative for TB an fungal infections; EBUS was non-diagnostic  -bleeding in setting of supra therapeutic INR  of 5.64  -holding coumadin for now, INR trending down, 4.11 today , will continue to monitor

## 2019-12-02 NOTE — PROGRESS NOTE ADULT - PROBLEM SELECTOR PLAN 5
Recent fall 2 weeks ago, reports in the process of obtaining insurance coverage for injections  - continue tylenol for mild, percocet for severe pain  - F/U XR Rt knee  - PT consult Recent fall 2 weeks ago, reports in the process of obtaining insurance coverage for injections  - continue tylenol for mild, percocet for severe pain  - F/U XR Rt knee  - PT consult noted, recommend rehab

## 2019-12-02 NOTE — PROGRESS NOTE ADULT - PROBLEM SELECTOR PLAN 6
-Pt with supratherapeutic INR in setting of coumadin; however, it is odd that her PTT is elevated as well  -repeat coags in am -Pt with supratherapeutic INR in setting of coumadin; however, it is odd that her PTT is elevated as well  - will continue to trend

## 2019-12-02 NOTE — PROGRESS NOTE ADULT - PROBLEM SELECTOR PLAN 8
DVT ppx: SCDs given supratherapeutic INR  Diet: Renal DVT ppx: SCDs given supratherapeutic INR, RLE tender and appears larger than left, check doppler to r/o DVt ,   Diet: Renal

## 2019-12-02 NOTE — PROGRESS NOTE ADULT - SUBJECTIVE AND OBJECTIVE BOX
Patient is a 86y old  Female who presents with a chief complaint of Hemoptysis (01 Dec 2019 12:13)      SUBJECTIVE / OVERNIGHT EVENTS:    MEDICATIONS  (STANDING):  allopurinol 300 milliGRAM(s) Oral daily  gabapentin 100 milliGRAM(s) Oral at bedtime  influenza   Vaccine 0.5 milliLiter(s) IntraMuscular once  midodrine. 10 milliGRAM(s) Oral <User Schedule>  sevelamer carbonate 800 milliGRAM(s) Oral three times a day with meals    MEDICATIONS  (PRN):  acetaminophen   Tablet .. 325 milliGRAM(s) Oral every 4 hours PRN Temp greater or equal to 38C (100.4F), Mild Pain (1 - 3), Moderate Pain (4 - 6)  oxycodone    5 mG/acetaminophen 325 mG 1 Tablet(s) Oral every 6 hours PRN Severe Pain (7 - 10)      Vital Signs Last 24 Hrs  T(C): 37.1 (02 Dec 2019 06:04), Max: 37.1 (02 Dec 2019 06:04)  T(F): 98.8 (02 Dec 2019 06:04), Max: 98.8 (02 Dec 2019 06:04)  HR: 63 (02 Dec 2019 06:04) (60 - 69)  BP: 146/60 (02 Dec 2019 06:04) (128/51 - 146/60)  BP(mean): --  RR: 17 (02 Dec 2019 06:04) (16 - 17)  SpO2: 100% (02 Dec 2019 06:04) (99% - 100%)  CAPILLARY BLOOD GLUCOSE        I&O's Summary      PHYSICAL EXAM:  GENERAL: NAD, well-developed  HEAD:  Atraumatic, Normocephalic  EYES: EOMI, PERRLA, conjunctiva and sclera clear  NECK: Supple, No JVD  CHEST/LUNG: Clear to auscultation bilaterally; No wheeze  HEART: Regular rate and rhythm; No murmurs, rubs, or gallops  ABDOMEN: Soft, Nontender, Nondistended; Bowel sounds present  EXTREMITIES:  2+ Peripheral Pulses, No clubbing, cyanosis, or edema  PSYCH: AAOx3  NEUROLOGY: non-focal  SKIN: No rashes or lesions    LABS:                        9.9    5.44  )-----------( 186      ( 02 Dec 2019 06:22 )             35.6     12-02    137  |  99  |  43<H>  ----------------------------<  86  5.2   |  23  |  7.70<H>    Ca    9.1      02 Dec 2019 06:22  Phos  4.8     12-02  Mg     2.4     12-02    TPro  9.1<H>  /  Alb  3.8  /  TBili  0.6  /  DBili  x   /  AST  25  /  ALT  17  /  AlkPhos  145<H>  11-30    PT/INR - ( 02 Dec 2019 06:22 )   PT: 49.0 SEC;   INR: 4.11          PTT - ( 02 Dec 2019 06:22 )  PTT:50.0 SEC          RADIOLOGY & ADDITIONAL TESTS:    Imaging Personally Reviewed:    Consultant(s) Notes Reviewed:      Care Discussed with Consultants/Other Providers: Patient is a 86y old  Female who presents with a chief complaint of Hemoptysis (01 Dec 2019 12:13)      SUBJECTIVE / OVERNIGHT EVENTS: patient seen and examined by bedside, c/o pain in RLE, denies headache, dizziness, SOB, CP, Palpitations , N/V/D, abdominal pain        MEDICATIONS  (STANDING):  allopurinol 300 milliGRAM(s) Oral daily  gabapentin 100 milliGRAM(s) Oral at bedtime  influenza   Vaccine 0.5 milliLiter(s) IntraMuscular once  midodrine. 10 milliGRAM(s) Oral <User Schedule>  sevelamer carbonate 800 milliGRAM(s) Oral three times a day with meals    MEDICATIONS  (PRN):  acetaminophen   Tablet .. 325 milliGRAM(s) Oral every 4 hours PRN Temp greater or equal to 38C (100.4F), Mild Pain (1 - 3), Moderate Pain (4 - 6)  oxycodone    5 mG/acetaminophen 325 mG 1 Tablet(s) Oral every 6 hours PRN Severe Pain (7 - 10)      Vital Signs Last 24 Hrs  T(C): 37.1 (02 Dec 2019 06:04), Max: 37.1 (02 Dec 2019 06:04)  T(F): 98.8 (02 Dec 2019 06:04), Max: 98.8 (02 Dec 2019 06:04)  HR: 63 (02 Dec 2019 06:04) (60 - 69)  BP: 146/60 (02 Dec 2019 06:04) (128/51 - 146/60)  BP(mean): --  RR: 17 (02 Dec 2019 06:04) (16 - 17)  SpO2: 100% (02 Dec 2019 06:04) (99% - 100%)  CAPILLARY BLOOD GLUCOSE        I&O's Summary      PHYSICAL EXAM:  CONSTITUTIONAL: NAD, well developed   EYES: PERRLA; conjunctiva and sclera clear  ENMT: Moist oral mucosa, no pharyngeal injection or exudates;   NECK: Supple,   RESPIRATORY: Normal respiratory effort; lungs are clear to auscultation bilaterally  CARDIOVASCULAR: Regular rate and rhythm, normal S1 and S2, no murmur/rub/gallop; Peripheral pulses are 2+ bilaterally  ABDOMEN: Nontender to palpation, normoactive bowel sounds, no rebound/guarding; No hepatosplenomegaly  MUSCULOSKELETAL  Normal gait; no clubbing or cyanosis of digits; Rt calf  mildly tender  to palpation, slightly  larger than left on exam   PSYCH: A+O to person, place, and time; affect appropriate  NEUROLOGY: CN 2-12 are intact and symmetric; no gross sensory deficits;   SKIN: No rashes; no palpable lesions    LABS:                        9.9    5.44  )-----------( 186      ( 02 Dec 2019 06:22 )             35.6     12-02    137  |  99  |  43<H>  ----------------------------<  86  5.2   |  23  |  7.70<H>    Ca    9.1      02 Dec 2019 06:22  Phos  4.8     12-02  Mg     2.4     12-02    TPro  9.1<H>  /  Alb  3.8  /  TBili  0.6  /  DBili  x   /  AST  25  /  ALT  17  /  AlkPhos  145<H>  11-30    PT/INR - ( 02 Dec 2019 06:22 )   PT: 49.0 SEC;   INR: 4.11          PTT - ( 02 Dec 2019 06:22 )  PTT:50.0 SEC          RADIOLOGY & ADDITIONAL TESTS:    Imaging Personally Reviewed:    Consultant(s) Notes Reviewed:  nephrology     Care Discussed with Consultants/Other Providers: oncology Dr Travis

## 2019-12-02 NOTE — PROGRESS NOTE ADULT - PROBLEM SELECTOR PLAN 3
-holding coumadin in setting of bleeding and supratherapeutic INR -holding coumadin in setting of bleeding and supra therapeutic INR  - will monitor PT/INR

## 2019-12-03 NOTE — PROGRESS NOTE ADULT - PROBLEM SELECTOR PLAN 1
- Pt with complaint of hemoptysis, likely 2/2 known hx of RML mass   - s/p bronch on 10/2/19, which was negative for TB an fungal infections; EBUS was non-diagnostic  - bleeding in setting of supra therapeutic INR  of 5.64  - continue to hold coumadin for now, INR trending down, 3.17 today , will continue to monitor

## 2019-12-03 NOTE — CONSULT NOTE ADULT - SUBJECTIVE AND OBJECTIVE BOX
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 Did not recognize me.       PAST MEDICAL & SURGICAL HISTORY:  Chronic atrial fibrillation: on coumadin  Tricuspid valve insufficiency, unspecified etiology  Mitral valve stenosis, unspecified etiology  Gout  ESRD on hemodialysis  H/O tricuspid valve repair:   History of mitral valve replacement with bioprosthetic valve  H/O total knee replacement, left:   H/O:  section  Colon tumor: tumor removed from colon   A-V fistula: Left A-V fistula for HD -        Meds:  acetaminophen   Tablet .. 325 milliGRAM(s) Oral every 4 hours PRN  allopurinol 300 milliGRAM(s) Oral daily  artificial tears (preservative free) Ophthalmic Solution 1 Drop(s) Both EYES two times a day PRN  gabapentin 100 milliGRAM(s) Oral at bedtime  influenza   Vaccine 0.5 milliLiter(s) IntraMuscular once  midodrine. 10 milliGRAM(s) Oral <User Schedule>  oxycodone    5 mG/acetaminophen 325 mG 1 Tablet(s) Oral every 6 hours PRN  polyethylene glycol 3350 17 Gram(s) Oral daily  senna 2 Tablet(s) Oral at bedtime  sevelamer carbonate 800 milliGRAM(s) Oral three times a day with meals      Epogen (Unknown)      FAMILY HISTORY:  Family history of heart disease (Sibling)  Family history of anemia: father      Vital Signs Last 24 Hrs  T(C): 36.8 (03 Dec 2019 05:30), Max: 36.9 (02 Dec 2019 22:00)  T(F): 98.2 (03 Dec 2019 05:30), Max: 98.5 (02 Dec 2019 22:00)  HR: 68 (03 Dec 2019 05:30) (62 - 72)  BP: 123/55 (03 Dec 2019 05:30) (123/55 - 157/70)  BP(mean): --  RR: 17 (03 Dec 2019 05:30) (17 - 18)  SpO2: 98% (03 Dec 2019 05:30) (98% - 100%)                          9.5    5.50  )-----------( 182      ( 03 Dec 2019 05:59 )             33.7       12-03    137  |  97<L>  |  66<H>  ----------------------------<  102<H>  4.9   |  24  |  9.40<H>    Ca    8.9      03 Dec 2019 05:59  Phos  5.7     12  Mg     2.4         TPro  8.4<H>  /  Alb  3.5  /  TBili  0.4  /  DBili  x   /  AST  11  /  ALT  8   /  AlkPhos  132<H>  12-03      PT/INR - ( 03 Dec 2019 05:59 )   PT: 36.5 SEC;   INR: 3.17          PTT - ( 03 Dec 2019 05:59 )  PTT:46.0 SEC      Path: Cytopathology - Non Gyn Report:   ACCESSION No:  [id number]    JOSE C WATTS ANTHONY                    5        Cytopathology Addendum Report          Addendum  This test was performed by an outside laboratory. For all patient  care and clinical decision making purposes refer solely to  original laboratory report.  The information transcribed here is  not the entire report.  This shortened version has been placed  here for the purpose of the electronic record.  Tumor molecular testing for detecting genomic alterations and  integrated clinical genomic profiling:  About this test: FoundationOne is a next-generation sequencing  (NGS) based assay which identifies genomic alterations within  hundreds of cancer-related genes  Performing Laboratory: [place], [place]  Specimen #: FMI Case # [id number]  Date Reported by Outside Laboratory: 2019  Submitting Physician: Amanda Nelson MD  Body Site: Bone  Specimen Type:  Slide  Block ID:  [id number]    1B    Biomarker Findings  Microsatellite status - MS-Stable  Tumor Mutational [place] - 4 Muts/Mb  Genomic Findings  For a complete list of the genes assayed, please refer to the  Appendix.  MET exon 14 splice site (3028+1G>T)  MDM2 amplification    Outside report electronically signed by: DOC Parra MD  Date: 2019    This test was performed and interpreted by [facility]. [address] [place] [address], [address] [place], [place] [id number]. [phone].    Verified by: ELISA ABDI MD Pathologist  (Electronic Signature)  Reported on: 19 12:26 EST, [facility]. [address] [address]  Phone: [phone]   Fax: [phone]  _________________________________________________________________          Addendum  PD-L1 Immunohistochemistry            JOSE C WATTS                    5        Cytopathology Addendum Report          Antibody: [place] PDL1 ()  Tissue type: Bone  Block:1B    Result: Tumor Proportion Score (TPS*): More than 1%  Interpretation: Positive    *TPS: The percentage of viable tumor cells showing partial or  complete membrane staining at any intensity  Case discussed at the daily cytopathology   conference.  Slide(s) with built in immunohistochemical study control(s) and  negative control associated with this case has/have been verified  by the sign out pathologist.  For slide(s) without built in controls positive control slides  has/have been reviewed and approved by immunohistochemistry lab  These immunohistochemical/ in-situ hybridization tests have been  developed and their performance characteristics determined by  [facility], Department of Pathology,  Division of Immunopathology, [address] [place] [address].  It has not been cleared or approved by the U.S. Food  and Drug Administration.  The FDA has determined that such  clearance or approval is not necessary.  This test is used for  clinical purposes.  The laboratory is certified under the CLIA-88  as qualified to perform high complexity clinical  testing.    Verified by: Amanda Nelson MD  (Electronic Signature)  Reported on: 19 19:28 [facility], [address] [place] [address]  Phone: [phone]   Fax: [phone]  _________________________________________________________________      Cytopathology Report            Final Diagnosis  BONE, FEMUR, RIGHT, CT GUIDED CORE BIOPSY AND FNA  POSITIVE FOR MALIGNANT CELLS.  Metastatic non-small cell carcinoma, favor squamous cell  carcinoma.              MAC, JOSE C E                    5        Cytopathology Report          Cytology slides display a paucicellular specimen comprised of  rare crowded clusters of atypical cells with enlarged nuclei and  dense cytoplasm..  Core biopsies: reveals an effacement of lamellar bone parenchyma  with infiltrative clusters of malignant epithelial cells with  enlarged nuclei, prominent nucleoli and dense cytoplasm.  The cell block consists exclusively of blood and is non  contributory.    Immunohistochemical stains: The neoplastic cells are positive for  P40 and CK7 while negative for TTF-1, CDX2 and CK20. KIRAN-3  reveals a faint , non- contributing staining pattern.    As per patient's CT and PET CT report: revealed hypermetabolic  right middle lobe pulmonary mass, hypermetabolic lytic lesion in  the right femur.    In summary the cytomorphology and immunophenotype are consistent  with metastatic squamous cell carcinoma. Correlation with  clinical presentation and radiologic studies is essential.    As per clinician request molecular studies are in progress.  Results will be reported in an addendum.  This case was reviewed for  with staff  pathologist who concurs with the diagnosis.  Dr. Travis was notified of the diagnosis on19.    Slide(s) with built in immunohistochemical study control(s) and  negative control associated with this case has/have been verified  by the sign out pathologist.  For slide(s) without built in controls positive control slides  has/have been reviewed and approved by immunohistochemistry lab  These immunohistochemical/ in-situ hybridization tests have been  developed and their performance characteristics determined by  [facility], Department of Pathology,  Division of Immunopathology, [address] [place] [address].  It has not been cleared or approved by the U.S. Food  and Drug Administration.  The FDA has determined that such  clearance or approval is not necessary.  This test is used for  clinical purposes.  The laboratory is certified under the CLIA-88  as qualified to perform high complexity clinical              MAC, JOSE C E                    5        Cytopathology Report          testing.    Screened by: Lorenzo ALMANZAR(ASCP)  Verified by: Amanda Nelson MD  (Electronic Signature)  Reported on: 19 13:27 EST, [address]. [address] [place] [address]  Cytology technical processing performed at [address] [place] [address]  _________________________________________________________________      Specimen(s) Submitted  BONE, FEMUR, RIGHT, CT GUIDED CORE BIOPSY AND FNA      Statement of Adequacy  Immediate cytologic study for adequacy of specimen using a Diff-  Quik stain was performed at [facility], [address] [place] [address], [place], [place] and, at  the time, deemed insufficient for interpretation.However, upon  evaluation of all additional cytologic material, the case is now  considered to be adequate for diagnosis.      Clinical Information  Hypermetabolic right middle lobe lung mass. Lytic lesion in right  femur.      Gross Description  Core Biopsy:  Tissue collected: Specimen container has been inspected to  confirm patient’s name and date of birth, contains 4  tan cores  measuring 0.8, 0.8, 0.4, 0.4 cm in length (and multiple  fragments). Entire specimen submitted in 2 cassette(s) labeled  1B.and 1 C    7 Touch prep slides ( 4 air dried + 3 fixed)    FNA:  Received: 1 air dried and1 fixed slides  Needle rinses in 20 ml formalin  Submitted: cell block in one cassette labeled 1A    Prepared:  7 Touch Prep, 1 Diff Quick,  1 Pap Stained slides            JOSE C WATTS        Cytopathology Report          1 cell block  labeled 1A  1 core biopsy labeled 1B  1 core biopsy labeled 1C  12 Total slides (19 @ 15:11)

## 2019-12-03 NOTE — PROGRESS NOTE ADULT - SUBJECTIVE AND OBJECTIVE BOX
[facility] Division of Hospital Medicine  Ansley Loyola DO  Pager (LILIA, 2B-5P): [phone]    Patient is a 86y old  Female who presents with a chief complaint of Hemoptysis (03 Dec 2019 11:19)    SUBJECTIVE / OVERNIGHT EVENTS:  No acute events.  Pt states she feels well, has poor insight into her medical history.  Denies CP, SOB, denies pain.       MEDICATIONS  (STANDING):  allopurinol 300 milliGRAM(s) Oral daily  gabapentin 100 milliGRAM(s) Oral at bedtime  influenza   Vaccine 0.5 milliLiter(s) IntraMuscular once  midodrine. 10 milliGRAM(s) Oral <User Schedule>  polyethylene glycol 3350 17 Gram(s) Oral daily  senna 2 Tablet(s) Oral at bedtime  sevelamer carbonate 800 milliGRAM(s) Oral three times a day with meals    MEDICATIONS  (PRN):  acetaminophen   Tablet .. 325 milliGRAM(s) Oral every 4 hours PRN Temp greater or equal to 38C (100.4F), Mild Pain (1 - 3), Moderate Pain (4 - 6)  artificial tears (preservative free) Ophthalmic Solution 1 Drop(s) Both EYES two times a day PRN Dry Eyes  oxycodone    5 mG/acetaminophen 325 mG 1 Tablet(s) Oral every 6 hours PRN Severe Pain (7 - 10)      PHYSICAL EXAM:  Vital Signs Last 24 Hrs  T(C): 36.7 (03 Dec 2019 09:00), Max: 36.9 (02 Dec 2019 22:00)  T(F): 98 (03 Dec 2019 09:00), Max: 98.5 (02 Dec 2019 22:00)  HR: 60 (03 Dec 2019 09:00) (60 - 72)  BP: 142/54 (03 Dec 2019 09:00) (123/55 - 157/70)  BP(mean): --  RR: 17 (03 Dec 2019 09:00) (17 - 18)  SpO2: 100% (03 Dec 2019 09:00) (98% - 100%)    CONSTITUTIONAL: NAD, well-developed, well-groomed  EYES: PERRLA; conjunctiva and sclera clear, L eyelid ptosis   RESPIRATORY: Normal respiratory effort; lungs are clear to auscultation bilaterally  CARDIOVASCULAR: Regular rate and rhythm, normal S1 and S2, no murmur/rub/gallop; 1+ pitting edema b/l LE   ABDOMEN: Nontender to palpation, normoactive bowel sounds, no rebound/guarding  MUSCLOSKELETAL:  No joint swelling or tenderness to palpation  PSYCH: A+O to person, place, and time; affect appropriate  NEUROLOGY: CN 2-12 are intact and symmetric; no gross sensory deficits;   SKIN: No rashes; no palpable lesions    LABS:                        9.5    5.50  )-----------( 182      ( 03 Dec 2019 05:59 )             33.7     12-03    137  |  97<L>  |  66<H>  ----------------------------<  102<H>  4.9   |  24  |  9.40<H>    Ca    8.9      03 Dec 2019 05:59  Phos  5.7     12-03  Mg     2.4     12-03    TPro  8.4<H>  /  Alb  3.5  /  TBili  0.4  /  DBili  x   /  AST  11  /  ALT  8   /  AlkPhos  132<H>  12-03    PT/INR - ( 03 Dec 2019 05:59 )   PT: 36.5 SEC;   INR: 3.17          PTT - ( 03 Dec 2019 05:59 )  PTT:46.0 SEC      RADIOLOGY & ADDITIONAL TESTS:  Results Reviewed:   < from: Xray Chest 1 View AP/PA (11.30.19 @ 19:52) >  IMPRESSION: Small right pleural effusion.    < end of copied text >    < from: Xray Knee 3 Views, Right (12.01.19 @ 14:56) >  IMPRESSION:  Small knee joint effusion.    Chronic focal small osseous fragmentation adjacent to peripheral medial   tibial plateau margin. Otherwise no dislocations or acute appearing   fractures.    Moderate tricompartment osteoarthritis.    Generalized osteopenia otherwise no discrete lytic or blastic lesions.    Vascular calcifications.      < end of copied text >      COORDINATION OF CARE:  Care Discussed with Consultants/Other Providers [Y/N]: yes, Dr. Travis oncology

## 2019-12-03 NOTE — CONSULT NOTE ADULT - ASSESSMENT
86 y.o. F with PMH of ESRD on HD on T/Th/Sat, Afib (on Coumadin), PPM Implanted, Mitral stenosis/Tricuspid Regurgitation s/p annuloplasty (2016), chronic hypotension, remote hx of colon Ca s/p resection, pulmonary HTN, COPD, restrictive lung disease and anemia who presents with complaint of hemoptysis.
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  - keep INR low therapeutic.  - monitor hgb and transfuse PRBCs as needed to keep hgb ~ 8  - will discuss in the lung tumor board regarding options for treating the stage 4 cancer  - pt on dialysis, can not receive any bisphophonates  - epogen need also needs to be assessed carefully because of active cancer  - if any significant deterioration, palliative and comfort care.    call \for questions - [phone]

## 2019-12-03 NOTE — PROGRESS NOTE ADULT - PROBLEM SELECTOR PLAN 7
DVT ppx: SCDs given supratherapeutic INR, RLE tender and appears larger than left, check doppler to r/o DVt ,   Diet: Renal

## 2019-12-03 NOTE — PROGRESS NOTE ADULT - SUBJECTIVE AND OBJECTIVE BOX
Pt seen and examined at bedside  feels fairly well  denies Hemoptysis, or bleeding from anywhere  No dyspnea, chest pain, fever, cough  no n/v/d  due for HD today      Allergies:  Epogen (Unknown)    Hospital Medications:   MEDICATIONS  (STANDING):  allopurinol 300 milliGRAM(s) Oral daily  gabapentin 100 milliGRAM(s) Oral at bedtime  influenza   Vaccine 0.5 milliLiter(s) IntraMuscular once  midodrine. 10 milliGRAM(s) Oral <User Schedule>  polyethylene glycol 3350 17 Gram(s) Oral daily  senna 2 Tablet(s) Oral at bedtime  sevelamer carbonate 800 milliGRAM(s) Oral three times a day with meals         VITALS:  T(F): 98 (12-03-19 @ 09:00), Max: 98.5 (12-02-19 @ 22:00)  HR: 60 (12-03-19 @ 09:00)  BP: 142/54 (12-03-19 @ 09:00)  RR: 17 (12-03-19 @ 09:00)  SpO2: 100% (12-03-19 @ 09:00)  Wt(kg): --      PHYSICAL EXAM:  Constitutional: NAD, sitting comfortably in bed. obese  HEENT: anicteric sclera, oropharynx clear, MMM  Neck: No JVD  Respiratory: CTAB, no wheezes, rales or rhonchi  Cardiovascular: S1, S2, RRR  Gastrointestinal: BS+, soft, NT/ND  Extremities: No cyanosis or clubbing. No peripheral edema  Neurological: A/O x 3, no focal deficits  Psychiatric: Normal mood, normal affect  : No CVA tenderness. No ortiz.   Skin: No rashes  Vascular Access: avf, good bruit    LABS:  12-03    137  |  97<L>  |  66<H>  ----------------------------<  102<H>  4.9   |  24  |  9.40<H>    Ca    8.9      03 Dec 2019 05:59  Phos  5.7     12-03  Mg     2.4     12-03    TPro  8.4<H>  /  Alb  3.5  /  TBili  0.4  /  DBili      /  AST  11  /  ALT  8   /  AlkPhos  132<H>  12-03    Creatinine Trend: 9.40 <--, 7.70 <--, 5.33 <--, 4.47 <--                        9.5    5.50  )-----------( 182      ( 03 Dec 2019 05:59 )             33.7     Urine Studies:      RADIOLOGY & ADDITIONAL STUDIES:

## 2019-12-03 NOTE — PROGRESS NOTE ADULT - PROBLEM SELECTOR PLAN 8
1.  Name of PCP: Javi Espana  2.  PCP Contacted on Admission: [ ] Y    [X] N    3.  PCP contacted at Discharge: [ ] Y    [ ] N    [ ] N/A  4.  Post-Discharge Appointment Date and Location:  5.  Summary of Handoff given to PCP:    Will meet with daughters tomorrow to discuss advanced care planning.

## 2019-12-03 NOTE — PROGRESS NOTE ADULT - PROBLEM SELECTOR PLAN 5
- Recent fall 2 weeks ago, reports in the process of obtaining insurance coverage for injections  - continue tylenol for mild, percocet for severe pain  - F/U XR Rt knee  - PT consult noted, recommend rehab

## 2019-12-03 NOTE — PROGRESS NOTE ADULT - PROBLEM SELECTOR PLAN 2
-Patient with recent IR guided biopsy on 11/1 of R femur demonstrates non-small cell carcinoma favoring squamous cell  -per outside PET report, patient has hypermetabolic RML pulmonary mass, hypermetabolic lesion in R femur  -case d/w patient's oncologist is Dr. Travis, pt not candidate for chemo and to discuss at tumor board in AM.  Dr. Travis has discussed this with family and will reach out again to daughters.

## 2019-12-04 NOTE — PROGRESS NOTE ADULT - PROBLEM SELECTOR PLAN 7
1.  Name of PCP: Javi Espana  2.  PCP Contacted on Admission: [ ] Y    [X] N    3.  PCP contacted at Discharge: [ ] Y    [ ] N    [ ] N/A  4.  Post-Discharge Appointment Date and Location:  5.  Summary of Handoff given to PCP:    Family meeting today to discuss GOC and dispo

## 2019-12-04 NOTE — PROGRESS NOTE ADULT - PROBLEM SELECTOR PLAN 1
c/w 3 times per week HD TTS.   s/p HD yesterday, Rx sheet reviewed, net UF 1kg, tolerated well. uneventful.   plan for next hd tomorrow, w/2k bath, uf 1kg as tolearted  c/w midodrine prehd  c/w renal diet.   volume ok,K ok

## 2019-12-04 NOTE — PROGRESS NOTE ADULT - PROBLEM SELECTOR PLAN 1
- Pt with complaint of hemoptysis, likely 2/2 known hx of RML mass, no further episodes noted   - s/p bronch on 10/2/19, which was negative for TB and fungal infections; EBUS was non-diagnostic  - bleeding in setting of supra therapeutic INR  of 5.64  - Can dose 5mg Coumadin today

## 2019-12-04 NOTE — PROGRESS NOTE ADULT - PROBLEM SELECTOR PLAN 3
Oncology eval noted. Off chemo presently  ?allergy to epogen in chart, pt do not recall  anemia- DESHAUN as per hem/onc

## 2019-12-04 NOTE — PROGRESS NOTE ADULT - SUBJECTIVE AND OBJECTIVE BOX
Pt feels OK, has pain in the right wrist from CTS, no bleeding and ROS otherwise unremarkable.      Meds:  acetaminophen   Tablet .. 325 milliGRAM(s) Oral every 4 hours PRN  allopurinol 300 milliGRAM(s) Oral daily  artificial tears (preservative free) Ophthalmic Solution 1 Drop(s) Both EYES two times a day PRN  chlorhexidine 4% Liquid 1 Application(s) Topical daily  gabapentin 100 milliGRAM(s) Oral at bedtime  influenza   Vaccine 0.5 milliLiter(s) IntraMuscular once  midodrine. 10 milliGRAM(s) Oral <User Schedule>  oxycodone    5 mG/acetaminophen 325 mG 1 Tablet(s) Oral every 6 hours PRN  polyethylene glycol 3350 17 Gram(s) Oral daily  senna 2 Tablet(s) Oral at bedtime  sevelamer carbonate 800 milliGRAM(s) Oral three times a day with meals      Vital Signs Last 24 Hrs  T(C): 36.7 (04 Dec 2019 12:02), Max: 37.1 (03 Dec 2019 22:56)  T(F): 98 (04 Dec 2019 12:02), Max: 98.8 (03 Dec 2019 22:56)  HR: 63 (04 Dec 2019 12:02) (60 - 63)  BP: 105/47 (04 Dec 2019 12:02) (97/41 - 139/71)  BP(mean): --  RR: 18 (04 Dec 2019 12:02) (17 - 18)  SpO2: 96% (04 Dec 2019 12:02) (96% - 100%)                          8.9    5.32  )-----------( 167      ( 04 Dec 2019 06:27 )             30.8       12-04    132<L>  |  96<L>  |  33<H>  ----------------------------<  93  4.1   |  26  |  4.96<H>    Ca    8.7      04 Dec 2019 06:27  Phos  5.7     12-03  Mg     2.4     12-03    TPro  7.7  /  Alb  3.1<L>  /  TBili  0.4  /  DBili  x   /  AST  14  /  ALT  11  /  AlkPhos  124<H>  12-04              PT/INR - ( 04 Dec 2019 06:27 )   PT: 22.6 SEC;   INR: 1.94          PTT - ( 03 Dec 2019 05:59 )  PTT:46.0 SEC

## 2019-12-04 NOTE — PROGRESS NOTE ADULT - PROBLEM SELECTOR PLAN 5
- Recent fall 2 weeks ago, reports in the process of obtaining insurance coverage for injections  - continue tylenol for pain  - Xray R knee with no fracture/dislocation, +OA  - PT consult noted, recommend rehab, pt reluctant to go

## 2019-12-04 NOTE — PROGRESS NOTE ADULT - SUBJECTIVE AND OBJECTIVE BOX
[facility] of [facility] Medicine  Ansley Loyola DO  Pager (LILIA, 5B-3M): [phone]    Patient is a 86y old  Female who presents with a chief complaint of Hemoptysis (03 Dec 2019 11:56)      SUBJECTIVE / OVERNIGHT EVENTS: Pt without acute complaints, reluctant to go to [facility].  Denies further hemoptysis, denies CP, SOB, palpitations.  No major tele events noted.       MEDICATIONS  (STANDING):  allopurinol 300 milliGRAM(s) Oral daily  chlorhexidine 4% Liquid 1 Application(s) Topical daily  gabapentin 100 milliGRAM(s) Oral at bedtime  influenza   Vaccine 0.5 milliLiter(s) IntraMuscular once  midodrine. 10 milliGRAM(s) Oral <User Schedule>  polyethylene glycol 3350 17 Gram(s) Oral daily  senna 2 Tablet(s) Oral at bedtime  sevelamer carbonate 800 milliGRAM(s) Oral three times a day with meals    MEDICATIONS  (PRN):  acetaminophen   Tablet .. 325 milliGRAM(s) Oral every 4 hours PRN Temp greater or equal to 38C (100.4F), Mild Pain (1 - 3), Moderate Pain (4 - 6)  artificial tears (preservative free) Ophthalmic Solution 1 Drop(s) Both EYES two times a day PRN Dry Eyes  oxycodone    5 mG/acetaminophen 325 mG 1 Tablet(s) Oral every 6 hours PRN Severe Pain (7 - 10)      CAPILLARY BLOOD GLUCOSE      I&O's Summary    03 Dec 2019 07:01  -  04 Dec 2019 07:00  --------------------------------------------------------  IN: 400 mL / OUT: 1400 mL / NET: -1000 mL      PHYSICAL EXAM:  Vital Signs Last 24 Hrs  T(C): 36.7 (04 Dec 2019 12:02), Max: 37.3 (03 Dec 2019 16:00)  T(F): 98 (04 Dec 2019 12:02), Max: 99.2 (03 Dec 2019 16:00)  HR: 63 (04 Dec 2019 12:02) (60 - 66)  BP: 105/47 (04 Dec 2019 12:02) (97/41 - 139/71)  BP(mean): --  RR: 18 (04 Dec 2019 12:02) (17 - 18)  SpO2: 96% (04 Dec 2019 12:02) (95% - 100%)    CONSTITUTIONAL: NAD, well-developed, well-groomed  EYES: b/l cataracts noted  RESPIRATORY: Normal respiratory effort; lungs are clear to auscultation bilaterally  CARDIOVASCULAR: Regular rate and rhythm, normal S1 and S2, no murmur/rub/gallop; trace b/l LE edema  ABDOMEN: Nontender to palpation, normoactive bowel sounds, no rebound/guarding  MUSCULOSKELETAL:  No joint swelling or tenderness to palpation  PSYCH: A+O to person, place, and time; affect appropriate  NEUROLOGY: CN 2-12 are intact and symmetric; no gross sensory deficits;   SKIN: No rashes; no palpable lesions    LABS:                        8.9    5.32  )-----------( 167      ( 04 Dec 2019 06:27 )             30.8     12-04    132<L>  |  96<L>  |  33<H>  ----------------------------<  93  4.1   |  26  |  4.96<H>    Ca    8.7      04 Dec 2019 06:27  Phos  5.7     12-03  Mg     2.4     12-03    TPro  7.7  /  Alb  3.1<L>  /  TBili  0.4  /  DBili  x   /  AST  14  /  ALT  11  /  AlkPhos  124<H>  12-04    PT/INR - ( 04 Dec 2019 06:27 )   PT: 22.6 SEC;   INR: 1.94          PTT - ( 03 Dec 2019 05:59 )  PTT:46.0 SEC

## 2019-12-04 NOTE — PROGRESS NOTE ADULT - PROBLEM SELECTOR PLAN 2
-Patient with recent IR guided biopsy on 11/1 of R femur demonstrates non-small cell carcinoma favoring squamous cell  -per outside PET report, patient has hypermetabolic RML pulmonary mass, hypermetabolic lesion in R femur  -case d/w patient's oncologist is Dr. Travis, pt not candidate for chemo and to discuss at tumor board in AM.  Dr. Travis has discussed this with family and will reach out again to daughters.  - Plan to meet with daughter today at bedside

## 2019-12-04 NOTE — PROGRESS NOTE ADULT - SUBJECTIVE AND OBJECTIVE BOX
[facility] - Hardeep / Tiff S /Nain/ INGE Galeano/ INGE Schaefer/ John Leary / CARA Njeru  ---------------------------------------------------------------------------------------------------------------    Patient seen and examined bedside    Subjective and Objective: No overnight events, denied sob. No new complaints today. + wrist pain, attributes to carpel tunnel  tolerated hd well yesterday    Allergies: Epogen (Unknown)      Hospital Medications:   MEDICATIONS  (STANDING):  allopurinol 300 milliGRAM(s) Oral daily  chlorhexidine 4% Liquid 1 Application(s) Topical daily  gabapentin 100 milliGRAM(s) Oral at bedtime  influenza   Vaccine 0.5 milliLiter(s) IntraMuscular once  midodrine. 10 milliGRAM(s) Oral <User Schedule>  polyethylene glycol 3350 17 Gram(s) Oral daily  senna 2 Tablet(s) Oral at bedtime  sevelamer carbonate 800 milliGRAM(s) Oral three times a day with meals    VITALS:  T(F): 98 (12-04-19 @ 12:02), Max: 99.2 (12-03-19 @ 16:00)  HR: 63 (12-04-19 @ 12:02)  BP: 105/47 (12-04-19 @ 12:02)  RR: 18 (12-04-19 @ 12:02)  SpO2: 96% (12-04-19 @ 12:02)  Wt(kg): --    12-03 @ 07:01  -  12-04 @ 07:00  --------------------------------------------------------  IN: 400 mL / OUT: 1400 mL / NET: -1000 mL      PHYSICAL EXAM:  Constitutional: NAD  HEENT: anicteric sclera, oropharynx clear  Neck: No JVD  Respiratory: CTAB, no wheezes, rales or rhonchi  Cardiovascular: S1, S2, RRR  Gastrointestinal: BS+, soft, NT/ND  Extremities: No cyanosis or clubbing. No peripheral edema  Neurological: A/O x 3, no focal deficits  Psychiatric: Normal mood, normal affect  : No ortiz.   Vascular Access: LFA AVF+thrill    LABS:  12-04    132<L>  |  96<L>  |  33<H>  ----------------------------<  93  4.1   |  26  |  4.96<H>    Ca    8.7      04 Dec 2019 06:27  Phos  5.7     12-03  Mg     2.4     12-03    TPro  7.7  /  Alb  3.1<L>  /  TBili  0.4  /  DBili      /  AST  14  /  ALT  11  /  AlkPhos  124<H>  12-04    Creatinine Trend: 4.96 <--, 9.40 <--, 7.70 <--, 5.33 <--, 4.47 <--                        8.9    5.32  )-----------( 167      ( 04 Dec 2019 06:27 )             30.8     Urine Studies:        RADIOLOGY & ADDITIONAL STUDIES:

## 2019-12-05 NOTE — PROGRESS NOTE ADULT - PROBLEM SELECTOR PLAN 1
c/w 3 times per week HD TTS.   plan for hd today, w/2k bath, uf 1kg as tolearted  c/w midodrine prehd  c/w renal diet. fluid restriction 1L/day  volume ok,K ok  awaiting d/c to rehab

## 2019-12-05 NOTE — PROGRESS NOTE ADULT - PROBLEM SELECTOR PLAN 1
- Pt with complaint of hemoptysis, likely 2/2 known hx of RML mass, no further episodes noted   - s/p bronch on 10/2/19, which was negative for TB and fungal infections; EBUS was non-diagnostic  - bleeding in setting of supra therapeutic INR  of 5.64  - Can give 6mg Coumadin today

## 2019-12-05 NOTE — PROGRESS NOTE ADULT - SUBJECTIVE AND OBJECTIVE BOX
[facility] - Hardeep / Tiff S /Nain/ INGE Galeano/ INGE Schaefer/ John Leary / CARA Njeru  ---------------------------------------------------------------------------------------------------------------    Patient seen and examined bedside    Subjective and Objective: No overnight events, denied sob. No complaints today. feeling better    Allergies: Epogen (Unknown)      Hospital Medications:   MEDICATIONS  (STANDING):  allopurinol 300 milliGRAM(s) Oral daily  chlorhexidine 4% Liquid 1 Application(s) Topical daily  gabapentin 100 milliGRAM(s) Oral at bedtime  influenza   Vaccine 0.5 milliLiter(s) IntraMuscular once  midodrine. 10 milliGRAM(s) Oral <User Schedule>  polyethylene glycol 3350 17 Gram(s) Oral daily  senna 2 Tablet(s) Oral at bedtime  sevelamer carbonate 800 milliGRAM(s) Oral three times a day with meals      VITALS:  T(F): 97.8 (12-05-19 @ 05:50), Max: 98.3 (12-04-19 @ 18:30)  HR: 63 (12-05-19 @ 05:50)  BP: 132/60 (12-05-19 @ 05:50)  RR: 18 (12-05-19 @ 05:50)  SpO2: 96% (12-05-19 @ 05:50)  Wt(kg): --        PHYSICAL EXAM:  Constitutional: NAD  HEENT: anicteric sclera, oropharynx clear  Neck: No JVD  Respiratory: CTAB, no wheezes, rales or rhonchi  Cardiovascular: S1, S2, RRR  Gastrointestinal: BS+, soft, NT/ND  Extremities: No cyanosis or clubbing. No peripheral edema  Neurological: A/O x 3, no focal deficits  Psychiatric: Normal mood, normal affect  :  No ortiz.   Vascular Access: AVF    LABS:  12-05    131<L>  |  92<L>  |  64<H>  ----------------------------<  78  5.0   |  23  |  7.27<H>    Ca    8.9      05 Dec 2019 05:39  Mg     2.2     12-05    TPro  7.8  /  Alb  3.2<L>  /  TBili  0.5  /  DBili  < 0.2  /  AST  14  /  ALT  12  /  AlkPhos  120  12-05    Creatinine Trend: 7.27 <--, 4.96 <--, 9.40 <--, 7.70 <--, 5.33 <--, 4.47 <--                        8.6    5.30  )-----------( 168      ( 05 Dec 2019 05:39 )             29.7     Urine Studies:        RADIOLOGY & ADDITIONAL STUDIES: [facility] - Hardeep / Tiff S /Nain/ INGE Galeano/ INGE Schaefer/ John Leary / CARA Njeru  ---------------------------------------------------------------------------------------------------------------    Patient seen and examined bedside, earlier today    Subjective and Objective: No overnight events, denied sob. No complaints today. feeling better    Allergies: Epogen (Unknown)      Hospital Medications:   MEDICATIONS  (STANDING):  allopurinol 300 milliGRAM(s) Oral daily  chlorhexidine 4% Liquid 1 Application(s) Topical daily  gabapentin 100 milliGRAM(s) Oral at bedtime  influenza   Vaccine 0.5 milliLiter(s) IntraMuscular once  midodrine. 10 milliGRAM(s) Oral <User Schedule>  polyethylene glycol 3350 17 Gram(s) Oral daily  senna 2 Tablet(s) Oral at bedtime  sevelamer carbonate 800 milliGRAM(s) Oral three times a day with meals      VITALS:  T(F): 97.8 (12-05-19 @ 05:50), Max: 98.3 (12-04-19 @ 18:30)  HR: 63 (12-05-19 @ 05:50)  BP: 132/60 (12-05-19 @ 05:50)  RR: 18 (12-05-19 @ 05:50)  SpO2: 96% (12-05-19 @ 05:50)  Wt(kg): --        PHYSICAL EXAM:  Constitutional: NAD  HEENT: anicteric sclera, oropharynx clear  Neck: No JVD  Respiratory: CTAB, no wheezes, rales or rhonchi  Cardiovascular: S1, S2, RRR  Gastrointestinal: BS+, soft, NT/ND  Extremities: No cyanosis or clubbing. No peripheral edema  Neurological: A/O x 3, no focal deficits  Psychiatric: Normal mood, normal affect  :  No oritz.   Vascular Access: AVF +thrill    LABS:  12-05    131<L>  |  92<L>  |  64<H>  ----------------------------<  78  5.0   |  23  |  7.27<H>    Ca    8.9      05 Dec 2019 05:39  Mg     2.2     12-05    TPro  7.8  /  Alb  3.2<L>  /  TBili  0.5  /  DBili  < 0.2  /  AST  14  /  ALT  12  /  AlkPhos  120  12-05    Creatinine Trend: 7.27 <--, 4.96 <--, 9.40 <--, 7.70 <--, 5.33 <--, 4.47 <--                        8.6    5.30  )-----------( 168      ( 05 Dec 2019 05:39 )             29.7     Urine Studies:        RADIOLOGY & ADDITIONAL STUDIES:

## 2019-12-05 NOTE — PROGRESS NOTE ADULT - SUBJECTIVE AND OBJECTIVE BOX
[facility] Medicine  Ansley Loyola DO  Pager (LILIA, 7P-5P): [phone]    Patient is a 86y old  Female who presents with a chief complaint of Hemoptysis (05 Dec 2019 10:50)      SUBJECTIVE / OVERNIGHT EVENTS: Pt feels well, no complaints.  Continues to believe that she does not have cancer.  After discussion with daughters, pt is now agreeable to [facility]    MEDICATIONS  (STANDING):  allopurinol 300 milliGRAM(s) Oral daily  chlorhexidine 4% Liquid 1 Application(s) Topical daily  gabapentin 100 milliGRAM(s) Oral at bedtime  influenza   Vaccine 0.5 milliLiter(s) IntraMuscular once  midodrine. 10 milliGRAM(s) Oral <User Schedule>  polyethylene glycol 3350 17 Gram(s) Oral daily  senna 2 Tablet(s) Oral at bedtime  sevelamer carbonate 800 milliGRAM(s) Oral three times a day with meals    MEDICATIONS  (PRN):  acetaminophen   Tablet .. 325 milliGRAM(s) Oral every 4 hours PRN Temp greater or equal to 38C (100.4F), Mild Pain (1 - 3), Moderate Pain (4 - 6)  artificial tears (preservative free) Ophthalmic Solution 1 Drop(s) Both EYES two times a day PRN Dry Eyes  oxycodone    5 mG/acetaminophen 325 mG 1 Tablet(s) Oral every 6 hours PRN Severe Pain (7 - 10)    PHYSICAL EXAM:  Vital Signs Last 24 Hrs  T(C): 36.5 (05 Dec 2019 12:16), Max: 36.8 (04 Dec 2019 18:30)  T(F): 97.7 (05 Dec 2019 12:16), Max: 98.3 (04 Dec 2019 18:30)  HR: 62 (05 Dec 2019 12:16) (60 - 63)  BP: 120/50 (05 Dec 2019 12:16) (105/50 - 142/60)  BP(mean): --  RR: 18 (05 Dec 2019 12:16) (18 - 18)  SpO2: 95% (05 Dec 2019 12:16) (95% - 98%)    CONSTITUTIONAL: NAD, well-developed, well-groomed  EYES: PERRLA; conjunctiva and sclera clear  RESPIRATORY: Normal respiratory effort; lungs are clear to auscultation bilaterally  CARDIOVASCULAR: Regular rate and rhythm, normal S1 and S2, no murmur/rub/gallop; trace LE edema  ABDOMEN: Nontender to palpation, normoactive bowel sounds, no rebound/guarding  MUSCLOSKELETAL:  No joint swelling or tenderness to palpation  PSYCH: A+O to person, place, and time; poor medical insight  NEUROLOGY: CN 2-12 are intact and symmetric; no gross sensory deficits;   SKIN: No rashes; no palpable lesions    LABS:                        8.6    5.30  )-----------( 168      ( 05 Dec 2019 05:39 )             29.7     12-05    131<L>  |  92<L>  |  64<H>  ----------------------------<  78  5.0   |  23  |  7.27<H>    Ca    8.9      05 Dec 2019 05:39  Mg     2.2     12-05    TPro  7.8  /  Alb  3.2<L>  /  TBili  0.5  /  DBili  < 0.2  /  AST  14  /  ALT  12  /  AlkPhos  120  12-05    PT/INR - ( 05 Dec 2019 05:39 )   PT: 17.5 SEC;   INR: 1.56           Culture - Nose (collected 03 Dec 2019 21:49)  Source: NOSE  Final Report (05 Dec 2019 10:33):    Growth of Methicillin-Resistant Staphylococcus aureus    MRSA^Staph. aureus *MRSA*    QUANTITY OF GROWTH: FEW    OXICILLIN-RESISTANT staphylococci should be regarded as    RESISTANT to ALL other Beta-Lactams regardless of the    in-vitro results obtained.  These include: ALL    Penicillins, Cephalosporins, Amoxicillin-clavulanic    acid, Ticarcillin-clavulanic acid,    Ampicillin-sulbactam, and Imipenem.      COORDINATION OF CARE:  Care Discussed with Consultants/Other Providers [Y/N]: Yes, Dr. luz oncology

## 2019-12-05 NOTE — PROGRESS NOTE ADULT - PROBLEM SELECTOR PLAN 2
-Patient with recent IR guided biopsy on 11/1 of R femur demonstrates non-small cell carcinoma favoring squamous cell  -per outside PET report, patient has hypermetabolic RML pulmonary mass, hypermetabolic lesion in R femur  -case d/w patient's oncologist is Dr. Travis, pt not candidate for chemo, but is offering crizotinib to family.  Daughters are all involved and they are still talking about whether or not they would pursue immunuotherapy vs. hospice.

## 2019-12-06 NOTE — DISCHARGE NOTE PROVIDER - PROVIDER TOKENS
PROVIDER:[TOKEN:[[id number]]],PROVIDER:[TOKEN:[[id number]]],FREE:[LAST:[primary],PHONE:[(   )    -],FAX:[(   )    -],ADDRESS:[follow up with your primary care doctor dr robert cosme call for appointment]]

## 2019-12-06 NOTE — PROGRESS NOTE ADULT - PROBLEM SELECTOR PROBLEM 2
Chronic atrial fibrillation
Hemoptysis
Metastatic cancer
Hemoptysis

## 2019-12-06 NOTE — PROGRESS NOTE ADULT - PROBLEM SELECTOR PLAN 6
1.  Name of PCP: Javi Espana  2.  PCP Contacted on Admission: [ ] Y    [X] N    3.  PCP contacted at Discharge: [ ] Y    [ ] N    [ ] N/A  4.  Post-Discharge Appointment Date and Location:  5.  Summary of Handoff given to PCP:    Pt medically stable for d/c to [facility], awaiting authorization.   D/C time: 35 minutes

## 2019-12-06 NOTE — PROGRESS NOTE ADULT - PROBLEM SELECTOR PLAN 1
c/w 3 times per week HD TTS.   plan for hd tomorrow  c/w midodrine prehd  c/w renal diet. fluid restriction 1L/day  volume ok, K ok  awaiting d/c to rehab

## 2019-12-06 NOTE — PROGRESS NOTE ADULT - SUBJECTIVE AND OBJECTIVE BOX
[facility] - KAYLEE Meier / KAYLEE Matthew / GRETTA Gillette/ KAYLEE Galeano/ KAYLEE Schaefer/ TRE Leary / ANTWON Serrano / RAGHU Alarcon  ---------------------------------------------------------------------------------------------------------------  seen and examined today for ESRD on HD  Interval : no complaints  VITALS:  T(F): 98 (12-06-19 @ 13:21), Max: 98.2 (12-05-19 @ 21:11)  HR: 60 (12-06-19 @ 13:21)  BP: 131/55 (12-06-19 @ 13:21)  RR: 17 (12-06-19 @ 13:21)  SpO2: 98% (12-06-19 @ 13:21)  Wt(kg): --    12-05 @ 07:01  -  12-06 @ 07:00  --------------------------------------------------------  IN: 400 mL / OUT: 1400 mL / NET: -1000 mL      Physical Exam :-  Constitutional: NAD  Neck: Supple.  Respiratory: Bilateral equal breath sounds,  Cardiovascular: S1, S2 normal,  Gastrointestinal: Bowel Sounds present, soft, non tender.  Extremities: No edema  Neurological: Alert and Oriented x 3, no focal deficits  Psychiatric: Normal mood, normal affect  Data:-  Allergies :   Epogen (Unknown)    Hospital Medications:   MEDICATIONS  (STANDING):  allopurinol 300 milliGRAM(s) Oral daily  chlorhexidine 4% Liquid 1 Application(s) Topical daily  gabapentin 100 milliGRAM(s) Oral at bedtime  influenza   Vaccine 0.5 milliLiter(s) IntraMuscular once  midodrine. 10 milliGRAM(s) Oral <User Schedule>  mupirocin 2% Ointment 1 Application(s) Topical two times a day  polyethylene glycol 3350 17 Gram(s) Oral daily  senna 2 Tablet(s) Oral at bedtime  sevelamer carbonate 800 milliGRAM(s) Oral three times a day with meals  warfarin 7.5 milliGRAM(s) Oral once    12-06    134<L>  |  93<L>  |  31<H>  ----------------------------<  83  4.0   |  28  |  4.31<H>    Ca    9.2      06 Dec 2019 06:47  Phos  4.5     12-06  Mg     2.1     12-06    TPro  7.8  /  Alb  3.2<L>  /  TBili  0.5  /  DBili  < 0.2  /  AST  14  /  ALT  12  /  AlkPhos  120  12-05    Creatinine Trend: 4.31 <--, 7.27 <--, 4.96 <--, 9.40 <--, 7.70 <--, 5.33 <--, 4.47 <--                        9.1    4.37  )-----------( 191      ( 06 Dec 2019 06:47 )             32.2

## 2019-12-06 NOTE — PROGRESS NOTE ADULT - SUBJECTIVE AND OBJECTIVE BOX
Pt feels Ok, wants to move but has right knee pain and that makes her uncomfortable. No visible bleeding, SOB, cough and ROS is otherwise unremarkable.         Meds:  acetaminophen   Tablet .. 325 milliGRAM(s) Oral every 4 hours PRN  allopurinol 300 milliGRAM(s) Oral daily  artificial tears (preservative free) Ophthalmic Solution 1 Drop(s) Both EYES two times a day PRN  chlorhexidine 4% Liquid 1 Application(s) Topical daily  gabapentin 100 milliGRAM(s) Oral at bedtime  influenza   Vaccine 0.5 milliLiter(s) IntraMuscular once  midodrine. 10 milliGRAM(s) Oral <User Schedule>  oxycodone    5 mG/acetaminophen 325 mG 1 Tablet(s) Oral every 6 hours PRN  polyethylene glycol 3350 17 Gram(s) Oral daily  senna 2 Tablet(s) Oral at bedtime  sevelamer carbonate 800 milliGRAM(s) Oral three times a day with meals      Vital Signs Last 24 Hrs  T(C): 36.6 (06 Dec 2019 04:58), Max: 36.8 (05 Dec 2019 21:11)  T(F): 97.8 (06 Dec 2019 04:58), Max: 98.2 (05 Dec 2019 21:11)  HR: 63 (06 Dec 2019 04:58) (59 - 63)  BP: 121/61 (06 Dec 2019 04:58) (120/50 - 137/78)  BP(mean): --  RR: 18 (06 Dec 2019 04:58) (17 - 18)  SpO2: 97% (06 Dec 2019 04:58) (95% - 99%)                          8.6    5.30  )-----------( 168      ( 05 Dec 2019 05:39 )             29.7       12-05    131<L>  |  92<L>  |  64<H>  ----------------------------<  78  5.0   |  23  |  7.27<H>    Ca    8.9      05 Dec 2019 05:39  Mg     2.2     12-05    TPro  7.8  /  Alb  3.2<L>  /  TBili  0.5  /  DBili  < 0.2  /  AST  14  /  ALT  12  /  AlkPhos  120  12-05              PT/INR - ( 05 Dec 2019 05:39 )   PT: 17.5 SEC;   INR: 1.56          Venous dopplers: No DVT.

## 2019-12-06 NOTE — PROGRESS NOTE ADULT - PROBLEM SELECTOR PLAN 4
- Recent fall 2 weeks ago, reports in the process of obtaining insurance coverage for injections  - continue tylenol for pain  - Xray R knee with no fracture/dislocation, +OA  - PT consult noted, recommend rehab, pt reluctant to go but now agreeable
-T/Th/Sat HD
-T/Th/Sat HD
-T/Th/Sat HD   -HD planned for today
-T/Th/Sat HD   Nephro following, recs appreciated
-T/Th/Sat HD   Nephro following, recs appreciated
rate well controlled.  off Coumadin for Hemoptysis  As per Cardio

## 2019-12-06 NOTE — DISCHARGE NOTE NURSING/CASE MANAGEMENT/SOCIAL WORK - PATIENT PORTAL LINK FT
You can access the FollowMyHealth Patient Portal offered by [facility] by registering at the following website: [email or web address]. By joining [employer]’s FollowMyHealth portal, you will also be able to view your health information using other applications (apps) compatible with our system.

## 2019-12-06 NOTE — PROGRESS NOTE ADULT - PROBLEM SELECTOR PROBLEM 4
Chronic atrial fibrillation
Chronic pain of right knee
ESRD on hemodialysis
Chronic atrial fibrillation

## 2019-12-06 NOTE — PROGRESS NOTE ADULT - PROBLEM SELECTOR PROBLEM 3
Chronic atrial fibrillation
ESRD on hemodialysis
Metastatic cancer

## 2019-12-06 NOTE — DISCHARGE NOTE PROVIDER - HOSPITAL COURSE
86 year old female with pmhx of ESRD on HD on T/Th/Sat, Afib (on Coumadin), PPM Implanted, Mitral stenosis/Tricuspid Regurgitation s/p annuloplasty (2016), chronic hypotension, remote hx of colon Ca s/p resection, pulmonary HTN, COPD, restrictive lung disease and anemia who presents with complaint of hemoptysis.  s/p bronch on 10/2/19, which was negative for TB and fungal infections; EBUS was non-diagnostic        Hemoptysis.      -s/p bronch on 10/2/19, which was negative for TB an fungal infections; EBUS was non-diagnostic    -bleeding in setting of supratherapeutic INR of 5.64    -coumadin initially Held and restarted         Metastatic cancer.       -Patient with recent IR guided biopsy on 11/1 of R femur demonstrates non-small cell carcinoma favoring squamous cell    -per outside PET report, patient has hypermetabolic RML pulmonary mass, hypermetabolic lesion in R femur    dr luz - candidate for crizotinib. the role and potential risks of this medications detailed. the option of symptom control and when there is significant deterioration, hospice care also discussed with them - trying to arrange for this as outpt    - if any significant deterioration, palliative and comfort care.            Chronic atrial fibrillation.       -coumadin Held in setting of bleeding and supratherapeutic INR. restarted      12/6 please give coumadin 7.5 tonight and then change to coumadin 5mg with ogal of 2-3     12/6 inr 1.5         ESRD on hemodialysis.       -T/Th/Sat HD     Nephro followed            Chronic pain of right knee.      Recent fall 2 weeks ago, reports in the process of obtaining insurance coverage for injections    - continue tylenol for mild, percocet for severe pain        12/3 RLE doppler: No evidence of deep vein thrombosis in the right lower    extremity    2.  No evidence of deep and superficial venous insufficiency noted in the right lower extremity.    3. Pulsatile Doppler waveforms noted in the right and left common femoral vein, which may suggest the presence of right-sided volume overload.

## 2019-12-06 NOTE — DISCHARGE NOTE PROVIDER - NSDCCPCAREPLAN_GEN_ALL_CORE_FT
PRINCIPAL DISCHARGE DIAGNOSIS  Diagnosis: Hemoptysis  Assessment and Plan of Treatment: -s/p bronch on 10/2/19, which was negative for TB an fungal infections; EBUS was non-diagnostic  -bleeding in setting of supratherapeutic INR of 5.64  -coumadin initially Held and restarted during hospital stay      SECONDARY DISCHARGE DIAGNOSES  Diagnosis: Chronic pain of right knee  Assessment and Plan of Treatment: Chronic pain of right knee.    Recent fall 2 weeks ago, reports in the process of obtaining insurance coverage for injections  - continue tylenol for mild, percocet for severe pain  12/3 RLE doppler: No evidence of deep vein thrombosis in the right lower  extremity  2.  No evidence of deep and superficial venous insufficiency noted in the right lower extremity.  3. Pulsatile Doppler waveforms noted in the right and left common femoral vein, which may suggest the presence of right-sided volume overload.  follow up with orthopedics as needed call for appointment    Diagnosis: Metastatic cancer  Assessment and Plan of Treatment: -Patient with recent IR guided biopsy on 11/1 of R femur demonstrates non-small cell carcinoma favoring squamous cell  -per outside PET report, patient has hypermetabolic RML pulmonary mass, hypermetabolic lesion in R femur  follow up with your oncologist and or primary care doctor call for appointment   dr luz - candidate for crizotinib. the role and potential risks of this medications detailed. follow up with dr luz call for appointment    Diagnosis: ESRD on hemodialysis  Assessment and Plan of Treatment: ESRD on hemodialysis.     -T/Th/Sat HD   follow up with nephrology call for appointment       Diagnosis: Chronic atrial fibrillation  Assessment and Plan of Treatment: -coumadin Held in setting of bleeding and supratherapeutic INR and then restarted prior to discharge     12/6 please give coumadin 7.5 tonight and then change to coumadin 5mg with goal of inr 2-3.  inr 12/6 1.5    Diagnosis: Pulmonary edema  Assessment and Plan of Treatment:

## 2019-12-06 NOTE — PROGRESS NOTE ADULT - ATTENDING COMMENTS
For any question, call:  Cell # [phone]  service # [phone]  office # [phone]
For any question, call:  Cell # [phone]  Pager # [phone]  Callback # [phone]
For any question, call:  Cell # [phone]  service # [phone]  office # [phone]
Dispo: rehab once medically stable

## 2019-12-06 NOTE — PROGRESS NOTE ADULT - PROBLEM SELECTOR PLAN 1
-Patient with recent IR guided biopsy on 11/1 of R femur demonstrates non-small cell carcinoma favoring squamous cell  -per outside PET report, patient has hypermetabolic RML pulmonary mass, hypermetabolic lesion in R femur  -case d/w patient's oncologist is Dr. Travis, pt not candidate for chemo, but is offering crizotinib to family.  Daughters are all involved and they are still talking about whether or not they would pursue immunuotherapy vs. hospice.  - Pt will not be on crizotinib while at [facility]

## 2019-12-06 NOTE — DISCHARGE NOTE PROVIDER - NSDCMRMEDTOKEN_GEN_ALL_CORE_FT
acetaminophen 325 mg oral tablet: 1 tab(s) orally every 4 hours, As needed, Temp greater or equal to 38C (100.4F), Mild Pain (1 - 3), Moderate Pain (4 - 6)  allopurinol 300 mg oral tablet: 1 tab(s) orally once a day  gabapentin 100 mg oral capsule: 1 cap(s) orally once a day (at bedtime)  midodrine 10 mg oral tablet: 1 tab(s) orally   ocular lubricant ophthalmic solution: 1 drop(s) to each affected eye 2 times a day, As needed, Dry Eyes  oxycodone-acetaminophen 5 mg-325 mg oral tablet: 1 tab(s) orally every 6 hours, As needed, Severe Pain (7 - 10)  polyethylene glycol 3350 oral powder for reconstitution: 17 gram(s) orally once a day  senna oral tablet: 2 tab(s) orally once a day (at bedtime)  sevelamer hydrochloride 800 mg oral tablet: 3 tab(s) orally 3 times a day  warfarin 7.5 mg oral tablet: 1 tab(s) orally once

## 2019-12-06 NOTE — PROGRESS NOTE ADULT - PROBLEM SELECTOR PROBLEM 1
ESRD on hemodialysis
Hemoptysis
Metastatic cancer
ESRD on hemodialysis

## 2019-12-06 NOTE — DISCHARGE NOTE PROVIDER - CARE PROVIDER_API CALL
Ihsan Travis)  Hematology; Medical Oncology  [address] [facility] [address] [place] [address]  Phone: [phone]  Fax: [phone]  Follow Up Time:     González Alarcon)  Internal Medicine; Nephrology  [address] [place] [address]  Phone: [phone]  Fax: [phone]  Follow Up Time:     primary,   follow up with your primary care doctor dr robert cosme call for appointment  Phone: (   )    -  Fax: (   )    -  Follow Up Time:

## 2019-12-06 NOTE — PROGRESS NOTE ADULT - ASSESSMENT
86 y.o. F with PMH of ESRD on HD on T/Th/Sat, Afib (on Coumadin), PPM Implanted, Mitral stenosis/Tricuspid Regurgitation s/p annuloplasty (2016), chronic hypotension, remote hx of colon Ca s/p resection, pulmonary HTN, COPD, restrictive lung disease and anemia who presents with complaint of hemoptysis.
86 y.o. F with PMH of ESRD on HD on T/Th/Sat, Afib (on Coumadin), PPM Implanted, Mitral stenosis/Tricuspid Regurgitation s/p annuloplasty (2016), chronic hypotension, remote hx of colon Ca s/p resection, pulmonary HTN, COPD, restrictive lung disease and anemia who presents with complaint of hemoptysis.  Pt with known RML lung mass and metastatic disease to bone s/p IR biopsy of R femur bone biopsy consistent with squamous cell carcinoma.
86 y.o. F with PMH of ESRD on HD on T/Th/Sat, Afib (on Coumadin), PPM Implanted, Mitral stenosis/Tricuspid Regurgitation s/p annuloplasty (2016), chronic hypotension, remote hx of colon Ca s/p resection, pulmonary HTN, COPD, restrictive lung disease and anemia who presents with complaint of hemoptysis. Renal following for ESRD Mx.    labs, chart reviewed
86 y.o. F with PMH of ESRD on HD on T/Th/Sat, Afib (on Coumadin), PPM Implanted, Mitral stenosis/Tricuspid Regurgitation s/p annuloplasty (2016), chronic hypotension, remote hx of colon Ca s/p resection, pulmonary HTN, COPD, restrictive lung disease and anemia who presents with complaint of hemoptysis. Renal following for ESRD Mx.    labs, chart reviewed
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  - keep INR low therapeutic.  - monitor hgb and transfuse PRBCs as needed to keep hgb ~ 8  - will discuss in the lung tumor board regarding options for treating the stage 4 cancer  - pt on dialysis, can not receive any bisphophonates  - epogen need also needs to be assessed carefully because of active cancer  - discussed the diagnosis of stage 4 sq cell lung cancer, the guarded prognosis in this pt and Rx options in great detail with the pt's 3 daughter (face to face) and answered dozens of their questions. Because of above genomic alteration, she will be a candidate for crizotinib. the role and potential risks of this medications detailed. the option of symptom control and when there is significant deterioration, hospice care also discussed with them  - if any significant deterioration, palliative and comfort care.     time spent on face to face discussion 25 min.    call for questions - [phone]
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  - keep INR low therapeutic.  - monitor hgb and transfuse PRBCs as needed to keep hgb ~ 8  - pt on dialysis, can not receive any bisphophonates  - epogen need also needs to be assessed carefully because of active cancer  - discussed the diagnosis of stage 4 sq cell lung cancer, the guarded prognosis in this pt and Rx options in great detail with the pt's 3 daughters (face to face) on 12.4.19 and answered dozens of their questions. Because of above genomic alteration, she will be a candidate for crizotinib. the role and potential risks of this medications detailed. the option of symptom control and when there is significant deterioration, hospice care also discussed with them - trying to arrange for this as outpt    - if any significant deterioration, palliative and comfort care.  - overall prognosis guarded  - to f/u as outpt after discharge    call for questions - [phone]

## 2019-12-06 NOTE — PROGRESS NOTE ADULT - SUBJECTIVE AND OBJECTIVE BOX
[facility] Division of Hospital Medicine  Ansley Loyola DO  Pager (LILIA, 8Q-5P): [phone]    Patient is a 86y old  Female who presents with a chief complaint of Hemoptysis (06 Dec 2019 07:32)      SUBJECTIVE / OVERNIGHT EVENTS: No further hemoptysis, feels well, just washed herself at her bedside.  Pt is reluctant to go to [facility], but her daughters have convinced her to go.       MEDICATIONS  (STANDING):  allopurinol 300 milliGRAM(s) Oral daily  chlorhexidine 4% Liquid 1 Application(s) Topical daily  gabapentin 100 milliGRAM(s) Oral at bedtime  influenza   Vaccine 0.5 milliLiter(s) IntraMuscular once  midodrine. 10 milliGRAM(s) Oral <User Schedule>  polyethylene glycol 3350 17 Gram(s) Oral daily  senna 2 Tablet(s) Oral at bedtime  sevelamer carbonate 800 milliGRAM(s) Oral three times a day with meals  warfarin 7.5 milliGRAM(s) Oral once    MEDICATIONS  (PRN):  acetaminophen   Tablet .. 325 milliGRAM(s) Oral every 4 hours PRN Temp greater or equal to 38C (100.4F), Mild Pain (1 - 3), Moderate Pain (4 - 6)  artificial tears (preservative free) Ophthalmic Solution 1 Drop(s) Both EYES two times a day PRN Dry Eyes  oxycodone    5 mG/acetaminophen 325 mG 1 Tablet(s) Oral every 6 hours PRN Severe Pain (7 - 10)      CAPILLARY BLOOD GLUCOSE        I&O's Summary    05 Dec 2019 07:01  -  06 Dec 2019 07:00  --------------------------------------------------------  IN: 400 mL / OUT: 1400 mL / NET: -1000 mL        PHYSICAL EXAM:  Vital Signs Last 24 Hrs  T(C): 36.6 (06 Dec 2019 04:58), Max: 36.8 (05 Dec 2019 21:11)  T(F): 97.8 (06 Dec 2019 04:58), Max: 98.2 (05 Dec 2019 21:11)  HR: 63 (06 Dec 2019 04:58) (59 - 63)  BP: 121/61 (06 Dec 2019 04:58) (121/60 - 137/78)  BP(mean): --  RR: 18 (06 Dec 2019 04:58) (17 - 18)  SpO2: 97% (06 Dec 2019 04:58) (97% - 99%)    CONSTITUTIONAL: NAD, well-developed, well-groomed  EYES: PERRLA; conjunctiva and sclera clear  RESPIRATORY: Normal respiratory effort; lungs are clear to auscultation bilaterally  CARDIOVASCULAR: Regular rate and rhythm, normal S1 and S2, no murmur/rub/gallop; Trace LE edema   ABDOMEN: Nontender to palpation, normoactive bowel sounds, no rebound/guarding  MUSCLOSKELETAL:  +R knee crepitus, limited ROM 2/2 pain 2/2 OA   PSYCH: A+O to person, place, and time; affect appropriate  NEUROLOGY: CN 2-12 are intact and symmetric; no gross sensory deficits;   SKIN: No rashes; no palpable lesions    LABS:                        9.1    4.37  )-----------( 191      ( 06 Dec 2019 06:47 )             32.2     12-06    134<L>  |  93<L>  |  31<H>  ----------------------------<  83  4.0   |  28  |  4.31<H>    Ca    9.2      06 Dec 2019 06:47  Phos  4.5     12-06  Mg     2.1     12-06    TPro  7.8  /  Alb  3.2<L>  /  TBili  0.5  /  DBili  < 0.2  /  AST  14  /  ALT  12  /  AlkPhos  120  12-05    PT/INR - ( 06 Dec 2019 06:47 )   PT: 18.3 SEC;   INR: 1.58            Culture - Nose (collected 03 Dec 2019 21:49)  Source: NOSE  Final Report (05 Dec 2019 10:33):    Growth of Methicillin-Resistant Staphylococcus aureus    MRSA^Staph. aureus *MRSA*    QUANTITY OF GROWTH: FEW    OXICILLIN-RESISTANT staphylococci should be regarded as    RESISTANT to ALL other Beta-Lactams regardless of the    in-vitro results obtained.  These include: ALL    Penicillins, Cephalosporins, Amoxicillin-clavulanic    acid, Ticarcillin-clavulanic acid,    Ampicillin-sulbactam, and Imipenem.

## 2019-12-06 NOTE — PROGRESS NOTE ADULT - REASON FOR ADMISSION
Hemoptysis

## 2019-12-06 NOTE — DISCHARGE NOTE NURSING/CASE MANAGEMENT/SOCIAL WORK - NSDCCRNAME_GEN_ALL_CORE_FT
[facility] address: [address], [place] [address], 6pm  via [address]

## 2020-01-01 ENCOUNTER — OUTPATIENT (OUTPATIENT)
Dept: OUTPATIENT SERVICES | Facility: HOSPITAL | Age: 85
LOS: 1 days | Discharge: HOME | End: 2020-01-01
Payer: MEDICARE

## 2020-01-01 ENCOUNTER — TRANSCRIPTION ENCOUNTER (OUTPATIENT)
Age: 85
End: 2020-01-01

## 2020-01-01 ENCOUNTER — APPOINTMENT (OUTPATIENT)
Dept: RADIATION ONCOLOGY | Facility: CLINIC | Age: 85
End: 2020-01-01
Payer: MEDICARE

## 2020-01-01 ENCOUNTER — APPOINTMENT (OUTPATIENT)
Dept: ORTHOPEDIC SURGERY | Facility: CLINIC | Age: 85
End: 2020-01-01

## 2020-01-01 ENCOUNTER — INPATIENT (INPATIENT)
Facility: HOSPITAL | Age: 85
LOS: 21 days | Discharge: HOME | DRG: 480 | End: 2020-02-03
Attending: HOSPITALIST | Admitting: HOSPITALIST
Payer: COMMERCIAL

## 2020-01-01 VITALS
SYSTOLIC BLOOD PRESSURE: 138 MMHG | BODY MASS INDEX: 26.45 KG/M2 | HEIGHT: 65 IN | DIASTOLIC BLOOD PRESSURE: 71 MMHG | RESPIRATION RATE: 14 BRPM | TEMPERATURE: 98.1 F | HEART RATE: 64 BPM | WEIGHT: 158.73 LBS | OXYGEN SATURATION: 97 %

## 2020-01-01 VITALS
OXYGEN SATURATION: 100 % | DIASTOLIC BLOOD PRESSURE: 77 MMHG | HEIGHT: 64 IN | TEMPERATURE: 98 F | SYSTOLIC BLOOD PRESSURE: 116 MMHG | RESPIRATION RATE: 16 BRPM | HEART RATE: 77 BPM | WEIGHT: 172.84 LBS

## 2020-01-01 VITALS
DIASTOLIC BLOOD PRESSURE: 69 MMHG | TEMPERATURE: 98 F | SYSTOLIC BLOOD PRESSURE: 117 MMHG | RESPIRATION RATE: 18 BRPM | OXYGEN SATURATION: 99 % | HEART RATE: 60 BPM

## 2020-01-01 VITALS
OXYGEN SATURATION: 95 % | RESPIRATION RATE: 14 BRPM | TEMPERATURE: 98.8 F | WEIGHT: 172 LBS | BODY MASS INDEX: 28.66 KG/M2 | DIASTOLIC BLOOD PRESSURE: 66 MMHG | HEART RATE: 62 BPM | SYSTOLIC BLOOD PRESSURE: 111 MMHG | HEIGHT: 65 IN

## 2020-01-01 DIAGNOSIS — R52 PAIN, UNSPECIFIED: ICD-10-CM

## 2020-01-01 DIAGNOSIS — C34.91 MALIGNANT NEOPLASM OF UNSPECIFIED PART OF RIGHT BRONCHUS OR LUNG: ICD-10-CM

## 2020-01-01 DIAGNOSIS — C80.0 DISSEMINATED MALIGNANT NEOPLASM, UNSPECIFIED: ICD-10-CM

## 2020-01-01 DIAGNOSIS — Z02.9 ENCOUNTER FOR ADMINISTRATIVE EXAMINATIONS, UNSPECIFIED: ICD-10-CM

## 2020-01-01 DIAGNOSIS — K59.00 CONSTIPATION, UNSPECIFIED: ICD-10-CM

## 2020-01-01 DIAGNOSIS — R41.0 DISORIENTATION, UNSPECIFIED: ICD-10-CM

## 2020-01-01 DIAGNOSIS — R09.02 HYPOXEMIA: ICD-10-CM

## 2020-01-01 DIAGNOSIS — N18.6 END STAGE RENAL DISEASE: ICD-10-CM

## 2020-01-01 DIAGNOSIS — E87.1 HYPO-OSMOLALITY AND HYPONATREMIA: ICD-10-CM

## 2020-01-01 DIAGNOSIS — Z98.89 OTHER SPECIFIED POSTPROCEDURAL STATES: Chronic | ICD-10-CM

## 2020-01-01 DIAGNOSIS — R74.0 NONSPECIFIC ELEVATION OF LEVELS OF TRANSAMINASE AND LACTIC ACID DEHYDROGENASE [LDH]: ICD-10-CM

## 2020-01-01 DIAGNOSIS — Z71.89 OTHER SPECIFIED COUNSELING: ICD-10-CM

## 2020-01-01 DIAGNOSIS — I77.0 ARTERIOVENOUS FISTULA, ACQUIRED: Chronic | ICD-10-CM

## 2020-01-01 DIAGNOSIS — Z96.652 PRESENCE OF LEFT ARTIFICIAL KNEE JOINT: Chronic | ICD-10-CM

## 2020-01-01 DIAGNOSIS — J98.11 ATELECTASIS: ICD-10-CM

## 2020-01-01 DIAGNOSIS — Z51.5 ENCOUNTER FOR PALLIATIVE CARE: ICD-10-CM

## 2020-01-01 DIAGNOSIS — S72.141A DISPLACED INTERTROCHANTERIC FRACTURE OF RIGHT FEMUR, INITIAL ENCOUNTER FOR CLOSED FRACTURE: ICD-10-CM

## 2020-01-01 DIAGNOSIS — Z95.3 PRESENCE OF XENOGENIC HEART VALVE: Chronic | ICD-10-CM

## 2020-01-01 DIAGNOSIS — S72.001A FRACTURE OF UNSPECIFIED PART OF NECK OF RIGHT FEMUR, INITIAL ENCOUNTER FOR CLOSED FRACTURE: ICD-10-CM

## 2020-01-01 DIAGNOSIS — D64.9 ANEMIA, UNSPECIFIED: ICD-10-CM

## 2020-01-01 DIAGNOSIS — M84.40XA PATHOLOGICAL FRACTURE, UNSPECIFIED SITE, INITIAL ENCOUNTER FOR FRACTURE: ICD-10-CM

## 2020-01-01 DIAGNOSIS — Z98.890 OTHER SPECIFIED POSTPROCEDURAL STATES: Chronic | ICD-10-CM

## 2020-01-01 DIAGNOSIS — D49.0 NEOPLASM OF UNSPECIFIED BEHAVIOR OF DIGESTIVE SYSTEM: Chronic | ICD-10-CM

## 2020-01-01 DIAGNOSIS — I48.20 CHRONIC ATRIAL FIBRILLATION, UNSPECIFIED: ICD-10-CM

## 2020-01-01 DIAGNOSIS — I95.1 ORTHOSTATIC HYPOTENSION: ICD-10-CM

## 2020-01-01 DIAGNOSIS — J98.19 OTHER PULMONARY COLLAPSE: ICD-10-CM

## 2020-01-01 DIAGNOSIS — Z29.9 ENCOUNTER FOR PROPHYLACTIC MEASURES, UNSPECIFIED: ICD-10-CM

## 2020-01-01 DIAGNOSIS — R79.1 ABNORMAL COAGULATION PROFILE: ICD-10-CM

## 2020-01-01 LAB
ALBUMIN SERPL ELPH-MCNC: 3.2 G/DL — LOW (ref 3.3–5)
ALBUMIN SERPL ELPH-MCNC: 3.4 G/DL — SIGNIFICANT CHANGE UP (ref 3.3–5)
ALP SERPL-CCNC: 145 U/L — HIGH (ref 40–120)
ALP SERPL-CCNC: 146 U/L — HIGH (ref 40–120)
ALP SERPL-CCNC: 146 U/L — HIGH (ref 40–120)
ALP SERPL-CCNC: 148 U/L — HIGH (ref 40–120)
ALT FLD-CCNC: 25 U/L — SIGNIFICANT CHANGE UP (ref 10–45)
ALT FLD-CCNC: 30 U/L — SIGNIFICANT CHANGE UP (ref 10–45)
ALT FLD-CCNC: <5 U/L — LOW (ref 10–45)
ALT FLD-CCNC: <5 U/L — LOW (ref 10–45)
ANION GAP SERPL CALC-SCNC: 10 MMOL/L — SIGNIFICANT CHANGE UP (ref 5–17)
ANION GAP SERPL CALC-SCNC: 13 MMOL/L — SIGNIFICANT CHANGE UP (ref 5–17)
ANION GAP SERPL CALC-SCNC: 14 MMOL/L — SIGNIFICANT CHANGE UP (ref 5–17)
ANION GAP SERPL CALC-SCNC: 14 MMOL/L — SIGNIFICANT CHANGE UP (ref 5–17)
ANION GAP SERPL CALC-SCNC: 16 MMOL/L — SIGNIFICANT CHANGE UP (ref 5–17)
ANION GAP SERPL CALC-SCNC: 17 MMOL/L — SIGNIFICANT CHANGE UP (ref 5–17)
ANION GAP SERPL CALC-SCNC: 18 MMOL/L — HIGH (ref 5–17)
ANION GAP SERPL CALC-SCNC: 19 MMOL/L — HIGH (ref 5–17)
ANION GAP SERPL CALC-SCNC: 20 MMOL/L — HIGH (ref 5–17)
ANION GAP SERPL CALC-SCNC: 21 MMOL/L — HIGH (ref 5–17)
ANISOCYTOSIS BLD QL: SIGNIFICANT CHANGE UP
APTT BLD: 193.5 SEC — CRITICAL HIGH (ref 27.5–36.3)
APTT BLD: 26.8 SEC — LOW (ref 27.5–36.3)
APTT BLD: 28 SEC — SIGNIFICANT CHANGE UP (ref 27.5–36.3)
APTT BLD: 28.5 SEC — SIGNIFICANT CHANGE UP (ref 27.5–36.3)
APTT BLD: 30 SEC — SIGNIFICANT CHANGE UP (ref 27.5–36.3)
APTT BLD: 31.5 SEC — SIGNIFICANT CHANGE UP (ref 27.5–36.3)
APTT BLD: 39.9 SEC — HIGH (ref 27.5–36.3)
APTT BLD: 40.7 SEC — HIGH (ref 27.5–36.3)
APTT BLD: 59.7 SEC — HIGH (ref 27.5–36.3)
APTT BLD: 60.2 SEC — HIGH (ref 27.5–36.3)
APTT BLD: 63.7 SEC — HIGH (ref 27.5–36.3)
APTT BLD: 67.7 SEC — HIGH (ref 27.5–36.3)
APTT BLD: 93.2 SEC — HIGH (ref 27.5–36.3)
AST SERPL-CCNC: 20 U/L — SIGNIFICANT CHANGE UP (ref 10–40)
AST SERPL-CCNC: 27 U/L — SIGNIFICANT CHANGE UP (ref 10–40)
AST SERPL-CCNC: 46 U/L — HIGH (ref 10–40)
AST SERPL-CCNC: 67 U/L — HIGH (ref 10–40)
BASE EXCESS BLDA CALC-SCNC: 2.8 MMOL/L — HIGH (ref -2–2)
BASOPHILS # BLD AUTO: 0.01 K/UL — SIGNIFICANT CHANGE UP (ref 0–0.2)
BASOPHILS # BLD AUTO: 0.02 K/UL — SIGNIFICANT CHANGE UP (ref 0–0.2)
BASOPHILS # BLD AUTO: 0.03 K/UL — SIGNIFICANT CHANGE UP (ref 0–0.2)
BASOPHILS # BLD AUTO: 0.03 K/UL — SIGNIFICANT CHANGE UP (ref 0–0.2)
BASOPHILS NFR BLD AUTO: 0.1 % — SIGNIFICANT CHANGE UP (ref 0–2)
BASOPHILS NFR BLD AUTO: 0.2 % — SIGNIFICANT CHANGE UP (ref 0–2)
BILIRUB SERPL-MCNC: 0.4 MG/DL — SIGNIFICANT CHANGE UP (ref 0.2–1.2)
BILIRUB SERPL-MCNC: 0.4 MG/DL — SIGNIFICANT CHANGE UP (ref 0.2–1.2)
BILIRUB SERPL-MCNC: 0.6 MG/DL — SIGNIFICANT CHANGE UP (ref 0.2–1.2)
BILIRUB SERPL-MCNC: 0.9 MG/DL — SIGNIFICANT CHANGE UP (ref 0.2–1.2)
BLD GP AB SCN SERPL QL: NEGATIVE — SIGNIFICANT CHANGE UP
BUN SERPL-MCNC: 18 MG/DL — SIGNIFICANT CHANGE UP (ref 7–23)
BUN SERPL-MCNC: 20 MG/DL — SIGNIFICANT CHANGE UP (ref 7–23)
BUN SERPL-MCNC: 25 MG/DL — HIGH (ref 7–23)
BUN SERPL-MCNC: 27 MG/DL — HIGH (ref 7–23)
BUN SERPL-MCNC: 34 MG/DL — HIGH (ref 7–23)
BUN SERPL-MCNC: 35 MG/DL — HIGH (ref 7–23)
BUN SERPL-MCNC: 36 MG/DL — HIGH (ref 7–23)
BUN SERPL-MCNC: 37 MG/DL — HIGH (ref 7–23)
BUN SERPL-MCNC: 38 MG/DL — HIGH (ref 7–23)
BUN SERPL-MCNC: 40 MG/DL — HIGH (ref 7–23)
BUN SERPL-MCNC: 50 MG/DL — HIGH (ref 7–23)
BUN SERPL-MCNC: 51 MG/DL — HIGH (ref 7–23)
BUN SERPL-MCNC: 57 MG/DL — HIGH (ref 7–23)
BUN SERPL-MCNC: 59 MG/DL — HIGH (ref 7–23)
BUN SERPL-MCNC: 6 MG/DL — LOW (ref 7–23)
BUN SERPL-MCNC: 60 MG/DL — HIGH (ref 7–23)
BUN SERPL-MCNC: 62 MG/DL — HIGH (ref 7–23)
BUN SERPL-MCNC: 62 MG/DL — HIGH (ref 7–23)
BUN SERPL-MCNC: 63 MG/DL — HIGH (ref 7–23)
BUN SERPL-MCNC: 71 MG/DL — HIGH (ref 7–23)
BUN SERPL-MCNC: 73 MG/DL — HIGH (ref 7–23)
BUN SERPL-MCNC: 90 MG/DL — HIGH (ref 7–23)
CALCIUM SERPL-MCNC: 10 MG/DL — SIGNIFICANT CHANGE UP (ref 8.4–10.5)
CALCIUM SERPL-MCNC: 10.1 MG/DL — SIGNIFICANT CHANGE UP (ref 8.4–10.5)
CALCIUM SERPL-MCNC: 10.1 MG/DL — SIGNIFICANT CHANGE UP (ref 8.4–10.5)
CALCIUM SERPL-MCNC: 10.2 MG/DL — SIGNIFICANT CHANGE UP (ref 8.4–10.5)
CALCIUM SERPL-MCNC: 10.3 MG/DL — SIGNIFICANT CHANGE UP (ref 8.4–10.5)
CALCIUM SERPL-MCNC: 7.7 MG/DL — LOW (ref 8.4–10.5)
CALCIUM SERPL-MCNC: 9.1 MG/DL — SIGNIFICANT CHANGE UP (ref 8.4–10.5)
CALCIUM SERPL-MCNC: 9.3 MG/DL — SIGNIFICANT CHANGE UP (ref 8.4–10.5)
CALCIUM SERPL-MCNC: 9.4 MG/DL — SIGNIFICANT CHANGE UP (ref 8.4–10.5)
CALCIUM SERPL-MCNC: 9.6 MG/DL — SIGNIFICANT CHANGE UP (ref 8.4–10.5)
CALCIUM SERPL-MCNC: 9.7 MG/DL — SIGNIFICANT CHANGE UP (ref 8.4–10.5)
CALCIUM SERPL-MCNC: 9.7 MG/DL — SIGNIFICANT CHANGE UP (ref 8.4–10.5)
CALCIUM SERPL-MCNC: 9.8 MG/DL — SIGNIFICANT CHANGE UP (ref 8.4–10.5)
CALCIUM SERPL-MCNC: 9.9 MG/DL — SIGNIFICANT CHANGE UP (ref 8.4–10.5)
CALCIUM SERPL-MCNC: 9.9 MG/DL — SIGNIFICANT CHANGE UP (ref 8.4–10.5)
CHLORIDE SERPL-SCNC: 107 MMOL/L — SIGNIFICANT CHANGE UP (ref 96–108)
CHLORIDE SERPL-SCNC: 85 MMOL/L — LOW (ref 96–108)
CHLORIDE SERPL-SCNC: 86 MMOL/L — LOW (ref 96–108)
CHLORIDE SERPL-SCNC: 87 MMOL/L — LOW (ref 96–108)
CHLORIDE SERPL-SCNC: 88 MMOL/L — LOW (ref 96–108)
CHLORIDE SERPL-SCNC: 89 MMOL/L — LOW (ref 96–108)
CHLORIDE SERPL-SCNC: 90 MMOL/L — LOW (ref 96–108)
CHLORIDE SERPL-SCNC: 91 MMOL/L — LOW (ref 96–108)
CHLORIDE SERPL-SCNC: 95 MMOL/L — LOW (ref 96–108)
CHLORIDE SERPL-SCNC: 95 MMOL/L — LOW (ref 96–108)
CHLORIDE SERPL-SCNC: 96 MMOL/L — SIGNIFICANT CHANGE UP (ref 96–108)
CHLORIDE SERPL-SCNC: 96 MMOL/L — SIGNIFICANT CHANGE UP (ref 96–108)
CHLORIDE SERPL-SCNC: 97 MMOL/L — SIGNIFICANT CHANGE UP (ref 96–108)
CO2 BLDA-SCNC: 30 MMOL/L — SIGNIFICANT CHANGE UP (ref 22–30)
CO2 SERPL-SCNC: 20 MMOL/L — LOW (ref 22–31)
CO2 SERPL-SCNC: 21 MMOL/L — LOW (ref 22–31)
CO2 SERPL-SCNC: 21 MMOL/L — LOW (ref 22–31)
CO2 SERPL-SCNC: 22 MMOL/L — SIGNIFICANT CHANGE UP (ref 22–31)
CO2 SERPL-SCNC: 23 MMOL/L — SIGNIFICANT CHANGE UP (ref 22–31)
CO2 SERPL-SCNC: 24 MMOL/L — SIGNIFICANT CHANGE UP (ref 22–31)
CO2 SERPL-SCNC: 25 MMOL/L — SIGNIFICANT CHANGE UP (ref 22–31)
CO2 SERPL-SCNC: 26 MMOL/L — SIGNIFICANT CHANGE UP (ref 22–31)
CO2 SERPL-SCNC: 26 MMOL/L — SIGNIFICANT CHANGE UP (ref 22–31)
CO2 SERPL-SCNC: 27 MMOL/L — SIGNIFICANT CHANGE UP (ref 22–31)
CREAT SERPL-MCNC: 0.59 MG/DL — SIGNIFICANT CHANGE UP (ref 0.5–1.3)
CREAT SERPL-MCNC: 2.95 MG/DL — HIGH (ref 0.5–1.3)
CREAT SERPL-MCNC: 3.02 MG/DL — HIGH (ref 0.5–1.3)
CREAT SERPL-MCNC: 3.69 MG/DL — HIGH (ref 0.5–1.3)
CREAT SERPL-MCNC: 3.83 MG/DL — HIGH (ref 0.5–1.3)
CREAT SERPL-MCNC: 4.15 MG/DL — HIGH (ref 0.5–1.3)
CREAT SERPL-MCNC: 4.24 MG/DL — HIGH (ref 0.5–1.3)
CREAT SERPL-MCNC: 4.25 MG/DL — HIGH (ref 0.5–1.3)
CREAT SERPL-MCNC: 4.97 MG/DL — HIGH (ref 0.5–1.3)
CREAT SERPL-MCNC: 5.09 MG/DL — HIGH (ref 0.5–1.3)
CREAT SERPL-MCNC: 5.25 MG/DL — HIGH (ref 0.5–1.3)
CREAT SERPL-MCNC: 5.4 MG/DL — HIGH (ref 0.5–1.3)
CREAT SERPL-MCNC: 5.4 MG/DL — HIGH (ref 0.5–1.3)
CREAT SERPL-MCNC: 5.52 MG/DL — HIGH (ref 0.5–1.3)
CREAT SERPL-MCNC: 5.59 MG/DL — HIGH (ref 0.5–1.3)
CREAT SERPL-MCNC: 5.67 MG/DL — HIGH (ref 0.5–1.3)
CREAT SERPL-MCNC: 5.92 MG/DL — HIGH (ref 0.5–1.3)
CREAT SERPL-MCNC: 6.38 MG/DL — HIGH (ref 0.5–1.3)
CREAT SERPL-MCNC: 6.53 MG/DL — HIGH (ref 0.5–1.3)
CREAT SERPL-MCNC: 6.88 MG/DL — HIGH (ref 0.5–1.3)
CREAT SERPL-MCNC: 7.02 MG/DL — HIGH (ref 0.5–1.3)
CREAT SERPL-MCNC: 7.43 MG/DL — HIGH (ref 0.5–1.3)
CREAT SERPL-MCNC: 8.08 MG/DL — HIGH (ref 0.5–1.3)
CREAT SERPL-MCNC: 8.76 MG/DL — HIGH (ref 0.5–1.3)
DACRYOCYTES BLD QL SMEAR: SLIGHT — SIGNIFICANT CHANGE UP
ELLIPTOCYTES BLD QL SMEAR: SLIGHT — SIGNIFICANT CHANGE UP
EOSINOPHIL # BLD AUTO: 0.04 K/UL — SIGNIFICANT CHANGE UP (ref 0–0.5)
EOSINOPHIL # BLD AUTO: 0.09 K/UL — SIGNIFICANT CHANGE UP (ref 0–0.5)
EOSINOPHIL # BLD AUTO: 0.13 K/UL — SIGNIFICANT CHANGE UP (ref 0–0.5)
EOSINOPHIL # BLD AUTO: 0.16 K/UL — SIGNIFICANT CHANGE UP (ref 0–0.5)
EOSINOPHIL NFR BLD AUTO: 0.6 % — SIGNIFICANT CHANGE UP (ref 0–6)
EOSINOPHIL NFR BLD AUTO: 0.7 % — SIGNIFICANT CHANGE UP (ref 0–6)
EOSINOPHIL NFR BLD AUTO: 1.1 % — SIGNIFICANT CHANGE UP (ref 0–6)
EOSINOPHIL NFR BLD AUTO: 1.5 % — SIGNIFICANT CHANGE UP (ref 0–6)
GAS PNL BLDA: SIGNIFICANT CHANGE UP
GIANT PLATELETS BLD QL SMEAR: PRESENT — SIGNIFICANT CHANGE UP
GLUCOSE BLDC GLUCOMTR-MCNC: 121 MG/DL — HIGH (ref 70–99)
GLUCOSE SERPL-MCNC: 100 MG/DL — HIGH (ref 70–99)
GLUCOSE SERPL-MCNC: 104 MG/DL — HIGH (ref 70–99)
GLUCOSE SERPL-MCNC: 106 MG/DL — HIGH (ref 70–99)
GLUCOSE SERPL-MCNC: 106 MG/DL — HIGH (ref 70–99)
GLUCOSE SERPL-MCNC: 113 MG/DL — HIGH (ref 70–99)
GLUCOSE SERPL-MCNC: 120 MG/DL — HIGH (ref 70–99)
GLUCOSE SERPL-MCNC: 124 MG/DL — HIGH (ref 70–99)
GLUCOSE SERPL-MCNC: 61 MG/DL — LOW (ref 70–99)
GLUCOSE SERPL-MCNC: 65 MG/DL — LOW (ref 70–99)
GLUCOSE SERPL-MCNC: 75 MG/DL — SIGNIFICANT CHANGE UP (ref 70–99)
GLUCOSE SERPL-MCNC: 78 MG/DL — SIGNIFICANT CHANGE UP (ref 70–99)
GLUCOSE SERPL-MCNC: 80 MG/DL — SIGNIFICANT CHANGE UP (ref 70–99)
GLUCOSE SERPL-MCNC: 80 MG/DL — SIGNIFICANT CHANGE UP (ref 70–99)
GLUCOSE SERPL-MCNC: 81 MG/DL — SIGNIFICANT CHANGE UP (ref 70–99)
GLUCOSE SERPL-MCNC: 85 MG/DL — SIGNIFICANT CHANGE UP (ref 70–99)
GLUCOSE SERPL-MCNC: 86 MG/DL — SIGNIFICANT CHANGE UP (ref 70–99)
GLUCOSE SERPL-MCNC: 87 MG/DL — SIGNIFICANT CHANGE UP (ref 70–99)
GLUCOSE SERPL-MCNC: 88 MG/DL — SIGNIFICANT CHANGE UP (ref 70–99)
GLUCOSE SERPL-MCNC: 89 MG/DL — SIGNIFICANT CHANGE UP (ref 70–99)
GLUCOSE SERPL-MCNC: 97 MG/DL — SIGNIFICANT CHANGE UP (ref 70–99)
HAV IGM SER-ACNC: SIGNIFICANT CHANGE UP
HBV CORE AB SER-ACNC: SIGNIFICANT CHANGE UP
HBV CORE IGM SER-ACNC: SIGNIFICANT CHANGE UP
HBV SURFACE AG SER-ACNC: SIGNIFICANT CHANGE UP
HCO3 BLDA-SCNC: 29 MMOL/L — SIGNIFICANT CHANGE UP (ref 21–29)
HCT VFR BLD CALC: 25.8 % — LOW (ref 34.5–45)
HCT VFR BLD CALC: 33.4 % — LOW (ref 34.5–45)
HCT VFR BLD CALC: 34.2 % — LOW (ref 34.5–45)
HCT VFR BLD CALC: 34.2 % — LOW (ref 34.5–45)
HCT VFR BLD CALC: 34.8 % — SIGNIFICANT CHANGE UP (ref 34.5–45)
HCT VFR BLD CALC: 35.5 % — SIGNIFICANT CHANGE UP (ref 34.5–45)
HCT VFR BLD CALC: 36.6 % — SIGNIFICANT CHANGE UP (ref 34.5–45)
HCT VFR BLD CALC: 37.1 % — SIGNIFICANT CHANGE UP (ref 34.5–45)
HCT VFR BLD CALC: 39.1 % — SIGNIFICANT CHANGE UP (ref 34.5–45)
HCT VFR BLD CALC: 39.7 % — SIGNIFICANT CHANGE UP (ref 34.5–45)
HCT VFR BLD CALC: 39.8 % — SIGNIFICANT CHANGE UP (ref 34.5–45)
HCT VFR BLD CALC: 41.4 % — SIGNIFICANT CHANGE UP (ref 34.5–45)
HCT VFR BLD CALC: 41.6 % — SIGNIFICANT CHANGE UP (ref 34.5–45)
HCT VFR BLD CALC: 42.5 % — SIGNIFICANT CHANGE UP (ref 34.5–45)
HCT VFR BLD CALC: 42.5 % — SIGNIFICANT CHANGE UP (ref 34.5–45)
HCT VFR BLD CALC: 42.6 % — SIGNIFICANT CHANGE UP (ref 34.5–45)
HCT VFR BLD CALC: 42.6 % — SIGNIFICANT CHANGE UP (ref 34.5–45)
HCT VFR BLD CALC: 42.8 % — SIGNIFICANT CHANGE UP (ref 34.5–45)
HCT VFR BLD CALC: 42.9 % — SIGNIFICANT CHANGE UP (ref 34.5–45)
HCT VFR BLD CALC: 48.1 % — HIGH (ref 34.5–45)
HCV AB S/CO SERPL IA: 0.14 S/CO — SIGNIFICANT CHANGE UP (ref 0–0.99)
HCV AB SERPL-IMP: SIGNIFICANT CHANGE UP
HGB BLD-MCNC: 10 G/DL — LOW (ref 11.5–15.5)
HGB BLD-MCNC: 10.2 G/DL — LOW (ref 11.5–15.5)
HGB BLD-MCNC: 10.5 G/DL — LOW (ref 11.5–15.5)
HGB BLD-MCNC: 10.6 G/DL — LOW (ref 11.5–15.5)
HGB BLD-MCNC: 10.7 G/DL — LOW (ref 11.5–15.5)
HGB BLD-MCNC: 10.7 G/DL — LOW (ref 11.5–15.5)
HGB BLD-MCNC: 11.4 G/DL — LOW (ref 11.5–15.5)
HGB BLD-MCNC: 11.5 G/DL — SIGNIFICANT CHANGE UP (ref 11.5–15.5)
HGB BLD-MCNC: 12.1 G/DL — SIGNIFICANT CHANGE UP (ref 11.5–15.5)
HGB BLD-MCNC: 12.1 G/DL — SIGNIFICANT CHANGE UP (ref 11.5–15.5)
HGB BLD-MCNC: 12.2 G/DL — SIGNIFICANT CHANGE UP (ref 11.5–15.5)
HGB BLD-MCNC: 12.4 G/DL — SIGNIFICANT CHANGE UP (ref 11.5–15.5)
HGB BLD-MCNC: 12.5 G/DL — SIGNIFICANT CHANGE UP (ref 11.5–15.5)
HGB BLD-MCNC: 12.6 G/DL — SIGNIFICANT CHANGE UP (ref 11.5–15.5)
HGB BLD-MCNC: 12.6 G/DL — SIGNIFICANT CHANGE UP (ref 11.5–15.5)
HGB BLD-MCNC: 13.9 G/DL — SIGNIFICANT CHANGE UP (ref 11.5–15.5)
HGB BLD-MCNC: 7.2 G/DL — LOW (ref 11.5–15.5)
HGB BLD-MCNC: 9.8 G/DL — LOW (ref 11.5–15.5)
HOROWITZ INDEX BLDA+IHG-RTO: 30 — SIGNIFICANT CHANGE UP
HYPOCHROMIA BLD QL: SLIGHT — SIGNIFICANT CHANGE UP
IMM GRANULOCYTES NFR BLD AUTO: 0.3 % — SIGNIFICANT CHANGE UP (ref 0–1.5)
IMM GRANULOCYTES NFR BLD AUTO: 0.6 % — SIGNIFICANT CHANGE UP (ref 0–1.5)
IMM GRANULOCYTES NFR BLD AUTO: 0.8 % — SIGNIFICANT CHANGE UP (ref 0–1.5)
IMM GRANULOCYTES NFR BLD AUTO: 1.7 % — HIGH (ref 0–1.5)
INR BLD: 1.34 RATIO — HIGH (ref 0.88–1.16)
INR BLD: 1.37 RATIO — HIGH (ref 0.88–1.16)
INR BLD: 1.44 RATIO — HIGH (ref 0.88–1.16)
INR BLD: 1.49 RATIO — HIGH (ref 0.88–1.16)
INR BLD: 1.49 RATIO — HIGH (ref 0.88–1.16)
INR BLD: 1.54 RATIO — HIGH (ref 0.88–1.16)
INR BLD: 1.6 RATIO — HIGH (ref 0.88–1.16)
INR BLD: 1.61 RATIO — HIGH (ref 0.88–1.16)
INR BLD: 1.68 RATIO — HIGH (ref 0.88–1.16)
INR BLD: 1.72 RATIO — HIGH (ref 0.88–1.16)
INR BLD: 1.79 RATIO — HIGH (ref 0.88–1.16)
INR BLD: 1.96 RATIO — HIGH (ref 0.88–1.16)
INR BLD: 15.62 RATIO — CRITICAL HIGH (ref 0.88–1.16)
INR BLD: 2.14 RATIO — HIGH (ref 0.88–1.16)
INR BLD: 2.22 RATIO — HIGH (ref 0.88–1.16)
INR BLD: 2.57 RATIO — HIGH (ref 0.88–1.16)
INR BLD: 2.58 RATIO — HIGH (ref 0.88–1.16)
INR BLD: 2.72 RATIO — HIGH (ref 0.88–1.16)
INR BLD: 2.74 RATIO — HIGH (ref 0.88–1.16)
INR BLD: 2.89 RATIO — HIGH (ref 0.88–1.16)
INR BLD: 2.99 RATIO — HIGH (ref 0.88–1.16)
INR BLD: 3.32 RATIO — HIGH (ref 0.88–1.16)
INR BLD: 3.67 RATIO — HIGH (ref 0.88–1.16)
INR BLD: 3.77 RATIO — HIGH (ref 0.88–1.16)
INR BLD: 3.81 RATIO — HIGH (ref 0.88–1.16)
LG PLATELETS BLD QL AUTO: SLIGHT — SIGNIFICANT CHANGE UP
LYMPHOCYTES # BLD AUTO: 0.72 K/UL — LOW (ref 1–3.3)
LYMPHOCYTES # BLD AUTO: 0.75 K/UL — LOW (ref 1–3.3)
LYMPHOCYTES # BLD AUTO: 1.16 K/UL — SIGNIFICANT CHANGE UP (ref 1–3.3)
LYMPHOCYTES # BLD AUTO: 1.31 K/UL — SIGNIFICANT CHANGE UP (ref 1–3.3)
LYMPHOCYTES # BLD AUTO: 10.3 % — LOW (ref 13–44)
LYMPHOCYTES # BLD AUTO: 10.5 % — LOW (ref 13–44)
LYMPHOCYTES # BLD AUTO: 13.2 % — SIGNIFICANT CHANGE UP (ref 13–44)
LYMPHOCYTES # BLD AUTO: 5.1 % — LOW (ref 13–44)
MACROCYTES BLD QL: SLIGHT — SIGNIFICANT CHANGE UP
MAGNESIUM SERPL-MCNC: 1.3 MG/DL — LOW (ref 1.6–2.6)
MAGNESIUM SERPL-MCNC: 2.2 MG/DL — SIGNIFICANT CHANGE UP (ref 1.6–2.6)
MAGNESIUM SERPL-MCNC: 2.3 MG/DL — SIGNIFICANT CHANGE UP (ref 1.6–2.6)
MAGNESIUM SERPL-MCNC: 2.3 MG/DL — SIGNIFICANT CHANGE UP (ref 1.6–2.6)
MAGNESIUM SERPL-MCNC: 2.4 MG/DL — SIGNIFICANT CHANGE UP (ref 1.6–2.6)
MAGNESIUM SERPL-MCNC: 2.7 MG/DL — HIGH (ref 1.6–2.6)
MAGNESIUM SERPL-MCNC: 3 MG/DL — HIGH (ref 1.6–2.6)
MANUAL SMEAR VERIFICATION: SIGNIFICANT CHANGE UP
MCHC RBC-ENTMCNC: 23.4 PG — LOW (ref 27–34)
MCHC RBC-ENTMCNC: 23.5 PG — LOW (ref 27–34)
MCHC RBC-ENTMCNC: 23.7 PG — LOW (ref 27–34)
MCHC RBC-ENTMCNC: 23.7 PG — LOW (ref 27–34)
MCHC RBC-ENTMCNC: 23.8 PG — LOW (ref 27–34)
MCHC RBC-ENTMCNC: 23.9 PG — LOW (ref 27–34)
MCHC RBC-ENTMCNC: 24.3 PG — LOW (ref 27–34)
MCHC RBC-ENTMCNC: 24.5 PG — LOW (ref 27–34)
MCHC RBC-ENTMCNC: 24.6 PG — LOW (ref 27–34)
MCHC RBC-ENTMCNC: 24.7 PG — LOW (ref 27–34)
MCHC RBC-ENTMCNC: 24.8 PG — LOW (ref 27–34)
MCHC RBC-ENTMCNC: 24.8 PG — LOW (ref 27–34)
MCHC RBC-ENTMCNC: 24.9 PG — LOW (ref 27–34)
MCHC RBC-ENTMCNC: 25.1 PG — LOW (ref 27–34)
MCHC RBC-ENTMCNC: 27.9 GM/DL — LOW (ref 32–36)
MCHC RBC-ENTMCNC: 28.6 GM/DL — LOW (ref 32–36)
MCHC RBC-ENTMCNC: 28.6 GM/DL — LOW (ref 32–36)
MCHC RBC-ENTMCNC: 28.9 GM/DL — LOW (ref 32–36)
MCHC RBC-ENTMCNC: 28.9 GM/DL — LOW (ref 32–36)
MCHC RBC-ENTMCNC: 29 GM/DL — LOW (ref 32–36)
MCHC RBC-ENTMCNC: 29 GM/DL — LOW (ref 32–36)
MCHC RBC-ENTMCNC: 29.2 GM/DL — LOW (ref 32–36)
MCHC RBC-ENTMCNC: 29.3 GM/DL — LOW (ref 32–36)
MCHC RBC-ENTMCNC: 29.3 GM/DL — LOW (ref 32–36)
MCHC RBC-ENTMCNC: 29.6 GM/DL — LOW (ref 32–36)
MCHC RBC-ENTMCNC: 29.6 GM/DL — LOW (ref 32–36)
MCHC RBC-ENTMCNC: 30 GM/DL — LOW (ref 32–36)
MCHC RBC-ENTMCNC: 30.1 GM/DL — LOW (ref 32–36)
MCHC RBC-ENTMCNC: 30.4 GM/DL — LOW (ref 32–36)
MCHC RBC-ENTMCNC: 30.7 GM/DL — LOW (ref 32–36)
MCV RBC AUTO: 79.9 FL — LOW (ref 80–100)
MCV RBC AUTO: 81.1 FL — SIGNIFICANT CHANGE UP (ref 80–100)
MCV RBC AUTO: 81.2 FL — SIGNIFICANT CHANGE UP (ref 80–100)
MCV RBC AUTO: 81.3 FL — SIGNIFICANT CHANGE UP (ref 80–100)
MCV RBC AUTO: 81.5 FL — SIGNIFICANT CHANGE UP (ref 80–100)
MCV RBC AUTO: 81.7 FL — SIGNIFICANT CHANGE UP (ref 80–100)
MCV RBC AUTO: 82.4 FL — SIGNIFICANT CHANGE UP (ref 80–100)
MCV RBC AUTO: 82.5 FL — SIGNIFICANT CHANGE UP (ref 80–100)
MCV RBC AUTO: 82.7 FL — SIGNIFICANT CHANGE UP (ref 80–100)
MCV RBC AUTO: 82.8 FL — SIGNIFICANT CHANGE UP (ref 80–100)
MCV RBC AUTO: 83.2 FL — SIGNIFICANT CHANGE UP (ref 80–100)
MCV RBC AUTO: 83.2 FL — SIGNIFICANT CHANGE UP (ref 80–100)
MCV RBC AUTO: 83.7 FL — SIGNIFICANT CHANGE UP (ref 80–100)
MCV RBC AUTO: 83.9 FL — SIGNIFICANT CHANGE UP (ref 80–100)
MCV RBC AUTO: 84.1 FL — SIGNIFICANT CHANGE UP (ref 80–100)
MCV RBC AUTO: 84.1 FL — SIGNIFICANT CHANGE UP (ref 80–100)
MCV RBC AUTO: 84.3 FL — SIGNIFICANT CHANGE UP (ref 80–100)
MCV RBC AUTO: 84.6 FL — SIGNIFICANT CHANGE UP (ref 80–100)
MCV RBC AUTO: 84.7 FL — SIGNIFICANT CHANGE UP (ref 80–100)
MCV RBC AUTO: 85 FL — SIGNIFICANT CHANGE UP (ref 80–100)
MICROCYTES BLD QL: SLIGHT — SIGNIFICANT CHANGE UP
MONOCYTES # BLD AUTO: 0.44 K/UL — SIGNIFICANT CHANGE UP (ref 0–0.9)
MONOCYTES # BLD AUTO: 0.84 K/UL — SIGNIFICANT CHANGE UP (ref 0–0.9)
MONOCYTES # BLD AUTO: 1.16 K/UL — HIGH (ref 0–0.9)
MONOCYTES # BLD AUTO: 1.25 K/UL — HIGH (ref 0–0.9)
MONOCYTES NFR BLD AUTO: 11.7 % — SIGNIFICANT CHANGE UP (ref 2–14)
MONOCYTES NFR BLD AUTO: 13.2 % — SIGNIFICANT CHANGE UP (ref 2–14)
MONOCYTES NFR BLD AUTO: 3.1 % — SIGNIFICANT CHANGE UP (ref 2–14)
MONOCYTES NFR BLD AUTO: 9.9 % — SIGNIFICANT CHANGE UP (ref 2–14)
NEUTROPHILS # BLD AUTO: 12.7 K/UL — HIGH (ref 1.8–7.4)
NEUTROPHILS # BLD AUTO: 5.47 K/UL — SIGNIFICANT CHANGE UP (ref 1.8–7.4)
NEUTROPHILS # BLD AUTO: 6.29 K/UL — SIGNIFICANT CHANGE UP (ref 1.8–7.4)
NEUTROPHILS # BLD AUTO: 9.78 K/UL — HIGH (ref 1.8–7.4)
NEUTROPHILS NFR BLD AUTO: 71.6 % — SIGNIFICANT CHANGE UP (ref 43–77)
NEUTROPHILS NFR BLD AUTO: 76.5 % — SIGNIFICANT CHANGE UP (ref 43–77)
NEUTROPHILS NFR BLD AUTO: 77.2 % — HIGH (ref 43–77)
NEUTROPHILS NFR BLD AUTO: 89.7 % — HIGH (ref 43–77)
NRBC # BLD: 0 /100 WBCS — SIGNIFICANT CHANGE UP (ref 0–0)
OB PNL STL: NEGATIVE — SIGNIFICANT CHANGE UP
PCO2 BLDA: 51 MMHG — HIGH (ref 32–46)
PH BLDA: 7.36 — SIGNIFICANT CHANGE UP (ref 7.35–7.45)
PHOSPHATE SERPL-MCNC: 2.1 MG/DL — LOW (ref 2.5–4.5)
PHOSPHATE SERPL-MCNC: 3 MG/DL — SIGNIFICANT CHANGE UP (ref 2.5–4.5)
PHOSPHATE SERPL-MCNC: 4.2 MG/DL — SIGNIFICANT CHANGE UP (ref 2.5–4.5)
PHOSPHATE SERPL-MCNC: 4.4 MG/DL — SIGNIFICANT CHANGE UP (ref 2.5–4.5)
PHOSPHATE SERPL-MCNC: 4.6 MG/DL — HIGH (ref 2.5–4.5)
PHOSPHATE SERPL-MCNC: 6.8 MG/DL — HIGH (ref 2.5–4.5)
PLAT MORPH BLD: ABNORMAL
PLATELET # BLD AUTO: 169 K/UL — SIGNIFICANT CHANGE UP (ref 150–400)
PLATELET # BLD AUTO: 181 K/UL — SIGNIFICANT CHANGE UP (ref 150–400)
PLATELET # BLD AUTO: 198 K/UL — SIGNIFICANT CHANGE UP (ref 150–400)
PLATELET # BLD AUTO: 198 K/UL — SIGNIFICANT CHANGE UP (ref 150–400)
PLATELET # BLD AUTO: 200 K/UL — SIGNIFICANT CHANGE UP (ref 150–400)
PLATELET # BLD AUTO: 205 K/UL — SIGNIFICANT CHANGE UP (ref 150–400)
PLATELET # BLD AUTO: 206 K/UL — SIGNIFICANT CHANGE UP (ref 150–400)
PLATELET # BLD AUTO: 213 K/UL — SIGNIFICANT CHANGE UP (ref 150–400)
PLATELET # BLD AUTO: 217 K/UL — SIGNIFICANT CHANGE UP (ref 150–400)
PLATELET # BLD AUTO: 247 K/UL — SIGNIFICANT CHANGE UP (ref 150–400)
PLATELET # BLD AUTO: 247 K/UL — SIGNIFICANT CHANGE UP (ref 150–400)
PLATELET # BLD AUTO: 256 K/UL — SIGNIFICANT CHANGE UP (ref 150–400)
PLATELET # BLD AUTO: 261 K/UL — SIGNIFICANT CHANGE UP (ref 150–400)
PLATELET # BLD AUTO: 262 K/UL — SIGNIFICANT CHANGE UP (ref 150–400)
PLATELET # BLD AUTO: 273 K/UL — SIGNIFICANT CHANGE UP (ref 150–400)
PLATELET # BLD AUTO: 277 K/UL — SIGNIFICANT CHANGE UP (ref 150–400)
PLATELET # BLD AUTO: 278 K/UL — SIGNIFICANT CHANGE UP (ref 150–400)
PLATELET # BLD AUTO: 286 K/UL — SIGNIFICANT CHANGE UP (ref 150–400)
PLATELET # BLD AUTO: 299 K/UL — SIGNIFICANT CHANGE UP (ref 150–400)
PLATELET # BLD AUTO: 328 K/UL — SIGNIFICANT CHANGE UP (ref 150–400)
PO2 BLDA: 68 MMHG — LOW (ref 74–108)
POLYCHROMASIA BLD QL SMEAR: SLIGHT — SIGNIFICANT CHANGE UP
POTASSIUM SERPL-MCNC: 3 MMOL/L — LOW (ref 3.5–5.3)
POTASSIUM SERPL-MCNC: 3.4 MMOL/L — LOW (ref 3.5–5.3)
POTASSIUM SERPL-MCNC: 3.9 MMOL/L — SIGNIFICANT CHANGE UP (ref 3.5–5.3)
POTASSIUM SERPL-MCNC: 4 MMOL/L — SIGNIFICANT CHANGE UP (ref 3.5–5.3)
POTASSIUM SERPL-MCNC: 4.1 MMOL/L — SIGNIFICANT CHANGE UP (ref 3.5–5.3)
POTASSIUM SERPL-MCNC: 4.2 MMOL/L — SIGNIFICANT CHANGE UP (ref 3.5–5.3)
POTASSIUM SERPL-MCNC: 4.5 MMOL/L — SIGNIFICANT CHANGE UP (ref 3.5–5.3)
POTASSIUM SERPL-MCNC: 4.5 MMOL/L — SIGNIFICANT CHANGE UP (ref 3.5–5.3)
POTASSIUM SERPL-MCNC: 4.6 MMOL/L — SIGNIFICANT CHANGE UP (ref 3.5–5.3)
POTASSIUM SERPL-MCNC: 4.6 MMOL/L — SIGNIFICANT CHANGE UP (ref 3.5–5.3)
POTASSIUM SERPL-MCNC: 4.7 MMOL/L — SIGNIFICANT CHANGE UP (ref 3.5–5.3)
POTASSIUM SERPL-MCNC: 4.8 MMOL/L — SIGNIFICANT CHANGE UP (ref 3.5–5.3)
POTASSIUM SERPL-MCNC: 5 MMOL/L — SIGNIFICANT CHANGE UP (ref 3.5–5.3)
POTASSIUM SERPL-MCNC: 5 MMOL/L — SIGNIFICANT CHANGE UP (ref 3.5–5.3)
POTASSIUM SERPL-MCNC: 5.1 MMOL/L — SIGNIFICANT CHANGE UP (ref 3.5–5.3)
POTASSIUM SERPL-MCNC: 5.2 MMOL/L — SIGNIFICANT CHANGE UP (ref 3.5–5.3)
POTASSIUM SERPL-MCNC: 5.8 MMOL/L — HIGH (ref 3.5–5.3)
POTASSIUM SERPL-MCNC: 5.8 MMOL/L — HIGH (ref 3.5–5.3)
POTASSIUM SERPL-MCNC: 6.9 MMOL/L — CRITICAL HIGH (ref 3.5–5.3)
POTASSIUM SERPL-SCNC: 3 MMOL/L — LOW (ref 3.5–5.3)
POTASSIUM SERPL-SCNC: 3.4 MMOL/L — LOW (ref 3.5–5.3)
POTASSIUM SERPL-SCNC: 3.9 MMOL/L — SIGNIFICANT CHANGE UP (ref 3.5–5.3)
POTASSIUM SERPL-SCNC: 4 MMOL/L — SIGNIFICANT CHANGE UP (ref 3.5–5.3)
POTASSIUM SERPL-SCNC: 4.1 MMOL/L — SIGNIFICANT CHANGE UP (ref 3.5–5.3)
POTASSIUM SERPL-SCNC: 4.2 MMOL/L — SIGNIFICANT CHANGE UP (ref 3.5–5.3)
POTASSIUM SERPL-SCNC: 4.5 MMOL/L — SIGNIFICANT CHANGE UP (ref 3.5–5.3)
POTASSIUM SERPL-SCNC: 4.5 MMOL/L — SIGNIFICANT CHANGE UP (ref 3.5–5.3)
POTASSIUM SERPL-SCNC: 4.6 MMOL/L — SIGNIFICANT CHANGE UP (ref 3.5–5.3)
POTASSIUM SERPL-SCNC: 4.6 MMOL/L — SIGNIFICANT CHANGE UP (ref 3.5–5.3)
POTASSIUM SERPL-SCNC: 4.7 MMOL/L — SIGNIFICANT CHANGE UP (ref 3.5–5.3)
POTASSIUM SERPL-SCNC: 4.8 MMOL/L — SIGNIFICANT CHANGE UP (ref 3.5–5.3)
POTASSIUM SERPL-SCNC: 5 MMOL/L — SIGNIFICANT CHANGE UP (ref 3.5–5.3)
POTASSIUM SERPL-SCNC: 5 MMOL/L — SIGNIFICANT CHANGE UP (ref 3.5–5.3)
POTASSIUM SERPL-SCNC: 5.1 MMOL/L — SIGNIFICANT CHANGE UP (ref 3.5–5.3)
POTASSIUM SERPL-SCNC: 5.2 MMOL/L — SIGNIFICANT CHANGE UP (ref 3.5–5.3)
POTASSIUM SERPL-SCNC: 5.8 MMOL/L — HIGH (ref 3.5–5.3)
POTASSIUM SERPL-SCNC: 5.8 MMOL/L — HIGH (ref 3.5–5.3)
POTASSIUM SERPL-SCNC: 6.9 MMOL/L — CRITICAL HIGH (ref 3.5–5.3)
PROT SERPL-MCNC: 8.4 G/DL — HIGH (ref 6–8.3)
PROT SERPL-MCNC: 8.6 G/DL — HIGH (ref 6–8.3)
PROT SERPL-MCNC: 8.9 G/DL — HIGH (ref 6–8.3)
PROT SERPL-MCNC: 8.9 G/DL — HIGH (ref 6–8.3)
PROTHROM AB SERPL-ACNC: 15.4 SEC — HIGH (ref 10–12.9)
PROTHROM AB SERPL-ACNC: 15.9 SEC — HIGH (ref 10–12.9)
PROTHROM AB SERPL-ACNC: 16.8 SEC — HIGH (ref 10–12.9)
PROTHROM AB SERPL-ACNC: 17.2 SEC — HIGH (ref 10–12.9)
PROTHROM AB SERPL-ACNC: 17.3 SEC — HIGH (ref 10–12.9)
PROTHROM AB SERPL-ACNC: 17.8 SEC — HIGH (ref 10–13.1)
PROTHROM AB SERPL-ACNC: 18.4 SEC — HIGH (ref 10–13.1)
PROTHROM AB SERPL-ACNC: 18.8 SEC — HIGH (ref 10–12.9)
PROTHROM AB SERPL-ACNC: 19.3 SEC — HIGH (ref 10–13.1)
PROTHROM AB SERPL-ACNC: 195.6 SEC — HIGH (ref 10–12.9)
PROTHROM AB SERPL-ACNC: 20.1 SEC — HIGH (ref 10–13.1)
PROTHROM AB SERPL-ACNC: 20.9 SEC — HIGH (ref 10–12.9)
PROTHROM AB SERPL-ACNC: 22.8 SEC — HIGH (ref 10–13.1)
PROTHROM AB SERPL-ACNC: 25 SEC — HIGH (ref 10–13.1)
PROTHROM AB SERPL-ACNC: 26.2 SEC — HIGH (ref 10–13.1)
PROTHROM AB SERPL-ACNC: 30.3 SEC — HIGH (ref 10–13.1)
PROTHROM AB SERPL-ACNC: 30.4 SEC — HIGH (ref 10–13.1)
PROTHROM AB SERPL-ACNC: 31.9 SEC — HIGH (ref 10–13.1)
PROTHROM AB SERPL-ACNC: 32.3 SEC — HIGH (ref 10–12.9)
PROTHROM AB SERPL-ACNC: 33.7 SEC — HIGH (ref 10–13.1)
PROTHROM AB SERPL-ACNC: 34.9 SEC — HIGH (ref 10–13.1)
PROTHROM AB SERPL-ACNC: 39.3 SEC — HIGH (ref 10–13.1)
PROTHROM AB SERPL-ACNC: 43.6 SEC — HIGH (ref 10–12.9)
PROTHROM AB SERPL-ACNC: 44.8 SEC — HIGH (ref 10–12.9)
PROTHROM AB SERPL-ACNC: 44.9 SEC — HIGH (ref 10–13.1)
RAPID RVP RESULT: SIGNIFICANT CHANGE UP
RBC # BLD: 3.06 M/UL — LOW (ref 3.8–5.2)
RBC # BLD: 4.1 M/UL — SIGNIFICANT CHANGE UP (ref 3.8–5.2)
RBC # BLD: 4.21 M/UL — SIGNIFICANT CHANGE UP (ref 3.8–5.2)
RBC # BLD: 4.26 M/UL — SIGNIFICANT CHANGE UP (ref 3.8–5.2)
RBC # BLD: 4.28 M/UL — SIGNIFICANT CHANGE UP (ref 3.8–5.2)
RBC # BLD: 4.29 M/UL — SIGNIFICANT CHANGE UP (ref 3.8–5.2)
RBC # BLD: 4.35 M/UL — SIGNIFICANT CHANGE UP (ref 3.8–5.2)
RBC # BLD: 4.43 M/UL — SIGNIFICANT CHANGE UP (ref 3.8–5.2)
RBC # BLD: 4.67 M/UL — SIGNIFICANT CHANGE UP (ref 3.8–5.2)
RBC # BLD: 4.81 M/UL — SIGNIFICANT CHANGE UP (ref 3.8–5.2)
RBC # BLD: 4.83 M/UL — SIGNIFICANT CHANGE UP (ref 3.8–5.2)
RBC # BLD: 4.92 M/UL — SIGNIFICANT CHANGE UP (ref 3.8–5.2)
RBC # BLD: 5.04 M/UL — SIGNIFICANT CHANGE UP (ref 3.8–5.2)
RBC # BLD: 5.06 M/UL — SIGNIFICANT CHANGE UP (ref 3.8–5.2)
RBC # BLD: 5.08 M/UL — SIGNIFICANT CHANGE UP (ref 3.8–5.2)
RBC # BLD: 5.11 M/UL — SIGNIFICANT CHANGE UP (ref 3.8–5.2)
RBC # BLD: 5.11 M/UL — SIGNIFICANT CHANGE UP (ref 3.8–5.2)
RBC # BLD: 5.15 M/UL — SIGNIFICANT CHANGE UP (ref 3.8–5.2)
RBC # BLD: 5.29 M/UL — HIGH (ref 3.8–5.2)
RBC # BLD: 5.68 M/UL — HIGH (ref 3.8–5.2)
RBC # FLD: 18.4 % — HIGH (ref 10.3–14.5)
RBC # FLD: 18.4 % — HIGH (ref 10.3–14.5)
RBC # FLD: 18.7 % — HIGH (ref 10.3–14.5)
RBC # FLD: 18.8 % — HIGH (ref 10.3–14.5)
RBC # FLD: 18.9 % — HIGH (ref 10.3–14.5)
RBC # FLD: 19 % — HIGH (ref 10.3–14.5)
RBC # FLD: 19.1 % — HIGH (ref 10.3–14.5)
RBC # FLD: 19.2 % — HIGH (ref 10.3–14.5)
RBC # FLD: 19.3 % — HIGH (ref 10.3–14.5)
RBC # FLD: 19.3 % — HIGH (ref 10.3–14.5)
RBC # FLD: 19.4 % — HIGH (ref 10.3–14.5)
RBC # FLD: 19.5 % — HIGH (ref 10.3–14.5)
RBC # FLD: 19.6 % — HIGH (ref 10.3–14.5)
RBC # FLD: 19.8 % — HIGH (ref 10.3–14.5)
RBC BLD AUTO: ABNORMAL
RH IG SCN BLD-IMP: POSITIVE — SIGNIFICANT CHANGE UP
SAO2 % BLDA: 92 % — SIGNIFICANT CHANGE UP (ref 92–96)
SODIUM SERPL-SCNC: 126 MMOL/L — LOW (ref 135–145)
SODIUM SERPL-SCNC: 127 MMOL/L — LOW (ref 135–145)
SODIUM SERPL-SCNC: 129 MMOL/L — LOW (ref 135–145)
SODIUM SERPL-SCNC: 130 MMOL/L — LOW (ref 135–145)
SODIUM SERPL-SCNC: 131 MMOL/L — LOW (ref 135–145)
SODIUM SERPL-SCNC: 133 MMOL/L — LOW (ref 135–145)
SODIUM SERPL-SCNC: 134 MMOL/L — LOW (ref 135–145)
SODIUM SERPL-SCNC: 135 MMOL/L — SIGNIFICANT CHANGE UP (ref 135–145)
SODIUM SERPL-SCNC: 135 MMOL/L — SIGNIFICANT CHANGE UP (ref 135–145)
SODIUM SERPL-SCNC: 137 MMOL/L — SIGNIFICANT CHANGE UP (ref 135–145)
SODIUM SERPL-SCNC: 138 MMOL/L — SIGNIFICANT CHANGE UP (ref 135–145)
SODIUM SERPL-SCNC: 138 MMOL/L — SIGNIFICANT CHANGE UP (ref 135–145)
SODIUM SERPL-SCNC: 140 MMOL/L — SIGNIFICANT CHANGE UP (ref 135–145)
TARGETS BLD QL SMEAR: SLIGHT — SIGNIFICANT CHANGE UP
WBC # BLD: 10.18 K/UL — SIGNIFICANT CHANGE UP (ref 3.8–10.5)
WBC # BLD: 11.42 K/UL — HIGH (ref 3.8–10.5)
WBC # BLD: 12.43 K/UL — HIGH (ref 3.8–10.5)
WBC # BLD: 12.67 K/UL — HIGH (ref 3.8–10.5)
WBC # BLD: 13.05 K/UL — HIGH (ref 3.8–10.5)
WBC # BLD: 14.17 K/UL — HIGH (ref 3.8–10.5)
WBC # BLD: 14.42 K/UL — HIGH (ref 3.8–10.5)
WBC # BLD: 14.67 K/UL — HIGH (ref 3.8–10.5)
WBC # BLD: 5.99 K/UL — SIGNIFICANT CHANGE UP (ref 3.8–10.5)
WBC # BLD: 6.31 K/UL — SIGNIFICANT CHANGE UP (ref 3.8–10.5)
WBC # BLD: 7.15 K/UL — SIGNIFICANT CHANGE UP (ref 3.8–10.5)
WBC # BLD: 7.31 K/UL — SIGNIFICANT CHANGE UP (ref 3.8–10.5)
WBC # BLD: 7.49 K/UL — SIGNIFICANT CHANGE UP (ref 3.8–10.5)
WBC # BLD: 8.1 K/UL — SIGNIFICANT CHANGE UP (ref 3.8–10.5)
WBC # BLD: 8.17 K/UL — SIGNIFICANT CHANGE UP (ref 3.8–10.5)
WBC # BLD: 8.79 K/UL — SIGNIFICANT CHANGE UP (ref 3.8–10.5)
WBC # BLD: 9.14 K/UL — SIGNIFICANT CHANGE UP (ref 3.8–10.5)
WBC # BLD: 9.17 K/UL — SIGNIFICANT CHANGE UP (ref 3.8–10.5)
WBC # BLD: 9.53 K/UL — SIGNIFICANT CHANGE UP (ref 3.8–10.5)
WBC # BLD: 9.92 K/UL — SIGNIFICANT CHANGE UP (ref 3.8–10.5)
WBC # FLD AUTO: 10.18 K/UL — SIGNIFICANT CHANGE UP (ref 3.8–10.5)
WBC # FLD AUTO: 11.42 K/UL — HIGH (ref 3.8–10.5)
WBC # FLD AUTO: 12.43 K/UL — HIGH (ref 3.8–10.5)
WBC # FLD AUTO: 12.67 K/UL — HIGH (ref 3.8–10.5)
WBC # FLD AUTO: 13.05 K/UL — HIGH (ref 3.8–10.5)
WBC # FLD AUTO: 14.17 K/UL — HIGH (ref 3.8–10.5)
WBC # FLD AUTO: 14.42 K/UL — HIGH (ref 3.8–10.5)
WBC # FLD AUTO: 14.67 K/UL — HIGH (ref 3.8–10.5)
WBC # FLD AUTO: 5.99 K/UL — SIGNIFICANT CHANGE UP (ref 3.8–10.5)
WBC # FLD AUTO: 6.31 K/UL — SIGNIFICANT CHANGE UP (ref 3.8–10.5)
WBC # FLD AUTO: 7.15 K/UL — SIGNIFICANT CHANGE UP (ref 3.8–10.5)
WBC # FLD AUTO: 7.31 K/UL — SIGNIFICANT CHANGE UP (ref 3.8–10.5)
WBC # FLD AUTO: 7.49 K/UL — SIGNIFICANT CHANGE UP (ref 3.8–10.5)
WBC # FLD AUTO: 8.1 K/UL — SIGNIFICANT CHANGE UP (ref 3.8–10.5)
WBC # FLD AUTO: 8.17 K/UL — SIGNIFICANT CHANGE UP (ref 3.8–10.5)
WBC # FLD AUTO: 8.79 K/UL — SIGNIFICANT CHANGE UP (ref 3.8–10.5)
WBC # FLD AUTO: 9.14 K/UL — SIGNIFICANT CHANGE UP (ref 3.8–10.5)
WBC # FLD AUTO: 9.17 K/UL — SIGNIFICANT CHANGE UP (ref 3.8–10.5)
WBC # FLD AUTO: 9.53 K/UL — SIGNIFICANT CHANGE UP (ref 3.8–10.5)
WBC # FLD AUTO: 9.92 K/UL — SIGNIFICANT CHANGE UP (ref 3.8–10.5)

## 2020-01-01 PROCEDURE — 99232 SBSQ HOSP IP/OBS MODERATE 35: CPT

## 2020-01-01 PROCEDURE — 99232 SBSQ HOSP IP/OBS MODERATE 35: CPT | Mod: GC

## 2020-01-01 PROCEDURE — 99233 SBSQ HOSP IP/OBS HIGH 50: CPT

## 2020-01-01 PROCEDURE — 73552 X-RAY EXAM OF FEMUR 2/>: CPT | Mod: 26,LT

## 2020-01-01 PROCEDURE — 86850 RBC ANTIBODY SCREEN: CPT

## 2020-01-01 PROCEDURE — 93010 ELECTROCARDIOGRAM REPORT: CPT

## 2020-01-01 PROCEDURE — 86900 BLOOD TYPING SEROLOGIC ABO: CPT

## 2020-01-01 PROCEDURE — 87633 RESP VIRUS 12-25 TARGETS: CPT

## 2020-01-01 PROCEDURE — 93970 EXTREMITY STUDY: CPT

## 2020-01-01 PROCEDURE — C1713: CPT

## 2020-01-01 PROCEDURE — 99261: CPT

## 2020-01-01 PROCEDURE — 82272 OCCULT BLD FECES 1-3 TESTS: CPT

## 2020-01-01 PROCEDURE — 99285 EMERGENCY DEPT VISIT HI MDM: CPT

## 2020-01-01 PROCEDURE — 85730 THROMBOPLASTIN TIME PARTIAL: CPT

## 2020-01-01 PROCEDURE — 97110 THERAPEUTIC EXERCISES: CPT

## 2020-01-01 PROCEDURE — 72192 CT PELVIS W/O DYE: CPT | Mod: 26,59

## 2020-01-01 PROCEDURE — P9016: CPT

## 2020-01-01 PROCEDURE — 77262 THER RADIOLOGY TX PLNG INTRM: CPT

## 2020-01-01 PROCEDURE — 71045 X-RAY EXAM CHEST 1 VIEW: CPT

## 2020-01-01 PROCEDURE — 99233 SBSQ HOSP IP/OBS HIGH 50: CPT | Mod: GC

## 2020-01-01 PROCEDURE — 99223 1ST HOSP IP/OBS HIGH 75: CPT | Mod: GC

## 2020-01-01 PROCEDURE — 87486 CHLMYD PNEUM DNA AMP PROBE: CPT

## 2020-01-01 PROCEDURE — 96374 THER/PROPH/DIAG INJ IV PUSH: CPT

## 2020-01-01 PROCEDURE — 77307 TELETHX ISODOSE PLAN CPLX: CPT | Mod: 26

## 2020-01-01 PROCEDURE — 74018 RADEX ABDOMEN 1 VIEW: CPT

## 2020-01-01 PROCEDURE — 85610 PROTHROMBIN TIME: CPT

## 2020-01-01 PROCEDURE — 71045 X-RAY EXAM CHEST 1 VIEW: CPT | Mod: 26

## 2020-01-01 PROCEDURE — C1769: CPT

## 2020-01-01 PROCEDURE — 76000 FLUOROSCOPY <1 HR PHYS/QHP: CPT

## 2020-01-01 PROCEDURE — 36430 TRANSFUSION BLD/BLD COMPNT: CPT

## 2020-01-01 PROCEDURE — 73560 X-RAY EXAM OF KNEE 1 OR 2: CPT | Mod: 26,RT

## 2020-01-01 PROCEDURE — 74176 CT ABD & PELVIS W/O CONTRAST: CPT

## 2020-01-01 PROCEDURE — 77332 RADIATION TREATMENT AID(S): CPT | Mod: 26

## 2020-01-01 PROCEDURE — 82962 GLUCOSE BLOOD TEST: CPT

## 2020-01-01 PROCEDURE — 77334 RADIATION TREATMENT AID(S): CPT | Mod: 26

## 2020-01-01 PROCEDURE — 86923 COMPATIBILITY TEST ELECTRIC: CPT

## 2020-01-01 PROCEDURE — 86704 HEP B CORE ANTIBODY TOTAL: CPT

## 2020-01-01 PROCEDURE — 99285 EMERGENCY DEPT VISIT HI MDM: CPT | Mod: 25

## 2020-01-01 PROCEDURE — 77427 RADIATION TX MANAGEMENT X5: CPT

## 2020-01-01 PROCEDURE — 70450 CT HEAD/BRAIN W/O DYE: CPT

## 2020-01-01 PROCEDURE — 70450 CT HEAD/BRAIN W/O DYE: CPT | Mod: 26

## 2020-01-01 PROCEDURE — 99204 OFFICE O/P NEW MOD 45 MIN: CPT | Mod: 25

## 2020-01-01 PROCEDURE — 96375 TX/PRO/DX INJ NEW DRUG ADDON: CPT

## 2020-01-01 PROCEDURE — 92610 EVALUATE SWALLOWING FUNCTION: CPT

## 2020-01-01 PROCEDURE — 80074 ACUTE HEPATITIS PANEL: CPT

## 2020-01-01 PROCEDURE — 27245 TREAT THIGH FRACTURE: CPT | Mod: RT

## 2020-01-01 PROCEDURE — 83735 ASSAY OF MAGNESIUM: CPT

## 2020-01-01 PROCEDURE — 97530 THERAPEUTIC ACTIVITIES: CPT

## 2020-01-01 PROCEDURE — 77280 THER RAD SIMULAJ FIELD SMPL: CPT | Mod: 26

## 2020-01-01 PROCEDURE — 87798 DETECT AGENT NOS DNA AMP: CPT

## 2020-01-01 PROCEDURE — 73560 X-RAY EXAM OF KNEE 1 OR 2: CPT

## 2020-01-01 PROCEDURE — 73552 X-RAY EXAM OF FEMUR 2/>: CPT | Mod: 26,RT

## 2020-01-01 PROCEDURE — 99239 HOSP IP/OBS DSCHRG MGMT >30: CPT

## 2020-01-01 PROCEDURE — 93970 EXTREMITY STUDY: CPT | Mod: 26

## 2020-01-01 PROCEDURE — 77290 THER RAD SIMULAJ FIELD CPLX: CPT | Mod: 26

## 2020-01-01 PROCEDURE — 76377 3D RENDER W/INTRP POSTPROCES: CPT | Mod: 26

## 2020-01-01 PROCEDURE — 74018 RADEX ABDOMEN 1 VIEW: CPT | Mod: 26

## 2020-01-01 PROCEDURE — 85027 COMPLETE CBC AUTOMATED: CPT

## 2020-01-01 PROCEDURE — 97116 GAIT TRAINING THERAPY: CPT

## 2020-01-01 PROCEDURE — 74176 CT ABD & PELVIS W/O CONTRAST: CPT | Mod: 26

## 2020-01-01 PROCEDURE — 73502 X-RAY EXAM HIP UNI 2-3 VIEWS: CPT | Mod: 26,LT

## 2020-01-01 PROCEDURE — 93280 PM DEVICE PROGR EVAL DUAL: CPT | Mod: 26

## 2020-01-01 PROCEDURE — 87581 M.PNEUMON DNA AMP PROBE: CPT

## 2020-01-01 PROCEDURE — 84100 ASSAY OF PHOSPHORUS: CPT

## 2020-01-01 PROCEDURE — 80048 BASIC METABOLIC PNL TOTAL CA: CPT

## 2020-01-01 PROCEDURE — 97162 PT EVAL MOD COMPLEX 30 MIN: CPT

## 2020-01-01 PROCEDURE — 82803 BLOOD GASES ANY COMBINATION: CPT

## 2020-01-01 PROCEDURE — 73502 X-RAY EXAM HIP UNI 2-3 VIEWS: CPT | Mod: 26,RT

## 2020-01-01 PROCEDURE — 76377 3D RENDER W/INTRP POSTPROCES: CPT

## 2020-01-01 PROCEDURE — 93005 ELECTROCARDIOGRAM TRACING: CPT

## 2020-01-01 PROCEDURE — 72192 CT PELVIS W/O DYE: CPT

## 2020-01-01 PROCEDURE — 80053 COMPREHEN METABOLIC PANEL: CPT

## 2020-01-01 PROCEDURE — 86901 BLOOD TYPING SEROLOGIC RH(D): CPT

## 2020-01-01 PROCEDURE — 73502 X-RAY EXAM HIP UNI 2-3 VIEWS: CPT

## 2020-01-01 PROCEDURE — 73552 X-RAY EXAM OF FEMUR 2/>: CPT

## 2020-01-01 PROCEDURE — 99223 1ST HOSP IP/OBS HIGH 75: CPT

## 2020-01-01 RX ORDER — HYDROMORPHONE HYDROCHLORIDE 2 MG/ML
0.2 INJECTION INTRAMUSCULAR; INTRAVENOUS; SUBCUTANEOUS EVERY 4 HOURS
Refills: 0 | Status: DISCONTINUED | OUTPATIENT
Start: 2020-01-01 | End: 2020-01-01

## 2020-01-01 RX ORDER — WARFARIN SODIUM 2.5 MG/1
1 TABLET ORAL ONCE
Refills: 0 | Status: DISCONTINUED | OUTPATIENT
Start: 2020-01-01 | End: 2020-01-01

## 2020-01-01 RX ORDER — POLYETHYLENE GLYCOL 3350 17 G/17G
17 POWDER, FOR SOLUTION ORAL DAILY
Refills: 0 | Status: DISCONTINUED | OUTPATIENT
Start: 2020-01-01 | End: 2020-01-01

## 2020-01-01 RX ORDER — HEPARIN SODIUM 5000 [USP'U]/ML
5000 INJECTION INTRAVENOUS; SUBCUTANEOUS EVERY 12 HOURS
Refills: 0 | Status: DISCONTINUED | OUTPATIENT
Start: 2020-01-01 | End: 2020-01-01

## 2020-01-01 RX ORDER — WARFARIN SODIUM 2.5 MG/1
1.5 TABLET ORAL ONCE
Refills: 0 | Status: COMPLETED | OUTPATIENT
Start: 2020-01-01 | End: 2020-01-01

## 2020-01-01 RX ORDER — POLYETHYLENE GLYCOL 3350 17 G/17G
17 POWDER, FOR SOLUTION ORAL
Qty: 0 | Refills: 0 | DISCHARGE
Start: 2020-01-01

## 2020-01-01 RX ORDER — HYDROMORPHONE HYDROCHLORIDE 2 MG/ML
0.3 INJECTION INTRAMUSCULAR; INTRAVENOUS; SUBCUTANEOUS
Refills: 0 | Status: DISCONTINUED | OUTPATIENT
Start: 2020-01-01 | End: 2020-01-01

## 2020-01-01 RX ORDER — LIDOCAINE 4 G/100G
1 CREAM TOPICAL DAILY
Refills: 0 | Status: DISCONTINUED | OUTPATIENT
Start: 2020-01-01 | End: 2020-01-01

## 2020-01-01 RX ORDER — ACETAMINOPHEN 500 MG
1000 TABLET ORAL ONCE
Refills: 0 | Status: COMPLETED | OUTPATIENT
Start: 2020-01-01 | End: 2020-01-01

## 2020-01-01 RX ORDER — WARFARIN SODIUM 2.5 MG/1
2.5 TABLET ORAL ONCE
Refills: 0 | Status: COMPLETED | OUTPATIENT
Start: 2020-01-01 | End: 2020-01-01

## 2020-01-01 RX ORDER — CRIZOTINIB 20 MG/1
200 CAPSULE, COATED PELLETS ORAL ONCE
Refills: 0 | Status: COMPLETED | OUTPATIENT
Start: 2020-01-01 | End: 2020-01-01

## 2020-01-01 RX ORDER — ACETAMINOPHEN 500 MG
1000 TABLET ORAL
Refills: 0 | Status: COMPLETED | OUTPATIENT
Start: 2020-01-01 | End: 2020-01-01

## 2020-01-01 RX ORDER — CALCIUM CARBONATE 500(1250)
1 TABLET ORAL EVERY 8 HOURS
Refills: 0 | Status: DISCONTINUED | OUTPATIENT
Start: 2020-01-01 | End: 2020-01-01

## 2020-01-01 RX ORDER — WARFARIN SODIUM 2.5 MG/1
0.5 TABLET ORAL ONCE
Refills: 0 | Status: COMPLETED | OUTPATIENT
Start: 2020-01-01 | End: 2020-01-01

## 2020-01-01 RX ORDER — ALBUTEROL 90 UG/1
2 AEROSOL, METERED ORAL
Qty: 0 | Refills: 0 | DISCHARGE

## 2020-01-01 RX ORDER — ACETAMINOPHEN 500 MG
650 TABLET ORAL EVERY 6 HOURS
Refills: 0 | Status: DISCONTINUED | OUTPATIENT
Start: 2020-01-01 | End: 2020-01-01

## 2020-01-01 RX ORDER — WARFARIN SODIUM 2.5 MG/1
1 TABLET ORAL ONCE
Refills: 0 | Status: COMPLETED | OUTPATIENT
Start: 2020-01-01 | End: 2020-01-01

## 2020-01-01 RX ORDER — HEPARIN SODIUM 5000 [USP'U]/ML
3000 INJECTION INTRAVENOUS; SUBCUTANEOUS EVERY 6 HOURS
Refills: 0 | Status: DISCONTINUED | OUTPATIENT
Start: 2020-01-01 | End: 2020-01-01

## 2020-01-01 RX ORDER — HEPARIN SODIUM 5000 [USP'U]/ML
5000 INJECTION INTRAVENOUS; SUBCUTANEOUS EVERY 12 HOURS
Refills: 0 | Status: COMPLETED | OUTPATIENT
Start: 2020-01-01 | End: 2020-01-01

## 2020-01-01 RX ORDER — PANTOPRAZOLE SODIUM 20 MG/1
1 TABLET, DELAYED RELEASE ORAL
Qty: 0 | Refills: 0 | DISCHARGE
Start: 2020-01-01

## 2020-01-01 RX ORDER — HYDROMORPHONE HYDROCHLORIDE 2 MG/ML
0.2 INJECTION INTRAMUSCULAR; INTRAVENOUS; SUBCUTANEOUS
Refills: 0 | Status: DISCONTINUED | OUTPATIENT
Start: 2020-01-01 | End: 2020-01-01

## 2020-01-01 RX ORDER — OXYCODONE HYDROCHLORIDE 5 MG/1
2.5 TABLET ORAL EVERY 4 HOURS
Refills: 0 | Status: DISCONTINUED | OUTPATIENT
Start: 2020-01-01 | End: 2020-01-01

## 2020-01-01 RX ORDER — POTASSIUM CHLORIDE 20 MEQ
40 PACKET (EA) ORAL ONCE
Refills: 0 | Status: COMPLETED | OUTPATIENT
Start: 2020-01-01 | End: 2020-01-01

## 2020-01-01 RX ORDER — FAMOTIDINE 10 MG/ML
20 INJECTION INTRAVENOUS ONCE
Refills: 0 | Status: COMPLETED | OUTPATIENT
Start: 2020-01-01 | End: 2020-01-01

## 2020-01-01 RX ORDER — ALLOPURINOL 300 MG
1 TABLET ORAL
Qty: 0 | Refills: 0 | DISCHARGE
Start: 2020-01-01

## 2020-01-01 RX ORDER — MIDODRINE HYDROCHLORIDE 2.5 MG/1
10 TABLET ORAL
Refills: 0 | Status: DISCONTINUED | OUTPATIENT
Start: 2020-01-01 | End: 2020-01-01

## 2020-01-01 RX ORDER — WARFARIN SODIUM 2.5 MG/1
2 TABLET ORAL ONCE
Refills: 0 | Status: COMPLETED | OUTPATIENT
Start: 2020-01-01 | End: 2020-01-01

## 2020-01-01 RX ORDER — ONDANSETRON 8 MG/1
4 TABLET, FILM COATED ORAL ONCE
Refills: 0 | Status: DISCONTINUED | OUTPATIENT
Start: 2020-01-01 | End: 2020-01-01

## 2020-01-01 RX ORDER — ACETAMINOPHEN 325 MG/1
325 TABLET ORAL
Qty: 2 | Refills: 0 | Status: COMPLETED | OUTPATIENT
Start: 2020-01-01 | End: 2020-01-01

## 2020-01-01 RX ORDER — ALLOPURINOL 300 MG
300 TABLET ORAL
Refills: 0 | Status: DISCONTINUED | OUTPATIENT
Start: 2020-01-01 | End: 2020-01-01

## 2020-01-01 RX ORDER — CRIZOTINIB 20 MG/1
200 CAPSULE, COATED PELLETS ORAL
Refills: 0 | Status: DISCONTINUED | OUTPATIENT
Start: 2020-01-01 | End: 2020-01-01

## 2020-01-01 RX ORDER — SEVELAMER CARBONATE 2400 MG/1
800 POWDER, FOR SUSPENSION ORAL
Refills: 0 | Status: DISCONTINUED | OUTPATIENT
Start: 2020-01-01 | End: 2020-01-01

## 2020-01-01 RX ORDER — POLYETHYLENE GLYCOL 3350 17 G/17G
17 POWDER, FOR SOLUTION ORAL
Refills: 0 | Status: DISCONTINUED | OUTPATIENT
Start: 2020-01-01 | End: 2020-01-01

## 2020-01-01 RX ORDER — SODIUM CHLORIDE 9 MG/ML
250 INJECTION INTRAMUSCULAR; INTRAVENOUS; SUBCUTANEOUS ONCE
Refills: 0 | Status: COMPLETED | OUTPATIENT
Start: 2020-01-01 | End: 2020-01-01

## 2020-01-01 RX ORDER — HEPARIN SODIUM 5000 [USP'U]/ML
6500 INJECTION INTRAVENOUS; SUBCUTANEOUS EVERY 6 HOURS
Refills: 0 | Status: DISCONTINUED | OUTPATIENT
Start: 2020-01-01 | End: 2020-01-01

## 2020-01-01 RX ORDER — ONDANSETRON 8 MG/1
4 TABLET, FILM COATED ORAL EVERY 4 HOURS
Refills: 0 | Status: DISCONTINUED | OUTPATIENT
Start: 2020-01-01 | End: 2020-01-01

## 2020-01-01 RX ORDER — ENOXAPARIN SODIUM 100 MG/ML
30 INJECTION SUBCUTANEOUS EVERY 24 HOURS
Refills: 0 | Status: DISCONTINUED | OUTPATIENT
Start: 2020-01-01 | End: 2020-01-01

## 2020-01-01 RX ORDER — CRIZOTINIB 20 MG/1
1 CAPSULE, COATED PELLETS ORAL
Qty: 0 | Refills: 0 | DISCHARGE

## 2020-01-01 RX ORDER — PHYTONADIONE (VIT K1) 5 MG
10 TABLET ORAL ONCE
Refills: 0 | Status: DISCONTINUED | OUTPATIENT
Start: 2020-01-01 | End: 2020-01-01

## 2020-01-01 RX ORDER — LIDOCAINE 4 G/100G
0 CREAM TOPICAL
Qty: 0 | Refills: 0 | DISCHARGE
Start: 2020-01-01

## 2020-01-01 RX ORDER — OXYCODONE HYDROCHLORIDE 5 MG/1
2 TABLET ORAL
Qty: 0 | Refills: 0 | DISCHARGE
Start: 2020-01-01

## 2020-01-01 RX ORDER — PANTOPRAZOLE SODIUM 20 MG/1
40 TABLET, DELAYED RELEASE ORAL DAILY
Refills: 0 | Status: DISCONTINUED | OUTPATIENT
Start: 2020-01-01 | End: 2020-01-01

## 2020-01-01 RX ORDER — MAGNESIUM SULFATE 500 MG/ML
2 VIAL (ML) INJECTION ONCE
Refills: 0 | Status: DISCONTINUED | OUTPATIENT
Start: 2020-01-01 | End: 2020-01-01

## 2020-01-01 RX ORDER — PROTHROMBIN COMPLEX CONCENTRATE (HUMAN) 25.5; 16.5; 24; 22; 22; 26 [IU]/ML; [IU]/ML; [IU]/ML; [IU]/ML; [IU]/ML; [IU]/ML
1500 POWDER, FOR SOLUTION INTRAVENOUS ONCE
Refills: 0 | Status: COMPLETED | OUTPATIENT
Start: 2020-01-01 | End: 2020-01-01

## 2020-01-01 RX ORDER — OXYCODONE HYDROCHLORIDE 5 MG/1
2 TABLET ORAL EVERY 6 HOURS
Refills: 0 | Status: DISCONTINUED | OUTPATIENT
Start: 2020-01-01 | End: 2020-01-01

## 2020-01-01 RX ORDER — CEFAZOLIN SODIUM 1 G
2000 VIAL (EA) INJECTION EVERY 8 HOURS
Refills: 0 | Status: COMPLETED | OUTPATIENT
Start: 2020-01-01 | End: 2020-01-01

## 2020-01-01 RX ORDER — MIDODRINE HYDROCHLORIDE 2.5 MG/1
10 TABLET ORAL THREE TIMES A DAY
Refills: 0 | Status: DISCONTINUED | OUTPATIENT
Start: 2020-01-01 | End: 2020-01-01

## 2020-01-01 RX ORDER — MIDODRINE HYDROCHLORIDE 2.5 MG/1
10 TABLET ORAL EVERY 8 HOURS
Refills: 0 | Status: DISCONTINUED | OUTPATIENT
Start: 2020-01-01 | End: 2020-01-01

## 2020-01-01 RX ORDER — MIDODRINE HYDROCHLORIDE 2.5 MG/1
1 TABLET ORAL
Qty: 0 | Refills: 0 | DISCHARGE
Start: 2020-01-01

## 2020-01-01 RX ORDER — HEPARIN SODIUM 5000 [USP'U]/ML
5000 INJECTION INTRAVENOUS; SUBCUTANEOUS ONCE
Refills: 0 | Status: COMPLETED | OUTPATIENT
Start: 2020-01-01 | End: 2020-01-01

## 2020-01-01 RX ORDER — WARFARIN SODIUM 2.5 MG/1
5 TABLET ORAL ONCE
Refills: 0 | Status: COMPLETED | OUTPATIENT
Start: 2020-01-01 | End: 2020-01-01

## 2020-01-01 RX ORDER — ALLOPURINOL 300 MG
300 TABLET ORAL DAILY
Refills: 0 | Status: DISCONTINUED | OUTPATIENT
Start: 2020-01-01 | End: 2020-01-01

## 2020-01-01 RX ORDER — ERYTHROPOIETIN 10000 [IU]/ML
3000 INJECTION, SOLUTION INTRAVENOUS; SUBCUTANEOUS
Refills: 0 | Status: DISCONTINUED | OUTPATIENT
Start: 2020-01-01 | End: 2020-01-01

## 2020-01-01 RX ORDER — PANTOPRAZOLE SODIUM 20 MG/1
40 TABLET, DELAYED RELEASE ORAL ONCE
Refills: 0 | Status: COMPLETED | OUTPATIENT
Start: 2020-01-01 | End: 2020-01-01

## 2020-01-01 RX ORDER — SENNA PLUS 8.6 MG/1
2 TABLET ORAL
Qty: 0 | Refills: 0 | DISCHARGE
Start: 2020-01-01

## 2020-01-01 RX ORDER — CHLORHEXIDINE GLUCONATE 213 G/1000ML
1 SOLUTION TOPICAL DAILY
Refills: 0 | Status: DISCONTINUED | OUTPATIENT
Start: 2020-01-01 | End: 2020-01-01

## 2020-01-01 RX ORDER — MORPHINE SULFATE 50 MG/1
2 CAPSULE, EXTENDED RELEASE ORAL ONCE
Refills: 0 | Status: DISCONTINUED | OUTPATIENT
Start: 2020-01-01 | End: 2020-01-01

## 2020-01-01 RX ORDER — CALCIUM CARBONATE 500(1250)
1 TABLET ORAL ONCE
Refills: 0 | Status: COMPLETED | OUTPATIENT
Start: 2020-01-01 | End: 2020-01-01

## 2020-01-01 RX ORDER — SENNA PLUS 8.6 MG/1
2 TABLET ORAL AT BEDTIME
Refills: 0 | Status: DISCONTINUED | OUTPATIENT
Start: 2020-01-01 | End: 2020-01-01

## 2020-01-01 RX ORDER — CALCIUM CARBONATE 500(1250)
1 TABLET ORAL
Qty: 0 | Refills: 0 | DISCHARGE
Start: 2020-01-01

## 2020-01-01 RX ORDER — ACETAMINOPHEN 500 MG
650 TABLET ORAL EVERY 6 HOURS
Refills: 0 | Status: COMPLETED | OUTPATIENT
Start: 2020-01-01 | End: 2020-01-01

## 2020-01-01 RX ORDER — SEVELAMER CARBONATE 2400 MG/1
3 POWDER, FOR SUSPENSION ORAL
Qty: 0 | Refills: 0 | DISCHARGE

## 2020-01-01 RX ORDER — PANTOPRAZOLE SODIUM 20 MG/1
40 TABLET, DELAYED RELEASE ORAL
Refills: 0 | Status: DISCONTINUED | OUTPATIENT
Start: 2020-01-01 | End: 2020-01-01

## 2020-01-01 RX ORDER — SENNA PLUS 8.6 MG/1
1 TABLET ORAL AT BEDTIME
Refills: 0 | Status: DISCONTINUED | OUTPATIENT
Start: 2020-01-01 | End: 2020-01-01

## 2020-01-01 RX ORDER — POLYETHYLENE GLYCOL 3350 17 G/17G
17 POWDER, FOR SOLUTION ORAL EVERY 12 HOURS
Refills: 0 | Status: DISCONTINUED | OUTPATIENT
Start: 2020-01-01 | End: 2020-01-01

## 2020-01-01 RX ORDER — ACETAMINOPHEN 500 MG
975 TABLET ORAL EVERY 8 HOURS
Refills: 0 | Status: DISCONTINUED | OUTPATIENT
Start: 2020-01-01 | End: 2020-01-01

## 2020-01-01 RX ORDER — WARFARIN SODIUM 2.5 MG/1
1 TABLET ORAL
Qty: 0 | Refills: 0 | DISCHARGE

## 2020-01-01 RX ORDER — PHYTONADIONE (VIT K1) 5 MG
10 TABLET ORAL ONCE
Refills: 0 | Status: COMPLETED | OUTPATIENT
Start: 2020-01-01 | End: 2020-01-01

## 2020-01-01 RX ORDER — WARFARIN SODIUM 2.5 MG/1
2 TABLET ORAL ONCE
Refills: 0 | Status: DISCONTINUED | OUTPATIENT
Start: 2020-01-01 | End: 2020-01-01

## 2020-01-01 RX ORDER — POTASSIUM CHLORIDE 20 MEQ
10 PACKET (EA) ORAL ONCE
Refills: 0 | Status: DISCONTINUED | OUTPATIENT
Start: 2020-01-01 | End: 2020-01-01

## 2020-01-01 RX ORDER — ACETAMINOPHEN 500 MG
2 TABLET ORAL
Qty: 0 | Refills: 0 | DISCHARGE
Start: 2020-01-01

## 2020-01-01 RX ORDER — HEPARIN SODIUM 5000 [USP'U]/ML
INJECTION INTRAVENOUS; SUBCUTANEOUS
Qty: 25000 | Refills: 0 | Status: DISCONTINUED | OUTPATIENT
Start: 2020-01-01 | End: 2020-01-01

## 2020-01-01 RX ORDER — HYDROMORPHONE HYDROCHLORIDE 2 MG/ML
0.25 INJECTION INTRAMUSCULAR; INTRAVENOUS; SUBCUTANEOUS
Refills: 0 | Status: DISCONTINUED | OUTPATIENT
Start: 2020-01-01 | End: 2020-01-01

## 2020-01-01 RX ORDER — OXYCODONE HYDROCHLORIDE 5 MG/1
5 TABLET ORAL EVERY 4 HOURS
Refills: 0 | Status: DISCONTINUED | OUTPATIENT
Start: 2020-01-01 | End: 2020-01-01

## 2020-01-01 RX ORDER — MIDODRINE HYDROCHLORIDE 2.5 MG/1
10 TABLET ORAL ONCE
Refills: 0 | Status: COMPLETED | OUTPATIENT
Start: 2020-01-01 | End: 2020-01-01

## 2020-01-01 RX ORDER — CHLORHEXIDINE GLUCONATE 213 G/1000ML
1 SOLUTION TOPICAL ONCE
Refills: 0 | Status: COMPLETED | OUTPATIENT
Start: 2020-01-01 | End: 2020-01-01

## 2020-01-01 RX ADMIN — LIDOCAINE 1 PATCH: 4 CREAM TOPICAL at 12:08

## 2020-01-01 RX ADMIN — LIDOCAINE 1 PATCH: 4 CREAM TOPICAL at 19:23

## 2020-01-01 RX ADMIN — SEVELAMER CARBONATE 800 MILLIGRAM(S): 2400 POWDER, FOR SUSPENSION ORAL at 09:15

## 2020-01-01 RX ADMIN — Medication 10 MILLIGRAM(S): at 20:51

## 2020-01-01 RX ADMIN — HEPARIN SODIUM 1400 UNIT(S)/HR: 5000 INJECTION INTRAVENOUS; SUBCUTANEOUS at 16:24

## 2020-01-01 RX ADMIN — WARFARIN SODIUM 0.5 MILLIGRAM(S): 2.5 TABLET ORAL at 21:21

## 2020-01-01 RX ADMIN — Medication 1 DROP(S): at 12:26

## 2020-01-01 RX ADMIN — LIDOCAINE 1 PATCH: 4 CREAM TOPICAL at 01:02

## 2020-01-01 RX ADMIN — Medication 650 MILLIGRAM(S): at 05:45

## 2020-01-01 RX ADMIN — SEVELAMER CARBONATE 800 MILLIGRAM(S): 2400 POWDER, FOR SUSPENSION ORAL at 08:51

## 2020-01-01 RX ADMIN — SENNA PLUS 2 TABLET(S): 8.6 TABLET ORAL at 23:23

## 2020-01-01 RX ADMIN — Medication 1000 MILLIGRAM(S): at 20:45

## 2020-01-01 RX ADMIN — Medication 1 DROP(S): at 22:07

## 2020-01-01 RX ADMIN — WARFARIN SODIUM 2 MILLIGRAM(S): 2.5 TABLET ORAL at 22:05

## 2020-01-01 RX ADMIN — Medication 1 TABLET(S): at 01:27

## 2020-01-01 RX ADMIN — LIDOCAINE 1 PATCH: 4 CREAM TOPICAL at 19:59

## 2020-01-01 RX ADMIN — SENNA PLUS 2 TABLET(S): 8.6 TABLET ORAL at 23:30

## 2020-01-01 RX ADMIN — Medication 1000 MILLIGRAM(S): at 14:17

## 2020-01-01 RX ADMIN — CRIZOTINIB 200 MILLIGRAM(S): 20 CAPSULE, COATED PELLETS ORAL at 22:42

## 2020-01-01 RX ADMIN — Medication 650 MILLIGRAM(S): at 06:28

## 2020-01-01 RX ADMIN — Medication 1 DROP(S): at 13:01

## 2020-01-01 RX ADMIN — Medication 650 MILLIGRAM(S): at 17:06

## 2020-01-01 RX ADMIN — Medication 1 DROP(S): at 21:04

## 2020-01-01 RX ADMIN — Medication 650 MILLIGRAM(S): at 18:38

## 2020-01-01 RX ADMIN — Medication 1 DROP(S): at 12:44

## 2020-01-01 RX ADMIN — SEVELAMER CARBONATE 800 MILLIGRAM(S): 2400 POWDER, FOR SUSPENSION ORAL at 12:44

## 2020-01-01 RX ADMIN — CRIZOTINIB 200 MILLIGRAM(S): 20 CAPSULE, COATED PELLETS ORAL at 23:00

## 2020-01-01 RX ADMIN — MIDODRINE HYDROCHLORIDE 10 MILLIGRAM(S): 2.5 TABLET ORAL at 06:38

## 2020-01-01 RX ADMIN — LIDOCAINE 1 PATCH: 4 CREAM TOPICAL at 19:00

## 2020-01-01 RX ADMIN — HYDROMORPHONE HYDROCHLORIDE 0.3 MILLIGRAM(S): 2 INJECTION INTRAMUSCULAR; INTRAVENOUS; SUBCUTANEOUS at 18:17

## 2020-01-01 RX ADMIN — Medication 1 DROP(S): at 22:06

## 2020-01-01 RX ADMIN — SEVELAMER CARBONATE 800 MILLIGRAM(S): 2400 POWDER, FOR SUSPENSION ORAL at 08:48

## 2020-01-01 RX ADMIN — Medication 1 DROP(S): at 00:00

## 2020-01-01 RX ADMIN — WARFARIN SODIUM 5 MILLIGRAM(S): 2.5 TABLET ORAL at 21:17

## 2020-01-01 RX ADMIN — Medication 10 MILLIGRAM(S): at 12:10

## 2020-01-01 RX ADMIN — PROTHROMBIN COMPLEX CONCENTRATE (HUMAN) 400 INTERNATIONAL UNIT(S): 25.5; 16.5; 24; 22; 22; 26 POWDER, FOR SOLUTION INTRAVENOUS at 20:55

## 2020-01-01 RX ADMIN — Medication 650 MILLIGRAM(S): at 19:15

## 2020-01-01 RX ADMIN — MIDODRINE HYDROCHLORIDE 10 MILLIGRAM(S): 2.5 TABLET ORAL at 09:10

## 2020-01-01 RX ADMIN — SEVELAMER CARBONATE 800 MILLIGRAM(S): 2400 POWDER, FOR SUSPENSION ORAL at 08:57

## 2020-01-01 RX ADMIN — MIDODRINE HYDROCHLORIDE 10 MILLIGRAM(S): 2.5 TABLET ORAL at 12:16

## 2020-01-01 RX ADMIN — SEVELAMER CARBONATE 800 MILLIGRAM(S): 2400 POWDER, FOR SUSPENSION ORAL at 13:00

## 2020-01-01 RX ADMIN — Medication 10 MILLIGRAM(S): at 12:08

## 2020-01-01 RX ADMIN — CHLORHEXIDINE GLUCONATE 1 APPLICATION(S): 213 SOLUTION TOPICAL at 08:59

## 2020-01-01 RX ADMIN — Medication 1 TABLET(S): at 23:35

## 2020-01-01 RX ADMIN — LIDOCAINE 1 PATCH: 4 CREAM TOPICAL at 19:15

## 2020-01-01 RX ADMIN — MIDODRINE HYDROCHLORIDE 10 MILLIGRAM(S): 2.5 TABLET ORAL at 05:35

## 2020-01-01 RX ADMIN — CRIZOTINIB 200 MILLIGRAM(S): 20 CAPSULE, COATED PELLETS ORAL at 21:05

## 2020-01-01 RX ADMIN — PANTOPRAZOLE SODIUM 40 MILLIGRAM(S): 20 TABLET, DELAYED RELEASE ORAL at 05:54

## 2020-01-01 RX ADMIN — CRIZOTINIB 200 MILLIGRAM(S): 20 CAPSULE, COATED PELLETS ORAL at 08:55

## 2020-01-01 RX ADMIN — Medication 650 MILLIGRAM(S): at 00:28

## 2020-01-01 RX ADMIN — POLYETHYLENE GLYCOL 3350 17 GRAM(S): 17 POWDER, FOR SOLUTION ORAL at 21:24

## 2020-01-01 RX ADMIN — Medication 1 DROP(S): at 13:19

## 2020-01-01 RX ADMIN — Medication 650 MILLIGRAM(S): at 04:13

## 2020-01-01 RX ADMIN — HYDROMORPHONE HYDROCHLORIDE 0.2 MILLIGRAM(S): 2 INJECTION INTRAMUSCULAR; INTRAVENOUS; SUBCUTANEOUS at 15:45

## 2020-01-01 RX ADMIN — Medication 650 MILLIGRAM(S): at 23:46

## 2020-01-01 RX ADMIN — CRIZOTINIB 200 MILLIGRAM(S): 20 CAPSULE, COATED PELLETS ORAL at 09:32

## 2020-01-01 RX ADMIN — CRIZOTINIB 200 MILLIGRAM(S): 20 CAPSULE, COATED PELLETS ORAL at 12:39

## 2020-01-01 RX ADMIN — Medication 300 MILLIGRAM(S): at 17:43

## 2020-01-01 RX ADMIN — SEVELAMER CARBONATE 800 MILLIGRAM(S): 2400 POWDER, FOR SUSPENSION ORAL at 18:39

## 2020-01-01 RX ADMIN — LIDOCAINE 1 PATCH: 4 CREAM TOPICAL at 12:54

## 2020-01-01 RX ADMIN — SEVELAMER CARBONATE 800 MILLIGRAM(S): 2400 POWDER, FOR SUSPENSION ORAL at 07:58

## 2020-01-01 RX ADMIN — POLYETHYLENE GLYCOL 3350 17 GRAM(S): 17 POWDER, FOR SOLUTION ORAL at 12:08

## 2020-01-01 RX ADMIN — WARFARIN SODIUM 1 MILLIGRAM(S): 2.5 TABLET ORAL at 21:38

## 2020-01-01 RX ADMIN — CRIZOTINIB 200 MILLIGRAM(S): 20 CAPSULE, COATED PELLETS ORAL at 23:02

## 2020-01-01 RX ADMIN — SEVELAMER CARBONATE 800 MILLIGRAM(S): 2400 POWDER, FOR SUSPENSION ORAL at 13:29

## 2020-01-01 RX ADMIN — POLYETHYLENE GLYCOL 3350 17 GRAM(S): 17 POWDER, FOR SOLUTION ORAL at 22:39

## 2020-01-01 RX ADMIN — Medication 650 MILLIGRAM(S): at 23:44

## 2020-01-01 RX ADMIN — Medication 1000 MILLIGRAM(S): at 13:31

## 2020-01-01 RX ADMIN — MIDODRINE HYDROCHLORIDE 10 MILLIGRAM(S): 2.5 TABLET ORAL at 13:01

## 2020-01-01 RX ADMIN — POLYETHYLENE GLYCOL 3350 17 GRAM(S): 17 POWDER, FOR SOLUTION ORAL at 15:26

## 2020-01-01 RX ADMIN — CRIZOTINIB 200 MILLIGRAM(S): 20 CAPSULE, COATED PELLETS ORAL at 09:03

## 2020-01-01 RX ADMIN — Medication 1 DROP(S): at 12:47

## 2020-01-01 RX ADMIN — Medication 400 MILLIGRAM(S): at 01:04

## 2020-01-01 RX ADMIN — Medication 30 MILLILITER(S): at 21:56

## 2020-01-01 RX ADMIN — Medication 1 DROP(S): at 16:24

## 2020-01-01 RX ADMIN — Medication 300 MILLIGRAM(S): at 18:29

## 2020-01-01 RX ADMIN — POLYETHYLENE GLYCOL 3350 17 GRAM(S): 17 POWDER, FOR SOLUTION ORAL at 05:39

## 2020-01-01 RX ADMIN — Medication 650 MILLIGRAM(S): at 13:08

## 2020-01-01 RX ADMIN — Medication 40 MILLIEQUIVALENT(S): at 12:55

## 2020-01-01 RX ADMIN — SEVELAMER CARBONATE 800 MILLIGRAM(S): 2400 POWDER, FOR SUSPENSION ORAL at 09:30

## 2020-01-01 RX ADMIN — Medication 1 DROP(S): at 12:17

## 2020-01-01 RX ADMIN — WARFARIN SODIUM 2 MILLIGRAM(S): 2.5 TABLET ORAL at 21:04

## 2020-01-01 RX ADMIN — Medication 650 MILLIGRAM(S): at 13:42

## 2020-01-01 RX ADMIN — HEPARIN SODIUM 1400 UNIT(S)/HR: 5000 INJECTION INTRAVENOUS; SUBCUTANEOUS at 10:38

## 2020-01-01 RX ADMIN — Medication 1 TABLET(S): at 13:06

## 2020-01-01 RX ADMIN — SEVELAMER CARBONATE 800 MILLIGRAM(S): 2400 POWDER, FOR SUSPENSION ORAL at 12:41

## 2020-01-01 RX ADMIN — CRIZOTINIB 200 MILLIGRAM(S): 20 CAPSULE, COATED PELLETS ORAL at 22:08

## 2020-01-01 RX ADMIN — CRIZOTINIB 200 MILLIGRAM(S): 20 CAPSULE, COATED PELLETS ORAL at 09:29

## 2020-01-01 RX ADMIN — SENNA PLUS 2 TABLET(S): 8.6 TABLET ORAL at 22:00

## 2020-01-01 RX ADMIN — Medication 650 MILLIGRAM(S): at 14:30

## 2020-01-01 RX ADMIN — HEPARIN SODIUM 1600 UNIT(S)/HR: 5000 INJECTION INTRAVENOUS; SUBCUTANEOUS at 20:27

## 2020-01-01 RX ADMIN — PANTOPRAZOLE SODIUM 40 MILLIGRAM(S): 20 TABLET, DELAYED RELEASE ORAL at 10:16

## 2020-01-01 RX ADMIN — CHLORHEXIDINE GLUCONATE 1 APPLICATION(S): 213 SOLUTION TOPICAL at 05:40

## 2020-01-01 RX ADMIN — LIDOCAINE 1 PATCH: 4 CREAM TOPICAL at 03:20

## 2020-01-01 RX ADMIN — CHLORHEXIDINE GLUCONATE 1 APPLICATION(S): 213 SOLUTION TOPICAL at 04:27

## 2020-01-01 RX ADMIN — SEVELAMER CARBONATE 800 MILLIGRAM(S): 2400 POWDER, FOR SUSPENSION ORAL at 17:42

## 2020-01-01 RX ADMIN — CRIZOTINIB 200 MILLIGRAM(S): 20 CAPSULE, COATED PELLETS ORAL at 09:31

## 2020-01-01 RX ADMIN — CRIZOTINIB 200 MILLIGRAM(S): 20 CAPSULE, COATED PELLETS ORAL at 12:00

## 2020-01-01 RX ADMIN — Medication 650 MILLIGRAM(S): at 21:24

## 2020-01-01 RX ADMIN — Medication 1 DROP(S): at 17:58

## 2020-01-01 RX ADMIN — CHLORHEXIDINE GLUCONATE 1 APPLICATION(S): 213 SOLUTION TOPICAL at 05:47

## 2020-01-01 RX ADMIN — CRIZOTINIB 200 MILLIGRAM(S): 20 CAPSULE, COATED PELLETS ORAL at 21:17

## 2020-01-01 RX ADMIN — Medication 400 MILLIGRAM(S): at 14:02

## 2020-01-01 RX ADMIN — Medication 1 DROP(S): at 23:25

## 2020-01-01 RX ADMIN — MIDODRINE HYDROCHLORIDE 10 MILLIGRAM(S): 2.5 TABLET ORAL at 17:27

## 2020-01-01 RX ADMIN — OXYCODONE HYDROCHLORIDE 5 MILLIGRAM(S): 5 TABLET ORAL at 09:11

## 2020-01-01 RX ADMIN — Medication 650 MILLIGRAM(S): at 12:55

## 2020-01-01 RX ADMIN — POLYETHYLENE GLYCOL 3350 17 GRAM(S): 17 POWDER, FOR SOLUTION ORAL at 11:50

## 2020-01-01 RX ADMIN — MIDODRINE HYDROCHLORIDE 10 MILLIGRAM(S): 2.5 TABLET ORAL at 17:18

## 2020-01-01 RX ADMIN — CRIZOTINIB 200 MILLIGRAM(S): 20 CAPSULE, COATED PELLETS ORAL at 05:52

## 2020-01-01 RX ADMIN — Medication 1 DROP(S): at 12:41

## 2020-01-01 RX ADMIN — Medication 1 TABLET(S): at 20:09

## 2020-01-01 RX ADMIN — CHLORHEXIDINE GLUCONATE 1 APPLICATION(S): 213 SOLUTION TOPICAL at 05:59

## 2020-01-01 RX ADMIN — CHLORHEXIDINE GLUCONATE 1 APPLICATION(S): 213 SOLUTION TOPICAL at 05:06

## 2020-01-01 RX ADMIN — MIDODRINE HYDROCHLORIDE 10 MILLIGRAM(S): 2.5 TABLET ORAL at 08:22

## 2020-01-01 RX ADMIN — SEVELAMER CARBONATE 800 MILLIGRAM(S): 2400 POWDER, FOR SUSPENSION ORAL at 07:56

## 2020-01-01 RX ADMIN — LIDOCAINE 1 PATCH: 4 CREAM TOPICAL at 01:00

## 2020-01-01 RX ADMIN — LIDOCAINE 1 PATCH: 4 CREAM TOPICAL at 23:46

## 2020-01-01 RX ADMIN — OXYCODONE HYDROCHLORIDE 2 MILLIGRAM(S): 5 TABLET ORAL at 16:02

## 2020-01-01 RX ADMIN — HEPARIN SODIUM 1300 UNIT(S)/HR: 5000 INJECTION INTRAVENOUS; SUBCUTANEOUS at 05:36

## 2020-01-01 RX ADMIN — CRIZOTINIB 200 MILLIGRAM(S): 20 CAPSULE, COATED PELLETS ORAL at 09:10

## 2020-01-01 RX ADMIN — Medication 1 DROP(S): at 10:05

## 2020-01-01 RX ADMIN — POLYETHYLENE GLYCOL 3350 17 GRAM(S): 17 POWDER, FOR SOLUTION ORAL at 04:13

## 2020-01-01 RX ADMIN — SEVELAMER CARBONATE 800 MILLIGRAM(S): 2400 POWDER, FOR SUSPENSION ORAL at 13:07

## 2020-01-01 RX ADMIN — Medication 1 DROP(S): at 12:38

## 2020-01-01 RX ADMIN — MORPHINE SULFATE 2 MILLIGRAM(S): 50 CAPSULE, EXTENDED RELEASE ORAL at 17:20

## 2020-01-01 RX ADMIN — LIDOCAINE 1 PATCH: 4 CREAM TOPICAL at 19:35

## 2020-01-01 RX ADMIN — MIDODRINE HYDROCHLORIDE 10 MILLIGRAM(S): 2.5 TABLET ORAL at 18:13

## 2020-01-01 RX ADMIN — PANTOPRAZOLE SODIUM 40 MILLIGRAM(S): 20 TABLET, DELAYED RELEASE ORAL at 05:26

## 2020-01-01 RX ADMIN — LIDOCAINE 1 PATCH: 4 CREAM TOPICAL at 19:53

## 2020-01-01 RX ADMIN — SEVELAMER CARBONATE 800 MILLIGRAM(S): 2400 POWDER, FOR SUSPENSION ORAL at 16:25

## 2020-01-01 RX ADMIN — SEVELAMER CARBONATE 800 MILLIGRAM(S): 2400 POWDER, FOR SUSPENSION ORAL at 17:53

## 2020-01-01 RX ADMIN — Medication 1 DROP(S): at 10:17

## 2020-01-01 RX ADMIN — WARFARIN SODIUM 2.5 MILLIGRAM(S): 2.5 TABLET ORAL at 01:16

## 2020-01-01 RX ADMIN — Medication 5 MILLIGRAM(S): at 21:27

## 2020-01-01 RX ADMIN — LIDOCAINE 1 PATCH: 4 CREAM TOPICAL at 16:08

## 2020-01-01 RX ADMIN — Medication 650 MILLIGRAM(S): at 02:43

## 2020-01-01 RX ADMIN — PANTOPRAZOLE SODIUM 40 MILLIGRAM(S): 20 TABLET, DELAYED RELEASE ORAL at 05:07

## 2020-01-01 RX ADMIN — PANTOPRAZOLE SODIUM 40 MILLIGRAM(S): 20 TABLET, DELAYED RELEASE ORAL at 07:06

## 2020-01-01 RX ADMIN — PANTOPRAZOLE SODIUM 40 MILLIGRAM(S): 20 TABLET, DELAYED RELEASE ORAL at 05:25

## 2020-01-01 RX ADMIN — CRIZOTINIB 200 MILLIGRAM(S): 20 CAPSULE, COATED PELLETS ORAL at 23:15

## 2020-01-01 RX ADMIN — SEVELAMER CARBONATE 800 MILLIGRAM(S): 2400 POWDER, FOR SUSPENSION ORAL at 17:35

## 2020-01-01 RX ADMIN — Medication 650 MILLIGRAM(S): at 14:00

## 2020-01-01 RX ADMIN — Medication 1 DROP(S): at 05:51

## 2020-01-01 RX ADMIN — CRIZOTINIB 200 MILLIGRAM(S): 20 CAPSULE, COATED PELLETS ORAL at 11:29

## 2020-01-01 RX ADMIN — CHLORHEXIDINE GLUCONATE 1 APPLICATION(S): 213 SOLUTION TOPICAL at 05:54

## 2020-01-01 RX ADMIN — CRIZOTINIB 200 MILLIGRAM(S): 20 CAPSULE, COATED PELLETS ORAL at 22:22

## 2020-01-01 RX ADMIN — SENNA PLUS 2 TABLET(S): 8.6 TABLET ORAL at 21:32

## 2020-01-01 RX ADMIN — Medication 1 DROP(S): at 12:07

## 2020-01-01 RX ADMIN — CRIZOTINIB 200 MILLIGRAM(S): 20 CAPSULE, COATED PELLETS ORAL at 09:13

## 2020-01-01 RX ADMIN — WARFARIN SODIUM 2.5 MILLIGRAM(S): 2.5 TABLET ORAL at 21:10

## 2020-01-01 RX ADMIN — Medication 650 MILLIGRAM(S): at 06:01

## 2020-01-01 RX ADMIN — ENOXAPARIN SODIUM 30 MILLIGRAM(S): 100 INJECTION SUBCUTANEOUS at 05:37

## 2020-01-01 RX ADMIN — Medication 1000 MILLIGRAM(S): at 09:50

## 2020-01-01 RX ADMIN — LIDOCAINE 1 PATCH: 4 CREAM TOPICAL at 12:43

## 2020-01-01 RX ADMIN — SENNA PLUS 2 TABLET(S): 8.6 TABLET ORAL at 22:47

## 2020-01-01 RX ADMIN — LIDOCAINE 1 PATCH: 4 CREAM TOPICAL at 12:53

## 2020-01-01 RX ADMIN — PANTOPRAZOLE SODIUM 40 MILLIGRAM(S): 20 TABLET, DELAYED RELEASE ORAL at 06:35

## 2020-01-01 RX ADMIN — WARFARIN SODIUM 2.5 MILLIGRAM(S): 2.5 TABLET ORAL at 22:47

## 2020-01-01 RX ADMIN — SEVELAMER CARBONATE 800 MILLIGRAM(S): 2400 POWDER, FOR SUSPENSION ORAL at 09:10

## 2020-01-01 RX ADMIN — Medication 300 MILLIGRAM(S): at 17:52

## 2020-01-01 RX ADMIN — SODIUM CHLORIDE 250 MILLILITER(S): 9 INJECTION INTRAMUSCULAR; INTRAVENOUS; SUBCUTANEOUS at 14:10

## 2020-01-01 RX ADMIN — SEVELAMER CARBONATE 800 MILLIGRAM(S): 2400 POWDER, FOR SUSPENSION ORAL at 12:38

## 2020-01-01 RX ADMIN — CHLORHEXIDINE GLUCONATE 1 APPLICATION(S): 213 SOLUTION TOPICAL at 06:00

## 2020-01-01 RX ADMIN — HEPARIN SODIUM 1400 UNIT(S)/HR: 5000 INJECTION INTRAVENOUS; SUBCUTANEOUS at 08:18

## 2020-01-01 RX ADMIN — SEVELAMER CARBONATE 800 MILLIGRAM(S): 2400 POWDER, FOR SUSPENSION ORAL at 13:42

## 2020-01-01 RX ADMIN — WARFARIN SODIUM 2 MILLIGRAM(S): 2.5 TABLET ORAL at 21:14

## 2020-01-01 RX ADMIN — SEVELAMER CARBONATE 800 MILLIGRAM(S): 2400 POWDER, FOR SUSPENSION ORAL at 08:56

## 2020-01-01 RX ADMIN — LIDOCAINE 1 PATCH: 4 CREAM TOPICAL at 12:38

## 2020-01-01 RX ADMIN — MIDODRINE HYDROCHLORIDE 10 MILLIGRAM(S): 2.5 TABLET ORAL at 12:41

## 2020-01-01 RX ADMIN — SEVELAMER CARBONATE 800 MILLIGRAM(S): 2400 POWDER, FOR SUSPENSION ORAL at 12:24

## 2020-01-01 RX ADMIN — MIDODRINE HYDROCHLORIDE 10 MILLIGRAM(S): 2.5 TABLET ORAL at 18:11

## 2020-01-01 RX ADMIN — Medication 100 MILLIGRAM(S): at 02:14

## 2020-01-01 RX ADMIN — LIDOCAINE 1 PATCH: 4 CREAM TOPICAL at 18:27

## 2020-01-01 RX ADMIN — SEVELAMER CARBONATE 800 MILLIGRAM(S): 2400 POWDER, FOR SUSPENSION ORAL at 13:11

## 2020-01-01 RX ADMIN — SEVELAMER CARBONATE 800 MILLIGRAM(S): 2400 POWDER, FOR SUSPENSION ORAL at 09:24

## 2020-01-01 RX ADMIN — LIDOCAINE 1 PATCH: 4 CREAM TOPICAL at 00:18

## 2020-01-01 RX ADMIN — Medication 5 MILLIGRAM(S): at 06:34

## 2020-01-01 RX ADMIN — MIDODRINE HYDROCHLORIDE 10 MILLIGRAM(S): 2.5 TABLET ORAL at 18:56

## 2020-01-01 RX ADMIN — POLYETHYLENE GLYCOL 3350 17 GRAM(S): 17 POWDER, FOR SOLUTION ORAL at 09:24

## 2020-01-01 RX ADMIN — MIDODRINE HYDROCHLORIDE 10 MILLIGRAM(S): 2.5 TABLET ORAL at 05:57

## 2020-01-01 RX ADMIN — POLYETHYLENE GLYCOL 3350 17 GRAM(S): 17 POWDER, FOR SOLUTION ORAL at 17:18

## 2020-01-01 RX ADMIN — CRIZOTINIB 200 MILLIGRAM(S): 20 CAPSULE, COATED PELLETS ORAL at 07:59

## 2020-01-01 RX ADMIN — SEVELAMER CARBONATE 800 MILLIGRAM(S): 2400 POWDER, FOR SUSPENSION ORAL at 17:28

## 2020-01-01 RX ADMIN — SEVELAMER CARBONATE 800 MILLIGRAM(S): 2400 POWDER, FOR SUSPENSION ORAL at 07:47

## 2020-01-01 RX ADMIN — LIDOCAINE 1 PATCH: 4 CREAM TOPICAL at 13:16

## 2020-01-01 RX ADMIN — LIDOCAINE 1 PATCH: 4 CREAM TOPICAL at 00:48

## 2020-01-01 RX ADMIN — SEVELAMER CARBONATE 800 MILLIGRAM(S): 2400 POWDER, FOR SUSPENSION ORAL at 12:54

## 2020-01-01 RX ADMIN — SEVELAMER CARBONATE 800 MILLIGRAM(S): 2400 POWDER, FOR SUSPENSION ORAL at 21:56

## 2020-01-01 RX ADMIN — MIDODRINE HYDROCHLORIDE 10 MILLIGRAM(S): 2.5 TABLET ORAL at 17:52

## 2020-01-01 RX ADMIN — CRIZOTINIB 200 MILLIGRAM(S): 20 CAPSULE, COATED PELLETS ORAL at 23:36

## 2020-01-01 RX ADMIN — Medication 5 MILLIGRAM(S): at 00:33

## 2020-01-01 RX ADMIN — PANTOPRAZOLE SODIUM 40 MILLIGRAM(S): 20 TABLET, DELAYED RELEASE ORAL at 05:35

## 2020-01-01 RX ADMIN — Medication 1 TABLET(S): at 17:57

## 2020-01-01 RX ADMIN — CRIZOTINIB 200 MILLIGRAM(S): 20 CAPSULE, COATED PELLETS ORAL at 19:59

## 2020-01-01 RX ADMIN — LIDOCAINE 1 PATCH: 4 CREAM TOPICAL at 19:20

## 2020-01-01 RX ADMIN — Medication 1 DROP(S): at 22:52

## 2020-01-01 RX ADMIN — Medication 400 MILLIGRAM(S): at 09:23

## 2020-01-01 RX ADMIN — SEVELAMER CARBONATE 800 MILLIGRAM(S): 2400 POWDER, FOR SUSPENSION ORAL at 09:29

## 2020-01-01 RX ADMIN — Medication 650 MILLIGRAM(S): at 07:00

## 2020-01-01 RX ADMIN — Medication 1 TABLET(S): at 00:03

## 2020-01-01 RX ADMIN — MIDODRINE HYDROCHLORIDE 10 MILLIGRAM(S): 2.5 TABLET ORAL at 12:55

## 2020-01-01 RX ADMIN — Medication 650 MILLIGRAM(S): at 08:52

## 2020-01-01 RX ADMIN — CHLORHEXIDINE GLUCONATE 1 APPLICATION(S): 213 SOLUTION TOPICAL at 05:35

## 2020-01-01 RX ADMIN — Medication 1 DROP(S): at 00:02

## 2020-01-01 RX ADMIN — HEPARIN SODIUM 0 UNIT(S)/HR: 5000 INJECTION INTRAVENOUS; SUBCUTANEOUS at 04:36

## 2020-01-01 RX ADMIN — MIDODRINE HYDROCHLORIDE 10 MILLIGRAM(S): 2.5 TABLET ORAL at 08:13

## 2020-01-01 RX ADMIN — Medication 650 MILLIGRAM(S): at 18:00

## 2020-01-01 RX ADMIN — Medication 1000 MILLIGRAM(S): at 06:57

## 2020-01-01 RX ADMIN — MIDODRINE HYDROCHLORIDE 10 MILLIGRAM(S): 2.5 TABLET ORAL at 06:01

## 2020-01-01 RX ADMIN — Medication 1000 MILLIGRAM(S): at 04:00

## 2020-01-01 RX ADMIN — MIDODRINE HYDROCHLORIDE 10 MILLIGRAM(S): 2.5 TABLET ORAL at 09:24

## 2020-01-01 RX ADMIN — SEVELAMER CARBONATE 800 MILLIGRAM(S): 2400 POWDER, FOR SUSPENSION ORAL at 13:16

## 2020-01-01 RX ADMIN — LIDOCAINE 1 PATCH: 4 CREAM TOPICAL at 12:15

## 2020-01-01 RX ADMIN — Medication 300 MILLIGRAM(S): at 19:09

## 2020-01-01 RX ADMIN — CHLORHEXIDINE GLUCONATE 1 APPLICATION(S): 213 SOLUTION TOPICAL at 05:17

## 2020-01-01 RX ADMIN — Medication 1 DROP(S): at 12:55

## 2020-01-01 RX ADMIN — CRIZOTINIB 200 MILLIGRAM(S): 20 CAPSULE, COATED PELLETS ORAL at 11:54

## 2020-01-01 RX ADMIN — LIDOCAINE 1 PATCH: 4 CREAM TOPICAL at 00:08

## 2020-01-01 RX ADMIN — CRIZOTINIB 200 MILLIGRAM(S): 20 CAPSULE, COATED PELLETS ORAL at 12:11

## 2020-01-01 RX ADMIN — Medication 650 MILLIGRAM(S): at 12:27

## 2020-01-01 RX ADMIN — CHLORHEXIDINE GLUCONATE 1 APPLICATION(S): 213 SOLUTION TOPICAL at 05:00

## 2020-01-01 RX ADMIN — SEVELAMER CARBONATE 800 MILLIGRAM(S): 2400 POWDER, FOR SUSPENSION ORAL at 09:32

## 2020-01-01 RX ADMIN — Medication 1 DROP(S): at 16:21

## 2020-01-01 RX ADMIN — Medication 650 MILLIGRAM(S): at 22:00

## 2020-01-01 RX ADMIN — SENNA PLUS 2 TABLET(S): 8.6 TABLET ORAL at 21:56

## 2020-01-01 RX ADMIN — Medication 1 DROP(S): at 23:30

## 2020-01-01 RX ADMIN — Medication 100 MILLIGRAM(S): at 10:04

## 2020-01-01 RX ADMIN — SEVELAMER CARBONATE 800 MILLIGRAM(S): 2400 POWDER, FOR SUSPENSION ORAL at 18:13

## 2020-01-01 RX ADMIN — Medication 300 MILLIGRAM(S): at 18:14

## 2020-01-01 RX ADMIN — MIDODRINE HYDROCHLORIDE 10 MILLIGRAM(S): 2.5 TABLET ORAL at 05:59

## 2020-01-01 RX ADMIN — WARFARIN SODIUM 2.5 MILLIGRAM(S): 2.5 TABLET ORAL at 22:38

## 2020-01-01 RX ADMIN — Medication 650 MILLIGRAM(S): at 22:05

## 2020-01-01 RX ADMIN — SENNA PLUS 2 TABLET(S): 8.6 TABLET ORAL at 22:39

## 2020-01-01 RX ADMIN — SEVELAMER CARBONATE 800 MILLIGRAM(S): 2400 POWDER, FOR SUSPENSION ORAL at 09:18

## 2020-01-01 RX ADMIN — LIDOCAINE 1 PATCH: 4 CREAM TOPICAL at 04:35

## 2020-01-01 RX ADMIN — Medication 1 DROP(S): at 22:05

## 2020-01-01 RX ADMIN — Medication 1 TABLET(S): at 23:51

## 2020-01-01 RX ADMIN — MIDODRINE HYDROCHLORIDE 10 MILLIGRAM(S): 2.5 TABLET ORAL at 16:24

## 2020-01-01 RX ADMIN — LIDOCAINE 1 PATCH: 4 CREAM TOPICAL at 01:30

## 2020-01-01 RX ADMIN — LIDOCAINE 1 PATCH: 4 CREAM TOPICAL at 13:18

## 2020-01-01 RX ADMIN — HYDROMORPHONE HYDROCHLORIDE 0.2 MILLIGRAM(S): 2 INJECTION INTRAMUSCULAR; INTRAVENOUS; SUBCUTANEOUS at 10:14

## 2020-01-01 RX ADMIN — POLYETHYLENE GLYCOL 3350 17 GRAM(S): 17 POWDER, FOR SOLUTION ORAL at 23:30

## 2020-01-01 RX ADMIN — ERYTHROPOIETIN 3000 UNIT(S): 10000 INJECTION, SOLUTION INTRAVENOUS; SUBCUTANEOUS at 10:18

## 2020-01-01 RX ADMIN — Medication 400 MILLIGRAM(S): at 02:55

## 2020-01-01 RX ADMIN — Medication 1 DROP(S): at 22:42

## 2020-01-01 RX ADMIN — Medication 1 DROP(S): at 23:16

## 2020-01-01 RX ADMIN — LIDOCAINE 1 PATCH: 4 CREAM TOPICAL at 20:19

## 2020-01-01 RX ADMIN — Medication 650 MILLIGRAM(S): at 20:39

## 2020-01-01 RX ADMIN — Medication 1 DROP(S): at 12:56

## 2020-01-01 RX ADMIN — HEPARIN SODIUM 1400 UNIT(S)/HR: 5000 INJECTION INTRAVENOUS; SUBCUTANEOUS at 01:39

## 2020-01-01 RX ADMIN — LIDOCAINE 1 PATCH: 4 CREAM TOPICAL at 12:42

## 2020-01-01 RX ADMIN — Medication 650 MILLIGRAM(S): at 13:19

## 2020-01-01 RX ADMIN — FAMOTIDINE 20 MILLIGRAM(S): 10 INJECTION INTRAVENOUS at 01:05

## 2020-01-01 RX ADMIN — LIDOCAINE 1 PATCH: 4 CREAM TOPICAL at 00:02

## 2020-01-01 RX ADMIN — Medication 1 DROP(S): at 11:01

## 2020-01-01 RX ADMIN — LIDOCAINE 1 PATCH: 4 CREAM TOPICAL at 11:48

## 2020-01-01 RX ADMIN — Medication 1000 MILLIGRAM(S): at 02:06

## 2020-01-01 RX ADMIN — POLYETHYLENE GLYCOL 3350 17 GRAM(S): 17 POWDER, FOR SOLUTION ORAL at 13:00

## 2020-01-01 RX ADMIN — LIDOCAINE 1 PATCH: 4 CREAM TOPICAL at 11:28

## 2020-01-01 RX ADMIN — Medication 650 MILLIGRAM(S): at 21:09

## 2020-01-01 RX ADMIN — Medication 102 MILLIGRAM(S): at 20:03

## 2020-01-01 RX ADMIN — CRIZOTINIB 200 MILLIGRAM(S): 20 CAPSULE, COATED PELLETS ORAL at 15:29

## 2020-01-01 RX ADMIN — LIDOCAINE 1 PATCH: 4 CREAM TOPICAL at 19:26

## 2020-01-01 RX ADMIN — Medication 1 DROP(S): at 23:52

## 2020-01-01 RX ADMIN — SEVELAMER CARBONATE 800 MILLIGRAM(S): 2400 POWDER, FOR SUSPENSION ORAL at 21:28

## 2020-01-01 RX ADMIN — SEVELAMER CARBONATE 800 MILLIGRAM(S): 2400 POWDER, FOR SUSPENSION ORAL at 17:58

## 2020-01-01 RX ADMIN — CRIZOTINIB 200 MILLIGRAM(S): 20 CAPSULE, COATED PELLETS ORAL at 22:41

## 2020-01-01 RX ADMIN — PANTOPRAZOLE SODIUM 40 MILLIGRAM(S): 20 TABLET, DELAYED RELEASE ORAL at 05:59

## 2020-01-01 RX ADMIN — LIDOCAINE 1 PATCH: 4 CREAM TOPICAL at 13:07

## 2020-01-01 RX ADMIN — WARFARIN SODIUM 2 MILLIGRAM(S): 2.5 TABLET ORAL at 22:24

## 2020-01-01 RX ADMIN — Medication 650 MILLIGRAM(S): at 23:02

## 2020-01-01 RX ADMIN — MIDODRINE HYDROCHLORIDE 10 MILLIGRAM(S): 2.5 TABLET ORAL at 14:10

## 2020-01-01 RX ADMIN — WARFARIN SODIUM 2 MILLIGRAM(S): 2.5 TABLET ORAL at 21:31

## 2020-01-01 RX ADMIN — LIDOCAINE 1 PATCH: 4 CREAM TOPICAL at 00:00

## 2020-01-01 RX ADMIN — CRIZOTINIB 200 MILLIGRAM(S): 20 CAPSULE, COATED PELLETS ORAL at 08:01

## 2020-01-01 RX ADMIN — Medication 300 MILLIGRAM(S): at 17:59

## 2020-01-01 RX ADMIN — LIDOCAINE 1 PATCH: 4 CREAM TOPICAL at 13:01

## 2020-01-01 RX ADMIN — Medication 650 MILLIGRAM(S): at 18:30

## 2020-01-01 RX ADMIN — Medication 650 MILLIGRAM(S): at 06:35

## 2020-01-01 RX ADMIN — MIDODRINE HYDROCHLORIDE 10 MILLIGRAM(S): 2.5 TABLET ORAL at 13:18

## 2020-01-01 RX ADMIN — SEVELAMER CARBONATE 800 MILLIGRAM(S): 2400 POWDER, FOR SUSPENSION ORAL at 12:08

## 2020-01-01 RX ADMIN — LIDOCAINE 1 PATCH: 4 CREAM TOPICAL at 15:28

## 2020-01-01 RX ADMIN — HYDROMORPHONE HYDROCHLORIDE 0.2 MILLIGRAM(S): 2 INJECTION INTRAMUSCULAR; INTRAVENOUS; SUBCUTANEOUS at 10:45

## 2020-01-01 RX ADMIN — Medication 1 DROP(S): at 23:22

## 2020-01-01 RX ADMIN — Medication 1 DROP(S): at 11:28

## 2020-01-01 RX ADMIN — PANTOPRAZOLE SODIUM 40 MILLIGRAM(S): 20 TABLET, DELAYED RELEASE ORAL at 05:34

## 2020-01-01 RX ADMIN — SEVELAMER CARBONATE 800 MILLIGRAM(S): 2400 POWDER, FOR SUSPENSION ORAL at 12:16

## 2020-01-01 RX ADMIN — Medication 650 MILLIGRAM(S): at 02:13

## 2020-01-01 RX ADMIN — SEVELAMER CARBONATE 800 MILLIGRAM(S): 2400 POWDER, FOR SUSPENSION ORAL at 17:18

## 2020-01-01 RX ADMIN — Medication 400 MILLIGRAM(S): at 06:32

## 2020-01-01 RX ADMIN — Medication 1 TABLET(S): at 17:22

## 2020-01-01 RX ADMIN — SENNA PLUS 2 TABLET(S): 8.6 TABLET ORAL at 21:36

## 2020-01-01 RX ADMIN — LIDOCAINE 1 PATCH: 4 CREAM TOPICAL at 22:06

## 2020-01-01 RX ADMIN — HEPARIN SODIUM 3000 UNIT(S): 5000 INJECTION INTRAVENOUS; SUBCUTANEOUS at 10:11

## 2020-01-01 RX ADMIN — LIDOCAINE 1 PATCH: 4 CREAM TOPICAL at 13:00

## 2020-01-01 RX ADMIN — LIDOCAINE 1 PATCH: 4 CREAM TOPICAL at 17:33

## 2020-01-01 RX ADMIN — PANTOPRAZOLE SODIUM 40 MILLIGRAM(S): 20 TABLET, DELAYED RELEASE ORAL at 05:45

## 2020-01-01 RX ADMIN — MIDODRINE HYDROCHLORIDE 10 MILLIGRAM(S): 2.5 TABLET ORAL at 13:10

## 2020-01-01 RX ADMIN — SEVELAMER CARBONATE 800 MILLIGRAM(S): 2400 POWDER, FOR SUSPENSION ORAL at 18:29

## 2020-01-01 RX ADMIN — Medication 300 MILLIGRAM(S): at 18:11

## 2020-01-01 RX ADMIN — Medication 1 DROP(S): at 15:27

## 2020-01-01 RX ADMIN — SENNA PLUS 2 TABLET(S): 8.6 TABLET ORAL at 21:14

## 2020-01-01 RX ADMIN — Medication 650 MILLIGRAM(S): at 22:35

## 2020-01-01 RX ADMIN — SEVELAMER CARBONATE 800 MILLIGRAM(S): 2400 POWDER, FOR SUSPENSION ORAL at 17:17

## 2020-01-01 RX ADMIN — SEVELAMER CARBONATE 800 MILLIGRAM(S): 2400 POWDER, FOR SUSPENSION ORAL at 13:19

## 2020-01-01 RX ADMIN — WARFARIN SODIUM 1.5 MILLIGRAM(S): 2.5 TABLET ORAL at 21:56

## 2020-01-01 RX ADMIN — POLYETHYLENE GLYCOL 3350 17 GRAM(S): 17 POWDER, FOR SOLUTION ORAL at 21:55

## 2020-01-01 RX ADMIN — Medication 650 MILLIGRAM(S): at 13:39

## 2020-01-01 RX ADMIN — Medication 1 TABLET(S): at 05:26

## 2020-01-01 RX ADMIN — SEVELAMER CARBONATE 800 MILLIGRAM(S): 2400 POWDER, FOR SUSPENSION ORAL at 19:09

## 2020-01-01 RX ADMIN — CRIZOTINIB 200 MILLIGRAM(S): 20 CAPSULE, COATED PELLETS ORAL at 12:28

## 2020-01-01 RX ADMIN — CRIZOTINIB 200 MILLIGRAM(S): 20 CAPSULE, COATED PELLETS ORAL at 21:36

## 2020-01-01 RX ADMIN — LIDOCAINE 1 PATCH: 4 CREAM TOPICAL at 19:38

## 2020-01-01 RX ADMIN — PANTOPRAZOLE SODIUM 40 MILLIGRAM(S): 20 TABLET, DELAYED RELEASE ORAL at 06:01

## 2020-01-01 RX ADMIN — PANTOPRAZOLE SODIUM 40 MILLIGRAM(S): 20 TABLET, DELAYED RELEASE ORAL at 05:39

## 2020-01-01 RX ADMIN — MIDODRINE HYDROCHLORIDE 10 MILLIGRAM(S): 2.5 TABLET ORAL at 16:23

## 2020-01-01 RX ADMIN — CHLORHEXIDINE GLUCONATE 1 APPLICATION(S): 213 SOLUTION TOPICAL at 06:01

## 2020-01-01 RX ADMIN — LIDOCAINE 1 PATCH: 4 CREAM TOPICAL at 19:16

## 2020-01-01 RX ADMIN — SEVELAMER CARBONATE 800 MILLIGRAM(S): 2400 POWDER, FOR SUSPENSION ORAL at 12:55

## 2020-01-01 RX ADMIN — PANTOPRAZOLE SODIUM 40 MILLIGRAM(S): 20 TABLET, DELAYED RELEASE ORAL at 06:00

## 2020-01-01 RX ADMIN — MIDODRINE HYDROCHLORIDE 10 MILLIGRAM(S): 2.5 TABLET ORAL at 05:25

## 2020-01-01 RX ADMIN — CRIZOTINIB 200 MILLIGRAM(S): 20 CAPSULE, COATED PELLETS ORAL at 19:40

## 2020-01-01 RX ADMIN — Medication 650 MILLIGRAM(S): at 17:19

## 2020-01-01 RX ADMIN — MIDODRINE HYDROCHLORIDE 10 MILLIGRAM(S): 2.5 TABLET ORAL at 05:45

## 2020-01-01 RX ADMIN — CHLORHEXIDINE GLUCONATE 1 APPLICATION(S): 213 SOLUTION TOPICAL at 04:14

## 2020-01-01 RX ADMIN — OXYCODONE HYDROCHLORIDE 2 MILLIGRAM(S): 5 TABLET ORAL at 15:33

## 2020-01-01 RX ADMIN — SEVELAMER CARBONATE 800 MILLIGRAM(S): 2400 POWDER, FOR SUSPENSION ORAL at 08:13

## 2020-01-01 RX ADMIN — CRIZOTINIB 200 MILLIGRAM(S): 20 CAPSULE, COATED PELLETS ORAL at 23:25

## 2020-01-01 RX ADMIN — Medication 300 MILLIGRAM(S): at 18:04

## 2020-01-01 RX ADMIN — LIDOCAINE 1 PATCH: 4 CREAM TOPICAL at 11:02

## 2020-01-01 RX ADMIN — CRIZOTINIB 200 MILLIGRAM(S): 20 CAPSULE, COATED PELLETS ORAL at 21:10

## 2020-01-01 RX ADMIN — LIDOCAINE 1 PATCH: 4 CREAM TOPICAL at 12:24

## 2020-01-01 RX ADMIN — CHLORHEXIDINE GLUCONATE 1 APPLICATION(S): 213 SOLUTION TOPICAL at 06:10

## 2020-01-01 RX ADMIN — Medication 1 DROP(S): at 23:23

## 2020-01-01 RX ADMIN — SEVELAMER CARBONATE 800 MILLIGRAM(S): 2400 POWDER, FOR SUSPENSION ORAL at 15:42

## 2020-01-01 RX ADMIN — LIDOCAINE 1 PATCH: 4 CREAM TOPICAL at 00:03

## 2020-01-01 RX ADMIN — PANTOPRAZOLE SODIUM 40 MILLIGRAM(S): 20 TABLET, DELAYED RELEASE ORAL at 05:29

## 2020-01-01 RX ADMIN — CRIZOTINIB 200 MILLIGRAM(S): 20 CAPSULE, COATED PELLETS ORAL at 21:31

## 2020-01-01 RX ADMIN — LIDOCAINE 1 PATCH: 4 CREAM TOPICAL at 00:15

## 2020-01-01 RX ADMIN — OXYCODONE HYDROCHLORIDE 5 MILLIGRAM(S): 5 TABLET ORAL at 08:27

## 2020-01-01 RX ADMIN — Medication 1 DROP(S): at 22:39

## 2020-01-01 RX ADMIN — MIDODRINE HYDROCHLORIDE 10 MILLIGRAM(S): 2.5 TABLET ORAL at 07:47

## 2020-01-01 RX ADMIN — Medication 400 MILLIGRAM(S): at 13:01

## 2020-01-01 RX ADMIN — PANTOPRAZOLE SODIUM 40 MILLIGRAM(S): 20 TABLET, DELAYED RELEASE ORAL at 06:38

## 2020-01-01 RX ADMIN — HEPARIN SODIUM 1600 UNIT(S)/HR: 5000 INJECTION INTRAVENOUS; SUBCUTANEOUS at 10:04

## 2020-01-01 RX ADMIN — Medication 1 DROP(S): at 23:34

## 2020-01-01 RX ADMIN — HYDROMORPHONE HYDROCHLORIDE 0.2 MILLIGRAM(S): 2 INJECTION INTRAMUSCULAR; INTRAVENOUS; SUBCUTANEOUS at 15:15

## 2020-01-01 RX ADMIN — HYDROMORPHONE HYDROCHLORIDE 0.3 MILLIGRAM(S): 2 INJECTION INTRAMUSCULAR; INTRAVENOUS; SUBCUTANEOUS at 17:57

## 2020-01-01 RX ADMIN — Medication 1 DROP(S): at 13:02

## 2020-01-01 RX ADMIN — Medication 1 DROP(S): at 12:53

## 2020-01-01 RX ADMIN — CHLORHEXIDINE GLUCONATE 1 APPLICATION(S): 213 SOLUTION TOPICAL at 05:34

## 2020-01-01 RX ADMIN — POLYETHYLENE GLYCOL 3350 17 GRAM(S): 17 POWDER, FOR SOLUTION ORAL at 05:35

## 2020-01-01 RX ADMIN — CRIZOTINIB 200 MILLIGRAM(S): 20 CAPSULE, COATED PELLETS ORAL at 22:14

## 2020-01-01 RX ADMIN — PANTOPRAZOLE SODIUM 40 MILLIGRAM(S): 20 TABLET, DELAYED RELEASE ORAL at 11:47

## 2020-01-01 RX ADMIN — SEVELAMER CARBONATE 800 MILLIGRAM(S): 2400 POWDER, FOR SUSPENSION ORAL at 17:56

## 2020-01-01 RX ADMIN — SEVELAMER CARBONATE 800 MILLIGRAM(S): 2400 POWDER, FOR SUSPENSION ORAL at 08:22

## 2020-01-01 RX ADMIN — MIDODRINE HYDROCHLORIDE 10 MILLIGRAM(S): 2.5 TABLET ORAL at 12:38

## 2020-01-01 RX ADMIN — LIDOCAINE 1 PATCH: 4 CREAM TOPICAL at 16:25

## 2020-01-01 RX ADMIN — LIDOCAINE 1 PATCH: 4 CREAM TOPICAL at 20:42

## 2020-01-01 RX ADMIN — Medication 650 MILLIGRAM(S): at 05:17

## 2020-01-01 RX ADMIN — WARFARIN SODIUM 2.5 MILLIGRAM(S): 2.5 TABLET ORAL at 21:28

## 2020-01-01 RX ADMIN — POLYETHYLENE GLYCOL 3350 17 GRAM(S): 17 POWDER, FOR SOLUTION ORAL at 17:53

## 2020-01-01 RX ADMIN — WARFARIN SODIUM 0.5 MILLIGRAM(S): 2.5 TABLET ORAL at 21:35

## 2020-01-01 RX ADMIN — PANTOPRAZOLE SODIUM 40 MILLIGRAM(S): 20 TABLET, DELAYED RELEASE ORAL at 06:36

## 2020-01-01 RX ADMIN — CRIZOTINIB 200 MILLIGRAM(S): 20 CAPSULE, COATED PELLETS ORAL at 21:14

## 2020-01-01 RX ADMIN — SEVELAMER CARBONATE 800 MILLIGRAM(S): 2400 POWDER, FOR SUSPENSION ORAL at 12:23

## 2020-01-01 RX ADMIN — SEVELAMER CARBONATE 800 MILLIGRAM(S): 2400 POWDER, FOR SUSPENSION ORAL at 18:11

## 2020-01-01 RX ADMIN — SENNA PLUS 2 TABLET(S): 8.6 TABLET ORAL at 21:27

## 2020-01-01 RX ADMIN — SEVELAMER CARBONATE 800 MILLIGRAM(S): 2400 POWDER, FOR SUSPENSION ORAL at 08:52

## 2020-01-01 RX ADMIN — SEVELAMER CARBONATE 800 MILLIGRAM(S): 2400 POWDER, FOR SUSPENSION ORAL at 13:01

## 2020-01-01 RX ADMIN — LIDOCAINE 1 PATCH: 4 CREAM TOPICAL at 01:03

## 2020-01-01 RX ADMIN — CRIZOTINIB 200 MILLIGRAM(S): 20 CAPSULE, COATED PELLETS ORAL at 09:18

## 2020-01-01 RX ADMIN — Medication 1 DROP(S): at 13:16

## 2020-01-01 RX ADMIN — Medication 650 MILLIGRAM(S): at 04:43

## 2020-01-01 RX ADMIN — Medication 300 MILLIGRAM(S): at 21:28

## 2020-01-01 RX ADMIN — Medication 1 DROP(S): at 22:24

## 2020-01-01 RX ADMIN — Medication 30 MILLILITER(S): at 11:39

## 2020-01-01 RX ADMIN — LIDOCAINE 1 PATCH: 4 CREAM TOPICAL at 23:35

## 2020-01-01 RX ADMIN — LIDOCAINE 1 PATCH: 4 CREAM TOPICAL at 20:36

## 2020-01-01 RX ADMIN — HEPARIN SODIUM 5000 UNIT(S): 5000 INJECTION INTRAVENOUS; SUBCUTANEOUS at 02:46

## 2020-01-01 RX ADMIN — Medication 1 TABLET(S): at 06:38

## 2020-01-01 RX ADMIN — ENOXAPARIN SODIUM 30 MILLIGRAM(S): 100 INJECTION SUBCUTANEOUS at 04:12

## 2020-01-01 RX ADMIN — PANTOPRAZOLE SODIUM 40 MILLIGRAM(S): 20 TABLET, DELAYED RELEASE ORAL at 06:10

## 2020-01-01 RX ADMIN — SENNA PLUS 2 TABLET(S): 8.6 TABLET ORAL at 21:24

## 2020-01-01 RX ADMIN — SEVELAMER CARBONATE 800 MILLIGRAM(S): 2400 POWDER, FOR SUSPENSION ORAL at 17:51

## 2020-01-01 RX ADMIN — Medication 1 DROP(S): at 22:47

## 2020-01-01 RX ADMIN — Medication 1 DROP(S): at 00:08

## 2020-01-01 RX ADMIN — SEVELAMER CARBONATE 800 MILLIGRAM(S): 2400 POWDER, FOR SUSPENSION ORAL at 11:28

## 2020-01-01 RX ADMIN — SEVELAMER CARBONATE 800 MILLIGRAM(S): 2400 POWDER, FOR SUSPENSION ORAL at 15:28

## 2020-01-01 RX ADMIN — LIDOCAINE 1 PATCH: 4 CREAM TOPICAL at 20:10

## 2020-01-01 RX ADMIN — LIDOCAINE 1 PATCH: 4 CREAM TOPICAL at 16:55

## 2020-01-01 RX ADMIN — Medication 1 DROP(S): at 14:14

## 2020-01-01 RX ADMIN — CRIZOTINIB 200 MILLIGRAM(S): 20 CAPSULE, COATED PELLETS ORAL at 13:17

## 2020-01-01 RX ADMIN — CRIZOTINIB 200 MILLIGRAM(S): 20 CAPSULE, COATED PELLETS ORAL at 19:38

## 2020-01-01 RX ADMIN — LIDOCAINE 1 PATCH: 4 CREAM TOPICAL at 04:13

## 2020-01-01 RX ADMIN — SENNA PLUS 2 TABLET(S): 8.6 TABLET ORAL at 21:22

## 2020-01-01 RX ADMIN — POLYETHYLENE GLYCOL 3350 17 GRAM(S): 17 POWDER, FOR SOLUTION ORAL at 18:38

## 2020-01-01 RX ADMIN — Medication 650 MILLIGRAM(S): at 06:30

## 2020-01-01 RX ADMIN — Medication 1 DROP(S): at 11:26

## 2020-01-01 RX ADMIN — Medication 650 MILLIGRAM(S): at 20:13

## 2020-01-01 RX ADMIN — CRIZOTINIB 200 MILLIGRAM(S): 20 CAPSULE, COATED PELLETS ORAL at 15:42

## 2020-01-01 RX ADMIN — Medication 1 DROP(S): at 12:48

## 2020-01-01 RX ADMIN — MIDODRINE HYDROCHLORIDE 10 MILLIGRAM(S): 2.5 TABLET ORAL at 17:42

## 2020-01-01 RX ADMIN — PANTOPRAZOLE SODIUM 40 MILLIGRAM(S): 20 TABLET, DELAYED RELEASE ORAL at 02:46

## 2020-01-01 RX ADMIN — SEVELAMER CARBONATE 800 MILLIGRAM(S): 2400 POWDER, FOR SUSPENSION ORAL at 18:56

## 2020-01-01 RX ADMIN — Medication 650 MILLIGRAM(S): at 05:39

## 2020-01-01 RX ADMIN — CHLORHEXIDINE GLUCONATE 1 APPLICATION(S): 213 SOLUTION TOPICAL at 05:39

## 2020-01-01 RX ADMIN — Medication 400 MILLIGRAM(S): at 20:15

## 2020-01-01 RX ADMIN — Medication 650 MILLIGRAM(S): at 09:32

## 2020-01-01 RX ADMIN — Medication 650 MILLIGRAM(S): at 19:30

## 2020-01-01 RX ADMIN — POLYETHYLENE GLYCOL 3350 17 GRAM(S): 17 POWDER, FOR SOLUTION ORAL at 11:28

## 2020-01-12 NOTE — H&P ADULT - NSHPLABSRESULTS_GEN_ALL_CORE
01-12    138  |  96  |  51<H>  ----------------------------<  81  3.9   |  23  |  5.09<H>  01-12    135  |  95<L>  |  50<H>  ----------------------------<  75  6.9<HH>   |  20<L>  |  4.97<H>    Ca    9.4      12 Jan 2020 18:41  Ca    9.4      12 Jan 2020 17:20  Phos  4.1     01-12  Mg     2.2     01-12    TPro  8.9<H>  /  Alb  3.4  /  TBili  0.6  /  DBili  x   /  AST  67<H>  /  ALT  30  /  AlkPhos  145<H>  01-12      PT/INR - ( 12 Jan 2020 22:01 )   PT: 20.9 sec;   INR: 1.79 ratio         PTT - ( 12 Jan 2020 17:20 )  PTT:59.7 sec                                        7.2    7.15  )-----------( 169      ( 12 Jan 2020 17:20 )             25.8     CAPILLARY BLOOD GLUCOSE    RADIOLOGY:  EXAM:  CT PELVIS ONLY                        EXAM:  CT 3D RECONSTRUCT W VALERIA                        PROCEDURE DATE:  01/12/2020    ******PRELIMINARY REPORT******   INTERPRETATION:  Pathologic intertrochanteric fracture of the right femur. Abnormal soft tissue within the left trochanter with surrounding erosive bony changes corresponds to the area of increased uptake on outside facility prior PET/CT imaging dated 8/23/2019.    EXAM:  CT BRAIN                        PROCEDURE DATE:  01/12/2020    FINDINGS:   Markedly limited evaluation of the posterior fossa due to metallic streak artifact.  There is no CT evidence of acute intracranial hemorrhage or extra-axial collection.  There is prominence of the ventricles and sulci secondary to parenchymal volume loss.  There are patchy areas of white matter hypoattenuation, nonspecific but likely sequela of microvascular-type change.  The basal cisterns are patent.  The visualized paranasal sinuses are clear. The mastoid air cells and middle ear cavities are grossly clear.  Bilateral intraocular lens replacement.  Intact calvarium.     IMPRESSION:   Markedly limited evaluation of the posterior fossa due to metallic streak artifact.  No CT evidence of acute intracranial hemorrhage or mass effect.    EKG: Vpaced, no ST-TW changes    All labs, imaging, and EKG personally reviewed EKG, labs, and imaging personally reviewed and interpreted.     Labs:             7.2    7.15  )-----------( 169      ( 12 Jan 2020 17:20 )             25.8     138  |  96  |  51<H>  ----------------------------<  81  3.9   |  23  |  5.09<H>    135  |  95<L>  |  50<H>  ----------------------------<  75  6.9<HH>   |  20<L>  |  4.97<H>    Ca    9.4      12 Jan 2020 18:41  Ca    9.4      12 Jan 2020 17:20  Phos  4.1     01-12  Mg     2.2     01-12    TPro  8.9<H>  /  Alb  3.4  /  TBili  0.6  /  DBili  x   /  AST  67<H>  /  ALT  30  /  AlkPhos  145<H>  01-12    PT/INR - ( 12 Jan 2020 22:01 )   PT: 20.9 sec;   INR: 1.79 ratio     PTT - ( 12 Jan 2020 17:20 )  PTT:59.7 sec                RADIOLOGY:  EXAM:  CT PELVIS ONLY                        EXAM:  CT 3D RECONSTRUCT W [place]ARLLISSETT                        PROCEDURE DATE:  01/12/2020    ******PRELIMINARY REPORT******   INTERPRETATION:  Pathologic intertrochanteric fracture of the right femur. Abnormal soft tissue within the left trochanter with surrounding erosive bony changes corresponds to the area of increased uptake on outside facility prior PET/CT imaging dated 8/23/2019.    EXAM:  CT BRAIN                        PROCEDURE DATE:  01/12/2020    FINDINGS:   Markedly limited evaluation of the posterior fossa due to metallic streak artifact.  There is no CT evidence of acute intracranial hemorrhage or extra-axial collection.  There is prominence of the ventricles and sulci secondary to parenchymal volume loss.  There are patchy areas of white matter hypoattenuation, nonspecific but likely sequela of microvascular-type change.  The basal cisterns are patent.  The visualized paranasal sinuses are clear. The mastoid air cells and middle ear cavities are grossly clear.  Bilateral intraocular lens replacement.  Intact calvarium.     IMPRESSION:   Markedly limited evaluation of the posterior fossa due to metallic streak artifact.  No CT evidence of acute intracranial hemorrhage or mass effect.    EKG: Vpaced, no ST-TW changes    All labs, imaging, and EKG personally reviewed EKG, labs, and imaging personally reviewed and interpreted.     Labs:             7.2    7.15  )-----------( 169      ( 12 Jan 2020 17:20 )             25.8     138  |  96  |  51<H>  ----------------------------<  81  3.9   |  23  |  5.09<H>    135  |  95<L>  |  50<H>  ----------------------------<  75  6.9<HH>   |  20<L>  |  4.97<H>    Ca    9.4      12 Jan 2020 18:41  Ca    9.4      12 Jan 2020 17:20  Phos  4.1     01-12  Mg     2.2     01-12    TPro  8.9<H>  /  Alb  3.4  /  TBili  0.6  /  DBili  x   /  AST  67<H>  /  ALT  30  /  AlkPhos  145<H>  01-12    PT/INR - ( 12 Jan 2020 22:01 )   PT: 20.9 sec;   INR: 1.79 ratio     PTT - ( 12 Jan 2020 17:20 )  PTT:59.7 sec                RADIOLOGY:  EXAM:  CT PELVIS ONLY                        EXAM:  CT 3D RECONSTRUCT W KARLLISSETT                        PROCEDURE DATE:  01/12/2020    ******PRELIMINARY REPORT******   INTERPRETATION:  Pathologic intertrochanteric fracture of the right femur. Abnormal soft tissue within the left trochanter with surrounding erosive bony changes corresponds to the area of increased uptake on outside facility prior PET/CT imaging dated 8/23/2019.    EXAM:  CT BRAIN                        PROCEDURE DATE:  01/12/2020    FINDINGS:   Markedly limited evaluation of the posterior fossa due to metallic streak artifact.  There is no CT evidence of acute intracranial hemorrhage or extra-axial collection.  There is prominence of the ventricles and sulci secondary to parenchymal volume loss.  There are patchy areas of white matter hypoattenuation, nonspecific but likely sequela of microvascular-type change.  The basal cisterns are patent.  The visualized paranasal sinuses are clear. The mastoid air cells and middle ear cavities are grossly clear.  Bilateral intraocular lens replacement.  Intact calvarium.     IMPRESSION:   Markedly limited evaluation of the posterior fossa due to metallic streak artifact.  No CT evidence of acute intracranial hemorrhage or mass effect.    EKG: Vpaced, no ST-TW changes    All labs, imaging, and EKG personally reviewed    Care discussed with other providers: Yes  Consult notes reviewed: Yes

## 2020-01-12 NOTE — H&P ADULT - PROBLEM SELECTOR PLAN 10
Transitions of Care Status:  1.  Name of PCP: Dr. Espana  2.  PCP Contacted on Admission: [ ] Y    [ ] N    3.  PCP contacted at Discharge: [ ] Y    [ ] N    [ ] N/A  4.  Post-Discharge Appointment Date and Location:  5.  Summary of Handoff given to PCP: Transitions of Care Status:  1.  Name of PCP: Dr. Espana  2.  PCP Contacted on Admission: [ ] Y    [x] N    3.  PCP contacted at Discharge: [ ] Y    [ ] N    [ ] N/A  4.  Post-Discharge Appointment Date and Location:  5.  Summary of Handoff given to PCP:

## 2020-01-12 NOTE — H&P ADULT - PROBLEM SELECTOR PLAN 3
Hgb 7.2 on admission in setting of supratherapeutic INR, baseline around 9. No evidence of active bleeding. 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 FOBT neg on this admission. Given 1u pRBCs in ED  - active T&S  - monitor CBC daily

## 2020-01-12 NOTE — H&P ADULT - NSHPPHYSICALEXAM_GEN_ALL_CORE
Vital Signs Last 24 Hrs  T(C): 36.6 (12 Jan 2020 17:03), Max: 36.6 (12 Jan 2020 15:57)  T(F): 97.8 (12 Jan 2020 17:03), Max: 97.9 (12 Jan 2020 15:57)  HR: 60 (12 Jan 2020 19:14) (60 - 77)  BP: 147/67 (12 Jan 2020 19:14) (116/77 - 166/60)  BP(mean): --  RR: 19 (12 Jan 2020 19:14) (16 - 19)  SpO2: 94% (12 Jan 2020 19:14) (94% - 100%)    GENERAL: NAD, well-developed  HEAD:  Atraumatic, normocephalic  EYES: PERRL, conjunctiva and sclera clear  MOUTH: MMM, no lesions  NECK: Supple, no appreciable masses  LUNG: Clear to auscultation bilaterally, no increased work of breathing  CHEST:  +S1/S2, regular rate and rhythm  ABDOMEN: Soft, nontender, nondistended, +BS in all 4 quadrants  : No CVA tenderness, no suprapubic tenderness, no visible hematomas  EXTREMITIES:  +1 pitting edema on RLE to midshin, TTP along R hip  NEURO: AAOx3, unable to raise RLE against gravity (limited by pain), sensation to light touch intact, strength 5/5 distally in LEs  SKIN: warm and dry, no visible rashes

## 2020-01-12 NOTE — H&P ADULT - PROBLEM SELECTOR PLAN 2
INR elevated to 15.62. Unclear underlying etiology, no recent change in meds, no recent abx use. Does not appear septic, no obvious source of infection. No bilirubin or albumin derangements to s INR elevated to 15.62. Unclear underlying etiology, no recent change in meds, no recent abx use. Does not appear septic, no obvious source of infection. AST/ALT mildly elevated, but no bilirubin or albumin derangements to suggest liver synthetic dysfunction. s/p Kcentra and Vit K 10mg IV in ED. No evidence of active bleeding. CT Head neg for hemorrhage, CT A/P no RP bleed.  - INR daily  - monitor for bleeding  - holding Coumadin  - neuro checks

## 2020-01-12 NOTE — ED ADULT NURSE NOTE - OBJECTIVE STATEMENT
86 year old female BIBA from home for 10/10 right knee pain. PMH of arthritis, HTN, patient on dialysis, last went yesterday. Pain began yesterday, gre progressively worse, patient now unable to bend right knee or bear weight due to intense, constant pain. Patient was given 50 mcg Fentanyl by EMS. Patient placed on 1 L nasal cannula O2. Patient has a left AV fistula. 18 G placed by EMS in Right AC. Patient has a pacemaker. 86 year old female BIBA from home for 10/10 right knee pain. PMH of arthritis, HTN, patient on dialysis, last went yesterday. Pain began yesterday, gre progressively worse, patient now unable to bend right knee or bear weight due to intense, constant pain. Patient was given 50 mcg Fentanyl by EMS. Patient placed on 1 L nasal cannula O2. Patient has a left AV fistula. 18 G placed by EMS in Right AC. Patient has a pacemaker. Bruit and thrill appreciated. Patient's right ankle +1 edema. Patient weaned from nasal cannula, O2 sat 97% on room air. A&Ox4 gross neuro intact, lungs cta bilaterally anteriorally, posterior expiratory wheezes noted to bases bilaterally, no difficulty speaking in complete sentences, s1s2 heart sounds heard, pulses x 4, valdivia x4, abdominal ascites with normoactive bowel sounds to all 4 quadrants. , skin intact. denies chest pain, sob, ha, n/v/d, abdominal pain,. Last BM yesterday, normal per patient. Patient no longer produces much urine due to dialysis. At baseline ambulates with cane and walker.

## 2020-01-12 NOTE — H&P ADULT - PROBLEM SELECTOR PLAN 6
Hx of afib, on Coumadin  - currently rate controlled  - holding Coumadin in setting of supratherapeutic INR

## 2020-01-12 NOTE — ED PROVIDER NOTE - PHYSICAL EXAMINATION
T(C): 36.6 (01-12-20 @ 15:57), Max: 36.6 (01-12-20 @ 15:57)  HR: 77 (01-12-20 @ 15:57) (77 - 77)  BP: 116/77 (01-12-20 @ 15:57) (116/77 - 116/77)  RR: 16 (01-12-20 @ 15:57) (16 - 16)  SpO2: 100% (01-12-20 @ 15:57) (100% - 100%)    PHYSICAL EXAM:  General: Female laying in bed in mild distress  HEENT: NCAT, no conjunctival pallor, moist mucous membranes  Pulmonary: Clear to auscultation bilaterally, no wheezing, rales, or ronchi  Cardiovascular: Regular rate and rhythm, normal S1/S2,+GARY, no rubs, or gallops  Abdominal Exam: Soft, NTND, no masses, bowel sounds present  Extremities: No edema, pulses 2+ bilaterally in the upper and lower extremities.   R Hip: Tender to palpation at the joint, no edema/erythema; Pain to passive flexion at the hip radiating to R knee;  R Knee: Non-tender to palpation, no edema/erythema, ROM limited by hip pain  Neuro: CN, Sensation, Coordination grossly intact. Motor in Right Lower Extremity significantly limited by pain, otherwise intact.  Skin: Warm, dry, no visible jaundice, no rashes

## 2020-01-12 NOTE — ED PROVIDER NOTE - ATTENDING CONTRIBUTION TO CARE
Pt with gradual onset of worsening R hip pain from groin toward knee for past 3 days, worse despite injection into R knee, pain localized to R groin and radiates down the thigh, worse with movement, better with rest, now unable to ambulate.  Pulses intact femoral b/l, equal, neg Hattie sign, no obvious hematoma, +td lateral hip, +td with hip movement, no pain/td/effusion of R knee.  Pt has known lesion of R hip likely from lung cancer metastasis, on immunotherapy.      Pt received morphine IV prior to arrival by EMS.

## 2020-01-12 NOTE — H&P ADULT - HISTORY OF PRESENT ILLNESS
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 Patient reports she has had worsening R knee/hip over the last few months, went to see her doctor 3 days ago and had arthrocentesis and intraarticular steroid injection to R knee. Since then pain has continued to worsen, now unable to ambulate due to pain. Endorses last fall was 2 weeks ago at HD, but denies acute trauma to leg at that time. No other falls or trauma. Denies any bruising, melena, BRBPR, hematemesis, hemoptysis, bleeding gums, headache, confusion, or hematuria. Of note, patient had admission for hemoptysis in 12/2019, s/p bronch which was negative for TB and fungal infections. Patient was diagnosed with metastatic SCLC in 10/2019, with mets to R femur. Outpatient PET scan showed hypermetabolic RML lung mass and hypermetabolic lesion in R femur, IR guided biopsy on 11/1 of R femur demonstrated non-small cell carcinoma favoring squamous cell. Patient was discharged to [facility], from where she was d/c'd to home on 12/27/19. Of note, she was diagnosed with pneumonia at [facility], and completed a course of abx there as per daughter. No antibiotics since then, no change in diet or supplement use. No recent use of tylenol. Has had limited mobility since discharge from [facility], requires 1 person assist, uses wheelchair and chairlift. Lives with daughter. Last HD on 1/11/20. 87 y/o F PMHx ESRD (HD on T/Th/Sat), stage IV squamous cell lung cancer (diagnosed 10/2019, mets to bone, on crizotinib), afib (on Coumadin), PPM, MS/TR (s/p annuloplasty in 2016), chronic hypotension (on midodrine HD days), remote colon CA (s/p R hemicolectomy), GI bleed (2/2 Dieulafoy lesion), anemia, pulmonary HTN, COPD, and restrictive lung disease, presented to ED with R knee and hip pain. Patient reports she has had worsening R knee/hip over the last few months, went to see her doctor 3 days ago and had arthrocentesis and intraarticular steroid injection to R knee. Since then pain has continued to worsen, now unable to ambulate due to pain. Endorses last fall was 2 weeks ago at HD, but denies acute trauma to leg at that time. No other falls or trauma. Denies any bruising, melena, BRBPR, hematemesis, hemoptysis, bleeding gums, headache, confusion, or hematuria. Of note, patient had admission for hemoptysis in 12/2019, s/p bronch which was negative for TB and fungal infections. Patient was diagnosed with metastatic SCLC in 10/2019, with mets to R femur. Outpatient PET scan showed hypermetabolic RML lung mass and hypermetabolic lesion in R femur, IR guided biopsy on 11/1 of R femur demonstrated non-small cell carcinoma favoring squamous cell. Patient was discharged to [facility], from where she was d/c'd to home on 12/27/19. Of note, she was diagnosed with pneumonia at [facility], and completed a course of abx there as per daughter. No antibiotics since then, no change in diet or supplement use. No recent use of tylenol. Has had limited mobility since discharge from [facility], requires 1 person assist, uses wheelchair and chairlift. Lives with daughter. Last HD on 1/11/20. 85 y/o F PMHx ESRD (HD on T/Th/Sat), stage IV squamous cell lung cancer (diagnosed 10/2019, mets to bone, on crizotinib), afib (on Coumadin), PPM, MS/TR (s/p annuloplasty in 2016), chronic hypotension (on midodrine HD days), remote colon CA (s/p R hemicolectomy), GI bleed (2/2 Dieulafoy lesion), anemia, pulmonary HTN, COPD, and restrictive lung disease, presented to ED with R knee and hip pain. Patient reports she has had worsening R knee/hip over the last few months, went to see her doctor 3 days ago and had arthrocentesis and intraarticular steroid injection to R knee. Since then pain has continued to worsen, now unable to ambulate due to pain. Endorses last fall was 2 weeks ago at HD, but denies acute trauma to leg at that time. No other falls or trauma. Denies any bruising, melena, BRBPR, hematemesis, hemoptysis, bleeding gums, headache, confusion, or hematuria. Of note, patient had admission for hemoptysis in 12/2019, s/p bronch which was negative for TB and fungal infections. Patient was diagnosed with metastatic SCLC in 10/2019, with mets to R femur. Outpatient PET scan showed hypermetabolic RML lung mass and hypermetabolic lesion in R femur, IR guided biopsy on 11/1 of R femur demonstrated non-small cell carcinoma favoring squamous cell. Patient was discharged to [facility], from where she was d/c'd to home on 12/27/19. Of note, she was diagnosed with pneumonia at [facility], and completed a course of abx there as per daughter. No antibiotics since then, no change in diet or supplement use. No recent use of tylenol. Has had limited mobility since discharge from [facility], requires 1 person assist, uses wheelchair and chairlift. Lives with daughter. Last HD on 1/11/20.    In the ED:  Vitals: afebrile, HR 77, /77, spO2 94% RA  Labs: hgb 7.2 (baseline 9), INR 15.62, .6, AST/ALT 67/30  CT Head neg for hemhorrhage. CT Pelvis showed pathologic intertrochanteric fracture of the right femur with abnormal soft tissue within the left trochanter with surrounding erosive bony changes corresponds to the area of increased uptake on outside facility prior PET/CT imaging dated 8/23/2019.  Given morphine 2mg IVP x1, vitamin K 10mg IV, KCentra, and 1u pRBCs in ED. 86F PMH ESRD (HD on T/Th/Sat), stage IV squamous cell lung cancer (diagnosed 10/2019, mets to bone, on crizotinib), afib (on Coumadin), PPM, MS/TR (s/p annuloplasty in 2016), chronic hypotension (on midodrine HD days), remote colon CA (s/p R hemicolectomy), GI bleed (2/2 Dieulafoy lesion), anemia, pulmonary HTN, COPD, and restrictive lung disease, presented to ED with R knee and hip pain. Patient reports she has had worsening R knee/hip over the last few months, went to see her doctor 3 days ago and had arthrocentesis and intraarticular steroid injection to R knee. Since then pain has continued to worsen, now unable to ambulate due to pain. Endorses last fall was 2 weeks ago at HD, but denies acute trauma to leg at that time. No other falls or trauma. Denies any bruising, melena, BRBPR, hematemesis, hemoptysis, bleeding gums, headache, confusion, or hematuria. Of note, patient had admission for hemoptysis in 12/2019, s/p bronch which was negative for TB and fungal infections. Patient was diagnosed with metastatic SCLC in 10/2019, with mets to R femur. Outpatient PET scan showed hypermetabolic RML lung mass and hypermetabolic lesion in R femur, IR guided biopsy on 11/1 of R femur demonstrated non-small cell carcinoma favoring squamous cell. Patient was discharged to [facility], from where she was d/c'd to home on 12/27/19. Of note, she was diagnosed with pneumonia at [facility], and completed a course of abx there as per daughter. No antibiotics since then, no change in diet or supplement use. No recent use of tylenol. Has had limited mobility since discharge from [facility], requires 1 person assist, uses wheelchair and chairlift. Lives with daughter. Last HD on 1/11/20.    In the ED:  Vitals: afebrile, HR 77, /77, spO2 94% RA  Labs: hgb 7.2 (baseline 9), INR 15.62, .6, AST/ALT 67/30  CT Head neg for hemhorrhage. CT Pelvis showed pathologic intertrochanteric fracture of the right femur with abnormal soft tissue within the left trochanter with surrounding erosive bony changes corresponds to the area of increased uptake on outside facility prior PET/CT imaging dated 8/23/2019.  Given morphine 2mg IVP x1, vitamin K 10mg IV, KCentra, and 1u pRBCs in ED.

## 2020-01-12 NOTE — ED PROVIDER NOTE - PROGRESS NOTE DETAILS
Deena Lu PGY2  INR 15, kcentra and Vit K given, hb low, given prbc, guiaic neg. xray shows right hip fx, ortho aware, will eval patient, admitted to medicine

## 2020-01-12 NOTE — H&P ADULT - NSHPOUTPATIENTPROVIDERS_GEN_ALL_CORE
PCP - Dr. Emerald Espana  Oncologist - Dr. Travis PCP - Dr. Emerald Espana  Oncologist - Dr. Travis  Nephrology - [facility]

## 2020-01-12 NOTE — H&P ADULT - PROBLEM SELECTOR PLAN 4
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 Currently on crizotinib. Follows with Onc Dr. Travis.  - c/w crizotinib (pending Onc consult for authorization)

## 2020-01-12 NOTE — H&P ADULT - PROBLEM SELECTOR PLAN 8
Discussed with patient, states she would not want to be on ventilator for too long if there is no hope. Not ready to make decision on DNR/DNI status currently. Explained that if patient were to have respiratory failure/code, it would likely be in setting of worsening disease burden, which would not be reversible.   - Palliative consult for [facility]

## 2020-01-12 NOTE — H&P ADULT - NSICDXPASTMEDICALHX_GEN_ALL_CORE_FT
PAST MEDICAL HISTORY:  Chronic atrial fibrillation on coumadin    ESRD on hemodialysis     Gout     Mitral valve stenosis, unspecified etiology     SCC (squamous cell carcinoma of lung), right metastatic to R femur    Tricuspid valve insufficiency, unspecified etiology

## 2020-01-12 NOTE — H&P ADULT - ASSESSMENT
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 87 y/o F PMHx ESRD (HD on T/Th/Sat), stage IV squamous cell lung cancer (diagnosed 10/2019, mets to bone, on crizotinib), Afib (on Coumadin), PPM, MS/TR (s/p annuloplasty in 2016), chronic hypotension, remote hx of colon Ca (s/p R hemicolectomy), GI bleed (2/2 Dieulafoy lesion), anemia, pulmonary HTN, COPD, and restrictive lung disease, presented to ED with R knee and hip pain, found to have pathologic intertrochanteric fracture of the right femur in setting of femur metastasis, c/b supratherapeutic INR to 15. 86F PMH ESRD (HD on T/Th/Sat), stage IV squamous cell lung cancer (diagnosed 10/2019, mets to bone, on crizotinib), Afib (on Coumadin), PPM, MS/TR (s/p annuloplasty in 2016), chronic hypotension, remote hx of colon Ca (s/p R hemicolectomy), GI bleed (2/2 Dieulafoy lesion), anemia, pulmonary HTN, COPD, and restrictive lung disease, presented to ED with R knee and hip pain, found to have pathologic intertrochanteric fracture of the right femur in setting of femur metastasis, c/b supratherapeutic INR to 15.

## 2020-01-12 NOTE — CONSULT NOTE ADULT - ASSESSMENT
Pt is a 86y Female with a right basicervical femoral neck fracture.  -Closed fracture, NV intact  -Pain control  -NWB affected extremity  -Bedrest  -FU Pre-Op Labs  -Please document medical optimization for OR  -NPO after midnight except medications  -IVF while NPO  -Hold all chemical DVT prophylaxis after midnight. S/p K Centra and Vit K in ER.  -SCDs  -Patient will require dialysis in the AM of 1/13 in preparation for the OR.   -Plan for OR 1/13 with Dr. Dean for IMN of R Hip Fracture, pending dialysis and reversal of INR.  - Dr. Dean is aware and agrees with the above plan.    Michelle Rodriguez M.D.  PGY-2 Orthopaedic Surgery

## 2020-01-12 NOTE — ED PROVIDER NOTE - CARE PLAN
Principal Discharge DX:	Pathologic femoral fracture  Assessment and plan of treatment:	Pt with h/o pulmonary SCC metastatic to R femur, with acute pain not resolving with medications, significant inability to ambulate, progressively worsening; Examination significant for R hip/femoral tenderness to palpation, with limited passive ROM secondary to pain; R knee wnl on examination, ROM limited due to hip/knee pain; Ddx includes R femoral pathologic metastatic RCC to R hip vs R hip effusion/hematoma vs ligamentous injury; Workup with basic labs, imaging of the pelvis/R hip/R knee, analgesia, and re-assessment; will likely require admission Assessment and plan of treatment:	Pt with h/o pulmonary SCC metastatic to R femur, with acute pain not resolving with medications, significant inability to ambulate, progressively worsening; Examination significant for R hip/femoral tenderness to palpation, with limited passive ROM secondary to pain; R knee wnl on examination, ROM limited due to hip/knee pain; Ddx includes R femoral pathologic metastatic RCC to R hip vs R hip effusion/hematoma vs ligamentous injury; Workup with basic labs, imaging of the pelvis/R hip/R knee, analgesia, and re-assessment; will likely require admission Principal Discharge DX:	Hip fracture, right  Assessment and plan of treatment:	Pt with h/o pulmonary SCC metastatic to R femur, with acute pain not resolving with medications, significant inability to ambulate, progressively worsening; Examination significant for R hip/femoral tenderness to palpation, with limited passive ROM secondary to pain; R knee wnl on examination, ROM limited due to hip/knee pain; Ddx includes R femoral pathologic metastatic RCC to R hip vs R hip effusion/hematoma vs ligamentous injury; Workup with basic labs, imaging of the pelvis/R hip/R knee, analgesia, and re-assessment; will likely require admission

## 2020-01-12 NOTE — ED PROVIDER NOTE - OBJECTIVE STATEMENT
The patient is an 86F with PMHx of gout, ESRD on HD, afib on Coumadin, complaining of acute on chronic R knee pain. The patient has long-standing R knee pain that has progressively been worsening. The knee pain is constant, 10/10 in intensity, worsens with movement, radiates from the thigh to the knee, and improves with rest/tylenol. The pain is so severe that the patient is unable to ambulate for a short period of time.     She saw a physician 3 days ago who at that time performed a arthrocentesis, and was given a steroid injection with improvement in the R knee pain. However, two days ago, she started developing the pain again and is now intolerable.     Otherwise, she denies fevers, chills, joint redness, extremity edema, or myalgias. The patient is an 86F with PMHx of gout, ESRD on HD, afib on Coumadin, pulmonary SCC with metastasis to R femur, complaining of acute on chronic R knee pain. The patient has long-standing R knee pain that has progressively been worsening. The knee pain is constant, 10/10 in intensity, worsens with movement, radiates from the thigh to the knee, and improves with rest/tylenol. The pain is so severe that the patient is unable to ambulate for a short period of time.     She saw a physician 3 days ago who at that time performed a arthrocentesis, and was given a steroid injection with improvement in the R knee pain. However, two days ago, she started developing the pain again and is now intolerable.     Otherwise, she denies fevers, chills, joint redness, extremity edema, or myalgias.

## 2020-01-12 NOTE — ED ADULT NURSE NOTE - PMH
Chronic atrial fibrillation  on coumadin  ESRD on hemodialysis    Gout    Mitral valve stenosis, unspecified etiology    SCC (squamous cell carcinoma of lung), right  metastatic to R femur  Tricuspid valve insufficiency, unspecified etiology

## 2020-01-12 NOTE — H&P ADULT - PROBLEM SELECTOR PLAN 1
Pathologic intertrochanteric fracture of the right femur in setting of known R femur mets from SCLC.  - Ortho planning for OR in AM pending medical optimization and reversal of INR  - NST wnl in 07/2019  - pain currently controlled, will start with oxycodone 5mg q4h PRN for severe pain. If still uncontrolled, will consider Dilaudid. Avoiding morphine in setting of ESRD  - bedrest  - NWB RLE Pathologic intertrochanteric fracture of the right femur in setting of known R femur mets from SCLC.  - Ortho planning for OR in AM pending medical optimization and reversal of INR  - Medical Pre-Op Risk Stratification: RCRI Score 1, with 6% 30 day risk of death/MI/CA. NATASHA score 4.6% risk of MI/CA 30 days post-op. Patient is a moderate risk for moderate risk procedure.   - NST wnl in 07/2019  - pain currently controlled, will start with oxycodone 5mg q4h PRN for severe pain. If still uncontrolled, will consider Dilaudid. Avoiding morphine in setting of ESRD  - bedrest  - NWB RLE - Pathologic intertrochanteric fracture of the right femur in setting of known R femur mets from SCLC.  - Ortho planning for OR in AM pending medical optimization and reversal of INR  - Medical Pre-Op Risk Stratification: RCRI Score 1, with 6% 30 day risk of death/MI/CA. NATASHA score 4.6% risk of MI/CA 30 days post-op. Patient is a moderate risk for moderate risk procedure.   - NST wnl in 07/2019  - pain currently controlled, will start with oxycodone 5mg q4h PRN for severe pain. If still uncontrolled, will consider Dilaudid. Avoiding morphine in setting of ESRD  - bedrest  - NWB RLE

## 2020-01-12 NOTE — H&P ADULT - NSHPREVIEWOFSYSTEMS_GEN_ALL_CORE
CONSTITUTIONAL: No fever, no chills  EYES: No eye pain, no visual disturbance  ENT: No sore throat, no runny nose  RESPIRATORY: No cough, No SOB  CARDIOVASCULAR: No CP, no palpitations  GASTROINTESTINAL: no abdominal pain, no n/v/d  GENITOURINARY: No dysuria, no hematuria  HEME: No easy bruising, no bleeding gums  NEURO: No headaches, no focal weakness  SKIN: No itching, no rashes  MSK: +joint pain, +joint swelling CONSTITUTIONAL: No fever, no chills  EYES: No eye pain, no visual disturbance  ENT: No sore throat, no runny nose  RESPIRATORY: No cough, No SOB  CARDIOVASCULAR: No CP, no palpitations  GASTROINTESTINAL: no abdominal pain, no n/v/d  GENITOURINARY: No dysuria, no hematuria  HEME: No easy bruising, no bleeding gums  NEURO: No headaches, no focal weakness  SKIN: No itching, no rashes  MSK: +joint pain, +joint swelling  PSYCH: No depression, anxiety

## 2020-01-12 NOTE — ED PROVIDER NOTE - CLINICAL SUMMARY MEDICAL DECISION MAKING FREE TEXT BOX
Pt with h/o pulmonary SCC metastatic to R femur, with acute pain not resolving with medications, significant inability to ambulate, progressively worsening; Examination significant for R hip/femoral tenderness to palpation, with limited passive ROM secondary to pain; R knee wnl on examination, ROM limited due to hip/knee pain; Ddx includes R femoral pathologic metastatic RCC to R hip vs R hip effusion/hematoma vs ligamentous injury; Workup with basic labs, imaging of the pelvis/R hip/R knee, analgesia, and re-assessment; will likely require admission

## 2020-01-12 NOTE — H&P ADULT - PROBLEM SELECTOR PLAN 5
HD on T,TH,SAT. Last HD 1/11/20.  - monitor electrolytes  - monitor I/Os  - Nephrology consult for HD HD on T,TH,SAT. Last HD 1/11/20. Currently no severe electrolyte derangements. Appears near euvolemic.   - monitor electrolytes  - monitor I/Os  - Nephrology consult in AM to determine whether patient needs HD prior to OR HD on T,TH,SAT. Last HD 1/11/20. Currently no severe electrolyte derangements. Appears near euvolemic.   - monitor electrolytes  - monitor I/Os  - Nephrology consult in AM

## 2020-01-12 NOTE — H&P ADULT - ATTENDING COMMENTS
Patient seen and examined. Agree with resident note above. Edited where appropriate. Patient seen and examined. Agree with resident note above. Edited where appropriate. Elderly patient ESRD on HD, metastatic lung SCC on tyrosine kinase inhibitor, afib on coumadin, MS/TR s/p annuloplasty, pulm HTN, restrictive lung disease p/w leg pain, found to have R femoral neck/intertrochanteric fracture, likely pathologic from metastatic malignancy. Also with INR 15, unclear cause, reversed in the ED. Ortho planning for operative repair/intramedullary nail of fracture in the morning. She has no evidence of decompensated heart failure, unstable arrhythmia, ACS. She had a normal nuclear stress test performed approximately one year ago in the outpatient records. Otherwise difficult to assess functional status as she cannot achieve >4 METs due to her partial dependence for ADL completion. Surgery is lower risk. Has history of chronic pulmonary issues including lung cancer, restrictive lung disease which may increase her risks with intubation. She has tolerated general anesthesia without complications during all prior operations.     Pending evaluation by nephrology in the morning to assess the need for HD prior to surgery, there is no other medical contraindication to IMN surgery. No further testing is required.

## 2020-01-12 NOTE — CONSULT NOTE ADULT - SUBJECTIVE AND OBJECTIVE BOX
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 Pt ambulates with a cane at baseline, though has been slowing down per family. Pt has been unable to bear weight on affected extremity since time of injury. Pt denies numbness, tingling, or paresthesias in affected limb. Pt denies headstrike or LOC and denies any other orthopaedic injuries at this time. Pt takes Coumadin for Afib (last dose ) .Denies fevers, dizziness, CP, SOB, N/V, calf pain. Patient accompanied by her daughter at bedside.    PMHx:  Fracture of unspecified part of neck of right femur, initial encounter for closed fracture  H/o or current diagnosis of HF- ACEI/ARB contraindication unknown  Family history of heart disease (Sibling)  Family history of anemia  Family history of heart disease (Sibling)  Family history of anemia  No pertinent family history in first degree relatives  Handoff  MEWS Score  SCC (squamous cell carcinoma of lung), right  Chronic atrial fibrillation  Tricuspid valve insufficiency, unspecified etiology  Mitral valve stenosis, unspecified etiology  Pneumonia  HTN (hypertension)  Gout  AV fistula  ESRD on hemodialysis  A-V Fistula  History of Gout (ICD9 V12.2)  History of Gout (ICD9 V12.2)  History of Pneumonia (ICD9 V12.61)  HTN (Hypertension) (ICD9 401.9)  HTN (Hypertension) (ICD9 401.9)  HTN (Hypertension) (ICD9 401.9)  Chronic Renal Failure (ICD9 585.9)  Chronic Renal Failure (ICD9 585.9)  Hip fracture, right  Pathologic femoral fracture  H/O tricuspid valve repair  History of mitral valve replacement with bioprosthetic valve  H/O total knee replacement, left  H/O:  section  Colon tumor  AV fistula  Colon tumor  History of knee replacement, total, left  A-V fistula  H/O:  Section  R KNEE PAIN  RAD CDS  36    PSHx:  L TKA (OSH, ?)    Allergies:  No Known Allergies    Medications:  allopurinol 300 milliGRAM(s) Oral <User Schedule>  artificial tears (preservative free) Ophthalmic Solution 1 Drop(s) Both EYES two times a day  crizotinib 200 milliGRAM(s) Oral once  midodrine. 10 milliGRAM(s) Oral <User Schedule>  sevelamer carbonate 800 milliGRAM(s) Oral three times a day with meals    Social: Denies smoking, EtOH, illicit drug use.    Labs:                        7.2    7.15  )-----------( 169      ( 2020 17:20 )             25.8         138  |  96  |  51<H>  ----------------------------<  81  3.9   |  23  |  5.09<H>    Ca    9.4      2020 18:41  Phos  4.1       Mg     2.2         TPro  8.9<H>  /  Alb  3.4  /  TBili  0.6  /  DBili  x   /  AST  67<H>  /  ALT  30  /  AlkPhos  145<H>      PT/INR - ( 2020 22:01 )   PT: 20.9 sec;   INR: 1.79 ratio         PTT - ( 2020 17:20 )  PTT:59.7 sec    Imaging:    Vitals:  T(C): 36.6 (20 @ 17:03), Max: 36.6 (20 @ 15:57)  HR: 60 (20 @ 19:14) (60 - 77)  BP: 147/67 (20 @ 19:14) (116/77 - 166/60)  RR: 19 (20 @ 19:14) (16 - 19)  SpO2: 94% (20 @ 19:14) (94% - 100%)    Physical Exam:  Gen: NAD, AAOx3    RLE:   Skin intact  No edema, erythema, ecchymosis  No gross deformity  Limb is shortened and externally rotated  No bony TTP of Knee/Foot/Toes  Unable to SLR  Tender with logroll/heelstrike  +EHL/FHL/TA/GS  SILT L2-S1  +DP/PT Pulses  Compartments soft and compressible  No calf TTP B/L    Secondary Assessment:  NC/AT, NTTP of clavicles, NTTP of C-,T-,L-Spine, NTTP of Pelvis  UEs: NTTP of Shoulders, Elbows, Wrists, Hands; NT with AROM/PROM of Shoulders, Elbows, Wrists, Hands; AIN/PIN/Med/Uln/Msc/Rad/Ax intact  LLE: Able to SLR, NT with Log Roll, NT with Heel Strike, NTTP of Hip, Knee, Ankle, Foot; NT with AROM/PROM of Hip, Knee, Ankle, Foot; Q/H/Gsc/TA/EHL/FHL intact

## 2020-01-13 NOTE — PROCEDURE NOTE - ADDITIONAL PROCEDURE DETAILS
call to check device s/p fall per patient knee gave out while standing on scale at dialysis on Thursday 1/9/20  normal sensing and pacing threshold, stable lead impedence   stored data revealed multiple high rate episodes since last interrogation on 9/12/2019 lasting 3 seconds to 31 seconds of regular  tachycardia likely SVT based on morphology, rate and in VVIR mode only.    No episodes or stored data to correlate with fall on 1/9/2020.     [id number]

## 2020-01-13 NOTE — PROGRESS NOTE ADULT - ASSESSMENT
86 y.o. F with PMH of ESRD on HD on T/Th/Sat, Afib (on Coumadin), PPM Implanted, Mitral stenosis/Tricuspid Regurgitation s/p annuloplasty (2016), chronic hypotension, remote hx of colon Ca s/p resection, pulmonary HTN, COPD, restrictive lung disease and anemia who presents with complaint of hemoptysis.  Pt with known RML lung mass and metastatic disease to bone s/p IR biopsy of R femur bone biopsy consistent with squamous cell carcinoma.

## 2020-01-13 NOTE — CONSULT NOTE ADULT - SUBJECTIVE AND OBJECTIVE BOX
[facility] Consult Note Nephrology - CONSULTATION NOTE    86F PMH ESRD (HD on //Sat), stage IV squamous cell lung cancer (diagnosed 10/2019, mets to bone, on crizotinib), afib (on Coumadin), PPM, MS/TR (s/p annuloplasty in ), chronic hypotension (on midodrine HD days), remote colon CA (s/p R hemicolectomy), GI bleed (2/2 Dieulafoy lesion), anemia, pulmonary HTN, COPD, and restrictive lung disease, presented to ED with R knee and hip pain. Patient reports she has had worsening R knee/hip over the last few months, went to see her doctor 3 days ago and had arthrocentesis and intraarticular steroid injection to R knee. Since then pain has continued to worsen, now unable to ambulate due to pain. Endorses last fall was 2 weeks ago at HD, but denies acute trauma to leg at that time. No other falls or trauma. Denies any bruising, melena, BRBPR, hematemesis, hemoptysis, bleeding gums, headache, confusion, or hematuria. Of note, patient had admission for hemoptysis in 2019, s/p bronch which was negative for TB and fungal infections. Patient was diagnosed with metastatic SCLC in 10/2019, with mets to R femur. Outpatient PET scan showed hypermetabolic RML lung mass and hypermetabolic lesion in R femur, IR guided biopsy on  of R femur demonstrated non-small cell carcinoma favoring squamous cell. Patient was discharged to [facility], from where she was d/c'd to home on 19. Of note, she was diagnosed with pneumonia at [facility], and completed a course of abx there as per daughter. No antibiotics since then, no change in diet or supplement use. No recent use of tylenol. Has had limited mobility since discharge from [facility], requires 1 person assist, uses wheelchair and chairlift. Lives with daughter. Last HD on 20.    In the ED:  Vitals: afebrile, HR 77, /77, spO2 94% RA  Labs: hgb 7.2 (baseline 9), INR 15.62, .6, AST/ALT 67/30  CT Head neg for hemhorrhage. CT Pelvis showed pathologic intertrochanteric fracture of the right femur with abnormal soft tissue within the left trochanter with surrounding erosive bony changes corresponds to the area of increased uptake on outside facility prior PET/CT imaging dated 2019.  Given morphine 2mg IVP x1, vitamin K 10mg IV, KCentra, and 1u pRBCs in ED.    Renal consult for esrd on hd via right forearm avf--> receives HD [facility]  currently awaiting OR fom Ortho 2/2 fracture  metabollically stable-- denies any sob or chest /f/c/n/v    PAST MEDICAL & SURGICAL HISTORY:  SCC (squamous cell carcinoma of lung), right: metastatic to R femur  Chronic atrial fibrillation: on coumadin  Tricuspid valve insufficiency, unspecified etiology  Mitral valve stenosis, unspecified etiology  Gout  ESRD on hemodialysis  H/O tricuspid valve repair:   History of mitral valve replacement with bioprosthetic valve  H/O total knee replacement, left:   H/O:  section  Colon tumor: tumor removed from colon   A-V fistula: Left A-V fistula for HD -    No Known Allergies    Home Medications Reviewed  Hospital Medications:   MEDICATIONS  (STANDING):  allopurinol 300 milliGRAM(s) Oral <User Schedule>  artificial tears (preservative free) Ophthalmic Solution 1 Drop(s) Both EYES two times a day  midodrine. 10 milliGRAM(s) Oral <User Schedule>  sevelamer carbonate 800 milliGRAM(s) Oral three times a day with meals    SOCIAL HISTORY:  Denies ETOh,Smoking,   FAMILY HISTORY:  Family history of heart disease (Sibling)  Family history of anemia: father    REVIEW OF SYSTEMS:  CONSTITUTIONAL: leg pain  EYES/ENT: No visual changes;  No vertigo or throat pain   NECK: No pain or stiffness  RESPIRATORY: No cough, wheezing, hemoptysis; No shortness of breath  CARDIOVASCULAR: No chest pain or palpitations.  GASTROINTESTINAL: No abdominal or epigastric pain. No nausea, vomiting, or hematemesis; No diarrhea or constipation. No melena or hematochezia.  GENITOURINARY: No dysuria, frequency, foamy urine, urinary urgency, incontinence or hematuria  NEUROLOGICAL: No numbness or weakness  SKIN: No itching, burning, rashes, or lesions   VASCULAR: No bilateral lower extremity edema.   All other review of systems is negative unless indicated above.    VITALS:  T(F): 98.2 (20 @ 08:29), Max: 98.5 (20 @ 07:15)  HR: 60 (20 @ 08:29)  BP: 157/69 (20 @ 08:29)  RR: 20 (20 @ 08:29)  SpO2: 95% (20 @ 08:29)  Wt(kg): --    Height (cm): 162.56 ( 15:57)  Weight (kg): 78.4 ( 15:57)  BMI (kg/m2): 29.7 (01-12 @ 15:57)  BSA (m2): 1.84 (01-12 @ 15:57)  PHYSICAL EXAM:  Constitutional: NAD  HEENT: anicteric sclera, oropharynx clear, MMM  Neck: No JVD  Respiratory: CTAB, no wheezes, rales or rhonchi  Cardiovascular: S1, S2, RRR  Gastrointestinal: BS+, soft, NT/ND  Extremities: dec rom rle 2/2 pain  Neurological: A/O x 3, no focal deficits  Psychiatric: Normal mood, normal affect  : No CVA tenderness. No ortiz.   Skin: No rashes  Vascular Access: left forearm avf + thrill    LABS:      140  |  97  |  71<H>  ----------------------------<  104<H>  4.0   |  26  |  6.53<H>    Ca    9.9      2020 11:23  Phos  4.4       Mg     2.3         TPro  8.6<H>  /  Alb  3.2<L>  /  TBili  0.9  /  DBili      /  AST  46<H>  /  ALT  25  /  AlkPhos  148<H>      Creatinine Trend: 6.53 <--, 5.67 <--, 5.09 <--, 4.97 <--                        10.7   8.17  )-----------( 217      ( 2020 05:22 )             36.6     Urine Studies:      RADIOLOGY & ADDITIONAL STUDIES:

## 2020-01-13 NOTE — CONSULT NOTE ADULT - PROBLEM SELECTOR RECOMMENDATION 9
- patient currently wishes to be full code  - she has appointed her daughter Yasemin Zaman as healthcare proxy - currently with severe pain on oral pain regimen resistant to oxycodone 5mg  - would recommend PRN IV pain medications: dilaudid 0.3mg q3 PRN for moderate to severe pain with hold parameters  - standing lidocaine patch daily to right trochanteric region  - may benefit from radiation therapy if high suspicion for pathologic fracture 2/2 metastatic bony involvement; spoke with Dr. Travis (oncologist) and he is agreeable to this plan  - will continue to follow with you    Discussed with attending Dr. Phelan - currently with severe pain on oral pain regimen resistant to oxycodone 5mg  - would recommend PRN IV pain medications: dilaudid 0.3mg q3 PRN for moderate to severe pain with hold parameters  - standing lidocaine patch daily to right trochanteric region  - may benefit from radiation therapy if high suspicion for pathologic fracture 2/2 metastatic bony involvement; spoke with Dr. Travis (oncologist) and he is agreeable to this plan  - Current plan is for OR to repair her Fx.     Discussed with attending Dr. Phelan

## 2020-01-13 NOTE — PRE-ANESTHESIA EVALUATION ADULT - NSANTHADDINFOFT_GEN_ALL_CORE
Pt obtunded sat=85 on room air. Pt has not received narcotics for 12 hrs. Family states that this is a change in mental status. After discussing with family and Dr. Dean, case delayed pending reevaluation with pt's mental status

## 2020-01-13 NOTE — PATIENT PROFILE ADULT - NSPROMUTINFOINDIVIDFT_GEN_A_NUR
Patient prefers family at the bedside

## 2020-01-13 NOTE — CONSULT NOTE ADULT - SUBJECTIVE AND OBJECTIVE BOX
HPI:  86F PMH ESRD (HD on //Sat), stage IV squamous cell lung cancer (diagnosed 10/2019, mets to bone, on crizotinib), afib (on Coumadin), PPM, MS/TR (s/p annuloplasty in 2016), chronic hypotension (on midodrine HD days), remote colon CA (s/p R hemicolectomy), GI bleed (2/2 Dieulafoy lesion), anemia, pulmonary HTN, COPD, and restrictive lung disease, presented to ED with R knee and hip pain. Patient reports she has had worsening R knee/hip over the last few months, went to see her doctor 3 days ago and had arthrocentesis and intraarticular steroid injection to R knee. Since then pain has continued to worsen, now unable to ambulate due to pain. Endorses last fall was 2 weeks ago at HD, but denies acute trauma to leg at that time. No other falls or trauma. Denies any bruising, melena, BRBPR, hematemesis, hemoptysis, bleeding gums, headache, confusion, or hematuria. Of note, patient had admission for hemoptysis in 2019, s/p bronch which was negative for TB and fungal infections. Patient was diagnosed with metastatic SCLC in 10/2019, with mets to R femur. Outpatient PET scan showed hypermetabolic RML lung mass and hypermetabolic lesion in R femur, IR guided biopsy on  of R femur demonstrated non-small cell carcinoma favoring squamous cell. Patient was discharged to [facility], from where she was d/c'd to home on 19. Of note, she was diagnosed with pneumonia at [facility], and completed a course of abx there as per daughter. No antibiotics since then, no change in diet or supplement use. No recent use of tylenol. Has had limited mobility since discharge from [facility], requires 1 person assist, uses wheelchair and chairlift. Lives with daughter. Last HD on 20.    Orthopedics was consulted with plan for intramedullary nail for hip fracture.    In the ED:  Vitals: afebrile, HR 77, /77, spO2 94% RA  Labs: hgb 7.2 (baseline 9), INR 15.62, .6, AST/ALT 67/30  CT Head neg for hemhorrhage. CT Pelvis showed pathologic intertrochanteric fracture of the right femur with abnormal soft tissue within the left trochanter with surrounding erosive bony changes corresponds to the area of increased uptake on outside facility prior PET/CT imaging dated 2019.  Given morphine 2mg IVP x1, vitamin K 10mg IV, KCentra, and 1u pRBCs in ED. (2020 21:35)    PERTINENT PM/SXH:   SCC (squamous cell carcinoma of lung), right  Chronic atrial fibrillation  Tricuspid valve insufficiency, unspecified etiology  Mitral valve stenosis, unspecified etiology  Pneumonia  HTN (hypertension)  Gout  AV fistula  ESRD on hemodialysis  A-V Fistula  History of Gout (ICD9 V12.2)  History of Gout (ICD9 V12.2)  History of Pneumonia (ICD9 V12.61)  HTN (Hypertension) (ICD9 401.9)  HTN (Hypertension) (ICD9 401.9)  HTN (Hypertension) (ICD9 401.9)  Chronic Renal Failure (ICD9 585.9)  Chronic Renal Failure (ICD9 585.9)    H/O tricuspid valve repair  History of mitral valve replacement with bioprosthetic valve  H/O total knee replacement, left  H/O:  section  Colon tumor  AV fistula  Colon tumor  History of knee replacement, total, left  A-V fistula  H/O:  Section    FAMILY HISTORY:  Family history of heart disease (Sibling)  Family history of anemia: father    ITEMS NOT CHECKED ARE NOT PRESENT    SOCIAL HISTORY:   Significant other/partner[ ]  Children[ ]  Advent/Spirituality:  Substance hx:  [ ]   Tobacco hx:  [ ]   Alcohol hx: [ ]   Home Opioid hx:  [ ] I-Stop Reference No:  Living Situation: [ ]Home  [ ]Long term care  [ ]Rehab [ ]Other    ADVANCE DIRECTIVES:    DNR  MOLST  [ ]  Living Will  [ ]   DECISION MAKER(s):  [ ] Health Care Proxy(s)  [ ] Surrogate(s)  [ ] Guardian           Name(s): Phone Number(s):    BASELINE (I)ADL(s) (prior to admission):  [facility]: [ ]Total  [ ] Moderate [ ]Dependent    Allergies    No Known Allergies    Intolerances    MEDICATIONS  (STANDING):  allopurinol 300 milliGRAM(s) Oral <User Schedule>  artificial tears (preservative free) Ophthalmic Solution 1 Drop(s) Both EYES two times a day  midodrine. 10 milliGRAM(s) Oral <User Schedule>  sevelamer carbonate 800 milliGRAM(s) Oral three times a day with meals    MEDICATIONS  (PRN):  acetaminophen   Tablet .. 975 milliGRAM(s) Oral every 8 hours PRN Mild Pain (1 - 3)  oxyCODONE    IR 5 milliGRAM(s) Oral every 4 hours PRN Severe Pain (7 - 10)  oxyCODONE    IR 2.5 milliGRAM(s) Oral every 4 hours PRN Moderate Pain (4 - 6)    PRESENT SYMPTOMS: [ ]Unable to obtain due to poor mentation   Source if other than patient:  [ ]Family   [ ]Team     Pain: [ ]yes [ ]no  QOL impact -   Location -                    Aggravating factors -  Quality -  Radiation -  Timing-  Severity (0-10 scale):  Minimal acceptable level (0-10 scale):     PAIN AD Score:     [email or web address] (press ctrl +  left click to view)    Dyspnea:                           [ ]Mild [ ]Moderate [ ]Severe  Anxiety:                             [ ]Mild [ ]Moderate [ ]Severe  Fatigue:                             [ ]Mild [ ]Moderate [ ]Severe  Nausea:                             [ ]Mild [ ]Moderate [ ]Severe  Loss of appetite:              [ ]Mild [ ]Moderate [ ]Severe  Constipation:                    [ ]Mild [ ]Moderate [ ]Severe    Other Symptoms:  [ ]All other review of systems negative     Palliative Performance Status Version 2:         %    http://npcrc.org/files/news/palliative_performance_scale_ppsv2.pdf  PHYSICAL EXAM:  Vital Signs Last 24 Hrs  T(C): 36.8 (2020 08:29), Max: 36.9 (2020 07:15)  T(F): 98.2 (2020 08:29), Max: 98.5 (2020 07:15)  HR: 60 (2020 08:29) (60 - 77)  BP: 157/69 (2020 08:29) (116/77 - 166/60)  BP(mean): --  RR: 20 (2020 08:29) (16 - 20)  SpO2: 95% (2020 08:29) (94% - 100%) I&O's Summary    GENERAL:  [ ]Alert  [ ]Oriented x   [ ]Lethargic  [ ]Cachexia  [ ]Unarousable  [ ]Verbal  [ ]Non-Verbal  Behavioral:   [ ] Anxiety  [ ] Delirium [ ] Agitation [ ] Other  HEENT:  [ ]Normal   [ ]Dry mouth   [ ]ET Tube/Trach  [ ]Oral lesions  PULMONARY:   [ ]Clear [ ]Tachypnea  [ ]Audible excessive secretions   [ ]Rhonchi        [ ]Right [ ]Left [ ]Bilateral  [ ]Crackles        [ ]Right [ ]Left [ ]Bilateral  [ ]Wheezing     [ ]Right [ ]Left [ ]Bilatera  [ ]Diminished breath sounds [ ]right [ ]left [ ]bilateral  CARDIOVASCULAR:    [ ]Regular [ ]Irregular [ ]Tachy  [ ]Kaveh [ ]Murmur [ ]Other  GASTROINTESTINAL:  [ ]Soft  [ ]Distended   [ ]+BS  [ ]Non tender [ ]Tender  [ ]PEG [ ]OGT/ NGT  Last BM:   GENITOURINARY:  [ ]Normal [ ] Incontinent   [ ]Oliguria/Anuria   [ ]Aocsta  MUSCULOSKELETAL:   [ ]Normal   [ ]Weakness  [ ]Bed/Wheelchair bound [ ]Edema  NEUROLOGIC:   [ ]No focal deficits  [ ]Cognitive impairment  [ ]Dysphagia [ ]Dysarthria [ ]Paresis [ ]Other   SKIN:   [ ]Normal    [ ]Rash  [ ]Pressure ulcer(s)       Present on admission [ ]y [ ]n    CRITICAL CARE:  [ ] Shock Present  [ ]Septic [ ]Cardiogenic [ ]Neurologic [ ]Hypovolemic  [ ]  Vasopressors [ ]  Inotropes   [ ]Respiratory failure present [ ]Mechanical ventilation [ ]Non-invasive ventilatory support [ ]High flow  [ ]Acute  [ ]Chronic [ ]Hypoxic  [ ]Hypercarbic [ ]Other  [ ]Other organ failure     LABS:                        10.7   8.17  )-----------( 217      ( 2020 05:22 )             36.6       137  |  96  |  62<H>  ----------------------------<  61<L>  4.7   |  21<L>  |  5.67<H>    Ca    9.7      2020 05:22  Phos  4.4       Mg     2.3         TPro  8.6<H>  /  Alb  3.2<L>  /  TBili  0.9  /  DBili  x   /  AST  46<H>  /  ALT  25  /  AlkPhos  148<H>    PT/INR - ( 2020 05:24 )   PT: 17.3 sec;   INR: 1.49 ratio         PTT - ( 2020 03:10 )  PTT:31.5 sec      RADIOLOGY & ADDITIONAL STUDIES:    PROTEIN CALORIE MALNUTRITION PRESENT: [ ]mild [ ]moderate [ ]severe [ ]underweight [ ]morbid obesity  https://www.andeal.org/vault/2440/web/files/ONC/Table_Clinical%20Characteristics%20to%20Document%20Malnutrition-White%20JV%20et%20al%415623.pdf    Height (cm): 162.56 (20 @ 15:57), 162.6 (19 @ 16:18), 160.02 (10-02-19 @ 08:12)  Weight (kg): 78.4 (20 @ 15:57), 77.3 (19 @ 16:18), 79.8 (10-02-19 @ 08:12)  BMI (kg/m2): 29.7 (20 @ 15:57), 29.2 (19 @ 16:18), 31.2 (10-02-19 @ 08:12)    [ ]PPSV2 < or = to 30% [ ]significant weight loss  [ ]poor nutritional intake  [ ]anasarca     Albumin, Serum: 3.2 g/dL (20 @ 05:22)   [ ]Artificial Nutrition      REFERRALS:   [ ]Chaplaincy  [ ]Hospice  [ ]Child Life  [ ]Social Work  [ ]Case management [ ]Holistic Therapy     Goals of Care Document: HPI:  86F PMH ESRD (HD on //Sat), stage IV squamous cell lung cancer (diagnosed 10/2019, mets to bone, on crizotinib), afib (on Coumadin), PPM, MS/TR (s/p annuloplasty in 2016), chronic hypotension (on midodrine HD days), remote colon CA (s/p R hemicolectomy), GI bleed (2/2 Dieulafoy lesion), anemia, pulmonary HTN, COPD, and restrictive lung disease, presented to ED with R knee and hip pain. Patient reports she has had worsening R knee/hip over the last few months, went to see her doctor 3 days ago and had arthrocentesis and intraarticular steroid injection to R knee. Since then pain has continued to worsen, now unable to ambulate due to pain. Endorses last fall was 2 weeks ago at HD, but denies acute trauma to leg at that time. No other falls or trauma. Denies any bruising, melena, BRBPR, hematemesis, hemoptysis, bleeding gums, headache, confusion, or hematuria. Of note, patient had admission for hemoptysis in 2019, s/p bronch which was negative for TB and fungal infections. Patient was diagnosed with metastatic SCLC in 10/2019, with mets to R femur. Outpatient PET scan showed hypermetabolic RML lung mass and hypermetabolic lesion in R femur, IR guided biopsy on  of R femur demonstrated non-small cell carcinoma favoring squamous cell. Patient was discharged to [facility], from where she was d/c'd to home on 19. Of note, she was diagnosed with pneumonia at [facility], and completed a course of abx there as per daughter. No antibiotics since then, no change in diet or supplement use. No recent use of tylenol. Has had limited mobility since discharge from [facility], requires 1 person assist, uses wheelchair and chairlift. Lives with daughter. Last HD on 20.    Orthopedics was consulted with plan for intramedullary nail for hip fracture.    The patient currently reports 9/10 pain, last received oxycodone 5mg at 8am . She received IV morphine 2mg at approximately 5pm  upon arrival. She reports the oxycodone only briefly provided relief and pain returned within an hour.    She is aware of her diagnosis of right hip fracture. She reports she follows with Dr. Travis for her metastatic SCC; was planning to follow with him outpatient in 1 week. At most recent visit she reports Dr. Travis explained the crizotinib is not curative therapy but may help control metastastatic burden. She is aware that there is plan for her to go to OR to receive intramedullary nail to right femur.    She has appointed HCP to her daughter Yasemin Zaman. She does not currently have a MOLST but there was a discussion regarding this paperwork. I discussed GOC with the patient and she currently wishes to be full code.      In the ED:  Vitals: afebrile, HR 77, /77, spO2 94% RA  Labs: hgb 7.2 (baseline 9), INR 15.62, .6, AST/ALT 67/30  CT Head neg for hemhorrhage. CT Pelvis showed pathologic intertrochanteric fracture of the right femur with abnormal soft tissue within the left trochanter with surrounding erosive bony changes corresponds to the area of increased uptake on outside facility prior PET/CT imaging dated 2019.  Given morphine 2mg IVP x1, vitamin K 10mg IV, KCentra, and 1u pRBCs in ED. (2020 21:35)    PERTINENT PM/SXH:   SCC (squamous cell carcinoma of lung), right  Chronic atrial fibrillation  Tricuspid valve insufficiency, unspecified etiology  Mitral valve stenosis, unspecified etiology  Pneumonia  HTN (hypertension)  Gout  AV fistula  ESRD on hemodialysis  A-V Fistula  History of Gout (ICD9 V12.2)  History of Gout (ICD9 V12.2)  History of Pneumonia (ICD9 V12.61)  HTN (Hypertension) (ICD9 401.9)  HTN (Hypertension) (ICD9 401.9)  HTN (Hypertension) (ICD9 401.9)  Chronic Renal Failure (ICD9 585.9)  Chronic Renal Failure (ICD9 585.9)    H/O tricuspid valve repair  History of mitral valve replacement with bioprosthetic valve  H/O total knee replacement, left  H/O:  section  Colon tumor  AV fistula  Colon tumor  History of knee replacement, total, left  A-V fistula  H/O:  Section    FAMILY HISTORY:  Family history of heart disease (Sibling)  Family history of anemia: father    ITEMS NOT CHECKED ARE NOT PRESENT    SOCIAL HISTORY:   Significant other/partner[ ]  Children[ ]  Denominational/Spirituality:  Substance hx:  [ ]   Tobacco hx:  [ ]   Alcohol hx: [ ]   Home Opioid hx:  [ ] I-Stop Reference No:  Living Situation: [ ]Home  [ ]Long term care  [ ]Rehab [ ]Other    ADVANCE DIRECTIVES:    DNR  MOLST  [ ]  Living Will  [ ]   DECISION MAKER(s):  [ ] Health Care Proxy(s)  [ ] Surrogate(s)  [ ] Guardian           Name(s): Phone Number(s):    BASELINE (I)ADL(s) (prior to admission):  [place]: [ ]Total  [ ] Moderate [ ]Dependent    Allergies    No Known Allergies    Intolerances    MEDICATIONS  (STANDING):  allopurinol 300 milliGRAM(s) Oral <User Schedule>  artificial tears (preservative free) Ophthalmic Solution 1 Drop(s) Both EYES two times a day  midodrine. 10 milliGRAM(s) Oral <User Schedule>  sevelamer carbonate 800 milliGRAM(s) Oral three times a day with meals    MEDICATIONS  (PRN):  acetaminophen   Tablet .. 975 milliGRAM(s) Oral every 8 hours PRN Mild Pain (1 - 3)  oxyCODONE    IR 5 milliGRAM(s) Oral every 4 hours PRN Severe Pain (7 - 10)  oxyCODONE    IR 2.5 milliGRAM(s) Oral every 4 hours PRN Moderate Pain (4 - 6)    PRESENT SYMPTOMS: [ ]Unable to obtain due to poor mentation   Source if other than patient:  [ ]Family   [ ]Team     Pain: [ ]yes [ ]no  QOL impact -   Location -                    Aggravating factors -  Quality -  Radiation -  Timing-  Severity (0-10 scale):  Minimal acceptable level (0-10 scale):     PAIN AD Score:     [email or web address] (press ctrl +  left click to view)    Dyspnea:                           [ ]Mild [ ]Moderate [ ]Severe  Anxiety:                             [ ]Mild [ ]Moderate [ ]Severe  Fatigue:                             [ ]Mild [ ]Moderate [ ]Severe  Nausea:                             [ ]Mild [ ]Moderate [ ]Severe  Loss of appetite:              [ ]Mild [ ]Moderate [ ]Severe  Constipation:                    [ ]Mild [ ]Moderate [ ]Severe    Other Symptoms:  [ ]All other review of systems negative     Palliative Performance Status Version 2:         %    http://npcrc.org/files/news/palliative_performance_scale_ppsv2.pdf  PHYSICAL EXAM:  Vital Signs Last 24 Hrs  T(C): 36.8 (2020 08:29), Max: 36.9 (2020 07:15)  T(F): 98.2 (2020 08:29), Max: 98.5 (2020 07:15)  HR: 60 (2020 08:29) (60 - 77)  BP: 157/69 (2020 08:29) (116/77 - 166/60)  BP(mean): --  RR: 20 (2020 08:29) (16 - 20)  SpO2: 95% (2020 08:29) (94% - 100%) I&O's Summary    GENERAL:  [ x]Alert  [ ]Oriented    [x ]Lethargic  [ ]Cachexia  [ ]Unarousable  [x ]Verbal  [ ]Non-Verbal  Behavioral:   [ ] Anxiety  [ ] Delirium [ ] Agitation [ ] Other  HEENT:  [ ]Normal   [ ]Dry mouth   [ ]ET Tube/Trach  [ ]Oral lesions  PULMONARY:   [x ]Clear [ ]Tachypnea  [ ]Audible excessive secretions   [ ]Rhonchi        [ ]Right [ ]Left [ ]Bilateral  [ ]Crackles        [ ]Right [ ]Left [ ]Bilateral  [ ]Wheezing     [ ]Right [ ]Left [ ]Bilatera  [ ]Diminished breath sounds [ ]right [ ]left [ ]bilateral  CARDIOVASCULAR:    [ ]Regular [ ]Irregular [ ]Tachy  [ ]Kaveh [ ]Murmur [ ]Other  GASTROINTESTINAL:  [ ]Soft  [ ]Distended   [ ]+BS  [ ]Non tender [ ]Tender  [ ]PEG [ ]OGT/ NGT  Last BM:   GENITOURINARY:  [ ]Normal [ ] Incontinent   [ ]Oliguria/Anuria   [ ]Acosta  MUSCULOSKELETAL:   [ ]Normal   [ ]Weakness  [ ]Bed/Wheelchair bound [ ]Edema  - pain with minimal passive and active motion of right leg. Pain to palpation of right trochanter, severe.     NEUROLOGIC:   [ ]No focal deficits  [ ]Cognitive impairment  [ ]Dysphagia [ ]Dysarthria [ ]Paresis [ ]Other   SKIN:   [ ]Normal    [ ]Rash  [ ]Pressure ulcer(s)       Present on admission [ ]y [ ]n    CRITICAL CARE:  [ ] Shock Present  [ ]Septic [ ]Cardiogenic [ ]Neurologic [ ]Hypovolemic  [ ]  Vasopressors [ ]  Inotropes   [ ]Respiratory failure present [ ]Mechanical ventilation [ ]Non-invasive ventilatory support [ ]High flow  [ ]Acute  [ ]Chronic [ ]Hypoxic  [ ]Hypercarbic [ ]Other  [ ]Other organ failure     LABS:                        10.7   8.17  )-----------( 217      ( 2020 05:22 )             36.6       137  |  96  |  62<H>  ----------------------------<  61<L>  4.7   |  21<L>  |  5.67<H>    Ca    9.7      2020 05:22  Phos  4.4       Mg     2.3         TPro  8.6<H>  /  Alb  3.2<L>  /  TBili  0.9  /  DBili  x   /  AST  46<H>  /  ALT  25  /  AlkPhos  148<H>    PT/INR - ( 2020 05:24 )   PT: 17.3 sec;   INR: 1.49 ratio         PTT - ( 2020 03:10 )  PTT:31.5 sec      RADIOLOGY & ADDITIONAL STUDIES:    PROTEIN CALORIE MALNUTRITION PRESENT: [ ]mild [ ]moderate [ ]severe [ ]underweight [ ]morbid obesity  https://www.andeal.org/vault/7390/web/files/ONC/Table_Clinical%20Characteristics%20to%20Document%20Malnutrition-White%20JV%20et%20al%813061.pdf    Height (cm): 162.56 (20 @ 15:57), 162.6 (19 @ 16:18), 160.02 (10-02-19 @ 08:12)  Weight (kg): 78.4 (20 @ 15:57), 77.3 (19 @ 16:18), 79.8 (10-02-19 @ 08:12)  BMI (kg/m2): 29.7 (20 @ 15:57), 29.2 (19 @ 16:18), 31.2 (10-02-19 @ 08:12)    [ ]PPSV2 < or = to 30% [ ]significant weight loss  [ ]poor nutritional intake  [ ]anasarca     Albumin, Serum: 3.2 g/dL (20 @ 05:22)   [ ]Artificial Nutrition      REFERRALS:   [ ]Chaplaincy  [ ]Hospice  [ ]Child Life  [ ]Social Work  [ ]Case management [ ]Holistic Therapy     Goals of Care Document: HPI:  86F PMH ESRD (HD on //Sat), stage IV squamous cell lung cancer (diagnosed 10/2019, mets to bone, on crizotinib), afib (on Coumadin), PPM, MS/TR (s/p annuloplasty in 2016), chronic hypotension (on midodrine HD days), remote colon CA (s/p R hemicolectomy), GI bleed (2/2 Dieulafoy lesion), anemia, pulmonary HTN, COPD, and restrictive lung disease, presented to ED with R knee and hip pain. Patient reports she has had worsening R knee/hip over the last few months, went to see her doctor 3 days ago and had arthrocentesis and intraarticular steroid injection to R knee. Since then pain has continued to worsen, now unable to ambulate due to pain. Endorses last fall was 2 weeks ago at HD, but denies acute trauma to leg at that time. No other falls or trauma. Denies any bruising, melena, BRBPR, hematemesis, hemoptysis, bleeding gums, headache, confusion, or hematuria. Of note, patient had admission for hemoptysis in 2019, s/p bronch which was negative for TB and fungal infections. Patient was diagnosed with metastatic SCLC in 10/2019, with mets to R femur. Outpatient PET scan showed hypermetabolic RML lung mass and hypermetabolic lesion in R femur, IR guided biopsy on  of R femur demonstrated non-small cell carcinoma favoring squamous cell. Patient was discharged to [facility], from where she was d/c'd to home on 19. Of note, she was diagnosed with pneumonia at [facility], and completed a course of abx there as per daughter. No antibiotics since then, no change in diet or supplement use. No recent use of tylenol. Has had limited mobility since discharge from [facility], requires 1 person assist, uses wheelchair and chairlift. Lives with daughter. Last HD on 20.    Orthopedics was consulted with plan for intramedullary nail for hip fracture.    The patient currently reports 9/10 pain, last received oxycodone 5mg at 8am . She received IV morphine 2mg at approximately 5pm  upon arrival. She reports the oxycodone only briefly provided relief and pain returned within an hour.    She is aware of her diagnosis of right hip fracture. She reports she follows with Dr. Travis for her metastatic SCC; was planning to follow with him outpatient in 1 week. At most recent visit she reports Dr. Travis explained the crizotinib is not curative therapy but may help control metastastatic burden. She is aware that there is plan for her to go to OR to receive intramedullary nail to right femur.    She has appointed HCP to her daughter Yasemin Zaman. She does not currently have a MOLST but there was a discussion regarding this paperwork. I discussed GOC with the patient and she currently wishes to be full code.      In the ED:  Vitals: afebrile, HR 77, /77, spO2 94% RA  Labs: hgb 7.2 (baseline 9), INR 15.62, .6, AST/ALT 67/30  CT Head neg for hemhorrhage. CT Pelvis showed pathologic intertrochanteric fracture of the right femur with abnormal soft tissue within the left trochanter with surrounding erosive bony changes corresponds to the area of increased uptake on outside facility prior PET/CT imaging dated 2019.  Given morphine 2mg IVP x1, vitamin K 10mg IV, KCentra, and 1u pRBCs in ED. (2020 21:35)    PERTINENT PM/SXH:   SCC (squamous cell carcinoma of lung), right  Chronic atrial fibrillation  Tricuspid valve insufficiency, unspecified etiology  Mitral valve stenosis, unspecified etiology  Pneumonia  HTN (hypertension)  Gout  AV fistula  ESRD on hemodialysis  A-V Fistula  History of Gout (ICD9 V12.2)  History of Gout (ICD9 V12.2)  History of Pneumonia (ICD9 V12.61)  HTN (Hypertension) (ICD9 401.9)  HTN (Hypertension) (ICD9 401.9)  HTN (Hypertension) (ICD9 401.9)  Chronic Renal Failure (ICD9 585.9)  Chronic Renal Failure (ICD9 585.9)    H/O tricuspid valve repair  History of mitral valve replacement with bioprosthetic valve  H/O total knee replacement, left  H/O:  section  Colon tumor  AV fistula  Colon tumor  History of knee replacement, total, left  A-V fistula  H/O:  Section    FAMILY HISTORY:  Family history of heart disease (Sibling)  Family history of anemia: father    ITEMS NOT CHECKED ARE NOT PRESENT    SOCIAL HISTORY:   Significant other/partner[ ]   Children[ ]  Yes Sikhism/Spirituality: Christianity   Substance hx:  [ ]   Tobacco hx:  [ ]   Alcohol hx: [ ]   Home Opioid hx:  [ ] I-Stop Reference No:  Living Situation: [ ]Home  [ ]Long term care  [ x]Rehab [ ]Other    ADVANCE DIRECTIVES:    DNR  MOLST  [ ]  Living Will  [ ]   DECISION MAKER(s):  [x ] Health Care Proxy(s)  [ ] Surrogate(s)  [ ] Guardian           Name(s): Phone Number(s): copy was placed in the chart.   Yasemin Zaman [phone]  BASELINE (I)ADL(s) (prior to admission):  [place]: [ ]Total  [x ] Moderate [ ]Dependent    Allergies    No Known Allergies    Intolerances    MEDICATIONS  (STANDING):  allopurinol 300 milliGRAM(s) Oral <User Schedule>  artificial tears (preservative free) Ophthalmic Solution 1 Drop(s) Both EYES two times a day  midodrine. 10 milliGRAM(s) Oral <User Schedule>  sevelamer carbonate 800 milliGRAM(s) Oral three times a day with meals    MEDICATIONS  (PRN):  acetaminophen   Tablet .. 975 milliGRAM(s) Oral every 8 hours PRN Mild Pain (1 - 3)  oxyCODONE    IR 5 milliGRAM(s) Oral every 4 hours PRN Severe Pain (7 - 10)  oxyCODONE    IR 2.5 milliGRAM(s) Oral every 4 hours PRN Moderate Pain (4 - 6)    PRESENT SYMPTOMS: [ ]Unable to obtain due to poor mentation   Source if other than patient:  [ ]Family   [ ]Team     Pain: [x ]yes [ ]no  QOL impact - not allowing her to rest   Location -         right femur   Aggravating factors - movement   Quality - not able to indicate   Radiation - none   Timing- with movement   Severity (0-10 scale): up to 10/10 but improving to 4-5 with Oxy.   Minimal acceptable level (0-10 scale): 4-5    PAIN AD Score:     [email or web address] (press ctrl +  left click to view)    Dyspnea:                           [ ]Mild [ ]Moderate [ ]Severe  Anxiety:                             [ ]Mild [ ]Moderate [ ]Severe  Fatigue:                             [ ]Mild [ ]Moderate [ ]Severe  Nausea:                             [ ]Mild [ ]Moderate [ ]Severe  Loss of appetite:              [ ]Mild [ ]Moderate [ ]Severe  Constipation:                    [x ]Mild [ ]Moderate [ ]Severe    Other Symptoms:  [x ]All other review of systems negative     Palliative Performance Status Version 2:   40      %    [email or web address]  PHYSICAL EXAM:  Vital Signs Last 24 Hrs  T(C): 36.8 (2020 08:29), Max: 36.9 (2020 07:15)  T(F): 98.2 (2020 08:29), Max: 98.5 (2020 07:15)  HR: 60 (2020 08:29) (60 - 77)  BP: 157/69 (2020 08:29) (116/77 - 166/60)  BP(mean): --  RR: 20 (2020 08:29) (16 - 20)  SpO2: 95% (2020 08:29) (94% - 100%) I&O's Summary    GENERAL:  [ x]Alert  [ ]Oriented    [x ]Lethargic  [ ]Cachexia  [ ]Unarousable  [x ]Verbal  [ ]Non-Verbal  Behavioral:   [ ] Anxiety  [ ] Delirium [ ] Agitation [ ] Other  HEENT:  [ ]Normal   [ ]Dry mouth   [ ]ET Tube/Trach  [ ]Oral lesions [x] decreased vision through left eye.   PULMONARY:   [x ]Clear [ ]Tachypnea  [ ]Audible excessive secretions   [ ]Rhonchi        [ ]Right [ ]Left [ ]Bilateral  [ ]Crackles        [ ]Right [ ]Left [ ]Bilateral  [ ]Wheezing     [ ]Right [ ]Left [ ]Bilatera  [ ]Diminished breath sounds [ ]right [ ]left [ ]bilateral  CARDIOVASCULAR:    [x ]Regular [ ]Irregular [ ]Tachy  [ ]Kaveh [ ]Murmur [ ]Other  GASTROINTESTINAL:  [x ]Soft  [ ]Distended   [x ]+BS  [ x]Non tender [ ]Tender  [ ]PEG [ ]OGT/ NGT  Last BM:   GENITOURINARY:  [x ]Normal [ ] Incontinent   [ ]Oliguria/Anuria   [ ]Acosta  MUSCULOSKELETAL:   [ ]Normal   [x ]Weakness  [ ]Bed/Wheelchair bound [ ]Edema  - pain with minimal passive and active motion of right leg. Pain to palpation of right trochanter, severe.     NEUROLOGIC:   [x ]No focal deficits  [ ]Cognitive impairment  [ ]Dysphagia [ ]Dysarthria [ ]Paresis [ ]Other   SKIN:   [x ]Normal    [ ]Rash  [ ]Pressure ulcer(s)       Present on admission [ ]y [ ]n    CRITICAL CARE:  [ ] Shock Present  [ ]Septic [ ]Cardiogenic [ ]Neurologic [ ]Hypovolemic  [ ]  Vasopressors [ ]  Inotropes   [ ]Respiratory failure present [ ]Mechanical ventilation [ ]Non-invasive ventilatory support [ ]High flow  [ ]Acute  [ ]Chronic [ ]Hypoxic  [ ]Hypercarbic [ ]Other  [ ]Other organ failure     LABS:                        10.7   8.17  )-----------( 217      ( 2020 05:22 )             36.6       137  |  96  |  62<H>  ----------------------------<  61<L>  4.7   |  21<L>  |  5.67<H>    Ca    9.7      2020 05:22  Phos  4.4       Mg     2.3         TPro  8.6<H>  /  Alb  3.2<L>  /  TBili  0.9  /  DBili  x   /  AST  46<H>  /  ALT  25  /  AlkPhos  148<H>    PT/INR - ( 2020 05:24 )   PT: 17.3 sec;   INR: 1.49 ratio         PTT - ( 2020 03:10 )  PTT:31.5 sec      RADIOLOGY & ADDITIONAL STUDIES:  < from: CT Abdomen and Pelvis No Cont (20 @ 19:40) >  IMPRESSION:     Right femoral intertrochanteric fracture with findings raising a question of pathologic fracture.    No evidence of retroperitoneal hematoma, as clinically questioned.    Right middle lobe lobar atelectasis.                        BRITTANIE HAYES M.D., ATTENDING RADIOLOGIST  This document has been electronically signed. 2020  8:48AM    < end of copied text >    PROTEIN CALORIE MALNUTRITION PRESENT: [ ]mild [ ]moderate [ ]severe [ ]underweight [ ]morbid obesity  https://www.andeal.org/vault/2440/web/files/ONC/Table_Clinical%20Characteristics%20to%20Document%20Malnutrition-White%20JV%20et%20al%2020.pdf    Height (cm): 162.56 (20 @ 15:57), 162.6 (19 @ 16:18), 160.02 (10-02-19 @ 08:12)  Weight (kg): 78.4 (20 @ 15:57), 77.3 (19 @ 16:18), 79.8 (10-02-19 @ 08:12)  BMI (kg/m2): 29.7 (20 @ 15:57), 29.2 (19 @ 16:18), 31.2 (10-02-19 @ 08:12)    [ ]PPSV2 < or = to 30% [ ]significant weight loss  [ ]poor nutritional intake  [ ]anasarca     Albumin, Serum: 3.2 g/dL (20 @ 05:22)   [ ]Artificial Nutrition      REFERRALS:   [ ]Chaplaincy  [ ]Hospice  [ ]Child Life  [ ]Social Work  [ ]Case management [ ]Holistic Therapy     Goals of Care Document:

## 2020-01-13 NOTE — CHART NOTE - NSCHARTNOTEFT_GEN_A_CORE
Informed by RN pt returned from OR due to low Pulse ox. Pt's POx 85% in OR. Pt seen and examined. Pt without c/o SOB. Pt without any acute distress at this time. Lungs- B/L lower lobes with expiratory wheezing. Dr. Galeano informed about hypoxia. Pt will go for HD tonight and case to be rescheduled tomorrow AM per Ortho. Ordered heparin SQ x 1 5,000 units as d/w attending and Dr. Rainey (Ortho) to be given tonight. RN to check vital signs now. Report given to night team to follow up.

## 2020-01-13 NOTE — PROGRESS NOTE ADULT - PROBLEM SELECTOR PLAN 3
Renal consult called- Dr Kamilla Galeano, HD T/Th/Sat outpatient  - HD per Renal  - BP has been stable

## 2020-01-13 NOTE — PROGRESS NOTE ADULT - SUBJECTIVE AND OBJECTIVE BOX
Mohsin Khan, MD  Attending Physician, Division Of Hospital Medicine  Pager: [phone], Office: [phone]  Off hour pager: [phone]    Patient is a 86y old  Female who presents with a chief complaint of R Knee pain    SUBJECTIVE / OVERNIGHT EVENTS:  Seen, examined the patient in ED around 10:30am  Resting in bed in ED, c/o pain in right hip and left knee, no chest pain, SOB, fever  NPO for possible OR per ortho, Hemodynamically stable      MEDICATIONS  (STANDING):  allopurinol 300 milliGRAM(s) Oral <User Schedule>  artificial tears (preservative free) Ophthalmic Solution 1 Drop(s) Both EYES two times a day  lidocaine   Patch 1 Patch Transdermal daily  midodrine. 10 milliGRAM(s) Oral <User Schedule>  sevelamer carbonate 800 milliGRAM(s) Oral three times a day with meals    MEDICATIONS  (PRN):  acetaminophen   Tablet .. 975 milliGRAM(s) Oral every 8 hours PRN Mild Pain (1 - 3)  HYDROmorphone  Injectable 0.3 milliGRAM(s) IV Push every 3 hours PRN moderate-severe pain  senna 1 Tablet(s) Oral at bedtime PRN Constipation      Vital Signs Last 24 Hrs  T(C): 36.7 (13 Jan 2020 12:53), Max: 36.9 (13 Jan 2020 07:15)  T(F): 98.1 (13 Jan 2020 12:53), Max: 98.5 (13 Jan 2020 07:15)  HR: 62 (13 Jan 2020 12:53) (60 - 77)  BP: 171/65 (13 Jan 2020 12:53) (116/77 - 171/65)  BP(mean): --  RR: 18 (13 Jan 2020 12:53) (16 - 20)  SpO2: 94% (13 Jan 2020 12:53) (94% - 100%)  CAPILLARY BLOOD GLUCOSE        I&O's Summary      PHYSICAL EXAM:-  GENERAL: NAD, well-developed  EYES: EOMI, PERRLA, conjunctiva and sclera clear  NECK: Supple, No JVD, no thyromegaly  CHEST/LUNG: Clear to auscultation bilaterally; No wheeze  HEART: Regular rate and rhythm; S1, S2 audible, No murmurs, rubs, or gallops  ABDOMEN: Soft, Nontender, Nondistended; Bowel sounds present  EXTREMITIES:  right HIP- tender on flexion and decreased ROM, 2+ Peripheral Pulses, No clubbing, cyanosis, or edema                       Left knee- mild swelling, no redness or warm  NEURO: AAOx3, no focal deficit      LABS:                        10.7   8.17  )-----------( 217      ( 13 Jan 2020 05:22 )             36.6     01-13    140  |  97  |  71<H>  ----------------------------<  104<H>  4.0   |  26  |  6.53<H>    Ca    9.9      13 Jan 2020 11:23  Phos  4.4     01-13  Mg     2.3     01-13    TPro  8.6<H>  /  Alb  3.2<L>  /  TBili  0.9  /  DBili  x   /  AST  46<H>  /  ALT  25  /  AlkPhos  148<H>  01-13    PT/INR - ( 13 Jan 2020 05:24 )   PT: 17.3 sec;   INR: 1.49 ratio      PTT - ( 13 Jan 2020 03:10 )  PTT:31.5 sec    RADIOLOGY & ADDITIONAL TESTS:    Imaging Personally Reviewed: CT brain, x-ray pelvis, CXR  Consultant(s) Notes Reviewed: Ortho, Card, Renal  Care Discussed with Consultants/Other Providers:  Ortho, Card, Renal

## 2020-01-13 NOTE — CONSULT NOTE ADULT - PROBLEM SELECTOR RECOMMENDATION 9
routine HD TTS  no acute need for HD today  Planned for OR for fracture  consent in chart for dialysis  plan for HD tomm  trend bmp

## 2020-01-13 NOTE — CONSULT NOTE ADULT - ASSESSMENT
86F PMH ESRD (HD on T/Th/Sat) via left forearm AVF, stage IV squamous cell lung cancer (diagnosed 10/2019, mets to bone, on crizotinib), afib (on Coumadin), PPM, MS/TR (s/p annuloplasty in 2016), chronic hypotension (on midodrine HD days), remote colon CA (s/p R hemicolectomy), GI bleed (2/2 Dieulafoy lesion), anemia, pulmonary HTN, COPD, and restrictive lung disease, presented to ED with R knee found to have  pathologic intertrochanteric fracture of the right femur

## 2020-01-13 NOTE — CONSULT NOTE ADULT - ASSESSMENT
86F PMH ESRD (HD on T/Th/Sat), stage IV squamous cell lung cancer (diagnosed 10/2019, mets to bone, on crizotinib), afib (on Coumadin), PPM, MS/TR (s/p annuloplasty in 2016), chronic hypotension (on midodrine HD days), remote colon CA (s/p R hemicolectomy), GI bleed (2/2 Dieulafoy lesion), anemia, pulmonary HTN, COPD, and restrictive lung disease, presented to ED with R knee and hip pain, found with right femoral neck fracture, palliative care consulted for GOC and pain management.

## 2020-01-13 NOTE — CONSULT NOTE ADULT - SUBJECTIVE AND OBJECTIVE BOX
HISTORY OF PRESENT ILLNESS: HPI:  87 y/o female known to our office with PMHx of ESRD on HD, persistent Afib (on Coumadin), s/p Bio MVR, Tricuspid valve repair and ASD closure in 2016, hx complete heart block s/p PPM, Lung CA, COPD, remote colon CA (s/p R hemicolectomy), had normal nuclear stress test and TTE with normal LV/RV with well seated bio MV with normal function both in 2019 who presented to ED with worsening right hip pain s/p recent mechanical fall found to have right hip fracture and supratherapeutic INR requiring reversal for OR. Cardiology consulted for preop clearance. Patient denies syncope/LOC. Reports productive cough on and off over last few weeks. Denies chest pain, SOB, palpitations, dizziness, abd pain, n/v, back pain, fever/chills.      PAST MEDICAL & SURGICAL HISTORY:  SCC (squamous cell carcinoma of lung), right: metastatic to R femur  Chronic atrial fibrillation: on coumadin  Tricuspid valve insufficiency, unspecified etiology  Mitral valve stenosis, unspecified etiology  Gout  ESRD on hemodialysis  H/O tricuspid valve repair:   History of mitral valve replacement with bioprosthetic valve  H/O total knee replacement, left:   H/O:  section  Colon tumor: tumor removed from colon   A-V fistula: Left A-V fistula for HD -          MEDICATIONS:  MEDICATIONS  (STANDING):  allopurinol 300 milliGRAM(s) Oral <User Schedule>  artificial tears (preservative free) Ophthalmic Solution 1 Drop(s) Both EYES two times a day  lidocaine   Patch 1 Patch Transdermal daily  midodrine. 10 milliGRAM(s) Oral <User Schedule>  pantoprazole  Injectable 40 milliGRAM(s) IV Push once  sevelamer carbonate 800 milliGRAM(s) Oral three times a day with meals      Allergies    No Known Allergies    Intolerances        FAMILY HISTORY:  Family history of heart disease (Sibling)  Family history of anemia: father    Non-contributary for premature coronary disease or sudden cardiac death    SOCIAL HISTORY:    [x ] Non-smoker  [ ] Smoker  [ ] Alcohol    FLU VACCINE THIS YEAR STARTS IN AUGUST:  [x ] Yes    [ ] No    IF OVER 65 HAVE YOU EVER HAD A PNA VACCINE:  [ x] Yes    [ ] No       [ ] N/A      REVIEW OF SYSTEMS:  [ ]chest pain  [  ]shortness of breath  [  ]palpitations  [  ]syncope  [ ]near syncope [ ]upper extremity weakness   [ ] lower extremity weakness  [  ]diplopia  [  ]altered mental status   [  ]fevers  [ ]chills [ ]nausea  [ ]vomitting  [  ]dysphagia    [ ]abdominal pain  [ ]melena  [ ]BRBPR    [  ]epistaxis  [  ]rash    [ ]lower extremity edema    +RLE pain  +cough      [x ] All others negative	  [ ] Unable to obtain      LABS:	 	    CARDIAC MARKERS:                              10.7   8.17  )-----------( 217      ( 2020 05:22 )             36.6     Hb Trend: 10.7<--, 10.7<--, 7.2<--        140  |  97  |  71<H>  ----------------------------<  104<H>  4.0   |  26  |  6.53<H>    Ca    9.9      2020 11:23  Phos  4.4       Mg     2.3         TPro  8.6<H>  /  Alb  3.2<L>  /  TBili  0.9  /  DBili  x   /  AST  46<H>  /  ALT  25  /  AlkPhos  148<H>      Creatinine Trend: 6.53<--, 5.67<--, 5.09<--, 4.97<--    Coags:  PT/INR - ( 2020 05:24 )   PT: 17.3 sec;   INR: 1.49 ratio         PTT - ( 2020 03:10 )  PTT:31.5 sec    proBNP:   Lipid Profile:   HgA1c:   TSH:         PHYSICAL EXAM:  T(C): 36.7 (20 @ 12:53), Max: 36.9 (20 @ 07:15)  HR: 62 (20 @ 12:53) (60 - 77)  BP: 171/65 (20 @ 12:53) (116/77 - 171/65)  RR: 18 (20 @ 12:53) (16 - 20)  SpO2: 94% (20 @ 12:53) (94% - 100%)  Wt(kg): --   BMI (kg/m2): 29.7 (20 @ 15:57)  I&O's Summary      Gen: Appears well in NAD  HEENT:  (-)icterus (-)pallor  CV: N S1 S2 1/6 GARY (+)2 Pulses B/l  Resp:  Clear to auscultation B/L, normal effort  GI: (+) BS Soft, NT, ND  Lymph:  (+) Trace RLE Edema, (-)obvious lymphadenopathy  Skin: Warm to touch, Normal turgor  Psych: Appropriate mood and affect      TELEMETRY: 	      ECG: VPaced at 63 bpm 	    RADIOLOGY:         CXR: < from: Xray Chest 1 View- PORTABLE-Urgent (20 @ 20:17) >    FINDINGS:   Right chest wall pacemaker. Median sternotomy.  Right middle lobe collapse.  There are no pleural effusions or pneumothorax.  The cardiomediastinal silhouette is not well evaluated onthis projection.    IMPRESSION:   Right middle lobe collapse.    < end of copied text >      ASSESSMENT/PLAN: 87 y/o female known to our office with PMHx of ESRD on HD, persistent Afib (on Coumadin), s/p Bio MVR, Tricuspid valve repair and ASD closure in 2016, hx complete heart block s/p PPM, Lung CA, COPD, remote colon CA (s/p R hemicolectomy), had normal nuclear stress test and TTE with normal LV/RV with well seated bio MV with normal function both in 2019 who presented to ED with worsening right hip pain s/p recent mechanical fall found to have right hip fracture and supratherapeutic INR requiring reversal for OR. Cardiology consulted for preop clearance.    - No evidence of clinical HF or anginal symptoms  - Normal Nuclear stress and TTE with normal LV/RV with well seated bio MV with normal function both in 2019  - AC with coumadin currently on hold pending OR s/p INR reversal - monitor INR, resume when cleared postop  - Based on patient's RCRI score, would consider her low cardiac risk for planned ortho procedure  - Productive cough noted, can consider RVP - d/w medicine  - Ortho follow up - pending OR today    Adam Jim PA-C  [facility]  Pager: [phone] HISTORY OF PRESENT ILLNESS: HPI:  87 y/o female known to our office with PMHx of ESRD on HD, persistent Afib (on Coumadin), s/p Bio MVR, Tricuspid valve repair and ASD closure in 2016, hx complete heart block s/p PPM, Lung CA, COPD, remote colon CA (s/p R hemicolectomy), had normal nuclear stress test and TTE with normal LV/RV with well seated bio MV with normal function both in 2019 who presented to ED with worsening right hip pain s/p recent mechanical fall found to have right hip fracture and supratherapeutic INR requiring reversal for OR. Cardiology consulted for preop clearance. Patient denies syncope/LOC. Reports productive cough on and off over last few weeks. Denies chest pain, SOB, palpitations, dizziness, abd pain, n/v, back pain, fever/chills.      PAST MEDICAL & SURGICAL HISTORY:  SCC (squamous cell carcinoma of lung), right: metastatic to R femur  Chronic atrial fibrillation: on coumadin  Tricuspid valve insufficiency, unspecified etiology  Mitral valve stenosis, unspecified etiology  Gout  ESRD on hemodialysis  H/O tricuspid valve repair:   History of mitral valve replacement with bioprosthetic valve  H/O total knee replacement, left:   H/O:  section  Colon tumor: tumor removed from colon   A-V fistula: Left A-V fistula for HD -          MEDICATIONS:  MEDICATIONS  (STANDING):  allopurinol 300 milliGRAM(s) Oral <User Schedule>  artificial tears (preservative free) Ophthalmic Solution 1 Drop(s) Both EYES two times a day  lidocaine   Patch 1 Patch Transdermal daily  midodrine. 10 milliGRAM(s) Oral <User Schedule>  pantoprazole  Injectable 40 milliGRAM(s) IV Push once  sevelamer carbonate 800 milliGRAM(s) Oral three times a day with meals      Allergies    No Known Allergies    Intolerances        FAMILY HISTORY:  Family history of heart disease (Sibling)  Family history of anemia: father    Non-contributary for premature coronary disease or sudden cardiac death    SOCIAL HISTORY:    [x ] Non-smoker  [ ] Smoker  [ ] Alcohol    FLU VACCINE THIS YEAR STARTS IN AUGUST:  [x ] Yes    [ ] No    IF OVER 65 HAVE YOU EVER HAD A PNA VACCINE:  [ x] Yes    [ ] No       [ ] N/A      REVIEW OF SYSTEMS:  [ ]chest pain  [  ]shortness of breath  [  ]palpitations  [  ]syncope  [ ]near syncope [ ]upper extremity weakness   [ ] lower extremity weakness  [  ]diplopia  [  ]altered mental status   [  ]fevers  [ ]chills [ ]nausea  [ ]vomitting  [  ]dysphagia    [ ]abdominal pain  [ ]melena  [ ]BRBPR    [  ]epistaxis  [  ]rash    [ ]lower extremity edema    +RLE pain  +cough      [x ] All others negative	  [ ] Unable to obtain      LABS:	 	    CARDIAC MARKERS:                              10.7   8.17  )-----------( 217      ( 2020 05:22 )             36.6     Hb Trend: 10.7<--, 10.7<--, 7.2<--        140  |  97  |  71<H>  ----------------------------<  104<H>  4.0   |  26  |  6.53<H>    Ca    9.9      2020 11:23  Phos  4.4       Mg     2.3         TPro  8.6<H>  /  Alb  3.2<L>  /  TBili  0.9  /  DBili  x   /  AST  46<H>  /  ALT  25  /  AlkPhos  148<H>      Creatinine Trend: 6.53<--, 5.67<--, 5.09<--, 4.97<--    Coags:  PT/INR - ( 2020 05:24 )   PT: 17.3 sec;   INR: 1.49 ratio         PTT - ( 2020 03:10 )  PTT:31.5 sec    proBNP:   Lipid Profile:   HgA1c:   TSH:         PHYSICAL EXAM:  T(C): 36.7 (20 @ 12:53), Max: 36.9 (20 @ 07:15)  HR: 62 (20 @ 12:53) (60 - 77)  BP: 171/65 (20 @ 12:53) (116/77 - 171/65)  RR: 18 (20 @ 12:53) (16 - 20)  SpO2: 94% (20 @ 12:53) (94% - 100%)  Wt(kg): --   BMI (kg/m2): 29.7 (20 @ 15:57)  I&O's Summary      Gen: Appears well in NAD  HEENT:  (-)icterus (-)pallor  CV: N S1 S2 1/6 GARY (+)2 Pulses B/l  Resp:  Clear to auscultation B/L, normal effort  GI: (+) BS Soft, NT, ND  Lymph:  (+) Trace RLE Edema, (-)obvious lymphadenopathy  Skin: Warm to touch, Normal turgor  Psych: Appropriate mood and affect      TELEMETRY: 	      ECG: VPaced at 63 bpm 	    RADIOLOGY:         CXR: < from: Xray Chest 1 View- PORTABLE-Urgent (20 @ 20:17) >    FINDINGS:   Right chest wall pacemaker. Median sternotomy.  Right middle lobe collapse.  There are no pleural effusions or pneumothorax.  The cardiomediastinal silhouette is not well evaluated onthis projection.    IMPRESSION:   Right middle lobe collapse.    < end of copied text >      ASSESSMENT/PLAN: 87 y/o female known to our office with PMHx of ESRD on HD, persistent Afib (on Coumadin), s/p Bio MVR, Tricuspid valve repair and ASD closure in 2016, hx complete heart block s/p PPM, Lung CA, COPD, remote colon CA (s/p R hemicolectomy), had normal nuclear stress test and TTE with normal LV/RV with well seated bio MV with normal function both in 2019 who presented to ED with worsening right hip pain s/p recent mechanical fall found to have right hip fracture and supratherapeutic INR requiring reversal for OR. Cardiology consulted for preop clearance.    - No evidence of clinical HF or anginal symptoms  - Denies LOC/syncope  - Normal Nuclear stress and TTE with normal LV/RV with well seated bio MV with normal function both in 2019  - AC with coumadin currently on hold pending OR s/p INR reversal - monitor INR, resume when cleared postop  - Check PPM interrogation (EP NP called)  - Based on patient's RCRI score, would consider her low cardiac risk for planned ortho procedure  - Productive cough noted, can consider RVP - d/w medicine  - Ortho follow up - pending OR today    Adam Jim PA-C  [facility]  Pager: [phone]

## 2020-01-13 NOTE — CONSULT NOTE ADULT - PROBLEM SELECTOR RECOMMENDATION 5
Will continue to f/u for Pain and will help with GOC if necessary. - patient currently wishes to be full code  - she has appointed her daughter Yasemin Zaman as healthcare proxy

## 2020-01-13 NOTE — CONSULT NOTE ADULT - ATTENDING COMMENTS
Agree with above  Recent NST with no ischemia  Optimized from cv perspective for planned surgery  Consider pulm maricruz Alba MD

## 2020-01-13 NOTE — CONSULT NOTE ADULT - PROBLEM SELECTOR RECOMMENDATION 2
- currently with severe pain on oral pain regimen resistant to oxycodone 5mg  - would recommend PRN IV pain medications (Dilaudid 0.5 mg q6) for severe pain given severity of hip pain resistant to oral opioids; use opioids cautiously given ESRD and elderly  - standing lidocaine patch daily to right trochanteric region - currently with severe pain on oral pain regimen resistant to oxycodone 5mg  - would recommend PRN IV pain medications (Dilaudid 0.5 mg q6) for severe pain given severity of hip pain resistant to oral opioids; use opioids cautiously given ESRD and elderly  - standing lidocaine patch daily to right trochanteric region    **PRELIMINARY RESIDENT NOTE, PENDING ATTENDING ATTESTATION** - currently with severe pain on oral pain regimen resistant to oxycodone 5mg  - would recommend PRN IV pain medications: dilaudid 0.3mg q3 PRN for moderate to severe pain with hold parameters  - standing lidocaine patch daily to right trochanteric region  - may benefit from radiation therapy if high suspicion for pathologic fracture 2/2 metastatic bony involvement  - may benefit from denosumab if high suspicion for pathologic fracture  - will reach out to oncologist Dr. Travis  - will continue to follow with you    Discussed with attending Dr. Phelan    **PRELIMINARY RESIDENT NOTE, PENDING ATTENDING ATTESTATION** - currently with severe pain on oral pain regimen resistant to oxycodone 5mg  - would recommend PRN IV pain medications: dilaudid 0.3mg q3 PRN for moderate to severe pain with hold parameters  - standing lidocaine patch daily to right trochanteric region  - may benefit from radiation therapy if high suspicion for pathologic fracture 2/2 metastatic bony involvement  - may benefit from denosumab if high suspicion for pathologic fracture  - will reach out to oncologist Dr. Travis  - will continue to follow with you    Discussed with attending Dr. Phelan - currently with severe pain on oral pain regimen resistant to oxycodone 5mg  - would recommend PRN IV pain medications: dilaudid 0.3mg q3 PRN for moderate to severe pain with hold parameters  - standing lidocaine patch daily to right trochanteric region  - may benefit from radiation therapy if high suspicion for pathologic fracture 2/2 metastatic bony involvement; spoke with Dr. Travis (oncologist) and he is agreeable to this plan  - will continue to follow with you    Discussed with attending Dr. Phelan On DMT.   Patient's desire if for continuing DMT if possible.

## 2020-01-13 NOTE — ED ADULT NURSE REASSESSMENT NOTE - NS ED NURSE REASSESS COMMENT FT1
Pt is resting in bed comfortably at this time. All morning labs sent. VSS/ NAD. Immunotherapy drug from home hung on IV pole attached to stretcher, in bag with labels. She is A&O x4 and at baseline mental status, well appearing. Safety and comfort measures maintained. Will continue to monitor.
Pt received 1900 from RN Shauna Tejada/ [facility]. VSS/ NAD. Denies complaints at this time. Daughter at bedside. Pt informed that she is ordered to received coumadin reversal and 1 unit PRBC. Awaiting [facility] from pharmacy and blood consent by provider. Pt is A&O x4 and aware of plan. Safety and comfort maintained.
Pt resting in bed comfortably. Family has gone home. Blood transfusion still in progress, pt tolerating well. Call bell placed with patient, she hs been instructed to press button for assistance.
pt transported to CT scan.
Patient resting comfortably in bed. VSS/NAD. Pending palliative consult.

## 2020-01-13 NOTE — CONSULT NOTE ADULT - PROBLEM SELECTOR RECOMMENDATION 4
- patient currently wishes to be full code  - she has appointed her daughter Yasemin Zaman as healthcare proxy HD as per Renal.

## 2020-01-13 NOTE — CHART NOTE - NSCHARTNOTEFT_GEN_A_CORE
Ortho    Case cancelled 2/2 patient respiratory status  urgent dialysis tonight  labs with dialysis CBC/BMP/Coags  Hold anticoagulation at midnight  please limit narcotics 2/2 respiratory drive.   NPOpMN,    Ortho [id number]

## 2020-01-13 NOTE — PROGRESS NOTE ADULT - PROBLEM SELECTOR PLAN 1
CT abd and pelvic x-ray showed right femur intertochanteric pathologic Fx  - Ortho consult and plan noted, OR today, NPO now  - Renal and Cardiology ( Dr Victor) consults and clearance requested

## 2020-01-14 NOTE — PROGRESS NOTE ADULT - ATTENDING COMMENTS
Patient seen and examined.  Agree with above.   Last NST with no ischemia and last TTE with normal LV function  Low risk based on RCRI score; optimized from cv perspective however would consider pulm eval for underlying pulmonary issues and hypoxia episode    Poncho Alba MD

## 2020-01-14 NOTE — PROGRESS NOTE ADULT - PROBLEM SELECTOR PLAN 6
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 Currently on crizotinib. Follows with Onc Dr. Travis.  seen by rad onc no role for radiation at this time

## 2020-01-14 NOTE — PROGRESS NOTE ADULT - PROBLEM SELECTOR PLAN 2
- On DMT.   - Patient's desire if for continuing DMT if possible. - On DMT.   - Patient's desire if for continuing DMT if possible; would discuss with Dr. Travis if this is safe given deterioration of mental status

## 2020-01-14 NOTE — PROGRESS NOTE ADULT - PROBLEM SELECTOR PLAN 7
Hgb 7.2 on admission in setting of supratherapeutic INR, baseline around 9. No evidence of active bleeding. 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 FOBT neg on this admission. Given 1u pRBCs in ED  - active T&S  - monitor CBC daily.

## 2020-01-14 NOTE — PROGRESS NOTE ADULT - ASSESSMENT
A/P: Pt is a 86y Female with a pathologic right basicervical femoral neck fracture.  -Pain Control  -Hold all chemical DVT PPx  -NWB RLE, bedrest  -Supplemental O2 as needed--hypoxia appears improved  -S/p urgent HD 1/13 PM  -FU pre-op labs  -Med Management  -Please document medical/nephrology optimization for OR  -Plan for OR today 1/14 with Dr. Dean for R Hip IMN.  -Will discuss with attending and advise if plan changes    Michelle Rodriguez M.D.  PGY-2 Orthopaedic Surgery

## 2020-01-14 NOTE — PROGRESS NOTE ADULT - ASSESSMENT
86 y.o. F with PMH of ESRD on HD on T/Th/Sat, Afib (on Coumadin), PPM Implanted, Mitral stenosis/Tricuspid Regurgitation s/p annuloplasty (2016), chronic hypotension, remote hx of colon Ca s/p resection, pulmonary HTN, COPD, restrictive lung disease and anemia who presents with complaint of hemoptysis.  Pt with known RML lung mass and metastatic disease to bone s/p IR biopsy of R femur bone biopsy consistent with squamous cell carcinoma. Pt found to have right femur intertochanteric pathologic Fx plan for RIMN with ortho

## 2020-01-14 NOTE — PROGRESS NOTE ADULT - PROBLEM SELECTOR PLAN 7
- patient currently wishes to be full code  - she has appointed her daughter Yasemin Zaman as healthcare proxy.

## 2020-01-14 NOTE — CONSULT NOTE ADULT - ATTENDING COMMENTS
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 CXR showing RML consolidation likely collapse unclear if from progression of malignancy vs mucous plugging. There is no absolute contraindication for planned orthopedic surgery. Patient is at high risk for postoperative pulmonary complications following surgery, however she is likely optimized.    - Incentive spirometry   - Keep euvolemic  - OOB once ok from ortho perspective  - Bronchodilators, chest PT 85 y/o F w/metastatic lung cancer (squamous), ESRD on HD, valvular heart disease, likely HFpEF, chronic pulmonary hypertension (likely combination of group II and group III), presenting with hip fracture. CXR showing RML consolidation likely collapse unclear if from progression of malignancy vs mucous plugging. There is no absolute contraindication for planned orthopedic surgery. Patient is at high risk for postoperative pulmonary complications following surgery, however she is likely optimized. Mild hypoxemia likely secondary to combination of pulmonary hypertension, lung cancer, and chronic pulmonary edema.    - Incentive spirometry   - Keep euvolemic  - OOB once ok from ortho perspective  - Bronchodilators, chest PT

## 2020-01-14 NOTE — PROGRESS NOTE ADULT - PROBLEM SELECTOR PLAN 5
- patient currently wishes to be full code  - she has appointed her daughter Yasemin Zaman as healthcare proxy. -Cristal NOGUEIRA PRN for now.

## 2020-01-14 NOTE — PROGRESS NOTE ADULT - PROBLEM SELECTOR PLAN 2
-Pt with hypoxic episode  -cxr with right middle lobe collapse   -CT A/P commenting on Right middle lobe lobar atelectasis.  -check rvp   -check le dopplers   -c/w O2 supplementation   -puljoão callejasal

## 2020-01-14 NOTE — PROGRESS NOTE ADULT - SUBJECTIVE AND OBJECTIVE BOX
Patient is a 86y old  Female who presents with a chief complaint of R Knee pain (14 Jan 2020 09:33)      SUBJECTIVE / OVERNIGHT EVENTS: pt seems agitated, does not want to talk to anyone     MEDICATIONS  (STANDING):  acetaminophen  IVPB .. 1000 milliGRAM(s) IV Intermittent once  acetaminophen  IVPB .. 1000 milliGRAM(s) IV Intermittent once  allopurinol 300 milliGRAM(s) Oral <User Schedule>  artificial tears (preservative free) Ophthalmic Solution 1 Drop(s) Both EYES two times a day  lidocaine   Patch 1 Patch Transdermal daily  midodrine. 10 milliGRAM(s) Oral <User Schedule>  potassium chloride    Tablet ER 40 milliEquivalent(s) Oral once  sevelamer carbonate 800 milliGRAM(s) Oral three times a day with meals    MEDICATIONS  (PRN):  HYDROmorphone  Injectable 0.3 milliGRAM(s) IV Push every 3 hours PRN moderate-severe pain  senna 1 Tablet(s) Oral at bedtime PRN Constipation        CAPILLARY BLOOD GLUCOSE        I&O's Summary    13 Jan 2020 07:01  -  14 Jan 2020 07:00  --------------------------------------------------------  IN: 900 mL / OUT: 2900 mL / NET: -2000 mL        PHYSICAL EXAM:  GENERAL: NAD, frail  HEAD:  Atraumatic, Normocephalic  EYES:  conjunctiva and sclera clear  NECK:  No JVD  CHEST/LUNG: decreased breath sounds right base No wheeze  HEART: Regular rate and rhythm;S1s2  ABDOMEN: Soft, Nontender, Nondistended; Bowel sounds present  EXTREMITIES:   No clubbing, cyanosis, or edema  PSYCH: AAOx2      LABS:                        12.1   13.05 )-----------( 198      ( 14 Jan 2020 05:01 )             39.8     01-14    134<L>  |  95<L>  |  27<H>  ----------------------------<  100<H>  3.4<L>   |  25  |  3.69<H>    Ca    9.4      14 Jan 2020 05:01  Phos  4.4     01-13  Mg     2.3     01-13    TPro  8.6<H>  /  Alb  3.2<L>  /  TBili  0.9  /  DBili  x   /  AST  46<H>  /  ALT  25  /  AlkPhos  148<H>  01-13    PT/INR - ( 14 Jan 2020 05:01 )   PT: 15.4 sec;   INR: 1.34 ratio         PTT - ( 14 Jan 2020 05:01 )  PTT:28.0 sec          RADIOLOGY & ADDITIONAL TESTS:    Imaging Personally Reviewed:    Consultant(s) Notes Reviewed:  cardio, ortho    Care Discussed with Consultants/Other Providers: ortho

## 2020-01-14 NOTE — PROGRESS NOTE ADULT - PROBLEM SELECTOR PLAN 1
- currently with severe pain on oral pain regimen resistant to oxycodone 5mg  - would recommend PRN IV pain medications: dilaudid 0.3mg q3 PRN for moderate to severe pain with hold parameters  - standing lidocaine patch daily to right trochanteric region  - may benefit from radiation therapy if high suspicion for pathologic fracture 2/2 metastatic bony involvement; rad-onc planning to pursue as outpatient s/p IMN to right trochanter  - Current plan is for OR to repair her Fx. - currently with severe pain on oral pain regimen resistant to oxycodone 5mg  - would recommend tylenol for mild pain and PRN IV pain medications for moderate-severe pain: dilaudid 0.3mg q3 PRN for moderate to severe pain with hold parameters; avoid currently given change of mental status and hypoxia  - standing lidocaine patch daily to right trochanteric region  - may benefit from radiation therapy if high suspicion for pathologic fracture 2/2 metastatic bony involvement; rad-onc planning to pursue as outpatient s/p IMN to right trochanter  - Current plan is for OR to repair her Fx once respiratory status and mental status are stable - currently with severe pain on oral pain regimen resistant to oxycodone 5mg  - will schedule for IV tylenol RTC for now, and PRN IV pain medications for moderate-severe pain: dilaudid 0.3mg q3 PRN for moderate to severe pain with hold parameters; use dilaudid with caution currently given change of mental status and hypoxia  - standing lidocaine patch daily to right trochanteric region  - may benefit from radiation therapy if high suspicion for pathologic fracture 2/2 metastatic bony involvement; rad-onc planning to pursue as outpatient s/p IMN to right trochanter  - Current plan is for OR to repair her Fx once respiratory status and mental status are stable; may need pulmonary clearance prior to procedure given hypoxia; orthopedics to re-evaluate optimal timing for procedure - will schedule for IV tylenol RTC for now, and PRN IV pain medications for moderate-severe pain: dilaudid 0.3mg q3 PRN for moderate to severe pain with hold parameters; use dilaudid with caution currently given change of mental status and hypoxia  - standing lidocaine patch daily to right trochanteric region  - may benefit from radiation therapy if high suspicion for pathologic fracture 2/2 metastatic bony involvement; rad-onc planning to pursue as outpatient s/p IMN to right trochanter  - Current plan is for OR to repair her Fx once respiratory status and mental status are stable; may need pulmonary clearance prior to procedure given hypoxia; orthopedics to re-evaluate optimal timing for procedure

## 2020-01-14 NOTE — PROGRESS NOTE ADULT - SUBJECTIVE AND OBJECTIVE BOX
SUBJECTIVE AND OBJECTIVE:  INTERVAL HPI/OVERNIGHT EVENTS: Overnight, patient with hypoxia (85% on RA) and dyspnea on pre-op anesthesia evaluation; patient sent for urgent HD and orthopedic IMN procedure rescheduled for 1/14. She had not received any additional opioids since oxycodone 5mg at 8am. Patient seen and examined at bedside.     DNR on chart:   Allergies    No Known Allergies    Intolerances    MEDICATIONS  (STANDING):  allopurinol 300 milliGRAM(s) Oral <User Schedule>  artificial tears (preservative free) Ophthalmic Solution 1 Drop(s) Both EYES two times a day  lidocaine   Patch 1 Patch Transdermal daily  midodrine. 10 milliGRAM(s) Oral <User Schedule>  sevelamer carbonate 800 milliGRAM(s) Oral three times a day with meals    MEDICATIONS  (PRN):  acetaminophen   Tablet .. 975 milliGRAM(s) Oral every 8 hours PRN Mild Pain (1 - 3)  HYDROmorphone  Injectable 0.3 milliGRAM(s) IV Push every 3 hours PRN moderate-severe pain  senna 1 Tablet(s) Oral at bedtime PRN Constipation      ITEMS UNCHECKED ARE NOT PRESENT    PRESENT SYMPTOMS: [ ]Unable to obtain due to poor mentation   Source if other than patient:  [ ]Family   [ ]Team     Pain:  [ ]yes [ ]no  QOL impact -   Location -                    Aggravating factors -  Quality -  Radiation -  Timing-  Severity (0-10 scale):  Minimal acceptable level (0-10 scale):     Dyspnea:                           [ ]Mild [ ]Moderate [ ]Severe  Anxiety:                             [ ]Mild [ ]Moderate [ ]Severe  Fatigue:                             [ ]Mild [ ]Moderate [ ]Severe  Nausea:                             [ ]Mild [ ]Moderate [ ]Severe  Loss of appetite:              [ ]Mild [ ]Moderate [ ]Severe  Constipation:                    [ ]Mild [ ]Moderate [ ]Severe    PAIN AD Score:	  http://geriatrictoolkit.[place].[facility]/cog/painad.pdf (Ctrl + left click to view)    Other Symptoms:  [ ]All other review of systems negative     Palliative Performance Status Version 2:         %      http://npcrc.org/files/news/palliative_performance_scale_ppsv2.pdf  PHYSICAL EXAM:  Vital Signs Last 24 Hrs  T(C): 36.3 (14 Jan 2020 05:49), Max: 36.9 (13 Jan 2020 19:41)  T(F): 97.3 (14 Jan 2020 05:49), Max: 98.4 (13 Jan 2020 19:41)  HR: 61 (14 Jan 2020 05:49) (60 - 82)  BP: 130/55 (14 Jan 2020 05:49) (116/51 - 182/66)  BP(mean): --  RR: 18 (14 Jan 2020 08:58) (18 - 20)  SpO2: 90% (14 Jan 2020 08:58) (85% - 100%) I&O's Summary    13 Jan 2020 07:01  -  14 Jan 2020 07:00  --------------------------------------------------------  IN: 900 mL / OUT: 2900 mL / NET: -2000 mL       GENERAL:  [ ]Alert  [ ]Oriented x   [ ]Lethargic  [ ]Cachexia  [ ]Unarousable  [ ]Verbal  [ ]Non-Verbal  Behavioral:   [ ]Anxiety  [ ]Delirium [ ]Agitation [ ]Other  HEENT:  [ ]Normal   [ ]Dry mouth   [ ]ET Tube/Trach  [ ]Oral lesions  PULMONARY:   [ ]Clear [ ]Tachypnea  [ ]Audible excessive secretions   [ ]Rhonchi        [ ]Right [ ]Left [ ]Bilateral  [ ]Crackles        [ ]Right [ ]Left [ ]Bilateral  [ ]Wheezing     [ ]Right [ ]Left [ ]Bilateral  [ ]Diminished BS [ ] Right [ ]Left [ ]Bilateral  CARDIOVASCULAR:    [ ]Regular [ ]Irregular [ ]Tachy  [ ]Kaveh [ ]Murmur [ ]Other  GASTROINTESTINAL:  [ ]Soft  [ ]Distended   [ ]+BS  [ ]Non tender [ ]Tender  [ ]PEG [ ]OGT/ NGT   Last BM:      GENITOURINARY:  [ ]Normal [ ]Incontinent   [ ]Oliguria/Anuria   [ ]Acosta  MUSCULOSKELETAL:   [ ]Normal   [ ]Weakness  [ ]Bed/Wheelchair bound [ ]Edema  NEUROLOGIC:   [ ]No focal deficits  [ ] Cognitive impairment  [ ] Dysphagia [ ]Dysarthria [ ] Paresis [ ]Other   SKIN:   [ ]Normal  [ ]Rash   [ ]Pressure ulcer(s) [ ]y [ ]n present on admission    CRITICAL CARE:  [ ]Shock Present  [ ]Septic [ ]Cardiogenic [ ]Neurologic [ ]Hypovolemic  [ ]Vasopressors [ ]Inotropes  [ ]Respiratory failure present [ ]Mechanical Ventilation [ ]Non-invasive ventilatory support [ ]High-Flow  [ ]Acute  [ ]Chronic [ ]Hypoxic  [ ]Hypercarbic [ ]Other  [ ]Other organ failure     LABS:                        12.1   13.05 )-----------( 198      ( 14 Jan 2020 05:01 )             39.8   01-14    134<L>  |  95<L>  |  27<H>  ----------------------------<  100<H>  3.4<L>   |  25  |  3.69<H>    Ca    9.4      14 Jan 2020 05:01  Phos  4.4     01-13  Mg     2.3     01-13    TPro  8.6<H>  /  Alb  3.2<L>  /  TBili  0.9  /  DBili  x   /  AST  46<H>  /  ALT  25  /  AlkPhos  148<H>  01-13  PT/INR - ( 14 Jan 2020 05:01 )   PT: 15.4 sec;   INR: 1.34 ratio         PTT - ( 14 Jan 2020 05:01 )  PTT:28.0 sec      RADIOLOGY & ADDITIONAL STUDIES:    Protein Calorie Malnutrition Present: [ ]mild [ ]moderate [ ]severe [ ]underweight [ ]morbid obesity  https://www.andeal.org/vault/2440/web/files/ONC/Table_Clinical%20Characteristics%20to%20Document%20Malnutrition-White%20JV%20et%20al%068182.pdf    Height (cm): 162.56 (01-13-20 @ 16:57), 162.6 (12-01-19 @ 16:18), 160.02 (10-02-19 @ 08:12)  Weight (kg): 78.4 (01-13-20 @ 16:57), 77.3 (12-01-19 @ 16:18), 79.8 (10-02-19 @ 08:12)  BMI (kg/m2): 29.7 (01-13-20 @ 16:57), 29.2 (12-01-19 @ 16:18), 31.2 (10-02-19 @ 08:12)    [ ]PPSV2 < or = 30%  [ ]significant weight loss [ ]poor nutritional intake [ ]anasarca   Albumin, Serum: 3.2 g/dL (01-13-20 @ 05:22)   [ ]Artificial Nutrition    REFERRALS:   [ ]Chaplaincy  [ ]Hospice  [ ]Child Life  [ ]Social Work  [ ]Case management [ ]Holistic Therapy     Goals of Care Document: SUBJECTIVE AND OBJECTIVE:  INTERVAL HPI/OVERNIGHT EVENTS: Overnight, patient with hypoxia (85% on RA) and dyspnea on pre-op anesthesia evaluation; patient sent for urgent HD and orthopedic IMN procedure rescheduled for 1/14. She had not received any additional opioids since oxycodone 5mg at 8am. Patient seen and examined at bedside; she denies pain, denies respiratory distress. She is lethargic, AAO x 1-2 (says full name, says she is in "hospital" but could not identify [facility], cannot say the year). She became slightly agitated regarding a female who was rude to her while repositioning her in bed.     DNR on chart:   Allergies    No Known Allergies    Intolerances    MEDICATIONS  (STANDING):  allopurinol 300 milliGRAM(s) Oral <User Schedule>  artificial tears (preservative free) Ophthalmic Solution 1 Drop(s) Both EYES two times a day  lidocaine   Patch 1 Patch Transdermal daily  midodrine. 10 milliGRAM(s) Oral <User Schedule>  sevelamer carbonate 800 milliGRAM(s) Oral three times a day with meals    MEDICATIONS  (PRN):  acetaminophen   Tablet .. 975 milliGRAM(s) Oral every 8 hours PRN Mild Pain (1 - 3)  HYDROmorphone  Injectable 0.3 milliGRAM(s) IV Push every 3 hours PRN moderate-severe pain  senna 1 Tablet(s) Oral at bedtime PRN Constipation      ITEMS UNCHECKED ARE NOT PRESENT    PRESENT SYMPTOMS: [ ]Unable to obtain due to poor mentation   Source if other than patient:  [ ]Family   [ ]Team     Pain:  [ ]yes [x ]no  QOL impact -   Location -                    Aggravating factors -  Quality -  Radiation -  Timing-  Severity (0-10 scale):  Minimal acceptable level (0-10 scale):     Dyspnea:                           [ ]Mild [ ]Moderate [ ]Severe  Anxiety:                             [ ]Mild [ ]Moderate [ ]Severe  Fatigue:                             [ ]Mild [ ]Moderate [ ]Severe  Nausea:                             [ ]Mild [ ]Moderate [ ]Severe  Loss of appetite:              [ ]Mild [ ]Moderate [ ]Severe  Constipation:                    [ ]Mild [ ]Moderate [ ]Severe    PAIN AD Score:	  http://geriatrictoolkit.[facility]/cog/painad.pdf (Ctrl + left click to view)    Other Symptoms:  [ ]All other review of systems negative     Palliative Performance Status Version 2:         %      http://npcrc.org/files/news/palliative_performance_scale_ppsv2.pdf  PHYSICAL EXAM:  Vital Signs Last 24 Hrs  T(C): 36.3 (14 Jan 2020 05:49), Max: 36.9 (13 Jan 2020 19:41)  T(F): 97.3 (14 Jan 2020 05:49), Max: 98.4 (13 Jan 2020 19:41)  HR: 61 (14 Jan 2020 05:49) (60 - 82)  BP: 130/55 (14 Jan 2020 05:49) (116/51 - 182/66)  BP(mean): --  RR: 18 (14 Jan 2020 08:58) (18 - 20)  SpO2: 90% (14 Jan 2020 08:58) (85% - 100%) I&O's Summary    13 Jan 2020 07:01  -  14 Jan 2020 07:00  --------------------------------------------------------  IN: 900 mL / OUT: 2900 mL / NET: -2000 mL       GENERAL:  [ ]Alert  [ ]Oriented x   [ ]Lethargic  [ ]Cachexia  [ ]Unarousable  [ ]Verbal  [ ]Non-Verbal  Behavioral:   [ ]Anxiety  [ ]Delirium [ ]Agitation [ ]Other  HEENT:  [ ]Normal   [ ]Dry mouth   [ ]ET Tube/Trach  [ ]Oral lesions  PULMONARY:   [ ]Clear [ ]Tachypnea  [ ]Audible excessive secretions   [ ]Rhonchi        [ ]Right [ ]Left [ ]Bilateral  [ ]Crackles        [ ]Right [ ]Left [ ]Bilateral  [ ]Wheezing     [ ]Right [ ]Left [ ]Bilateral  [ ]Diminished BS [ ] Right [ ]Left [ ]Bilateral  CARDIOVASCULAR:    [ ]Regular [ ]Irregular [ ]Tachy  [ ]Kaveh [ ]Murmur [ ]Other  GASTROINTESTINAL:  [ ]Soft  [ ]Distended   [ ]+BS  [ ]Non tender [ ]Tender  [ ]PEG [ ]OGT/ NGT   Last BM:      GENITOURINARY:  [ ]Normal [ ]Incontinent   [ ]Oliguria/Anuria   [ ]Acosta  MUSCULOSKELETAL:   [ ]Normal   [ ]Weakness  [ ]Bed/Wheelchair bound [ ]Edema  NEUROLOGIC:   [ ]No focal deficits  [ ] Cognitive impairment  [ ] Dysphagia [ ]Dysarthria [ ] Paresis [ ]Other   SKIN:   [ ]Normal  [ ]Rash   [ ]Pressure ulcer(s) [ ]y [ ]n present on admission    CRITICAL CARE:  [ ]Shock Present  [ ]Septic [ ]Cardiogenic [ ]Neurologic [ ]Hypovolemic  [ ]Vasopressors [ ]Inotropes  [ ]Respiratory failure present [ ]Mechanical Ventilation [ ]Non-invasive ventilatory support [ ]High-Flow  [ ]Acute  [ ]Chronic [ ]Hypoxic  [ ]Hypercarbic [ ]Other  [ ]Other organ failure     LABS:                        12.1   13.05 )-----------( 198      ( 14 Jan 2020 05:01 )             39.8   01-14    134<L>  |  95<L>  |  27<H>  ----------------------------<  100<H>  3.4<L>   |  25  |  3.69<H>    Ca    9.4      14 Jan 2020 05:01  Phos  4.4     01-13  Mg     2.3     01-13    TPro  8.6<H>  /  Alb  3.2<L>  /  TBili  0.9  /  DBili  x   /  AST  46<H>  /  ALT  25  /  AlkPhos  148<H>  01-13  PT/INR - ( 14 Jan 2020 05:01 )   PT: 15.4 sec;   INR: 1.34 ratio         PTT - ( 14 Jan 2020 05:01 )  PTT:28.0 sec      RADIOLOGY & ADDITIONAL STUDIES:    Protein Calorie Malnutrition Present: [ ]mild [ ]moderate [ ]severe [ ]underweight [ ]morbid obesity  https://www.andeal.org/vault/2440/web/files/ONC/Table_Clinical%20Characteristics%20to%20Document%20Malnutrition-White%20JV%20et%20al%969015.pdf    Height (cm): 162.56 (01-13-20 @ 16:57), 162.6 (12-01-19 @ 16:18), 160.02 (10-02-19 @ 08:12)  Weight (kg): 78.4 (01-13-20 @ 16:57), 77.3 (12-01-19 @ 16:18), 79.8 (10-02-19 @ 08:12)  BMI (kg/m2): 29.7 (01-13-20 @ 16:57), 29.2 (12-01-19 @ 16:18), 31.2 (10-02-19 @ 08:12)    [ ]PPSV2 < or = 30%  [ ]significant weight loss [ ]poor nutritional intake [ ]anasarca   Albumin, Serum: 3.2 g/dL (01-13-20 @ 05:22)   [ ]Artificial Nutrition    REFERRALS:   [ ]Chaplaincy  [ ]Hospice  [ ]Child Life  [ ]Social Work  [ ]Case management [ ]Holistic Therapy     Goals of Care Document: SUBJECTIVE AND OBJECTIVE:  INTERVAL HPI/OVERNIGHT EVENTS: Overnight, patient with hypoxia (85% on RA) and dyspnea on pre-op anesthesia evaluation; patient sent for urgent HD and orthopedic IMN procedure rescheduled for 1/14. She had not received any additional opioids since oxycodone 5mg at 8am. Patient seen and examined at bedside; she denies pain, denies respiratory distress. She is lethargic, AAO x 1-2 (says full name, says she is in "hospital" but could not identify [facility], cannot say the year). She became slightly agitated regarding a female who was rude to her while repositioning her in bed.     Subjective/Objective: The patient was seen with her daughters by her bedside. She was confused (thought she was at home) but she was not agitated. She did not appear to be on any acute distress; however, her family indicated the patient was c/o pain when moving on her bed. They also indicated the patient was not allowing nurses to care for him and we wondered if pain may be a triggering factor for such behavior.     DNR on chart:   Allergies    No Known Allergies    Intolerances    MEDICATIONS  (STANDING):  allopurinol 300 milliGRAM(s) Oral <User Schedule>  artificial tears (preservative free) Ophthalmic Solution 1 Drop(s) Both EYES two times a day  lidocaine   Patch 1 Patch Transdermal daily  midodrine. 10 milliGRAM(s) Oral <User Schedule>  sevelamer carbonate 800 milliGRAM(s) Oral three times a day with meals    MEDICATIONS  (PRN):  acetaminophen   Tablet .. 975 milliGRAM(s) Oral every 8 hours PRN Mild Pain (1 - 3)  HYDROmorphone  Injectable 0.3 milliGRAM(s) IV Push every 3 hours PRN moderate-severe pain  senna 1 Tablet(s) Oral at bedtime PRN Constipation      ITEMS UNCHECKED ARE NOT PRESENT    PRESENT SYMPTOMS: [x ]Unable to obtain due to poor mentation   Source if other than patient:  [ ]Family   [ ]Team     Pain:  [ ]yes [x ]no. However, as per her family with pain triggered by movement.   QOL impact -   Location -                    Aggravating factors -  Quality -  Radiation -  Timing-  Severity (0-10 scale):  Minimal acceptable level (0-10 scale):     Dyspnea:                           [ ]Mild [ ]Moderate [ ]Severe  Anxiety:                             [ ]Mild [ ]Moderate [ ]Severe  Fatigue:                             [ ]Mild [ ]Moderate [ ]Severe  Nausea:                             [ ]Mild [ ]Moderate [ ]Severe  Loss of appetite:              [ ]Mild [ ]Moderate [ ]Severe  Constipation:                    [ ]Mild [ ]Moderate [ ]Severe    PAIN AD Score:	  http://geriatrictoolkit.[facility]/cog/painad.pdf (Ctrl + left click to view)    Other Symptoms:  [ ]All other review of systems negative     Palliative Performance Status Version 2:  30       %      http://[email or web address].org/files/news/palliative_performance_scale_ppsv2.pdf  PHYSICAL EXAM:  Vital Signs Last 24 Hrs  T(C): 36.3 (14 Jan 2020 05:49), Max: 36.9 (13 Jan 2020 19:41)  T(F): 97.3 (14 Jan 2020 05:49), Max: 98.4 (13 Jan 2020 19:41)  HR: 61 (14 Jan 2020 05:49) (60 - 82)  BP: 130/55 (14 Jan 2020 05:49) (116/51 - 182/66)  BP(mean): --  RR: 18 (14 Jan 2020 08:58) (18 - 20)  SpO2: 90% (14 Jan 2020 08:58) (85% - 100%) I&O's Summary    13 Jan 2020 07:01  -  14 Jan 2020 07:00  --------------------------------------------------------  IN: 900 mL / OUT: 2900 mL / NET: -2000 mL       GENERAL:  [x ]Alert  [ ]Oriented x   [ ]Lethargic  [ ]Cachexia  [ ]Unarousable  [ ]Verbal  [ ]Non-Verbal  [x] Confused.   Behavioral:   [ ]Anxiety  [x ]Delirium [ ]Agitation [ ]Other  HEENT:  [ ]Normal   [x ]Dry mouth   [ ]ET Tube/Trach  [ ]Oral lesions  PULMONARY:   [ ]Clear [ ]Tachypnea  [ ]Audible excessive secretions   [ ]Rhonchi        [ ]Right [ ]Left [ ]Bilateral  [ ]Crackles        [ ]Right [ ]Left [ ]Bilateral  [ ]Wheezing     [ ]Right [ ]Left [ ]Bilateral  [ ]Diminished BS [ ] Right [ ]Left [ ]Bilateral  [x] Decreased air entry over Left pulmonary regiona.   CARDIOVASCULAR:    [x ]Regular [ ]Irregular [ ]Tachy  [ ]Kaveh [ ]Murmur [ ]Other  GASTROINTESTINAL:  [ x]Soft  [ ]Distended   [ ]+BS  [ ]Non tender [ ]Tender  [ ]PEG [ ]OGT/ NGT   Last BM: 1/11     GENITOURINARY:  [ ]Normal [x ]Incontinent   [ ]Oliguria/Anuria   [ ]Acosta  MUSCULOSKELETAL:   [ ]Normal   [ x]Weakness  [ ]Bed/Wheelchair bound [ ]Edema  NEUROLOGIC:   [ ]No focal deficits  [ ] Cognitive impairment  [ ] Dysphagia [ ]Dysarthria [ ] Paresis [x ]Other: Delirium    SKIN:   [x ]Normal  [ ]Rash   [ ]Pressure ulcer(s) [ ]y [ ]n present on admission    CRITICAL CARE:  [ ]Shock Present  [ ]Septic [ ]Cardiogenic [ ]Neurologic [ ]Hypovolemic  [ ]Vasopressors [ ]Inotropes  [ ]Respiratory failure present [ ]Mechanical Ventilation [ ]Non-invasive ventilatory support [ ]High-Flow  [ ]Acute  [ ]Chronic [ ]Hypoxic  [ ]Hypercarbic [ ]Other  [ ]Other organ failure     LABS:                        12.1   13.05 )-----------( 198      ( 14 Jan 2020 05:01 )             39.8   01-14    134<L>  |  95<L>  |  27<H>  ----------------------------<  100<H>  3.4<L>   |  25  |  3.69<H>    Ca    9.4      14 Jan 2020 05:01  Phos  4.4     01-13  Mg     2.3     01-13    TPro  8.6<H>  /  Alb  3.2<L>  /  TBili  0.9  /  DBili  x   /  AST  46<H>  /  ALT  25  /  AlkPhos  148<H>  01-13  PT/INR - ( 14 Jan 2020 05:01 )   PT: 15.4 sec;   INR: 1.34 ratio         PTT - ( 14 Jan 2020 05:01 )  PTT:28.0 sec      RADIOLOGY & ADDITIONAL STUDIES:  < from: Xray Chest 1 View- PORTABLE-Urgent (01.12.20 @ 20:17) >  EXAM:  XR CHEST PORTABLE URGENT 1V                            PROCEDURE DATE:  01/12/2020            INTERPRETATION:  CLINICAL INFORMATION: Screening chest radiograph.    EXAM: Frontal chest radiograph dated 1/12/2020.    COMPARISON: Chest radiograph from 11/30/2019. CT abdomen pelvis from same day.    FINDINGS:   Right chest wall pacemaker. Median sternotomy.  Right middle lobe collapse.  There are no pleural effusions or pneumothorax.  The cardiomediastinal silhouette is not well evaluated onthis projection.    IMPRESSION:   Right middle lobe collapse.                KIRSTEN DAVID M.D., RADIOLOGY RESIDENT  This document has been electronically signed.  YINKA JOHNSON M.D., ATTENDING RADIOLOGIST  This document has been electronically signed. Jan 13 2020  9:53AM        < end of copied text >      < from: CT Abdomen and Pelvis No Cont (01.12.20 @ 19:40) >  EXAM:  CT ABDOMEN AND PELVIS                            PROCEDURE DATE:  01/12/2020  IMPRESSION:     Right femoral intertrochanteric fracture with findings raising a question of pathologic fracture.    No evidence of retroperitoneal hematoma, as clinically questioned.    Right middle lobe lobar atelectasis.                        BRITTANIE HAYES M.D., ATTENDING RADIOLOGIST  This document has been electronically signed. Jan 13 2020  8:48AM        < end of copied text >    Protein Calorie Malnutrition Present: [ ]mild [ ]moderate [ ]severe [ ]underweight [ ]morbid obesity  https://www.andeal.org/vault/2440/web/files/ONC/Table_Clinical%20Characteristics%20to%20Document%20Malnutrition-White%20JV%20et%20al%202012.pdf    Height (cm): 162.56 (01-13-20 @ 16:57), 162.6 (12-01-19 @ 16:18), 160.02 (10-02-19 @ 08:12)  Weight (kg): 78.4 (01-13-20 @ 16:57), 77.3 (12-01-19 @ 16:18), 79.8 (10-02-19 @ 08:12)  BMI (kg/m2): 29.7 (01-13-20 @ 16:57), 29.2 (12-01-19 @ 16:18), 31.2 (10-02-19 @ 08:12)    [ ]PPSV2 < or = 30%  [ ]significant weight loss [ ]poor nutritional intake [ ]anasarca   Albumin, Serum: 3.2 g/dL (01-13-20 @ 05:22)   [ ]Artificial Nutrition    REFERRALS:   [ ]Chaplaincy  [ ]Hospice  [ ]Child Life  [ ]Social Work  [ ]Case management [ ]Holistic Therapy     Goals of Care Document:

## 2020-01-14 NOTE — PROGRESS NOTE ADULT - PROBLEM SELECTOR PLAN 5
will monitor PT/INR, INR 1.34 today. NR elevated to 15.62. Unclear underlying etiology, no recent change in meds, no recent abx use. Does not appear septic, no obvious source of infection. AST/ALT mildly elevated, but no bilirubin or albumin derangements to suggest liver synthetic dysfunction. s/p Kcentra and Vit K 10mg IV in ED. No evidence of active bleeding. CT Head neg for hemorrhage, CT A/P no RP bleed.  - INR daily  - monitor for bleeding  - holding Coumadin  - neuro checks.

## 2020-01-14 NOTE — CHART NOTE - NSCHARTNOTEFT_GEN_A_CORE
Heme onc consult called Dr. Travis. Restart Rosalinda as d/w Dr. Travis and he will follow up in Am. D/w Dr. Sanderson.

## 2020-01-14 NOTE — CHART NOTE - NSCHARTNOTEFT_GEN_A_CORE
Spoke with patient's Neprhologist Dr. Jaclyn Molina over the phone.   Dr. Molina states patient is renally optimized for procedure. No further nephrology intervention needed prior to OR. Dr. Molina states he will leave clearance note later this afternoon.

## 2020-01-14 NOTE — PROGRESS NOTE ADULT - PROBLEM SELECTOR PLAN 4
- HD per renal - likely in setting of hypoxia and inpatient hospitalization in older female with multiple medical problems  - frequent reorientation; avoid IV opioids if altered/hypoxic/labored breathing - likely in setting of hypoxia and inpatient hospitalization in older female with multiple medical problems  Promote:   -Frequent reassurance and verbal orientation   -Family members or other familiar persons by his bedside.   -Delusions and hallucinations should be neither endorsed nor challenged.   -Physical restraints should be avoided. Alternatives to restraint use, such as constant observation (preferably by someone familiar to the patient such as a family member), may be more effective.  -Pain controlled and PT eval and early ambulation when possible  -Update the calendar date in the room.   -Continue care in 3 [place].

## 2020-01-14 NOTE — PROGRESS NOTE ADULT - PROBLEM SELECTOR PLAN 1
CT abd and pelvic x-ray showed right femur intertochanteric pathologic Fx  - Ortho consult and plan noted  -pt with episode of hyopoxia, pulm clearance pending, check rvp, b/l dopplers, c/w o2 supplementation   - Renal and Cardiology ( Dr Victor) consults and clearance noted

## 2020-01-14 NOTE — PROGRESS NOTE ADULT - PROBLEM SELECTOR PLAN 8
Transitions of Care Status:  1.  Name of PCP: Dr. Espana  2.  PCP Contacted on Admission: [ ] Y    [x] N    3.  PCP contacted at Discharge: [ ] Y    [ ] N    [ ] N/A  4.  Post-Discharge Appointment Date and Location:  5.  Summary of Handoff given to PCP:

## 2020-01-14 NOTE — CONSULT NOTE ADULT - ASSESSMENT
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 Pulmonary service consulted for preoperative risk stratification.    1. Preoperative risk stratification  - Patient at high risk for pulmonary complications postoperatively based on Ariscat score  - No absolute contraindication to proceed with surgery  - Had dialysis recently so she is optimized from volume status standpoint   - Would start incentive spirometry   - Encourage out of bed to chair as tolerated  - Bronchodilators prior to surgery   - DVT prophylaxis during perioperative period while not on full dose anticoagulation    --------------------------------------------------------  Jack Mckeon, PGY-4  Pulmonary/Critical Care Fellow  Pager: [phone] ([facility]), [phone] ([place])

## 2020-01-14 NOTE — PROGRESS NOTE ADULT - SUBJECTIVE AND OBJECTIVE BOX
Pt seen and examined at bedside. Pt reports pain is well controlled. Case cancelled yesterday due to hypoxia and patient taken for urgent dialysis. Pt endorses mild cough. Denies fevers, dizziness, CP, SOB, N/V, numbness/tingling, calf pain.    Labs:                        12.1   13.05 )-----------( 198      ( 14 Jan 2020 05:01 )             39.8     01-14    134<L>  |  95<L>  |  27<H>  ----------------------------<  100<H>  3.4<L>   |  25  |  3.69<H>    Ca    9.4      14 Jan 2020 05:01  Phos  4.4     01-13  Mg     2.3     01-13    TPro  8.6<H>  /  Alb  3.2<L>  /  TBili  0.9  /  DBili  x   /  AST  46<H>  /  ALT  25  /  AlkPhos  148<H>  01-13    PT/INR - ( 14 Jan 2020 05:01 )   PT: 15.4 sec;   INR: 1.34 ratio         PTT - ( 14 Jan 2020 05:01 )  PTT:28.0 sec    Vitals:  T(C): 36.3 (01-14-20 @ 05:49), Max: 36.9 (01-13-20 @ 07:15)  HR: 61 (01-14-20 @ 05:49) (60 - 82)  BP: 130/55 (01-14-20 @ 05:49) (116/51 - 182/66)  RR: 18 (01-14-20 @ 05:49) (18 - 20)  SpO2: 100% (01-14-20 @ 05:49) (85% - 100%)    Physical Exam:  Gen: NAD, resting comfortably    RLE:  Skin intact  +EHL/FHL/TA/GS  SILT L2-S1  +DP/PT Pulses  Compartments soft and compressible  No calf TTP B/L

## 2020-01-14 NOTE — PROGRESS NOTE ADULT - SUBJECTIVE AND OBJECTIVE BOX
S: Last night hypoxia events noted. Patient currently denies chest pain or shortness of breath on room air at time of exam. Still with intermittent cough. Review of systems otherwise (-)  	  MEDICATIONS  (STANDING):  allopurinol 300 milliGRAM(s) Oral <User Schedule>  artificial tears (preservative free) Ophthalmic Solution 1 Drop(s) Both EYES two times a day  lidocaine   Patch 1 Patch Transdermal daily  midodrine. 10 milliGRAM(s) Oral <User Schedule>  sevelamer carbonate 800 milliGRAM(s) Oral three times a day with meals    MEDICATIONS  (PRN):  acetaminophen   Tablet .. 975 milliGRAM(s) Oral every 8 hours PRN Mild Pain (1 - 3)  HYDROmorphone  Injectable 0.3 milliGRAM(s) IV Push every 3 hours PRN moderate-severe pain  senna 1 Tablet(s) Oral at bedtime PRN Constipation        LABS:	 	                          12.1   13.05 )-----------( 198      ( 14 Jan 2020 05:01 )             39.8     Hemoglobin: 12.1 g/dL (01-14 @ 05:01)  Hemoglobin: 10.7 g/dL (01-13 @ 05:22)  Hemoglobin: 10.7 g/dL (01-13 @ 03:24)  Hemoglobin: 7.2 g/dL (01-12 @ 17:20)    01-14    134<L>  |  95<L>  |  27<H>  ----------------------------<  100<H>  3.4<L>   |  25  |  3.69<H>    Ca    9.4      14 Jan 2020 05:01  Phos  4.4     01-13  Mg     2.3     01-13    TPro  8.6<H>  /  Alb  3.2<L>  /  TBili  0.9  /  DBili  x   /  AST  46<H>  /  ALT  25  /  AlkPhos  148<H>  01-13    Creatinine Trend: 3.69<--, 6.53<--, 5.67<--, 5.09<--, 4.97<--  COAGS:   PT/INR - ( 14 Jan 2020 05:01 )   PT: 15.4 sec;   INR: 1.34 ratio         PTT - ( 14 Jan 2020 05:01 )  PTT:28.0 sec    proBNP:   Lipid Profile:   HgA1c:   TSH:     PHYSICAL EXAM:  T(C): 36.3 (01-14-20 @ 05:49), Max: 36.9 (01-13-20 @ 19:41)  HR: 61 (01-14-20 @ 05:49) (60 - 82)  BP: 130/55 (01-14-20 @ 05:49) (116/51 - 182/66)  RR: 18 (01-14-20 @ 08:58) (18 - 20)  SpO2: 90% (01-14-20 @ 08:58) (85% - 100%)  Wt(kg): --  I&O's Summary    13 Jan 2020 07:01  -  14 Jan 2020 07:00  --------------------------------------------------------  IN: 900 mL / OUT: 2900 mL / NET: -2000 mL      Height (cm): 162.56 (01-13 @ 16:57)  Weight (kg): 78.4 (01-13 @ 16:57)  BMI (kg/m2): 29.7 (01-13 @ 16:57)  BSA (m2): 1.84 (01-13 @ 16:57)    Gen: Appears well in NAD  HEENT:  (-)icterus (-)pallor  CV: N S1 S2 1/6 GARY (+)2 Pulses B/l  Resp:  Clear to auscultation B/L, normal effort  GI: (+) BS Soft, NT, ND  Lymph:  (+)Trace RLE Edema, (-)obvious lymphadenopathy  Skin: Warm to touch, Normal turgor  Psych: Appropriate mood and affect      TELEMETRY: None	      ECG: VPaced at 63 bpm 	    ASSESSMENT/PLAN: 87 y/o female known to our office with PMHx of ESRD on HD, persistent Afib (on Coumadin), s/p Bio MVR, Tricuspid valve repair and ASD closure in 2016, hx complete heart block s/p PPM, Lung CA, COPD, remote colon CA (s/p R hemicolectomy), had normal nuclear stress test and TTE with normal LV/RV with well seated bio MV with normal function both in 7/2019 who presented to ED with worsening right hip pain s/p recent mechanical fall found to have right hip fracture and supratherapeutic INR requiring reversal for OR. Cardiology consulted for preop clearance.    - No evidence of clinical HF or anginal symptoms  - Denies LOC/syncope  - Normal Nuclear stress and TTE with normal LV/RV with well seated bio MV with normal function both in 7/2019  - AC with coumadin currently on hold pending OR s/p INR reversal - monitor INR, resume when cleared postop  - PPM interrogation noted, episodes of likely SVT but no correlating events to recent fall  - Based on patient's RCRI score, would consider her low cardiac risk for planned ortho procedure. Patient is optimized from CV perspective.  - HD per renal - renal optimization noted  - Consider pulm eval - d/w medicine NP  - Ortho follow up - pending OR today    Adam Jim PA-C  [facility]  Pager: [phone]

## 2020-01-14 NOTE — CHART NOTE - NSCHARTNOTEFT_GEN_A_CORE
We were called for a consult regarding any RT to the right hip.     Briefly, this is an 86 y.o. F with Stage IV lung cancer with known metastases to the right femur who came to the ED c/o right knee and right hip pains 10/10.     Based on imaging below, she has a basicervical fracture of the right femoral neck.       She was seen by orthopedics who plans for surgical intervention, IMN with Dr. Dean.   There is no role for radiation now in an already fractured bone.   Please consult us after surgery if there is a request for adjuvant radiation treatment which can be arranged as an outpatient once she has been cleared post operatively to have RT.         < from: CT Abdomen and Pelvis No Cont (01.12.20 @ 19:40) >      IMPRESSION:     Right femoral intertrochanteric fracture with findings raising a question of pathologic fracture.    No evidence of retroperitoneal hematoma, as clinically questioned.    Right middle lobe lobar atelectasis.    < from: CT Head No Cont (01.12.20 @ 19:38) >    IMPRESSION:     Markedly limited evaluation of the posterior fossa due to metallic streak artifact.    No CT evidence of acute intracranial hemorrhage or mass effect.      < from: CT Pelvis No Cont (01.12.20 @ 17:32) >      IMPRESSION:  1.  Basicervical fracture of the right femoral neck with mild proximal migration.  2.  Lytic lesions are identified in the bilateral proximal femora corresponding with areas of increased uptake in lytic lesion seen on prior PET/CT. Lytic lesions appear to have increased in size when compared to prior study.      [phone]

## 2020-01-14 NOTE — PROGRESS NOTE ADULT - PROBLEM SELECTOR PLAN 1
s/p HD yesterday-  Given OR cancelled and potential OR tomm  will plan for routine hd today  trend bmp

## 2020-01-14 NOTE — CHART NOTE - NSCHARTNOTEFT_GEN_A_CORE
Pt w hip fracture.  Pt on schedule and optimized for surgery today.  However, patient ate lunch around 130.  Surgery postponed until tomorrow.  NPO after MN

## 2020-01-14 NOTE — CONSULT NOTE ADULT - SUBJECTIVE AND OBJECTIVE BOX
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 Pulmonary service consulted for preoperative risk stratification. Reported history of COPD and restrictive lung disease per notes but patient denies and no formal PFTs in system. Not on oxygen at home but reports occasional use of short acting bronchodilators. Never a smoker. No history of RALPH. No history of DVT/PE. Recent respiratory infection with the flu and pneumonia earlier in December. No current respiratory complaints.     PAST MEDICAL & SURGICAL HISTORY:  SCC (squamous cell carcinoma of lung), right: metastatic to R femur  Chronic atrial fibrillation: on coumadin  Tricuspid valve insufficiency, unspecified etiology  Mitral valve stenosis, unspecified etiology  Gout  ESRD on hemodialysis  H/O tricuspid valve repair:   History of mitral valve replacement with bioprosthetic valve  H/O total knee replacement, left:   H/O:  section  Colon tumor: tumor removed from colon   A-V fistula: Left A-V fistula for HD -    FAMILY HISTORY:  Family history of heart disease (Sibling)  Family history of anemia: father    SOCIAL HISTORY  Denies tobacco, alcohol and drug use    MEDICATIONS  (STANDING):  acetaminophen  IVPB .. 1000 milliGRAM(s) IV Intermittent once  allopurinol 300 milliGRAM(s) Oral <User Schedule>  artificial tears (preservative free) Ophthalmic Solution 1 Drop(s) Both EYES two times a day  crizotinib 200 milliGRAM(s) Oral two times a day  heparin  Injectable 5000 Unit(s) SubCutaneous every 12 hours  lidocaine   Patch 1 Patch Transdermal daily  midodrine. 10 milliGRAM(s) Oral <User Schedule>  sevelamer carbonate 800 milliGRAM(s) Oral three times a day with meals    MEDICATIONS  (PRN):  HYDROmorphone  Injectable 0.3 milliGRAM(s) IV Push every 3 hours PRN moderate-severe pain  senna 1 Tablet(s) Oral at bedtime PRN Constipation      ALLERGIES  No known medication allergies      OBJECTIVE    Vital Signs Last 24 Hrs  T(C): 36.3 (2020 05:49), Max: 36.9 (2020 19:41)  T(F): 97.3 (2020 05:49), Max: 98.4 (2020 19:41)  HR: 61 (2020 05:49) (60 - 82)  BP: 130/55 (2020 05:49) (116/51 - 182/66)  BP(mean): --  RR: 18 (2020 08:58) (18 - 20)  SpO2: 90% (2020 08:58) (85% - 100%)    PHYSICAL EXAM:  General: no acute distress  HEENT: normocephalic  Eyes: extraocular movements intact, pupils equal and reactive to light  Neck: supple  Respiratory: non labored breathing, decreased breath sounds in bases, no wheezing  Cardiovascular: normal rate, regular rhythm  Gastrointestinal: abdomen soft, non tender, non distended  Extremities: mild lower extremity edema  Neurological: alert, oriented, no focal deficits  Psychiatric: cooperative        LABORATORY                          12.1   13.05 )-----------( 198      ( 2020 05:01 )             39.8     01-14    134<L>  |  95<L>  |  27<H>  ----------------------------<  100<H>  3.4<L>   |  25  |  3.69<H>    Ca    9.4      2020 05:01  Phos  4.4     -  Mg     2.3         TPro  8.6<H>  /  Alb  3.2<L>  /  TBili  0.9  /  DBili  x   /  AST  46<H>  /  ALT  25  /  AlkPhos  148<H>      RADIOLOGY    Xray Chest 1 View- PORTABLE-Urgent (20 @ 20:17) >  Right middle lobe collapse.    < end of copied text >

## 2020-01-14 NOTE — PROGRESS NOTE ADULT - SUBJECTIVE AND OBJECTIVE BOX
Patient seen and examined  was taken to OR however hypoxic again  no intervention done and patient sent back to her room    No Known Allergies    Hospital Medications:   MEDICATIONS  (STANDING):  acetaminophen  IVPB .. 1000 milliGRAM(s) IV Intermittent once  allopurinol 300 milliGRAM(s) Oral <User Schedule>  artificial tears (preservative free) Ophthalmic Solution 1 Drop(s) Both EYES two times a day  crizotinib 200 milliGRAM(s) Oral two times a day  heparin  Injectable 5000 Unit(s) SubCutaneous every 12 hours  lidocaine   Patch 1 Patch Transdermal daily  midodrine. 10 milliGRAM(s) Oral <User Schedule>  sevelamer carbonate 800 milliGRAM(s) Oral three times a day with meals        VITALS:  T(F): 97.3 (01-14-20 @ 05:49), Max: 98.4 (01-13-20 @ 19:41)  HR: 61 (01-14-20 @ 05:49)  BP: 130/55 (01-14-20 @ 05:49)  RR: 18 (01-14-20 @ 08:58)  SpO2: 90% (01-14-20 @ 08:58)  Wt(kg): --    01-13 @ 07:01  -  01-14 @ 07:00  --------------------------------------------------------  IN: 900 mL / OUT: 2900 mL / NET: -2000 mL      Height (cm): 162.56 (01-13 @ 16:57)  Weight (kg): 78.4 (01-13 @ 16:57)  BMI (kg/m2): 29.7 (01-13 @ 16:57)  BSA (m2): 1.84 (01-13 @ 16:57)  PHYSICAL EXAM:  Constitutional: NAD  HEENT: anicteric sclera, oropharynx clear, MMM  Neck: No JVD  Respiratory: CTAB, no wheezes, rales or rhonchi  Cardiovascular: S1, S2, RRR  Gastrointestinal: BS+, soft, NT/ND  Extremities: dec rom rle 2/2 pain  Neurological: A/O x 3, no focal deficits  Psychiatric: Normal mood, normal affect  : No CVA tenderness. No ortiz.   Skin: No rashes  Vascular Access: left forearm avf + thrill    LABS:  01-14    134<L>  |  95<L>  |  27<H>  ----------------------------<  100<H>  3.4<L>   |  25  |  3.69<H>    Ca    9.4      14 Jan 2020 05:01  Phos  4.4     01-13  Mg     2.3     01-13    TPro  8.6<H>  /  Alb  3.2<L>  /  TBili  0.9  /  DBili      /  AST  46<H>  /  ALT  25  /  AlkPhos  148<H>  01-13    Creatinine Trend: 3.69 <--, 6.53 <--, 5.67 <--, 5.09 <--, 4.97 <--                        12.1   13.05 )-----------( 198      ( 14 Jan 2020 05:01 )             39.8     Urine Studies:      RADIOLOGY & ADDITIONAL STUDIES: Patient seen and examined  hypoxic in OR yesterday- procedure aborted    No Known Allergies    Hospital Medications:   MEDICATIONS  (STANDING):  acetaminophen  IVPB .. 1000 milliGRAM(s) IV Intermittent once  allopurinol 300 milliGRAM(s) Oral <User Schedule>  artificial tears (preservative free) Ophthalmic Solution 1 Drop(s) Both EYES two times a day  crizotinib 200 milliGRAM(s) Oral two times a day  heparin  Injectable 5000 Unit(s) SubCutaneous every 12 hours  lidocaine   Patch 1 Patch Transdermal daily  midodrine. 10 milliGRAM(s) Oral <User Schedule>  sevelamer carbonate 800 milliGRAM(s) Oral three times a day with meals        VITALS:  T(F): 97.3 (01-14-20 @ 05:49), Max: 98.4 (01-13-20 @ 19:41)  HR: 61 (01-14-20 @ 05:49)  BP: 130/55 (01-14-20 @ 05:49)  RR: 18 (01-14-20 @ 08:58)  SpO2: 90% (01-14-20 @ 08:58)  Wt(kg): --    01-13 @ 07:01  -  01-14 @ 07:00  --------------------------------------------------------  IN: 900 mL / OUT: 2900 mL / NET: -2000 mL      Height (cm): 162.56 (01-13 @ 16:57)  Weight (kg): 78.4 (01-13 @ 16:57)  BMI (kg/m2): 29.7 (01-13 @ 16:57)  BSA (m2): 1.84 (01-13 @ 16:57)  PHYSICAL EXAM:  Constitutional: NAD  HEENT: anicteric sclera, oropharynx clear, MMM  Neck: No JVD  Respiratory: CTAB, no wheezes, rales or rhonchi  Cardiovascular: S1, S2, RRR  Gastrointestinal: BS+, soft, NT/ND  Extremities: dec rom rle 2/2 pain  Neurological: A/O x 3, no focal deficits  Psychiatric: Normal mood, normal affect  : No CVA tenderness. No ortiz.   Skin: No rashes  Vascular Access: left forearm avf + thrill    LABS:  01-14    134<L>  |  95<L>  |  27<H>  ----------------------------<  100<H>  3.4<L>   |  25  |  3.69<H>    Ca    9.4      14 Jan 2020 05:01  Phos  4.4     01-13  Mg     2.3     01-13    TPro  8.6<H>  /  Alb  3.2<L>  /  TBili  0.9  /  DBili      /  AST  46<H>  /  ALT  25  /  AlkPhos  148<H>  01-13    Creatinine Trend: 3.69 <--, 6.53 <--, 5.67 <--, 5.09 <--, 4.97 <--                        12.1   13.05 )-----------( 198      ( 14 Jan 2020 05:01 )             39.8     Urine Studies:      RADIOLOGY & ADDITIONAL STUDIES:

## 2020-01-14 NOTE — PROGRESS NOTE ADULT - PROBLEM SELECTOR PLAN 3
-Cristal NOGUEIRA PRN for now. - noted on CXR and CT A/P on presentation; PNA cannot be excluded  - could be etiology of hypoxia  - would recommend pulmonary consult and consider antibiotics if clinically indicated; WBC noted to be increasing today to 13s but currently afebrile

## 2020-01-15 NOTE — CHART NOTE - NSCHARTNOTEFT_GEN_A_CORE
Orthopedic Post Op Note    86 year old female on hemodialysis post op from right hip Intramedullar rodding from femur fracture post fall. She reports no pain.     Vital Signs Last 24 Hrs  T(C): 36.5 (15 Noel 2020 21:00), Max: 37.3 (15 Noel 2020 08:52)  T(F): 97.7 (15 Noel 2020 21:00), Max: 99.1 (15 Noel 2020 08:52)  HR: 60 (15 Noel 2020 21:30) (60 - 92)  BP: 140/60 (15 Noel 2020 21:30) (112/56 - 140/60)  BP(mean): 86 (15 Noel 2020 21:30) (81 - 86)  RR: 18 (15 Noel 2020 21:30) (15 - 18)  SpO2: 96% (15 Noel 2020 21:30) (94% - 100%)    CBC Full  -  ( 15 Noel 2020 20:51 )  WBC Count : 14.17 K/uL  RBC Count : 4.67 M/uL  Hemoglobin : 11.5 g/dL  Hematocrit : 39.7 %  Platelet Count - Automated : 200 K/uL    PE:  General: In no acute distress. Alert and oriented.   Right Hip:         Incision dressings x2. Proximal dressing has scant serosanguinous drainage and distal dressing dry, clean, and intact.         Calf is soft, non-compressible, negative Hattie        +2 DP and +2 PT         5/5 Extensor hallicus Longus, 5/5 Flexor Hallicus Longus, 5/5Gastrocnemius, 5/5 Tibialis posterior         Sensation intact to light touch    Assessment: 86 year old female on hemodialysis post op from right hip Intramedullar rodding from femur fracture post fall in PACU will be admitted to Medical Floor for observation.    Plan:  -PT WBS ambulation in am as tolerated  -DVT prophylaxis: Enoxaparin 300 mg   -Pain management: IV Tylenol 1000mg BID  -Antibiotics: Cefazolin   - Care as per primary team     Analisa Nair  Beeper: [id number]  Orthopedics Orthopedic Post Op Note    86 year old female on hemodialysis post op from right hip Intramedullar rodding from femur fracture post fall. She reports no pain.     Vital Signs Last 24 Hrs  T(C): 36.5 (15 Noel 2020 21:00), Max: 37.3 (15 Noel 2020 08:52)  T(F): 97.7 (15 Noel 2020 21:00), Max: 99.1 (15 Noel 2020 08:52)  HR: 60 (15 Noel 2020 21:30) (60 - 92)  BP: 140/60 (15 Noel 2020 21:30) (112/56 - 140/60)  BP(mean): 86 (15 Noel 2020 21:30) (81 - 86)  RR: 18 (15 Noel 2020 21:30) (15 - 18)  SpO2: 96% (15 Noel 2020 21:30) (94% - 100%)    CBC Full  -  ( 15 Noel 2020 20:51 )  WBC Count : 14.17 K/uL  RBC Count : 4.67 M/uL  Hemoglobin : 11.5 g/dL  Hematocrit : 39.7 %  Platelet Count - Automated : 200 K/uL    PE:  General: In no acute distress. Alert and oriented.   Right Hip:         Incision dressings x2. Proximal dressing has scant serosanguinous drainage and distal dressing dry, clean, and intact.         Calf is soft, non-compressible, negative Hattie        +2 DP and +2 PT         5/5 Extensor hallicus Longus, 5/5 Flexor Hallicus Longus, 5/5Gastrocnemius, 5/5 Tibialis posterior         Sensation intact to light touch    Assessment: 86 year old female on hemodialysis post op from right hip Intramedullar rodding from femur fracture post fall in PACU will be admitted to Medical Floor for observation.    Plan:  -PT WBS ambulation in am as tolerated  -DVT prophylaxis: Enoxaparin 300 mg   -Pain management: IV Tylenol 1000mg BID  -Antibiotics: Cefazolin   - Care as per primary team     Analisa Nair  Beeper: [id number]  Orthopedics    Present for exam and agree with assessment and plan as stated above.    Greta Tran PA-C  Team Pager #[phone]

## 2020-01-15 NOTE — PROVIDER CONTACT NOTE (OTHER) - SITUATION
Attempting to draw pre-op labs on pt. Pt combative/ hitting and trying to scratch staff, and trying to spit on staff. Pt yelling for police

## 2020-01-15 NOTE — PROGRESS NOTE ADULT - PROBLEM SELECTOR PLAN 2
- On DMT.   - Patient's desire if for continuing DMT if possible; heme/onc consulted with primary oncologist Dr. Travis currently continuing crizotinib

## 2020-01-15 NOTE — PROGRESS NOTE ADULT - PROBLEM SELECTOR PLAN 4
- likely in setting of hypoxia and inpatient hospitalization in older female with multiple medical problems  Promote:   -Frequent reassurance and verbal orientation   -Family members or other familiar persons by his bedside.   -Delusions and hallucinations should be neither endorsed nor challenged.   -Physical restraints should be avoided. Alternatives to restraint use, such as constant observation (preferably by someone familiar to the patient such as a family member), may be more effective.  -Pain controlled and PT eval and early ambulation when possible  -Update the calendar date in the room.   -Continue care in 3 Ramon.

## 2020-01-15 NOTE — PROGRESS NOTE ADULT - SUBJECTIVE AND OBJECTIVE BOX
Patient seen and examined  no compaints    No Known Allergies    Hospital Medications:   MEDICATIONS  (STANDING):  acetaminophen  IVPB .. 1000 milliGRAM(s) IV Intermittent once  allopurinol 300 milliGRAM(s) Oral <User Schedule>  artificial tears (preservative free) Ophthalmic Solution 1 Drop(s) Both EYES two times a day  crizotinib 200 milliGRAM(s) Oral two times a day  lidocaine   Patch 1 Patch Transdermal daily  midodrine. 10 milliGRAM(s) Oral <User Schedule>  senna 2 Tablet(s) Oral at bedtime  sevelamer carbonate 800 milliGRAM(s) Oral three times a day with meals        VITALS:  T(F): 99.1 (01-15-20 @ 08:52), Max: 99.1 (01-15-20 @ 08:52)  HR: 92 (01-15-20 @ 08:52)  BP: 112/64 (01-15-20 @ 08:52)  RR: 18 (01-15-20 @ 08:52)  SpO2: 94% (01-15-20 @ 08:52)  Wt(kg): --    01-14 @ 07:01  -  01-15 @ 07:00  --------------------------------------------------------  IN: 1330 mL / OUT: 2800 mL / NET: -1470 mL    01-15 @ 07:01  -  01-15 @ 13:05  --------------------------------------------------------  IN: 0 mL / OUT: 0 mL / NET: 0 mL        PHYSICAL EXAM:  Constitutional: NAD  HEENT: anicteric sclera, oropharynx clear, MMM  Neck: No JVD  Respiratory: CTAB, no wheezes, rales or rhonchi  Cardiovascular: S1, S2, RRR  Gastrointestinal: BS+, soft, NT/ND  Extremities: dec rom rle 2/2 pain  Neurological: A/O x 3, no focal deficits  Psychiatric: Normal mood, normal affect  : No CVA tenderness. No ortiz.   Skin: No rashes  Vascular Access: left forearm avf + thrill    LABS:  01-15    133<L>  |  89<L>  |  20  ----------------------------<  88  4.7   |  26  |  3.02<H>    Ca    10.3      15 Noel 2020 04:58  Phos  3.0     01-15  Mg     2.2     01-15      Creatinine Trend: 3.02 <--, 3.69 <--, 6.53 <--, 5.67 <--, 5.09 <--, 4.97 <--                        13.9   14.42 )-----------( 247      ( 15 Noel 2020 04:58 )             48.1     Urine Studies:      RADIOLOGY & ADDITIONAL STUDIES:

## 2020-01-15 NOTE — PROGRESS NOTE ADULT - PROBLEM SELECTOR PLAN 1
CT abd and pelvic x-ray showed right femur intertochanteric pathologic Fx  - Plan for OR for RIMN with ortho   - Renal, pulm and Cardiology ( Dr Victor) consults and clearance noted

## 2020-01-15 NOTE — PROGRESS NOTE ADULT - SUBJECTIVE AND OBJECTIVE BOX
Patient is a 86y old  Female who presents with a chief complaint of R Knee pain (15 Noel 2020 13:08)      SUBJECTIVE / OVERNIGHT EVENTS: appears comfortable, denies pain     MEDICATIONS  (STANDING):  acetaminophen  IVPB .. 1000 milliGRAM(s) IV Intermittent <User Schedule>  acetaminophen  IVPB .. 1000 milliGRAM(s) IV Intermittent <User Schedule>  allopurinol 300 milliGRAM(s) Oral <User Schedule>  artificial tears (preservative free) Ophthalmic Solution 1 Drop(s) Both EYES two times a day  crizotinib 200 milliGRAM(s) Oral two times a day  lidocaine   Patch 1 Patch Transdermal daily  midodrine. 10 milliGRAM(s) Oral <User Schedule>  senna 2 Tablet(s) Oral at bedtime  sevelamer carbonate 800 milliGRAM(s) Oral three times a day with meals    MEDICATIONS  (PRN):        CAPILLARY BLOOD GLUCOSE        I&O's Summary    14 Jan 2020 07:01  -  15 Noel 2020 07:00  --------------------------------------------------------  IN: 1330 mL / OUT: 2800 mL / NET: -1470 mL    15 Noel 2020 07:01  -  15 Noel 2020 14:58  --------------------------------------------------------  IN: 100 mL / OUT: 0 mL / NET: 100 mL        PHYSICAL EXAM:  GENERAL: NAD, frail  HEAD:  Atraumatic, Normocephalic  EYES:  conjunctiva and sclera clear  NECK:  No JVD  CHEST/LUNG: Clear to auscultation bilaterally; No wheeze  HEART: Regular rate and rhythm; s1S2  ABDOMEN: Soft, Nontender, Nondistended; Bowel sounds present  EXTREMITIES:  No clubbing, cyanosis, or edema  PSYCH: AAOx1-2      LABS:                        13.9   14.42 )-----------( 247      ( 15 Noel 2020 04:58 )             48.1     01-15    133<L>  |  89<L>  |  20  ----------------------------<  88  4.7   |  26  |  3.02<H>    Ca    10.3      15 Noel 2020 04:58  Phos  3.0     01-15  Mg     2.2     01-15      PT/INR - ( 15 Noel 2020 04:58 )   PT: 15.9 sec;   INR: 1.37 ratio         PTT - ( 15 Noel 2020 04:58 )  PTT:30.0 sec          RADIOLOGY & ADDITIONAL TESTS:    Imaging Personally Reviewed:    Consultant(s) Notes Reviewed:      Care Discussed with Consultants/Other Providers:

## 2020-01-15 NOTE — PROGRESS NOTE ADULT - PROBLEM SELECTOR PLAN 3
- noted on CXR and CT A/P on presentation; PNA cannot be excluded  - could be etiology of hypoxia  - per pulmonary recs: incentive spirometer, bronchodilators prior to surgery, OOBTC as tolerated per ortho; patient is medically optimized for procedure however high risk for pulmonary complications. - noted on CXR and CT A/P on presentation; PNA cannot be excluded  - could be etiology of hypoxia  - per pulmonary recs: incentive spirometer, bronchodilators prior to surgery, OOBTC as tolerated per ortho; patient is medically optimized for procedure however high risk for pulmonary complications.  -Consider f/u CXR

## 2020-01-15 NOTE — PROGRESS NOTE ADULT - SUBJECTIVE AND OBJECTIVE BOX
Pt seen and examined at bedside. Pt reports pain is well controlled. Case cancelled yesterday due to patient eating fed prior to case. Denies CP/SOB.  Plan for OR today evening.     Labs:  Vitals 24hrs  Vital Signs Last 24 Hrs  T(C): 36.7 (15 Noel 2020 04:54), Max: 36.7 (14 Jan 2020 20:22)  T(F): 98 (15 Noel 2020 04:54), Max: 98 (14 Jan 2020 20:22)  HR: 65 (15 Noel 2020 04:54) (60 - 65)  BP: 116/62 (15 Noel 2020 04:54) (112/56 - 138/59)  BP(mean): --  RR: 18 (15 Noel 2020 04:54) (17 - 18)  SpO2: 96% (15 Noel 2020 04:54) (90% - 99%)      01-13-20 @ 07:01  -  01-14-20 @ 07:00  --------------------------------------------------------  IN: 900 mL / OUT: 2900 mL / NET: -2000 mL    01-14-20 @ 07:01  -  01-15-20 @ 06:04  --------------------------------------------------------  IN: 1330 mL / OUT: 2800 mL / NET: -1470 mL        Lab Results 24hrs:                        13.9   14.42 )-----------( 247      ( 15 Noel 2020 04:58 )             48.1     01-15    133<L>  |  89<L>  |  20  ----------------------------<  88  4.7   |  26  |  3.02<H>    Ca    10.3      15 Noel 2020 04:58  Phos  3.0     01-15  Mg     2.2     01-15      Physical Exam:  Gen: NAD, resting comfortably    RLE:  Skin intact  +EHL/FHL/TA/GS  SILT L2-S1  +DP/PT Pulses  Compartments soft and compressible  No calf TTP B/L

## 2020-01-15 NOTE — CONSULT NOTE ADULT - ASSESSMENT
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

## 2020-01-15 NOTE — CONSULT NOTE ADULT - SUBJECTIVE AND OBJECTIVE BOX
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 1000 milliGRAM(s) IV Intermittent once  allopurinol 300 milliGRAM(s) Oral <User Schedule>  artificial tears (preservative free) Ophthalmic Solution 1 Drop(s) Both EYES two times a day  crizotinib 200 milliGRAM(s) Oral two times a day  HYDROmorphone  Injectable 0.3 milliGRAM(s) IV Push every 3 hours PRN  lidocaine   Patch 1 Patch Transdermal daily  midodrine. 10 milliGRAM(s) Oral <User Schedule>  senna 2 Tablet(s) Oral at bedtime  sevelamer carbonate 800 milliGRAM(s) Oral three times a day with meals      No Known Allergies      FAMILY HISTORY:  Family history of heart disease (Sibling)  Family history of anemia: father      Vital Signs Last 24 Hrs  T(C): 36.7 (15 Noel 2020 04:54), Max: 36.7 (2020 20:22)  T(F): 98 (15 Noel 2020 04:54), Max: 98 (2020 20:22)  HR: 65 (15 Noel 2020 04:54) (60 - 65)  BP: 116/62 (15 Noel 2020 04:54) (112/56 - 138/59)  BP(mean): --  RR: 18 (15 Noel 2020 04:54) (17 - 18)  SpO2: 96% (15 Noel 2020 04:54) (90% - 99%)                          13.9   14.42 )-----------( 247      ( 15 Noel 2020 04:58 )             48.1       01-15    133<L>  |  89<L>  |  20  ----------------------------<  88  4.7   |  26  |  3.02<H>    Ca    10.3      15 Noel 2020 04:58  Phos  3.0     01-15  Mg     2.2     01-15        PT/INR - ( 15 Noel 2020 04:58 )   PT: 15.9 sec;   INR: 1.37 ratio         PTT - ( 15 Noel 2020 04:58 )  PTT:30.0 sec        Ass/rec: Patient is a 86y old  Female who presents with a chief complaint of R Knee pain (15 Noel 2020 06:02), with 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 Pt feels good, denies any pain, cough, breathing difficulty or any other active symptoms on detailed ROS questions.      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 1000 milliGRAM(s) IV Intermittent once  allopurinol 300 milliGRAM(s) Oral <User Schedule>  artificial tears (preservative free) Ophthalmic Solution 1 Drop(s) Both EYES two times a day  crizotinib 200 milliGRAM(s) Oral two times a day  HYDROmorphone  Injectable 0.3 milliGRAM(s) IV Push every 3 hours PRN  lidocaine   Patch 1 Patch Transdermal daily  midodrine. 10 milliGRAM(s) Oral <User Schedule>  senna 2 Tablet(s) Oral at bedtime  sevelamer carbonate 800 milliGRAM(s) Oral three times a day with meals      No Known Allergies      FAMILY HISTORY:  Family history of heart disease (Sibling)  Family history of anemia: father      Vital Signs Last 24 Hrs  T(C): 36.7 (15 Noel 2020 04:54), Max: 36.7 (2020 20:22)  T(F): 98 (15 Noel 2020 04:54), Max: 98 (2020 20:22)  HR: 65 (15 Noel 2020 04:54) (60 - 65)  BP: 116/62 (15 Noel 2020 04:54) (112/56 - 138/59)  BP(mean): --  RR: 18 (15 Noel 2020 04:54) (17 - 18)  SpO2: 96% (15 Noel 2020 04:54) (90% - 99%)                          13.9   14.42 )-----------( 247      ( 15 Noel 2020 04:58 )             48.1       01-15    133<L>  |  89<L>  |  20  ----------------------------<  88  4.7   |  26  |  3.02<H>    Ca    10.3      15 Noel 2020 04:58  Phos  3.0     01-15  Mg     2.2     01-15        PT/INR - ( 15 Noel 2020 04:58 )   PT: 15.9 sec;   INR: 1.37 ratio         PTT - ( 15 Noel 2020 04:58 )  PTT:30.0 sec    Xray leg: IMPRESSION:  1.  No acute fracture or dislocation.  2.  Moderate left hip osteoarthritis.                  CYRUS GALLOWAY M.D., RADIOLOGY RESIDENT  This document has been electronically signed.  DUC CAIN M.D., ATTENDING RADIOLOGIST  This document has been electronically signed. 2020  2:41PM        Venous Dopplers:      IMPRESSION:   Generalized bone demineralization.    There is a mildly impacted acute basicervical fracture of the right femoral neck with possible extension to the lesser trochanter with there is a lucency suggesting the possibility of a metastatic lesion and subsequent pathologic fracture. There is mild varus angulation.    Osteoarthritic changes of the right knee joint including joint space narrowing and osteophyte formation.    Vascular calcifications.                ELIZABET HALL M.D., RADIOLOGY RESIDENT  This document has been electronically signed.  KLAUDIA YANEZ M.D., ATTENDING RADIOLOGIST  This document has been electronically signed. 2020  9:32AM                     CT a/p:  IMPRESSION:     Right femoral intertrochanteric fracture with findings raising a question of pathologic fracture.    No evidence of retroperitoneal hematoma, as clinically questioned.    Right middle lobe lobar atelectasis.                        BRITTANIE HAYES M.D., ATTENDING RADIOLOGIST  This document has been electronically signed. 2020  8:48AM                 IMPRESSION:     No evidence of deep venous thrombosis in either lower extremity.                          NICOLAS VILLEGAS M.D., ATTENDING RADIOLOGIST  This document has been electronically signed. 2020  5:31PM

## 2020-01-15 NOTE — PROGRESS NOTE ADULT - PROBLEM SELECTOR PLAN 2
-Pt with hypoxic episode  -cxr with right middle lobe collapse   -CT A/P commenting on Right middle lobe lobar atelectasis.  -rvp negative  -le dopplers negative  -c/w O2 supplementation   -pulm recs noted

## 2020-01-15 NOTE — PROGRESS NOTE ADULT - PROBLEM SELECTOR PLAN 1
- will schedule for IV tylenol RTC for now, and PRN IV pain medications for moderate-severe pain: dilaudid 0.3mg q3 PRN for moderate to severe pain with hold parameters; use dilaudid with caution currently given change of mental status and hypoxia  - standing lidocaine patch daily to right trochanteric region  - may benefit from radiation therapy if high suspicion for pathologic fracture 2/2 metastatic bony involvement; rad-onc planning to pursue as outpatient s/p IMN to right trochanter  - Current plan is for OR to repair her Fx once respiratory status and mental status are stable; may need pulmonary clearance prior to procedure given hypoxia; orthopedics to re-evaluate optimal timing for procedure - will schedule for IV tylenol RTC for now, and PRN IV pain medications for moderate-severe pain: dilaudid 0.3mg q3 PRN for moderate to severe pain with hold parameters; use dilaudid with caution currently given change of mental status and hypoxia  - standing lidocaine patch daily to right trochanteric region  - may benefit from radiation therapy if high suspicion for pathologic fracture 2/2 metastatic bony involvement; rad-onc planning to pursue as outpatient s/p IMN to right trochanter  - Current plan is for OR to repair her Fx once respiratory status and mental status are stable; plan for OR today 1/15: medically optimized by pulmonology, cardiology, IM - will schedule for IV tylenol RTC for now, and will advise PRN IV pain medications for moderate-severe pain: dilaudid 0.2mg q4 PRN for moderate to severe pain with holding parameters. The last dose of Dilaudid the patient received was at 17:57 on 1/14, therefore I strongly doubt Dilaudid is causing her change in mental status.   I d/w the primary NP about doing a further work up (e.g, ABGs) in order to better understand the reasons why the patient is lethargic.   - standing lidocaine patch daily to right trochanteric region  - may benefit from radiation therapy if high suspicion for pathologic fracture 2/2 metastatic bony involvement; rad-onc planning to pursue as outpatient s/p IMN to right trochanter  - Current plan is for OR to repair her Fx once respiratory status and mental status are stable; plan for OR today 1/15: medically optimized by pulmonology, cardiology, IM; however, the primary team is addressing the patient's fluctuating mental status and acute issues in order to continue to be sure she is stable for a Sx intervention.

## 2020-01-15 NOTE — PROGRESS NOTE ADULT - ATTENDING COMMENTS
Patient seen and examined.  Agree with above.   Recent NST and TTE with no ischemia and normal LV function respectively  No further cardiac workup planned prior to OR    Poncho Alba MD

## 2020-01-15 NOTE — PROGRESS NOTE ADULT - SUBJECTIVE AND OBJECTIVE BOX
SUBJECTIVE AND OBJECTIVE:  INTERVAL HPI/OVERNIGHT EVENTS: Overnight, no acute events. Patient medically optimized by pulmonology with high risk for post-operative pulmonary complications; heme/onc consulted patient continued on crizotinib. Plan for OR later today 1/15 for IMR per ortho for right intertrochanteric fracture.     DNR on chart:   Allergies    No Known Allergies    Intolerances    MEDICATIONS  (STANDING):  acetaminophen  IVPB .. 1000 milliGRAM(s) IV Intermittent once  allopurinol 300 milliGRAM(s) Oral <User Schedule>  artificial tears (preservative free) Ophthalmic Solution 1 Drop(s) Both EYES two times a day  crizotinib 200 milliGRAM(s) Oral two times a day  lidocaine   Patch 1 Patch Transdermal daily  midodrine. 10 milliGRAM(s) Oral <User Schedule>  senna 2 Tablet(s) Oral at bedtime  sevelamer carbonate 800 milliGRAM(s) Oral three times a day with meals    MEDICATIONS  (PRN):  HYDROmorphone  Injectable 0.3 milliGRAM(s) IV Push every 3 hours PRN moderate-severe pain      ITEMS UNCHECKED ARE NOT PRESENT    PRESENT SYMPTOMS: [ ]Unable to obtain due to poor mentation   Source if other than patient:  [ ]Family   [ ]Team     Pain:  [ ]yes [x ]no  QOL impact -   Location -                    Aggravating factors -  Quality -  Radiation -  Timing-  Severity (0-10 scale):  Minimal acceptable level (0-10 scale):     Dyspnea:                           [ ]Mild [ ]Moderate [ ]Severe  Anxiety:                             [ ]Mild [ ]Moderate [ ]Severe  Fatigue:                             [ ]Mild [ ]Moderate [ ]Severe  Nausea:                             [ ]Mild [ ]Moderate [ ]Severe  Loss of appetite:              [ ]Mild [ ]Moderate [ ]Severe  Constipation:                    [ ]Mild [ ]Moderate [ ]Severe    PAIN AD Score:	  http://geriatrictoolkit.[place].[facility]/cog/painad.pdf (Ctrl + left click to view)    Other Symptoms:  [ ]All other review of systems negative     Palliative Performance Status Version 2:         %      http://npcrc.org/files/news/palliative_performance_scale_ppsv2.pdf  PHYSICAL EXAM:  Vital Signs Last 24 Hrs  T(C): 36.7 (15 Noel 2020 04:54), Max: 36.7 (14 Jan 2020 20:22)  T(F): 98 (15 Noel 2020 04:54), Max: 98 (14 Jan 2020 20:22)  HR: 65 (15 Noel 2020 04:54) (60 - 65)  BP: 116/62 (15 Noel 2020 04:54) (112/56 - 138/59)  BP(mean): --  RR: 18 (15 Noel 2020 08:24) (17 - 18)  SpO2: 98% (15 Noel 2020 08:24) (96% - 99%) I&O's Summary    14 Jan 2020 07:01  -  15 Noel 2020 07:00  --------------------------------------------------------  IN: 1330 mL / OUT: 2800 mL / NET: -1470 mL       GENERAL:  [ ]Alert  [ ]Oriented x   [ ]Lethargic  [ ]Cachexia  [ ]Unarousable  [ ]Verbal  [ ]Non-Verbal  Behavioral:   [ ]Anxiety  [ ]Delirium [ ]Agitation [ ]Other  HEENT:  [ ]Normal   [ ]Dry mouth   [ ]ET Tube/Trach  [ ]Oral lesions  PULMONARY:   [ ]Clear [ ]Tachypnea  [ ]Audible excessive secretions   [ ]Rhonchi        [ ]Right [ ]Left [ ]Bilateral  [ ]Crackles        [ ]Right [ ]Left [ ]Bilateral  [ ]Wheezing     [ ]Right [ ]Left [ ]Bilateral  [ ]Diminished BS [ ] Right [ ]Left [ ]Bilateral  CARDIOVASCULAR:    [ ]Regular [ ]Irregular [ ]Tachy  [ ]Kaveh [ ]Murmur [ ]Other  GASTROINTESTINAL:  [ ]Soft  [ ]Distended   [ ]+BS  [ ]Non tender [ ]Tender  [ ]PEG [ ]OGT/ NGT   Last BM:      GENITOURINARY:  [ ]Normal [ ]Incontinent   [ ]Oliguria/Anuria   [ ]Acosta  MUSCULOSKELETAL:   [ ]Normal   [ ]Weakness  [ ]Bed/Wheelchair bound [ ]Edema  NEUROLOGIC:   [ ]No focal deficits  [ ] Cognitive impairment  [ ] Dysphagia [ ]Dysarthria [ ] Paresis [ ]Other   SKIN:   [ ]Normal  [ ]Rash   [ ]Pressure ulcer(s) [ ]y [ ]n present on admission    CRITICAL CARE:  [ ]Shock Present  [ ]Septic [ ]Cardiogenic [ ]Neurologic [ ]Hypovolemic  [ ]Vasopressors [ ]Inotropes  [ ]Respiratory failure present [ ]Mechanical Ventilation [ ]Non-invasive ventilatory support [ ]High-Flow  [ ]Acute  [ ]Chronic [ ]Hypoxic  [ ]Hypercarbic [ ]Other  [ ]Other organ failure     LABS:                        13.9   14.42 )-----------( 247      ( 15 Noel 2020 04:58 )             48.1   01-15    133<L>  |  89<L>  |  20  ----------------------------<  88  4.7   |  26  |  3.02<H>    Ca    10.3      15 Noel 2020 04:58  Phos  3.0     01-15  Mg     2.2     01-15    PT/INR - ( 15 Noel 2020 04:58 )   PT: 15.9 sec;   INR: 1.37 ratio         PTT - ( 15 Noel 2020 04:58 )  PTT:30.0 sec      RADIOLOGY & ADDITIONAL STUDIES:    Protein Calorie Malnutrition Present: [ ]mild [ ]moderate [ ]severe [ ]underweight [ ]morbid obesity  https://www.andeal.org/vault/2440/web/files/ONC/Table_Clinical%20Characteristics%20to%20Document%20Malnutrition-White%20JV%20et%20al%150375.pdf    Height (cm): 162.56 (01-13-20 @ 16:57), 162.6 (12-01-19 @ 16:18), 160.02 (10-02-19 @ 08:12)  Weight (kg): 78.4 (01-13-20 @ 16:57), 77.3 (12-01-19 @ 16:18), 79.8 (10-02-19 @ 08:12)  BMI (kg/m2): 29.7 (01-13-20 @ 16:57), 29.2 (12-01-19 @ 16:18), 31.2 (10-02-19 @ 08:12)    [ ]PPSV2 < or = 30%  [ ]significant weight loss [ ]poor nutritional intake [ ]anasarca   Albumin, Serum: 3.2 g/dL (01-13-20 @ 05:22)   [ ]Artificial Nutrition    REFERRALS:   [ ]Chaplaincy  [ ]Hospice  [ ]Child Life  [ ]Social Work  [ ]Case management [ ]Holistic Therapy     Goals of Care Document: SUBJECTIVE AND OBJECTIVE:  INTERVAL HPI/OVERNIGHT EVENTS: Overnight, no acute events. Patient medically optimized by pulmonology with high risk for post-operative pulmonary complications; heme/onc consulted patient continued on crizotinib. Plan for OR later today 1/15 for IMR per ortho for right intertrochanteric fracture. Patient seen and examined at bedside this AM, she is lethargic but AAO x 2-3 ([facility]).     DNR on chart:   Allergies    No Known Allergies    Intolerances    MEDICATIONS  (STANDING):  acetaminophen  IVPB .. 1000 milliGRAM(s) IV Intermittent once  allopurinol 300 milliGRAM(s) Oral <User Schedule>  artificial tears (preservative free) Ophthalmic Solution 1 Drop(s) Both EYES two times a day  crizotinib 200 milliGRAM(s) Oral two times a day  lidocaine   Patch 1 Patch Transdermal daily  midodrine. 10 milliGRAM(s) Oral <User Schedule>  senna 2 Tablet(s) Oral at bedtime  sevelamer carbonate 800 milliGRAM(s) Oral three times a day with meals    MEDICATIONS  (PRN):  HYDROmorphone  Injectable 0.3 milliGRAM(s) IV Push every 3 hours PRN moderate-severe pain      ITEMS UNCHECKED ARE NOT PRESENT    PRESENT SYMPTOMS: [ ]Unable to obtain due to poor mentation   Source if other than patient:  [ ]Family   [ ]Team     Pain:  [ ]yes [x ]no  QOL impact -   Location -                    Aggravating factors -  Quality -  Radiation -  Timing-  Severity (0-10 scale):  Minimal acceptable level (0-10 scale):     Dyspnea:                           [ ]Mild [ ]Moderate [ ]Severe  Anxiety:                             [ ]Mild [ ]Moderate [ ]Severe  Fatigue:                             [ ]Mild [ ]Moderate [ ]Severe  Nausea:                             [ ]Mild [ ]Moderate [ ]Severe  Loss of appetite:              [ ]Mild [ ]Moderate [ ]Severe  Constipation:                    [ ]Mild [ ]Moderate [ ]Severe    PAIN AD Score:	  http://geriatrictoolkit.[place].edu/cog/painad.pdf (Ctrl + left click to view)    Other Symptoms:  [ ]All other review of systems negative     Palliative Performance Status Version 2:         %      http://npcrc.org/files/news/palliative_performance_scale_ppsv2.pdf  PHYSICAL EXAM:  Vital Signs Last 24 Hrs  T(C): 36.7 (15 Noel 2020 04:54), Max: 36.7 (14 Jan 2020 20:22)  T(F): 98 (15 Noel 2020 04:54), Max: 98 (14 Jan 2020 20:22)  HR: 65 (15 Noel 2020 04:54) (60 - 65)  BP: 116/62 (15 Noel 2020 04:54) (112/56 - 138/59)  BP(mean): --  RR: 18 (15 Noel 2020 08:24) (17 - 18)  SpO2: 98% (15 Noel 2020 08:24) (96% - 99%) I&O's Summary    14 Jan 2020 07:01  -  15 Noel 2020 07:00  --------------------------------------------------------  IN: 1330 mL / OUT: 2800 mL / NET: -1470 mL       GENERAL:  [ ]Alert  [x ]Oriented    [ x]Lethargic  [ ]Cachexia  [ ]Unarousable  [ ]Verbal  [ ]Non-Verbal  Behavioral:   [ ]Anxiety  [ ]Delirium [ ]Agitation [ ]Other  HEENT:  [ ]Normal   [x ]Dry mouth   [ ]ET Tube/Trach  [ ]Oral lesions  PULMONARY:   [ ]Clear [ ]Tachypnea  [ ]Audible excessive secretions   [ ]Rhonchi        [ ]Right [ ]Left [ ]Bilateral  [ ]Crackles        [ ]Right [ ]Left [ ]Bilateral  [ ]Wheezing     [ ]Right [ ]Left [ ]Bilateral  [ ]Diminished BS [ ] Right [ ]Left [ ]Bilateral  CARDIOVASCULAR:    [ ]Regular [ ]Irregular [ ]Tachy  [ ]Kaveh [ ]Murmur [ ]Other  GASTROINTESTINAL:  [ ]Soft  [ ]Distended   [ ]+BS  [ ]Non tender [ ]Tender  [ ]PEG [ ]OGT/ NGT   Last BM:      GENITOURINARY:  [ ]Normal [ ]Incontinent   [ ]Oliguria/Anuria   [ ]Acosta  MUSCULOSKELETAL:   [ ]Normal   [ ]Weakness  [ ]Bed/Wheelchair bound [ ]Edema  NEUROLOGIC:   [ ]No focal deficits  [ ] Cognitive impairment  [ ] Dysphagia [ ]Dysarthria [ ] Paresis [ ]Other   SKIN:   [ ]Normal  [ ]Rash   [ ]Pressure ulcer(s) [ ]y [ ]n present on admission    CRITICAL CARE:  [ ]Shock Present  [ ]Septic [ ]Cardiogenic [ ]Neurologic [ ]Hypovolemic  [ ]Vasopressors [ ]Inotropes  [ ]Respiratory failure present [ ]Mechanical Ventilation [ ]Non-invasive ventilatory support [ ]High-Flow  [ ]Acute  [ ]Chronic [ ]Hypoxic  [ ]Hypercarbic [ ]Other  [ ]Other organ failure     LABS:                        13.9   14.42 )-----------( 247      ( 15 Noel 2020 04:58 )             48.1   01-15    133<L>  |  89<L>  |  20  ----------------------------<  88  4.7   |  26  |  3.02<H>    Ca    10.3      15 Noel 2020 04:58  Phos  3.0     01-15  Mg     2.2     01-15    PT/INR - ( 15 Noel 2020 04:58 )   PT: 15.9 sec;   INR: 1.37 ratio         PTT - ( 15 Noel 2020 04:58 )  PTT:30.0 sec      RADIOLOGY & ADDITIONAL STUDIES:    Protein Calorie Malnutrition Present: [ ]mild [ ]moderate [ ]severe [ ]underweight [ ]morbid obesity  https://www.andeal.org/vault/2440/web/files/ONC/Table_Clinical%20Characteristics%20to%20Document%20Malnutrition-White%20JV%20et%20al%737787.pdf    Height (cm): 162.56 (01-13-20 @ 16:57), 162.6 (12-01-19 @ 16:18), 160.02 (10-02-19 @ 08:12)  Weight (kg): 78.4 (01-13-20 @ 16:57), 77.3 (12-01-19 @ 16:18), 79.8 (10-02-19 @ 08:12)  BMI (kg/m2): 29.7 (01-13-20 @ 16:57), 29.2 (12-01-19 @ 16:18), 31.2 (10-02-19 @ 08:12)    [ ]PPSV2 < or = 30%  [ ]significant weight loss [ ]poor nutritional intake [ ]anasarca   Albumin, Serum: 3.2 g/dL (01-13-20 @ 05:22)   [ ]Artificial Nutrition    REFERRALS:   [ ]Chaplaincy  [ ]Hospice  [ ]Child Life  [ ]Social Work  [ ]Case management [ ]Holistic Therapy     Goals of Care Document: SUBJECTIVE AND OBJECTIVE: Patient seen and examined at bedside this AM, she is lethargic but AAO x 2-3 ([facility]).   INTERVAL HPI/OVERNIGHT EVENTS: Overnight, no acute events. Patient medically optimized by pulmonology with high risk for post-operative pulmonary complications; heme/onc consulted patient continued on crizotinib. Plan for OR later today 1/15 for IMR per ortho for right intertrochanteric fracture.     DNR on chart: No   Allergies    No Known Allergies    Intolerances    MEDICATIONS  (STANDING):  acetaminophen  IVPB .. 1000 milliGRAM(s) IV Intermittent once  allopurinol 300 milliGRAM(s) Oral <User Schedule>  artificial tears (preservative free) Ophthalmic Solution 1 Drop(s) Both EYES two times a day  crizotinib 200 milliGRAM(s) Oral two times a day  lidocaine   Patch 1 Patch Transdermal daily  midodrine. 10 milliGRAM(s) Oral <User Schedule>  senna 2 Tablet(s) Oral at bedtime  sevelamer carbonate 800 milliGRAM(s) Oral three times a day with meals    MEDICATIONS  (PRN):  HYDROmorphone  Injectable 0.3 milliGRAM(s) IV Push every 3 hours PRN moderate-severe pain      ITEMS UNCHECKED ARE NOT PRESENT    PRESENT SYMPTOMS: [x ]Unable to obtain due to lethargy.   Source if other than patient:  [ ]Family   [ ]Team     Pain:  [ ]yes [x ]no  QOL impact -   Location -                    Aggravating factors -  Quality -  Radiation -  Timing-  Severity (0-10 scale):  Minimal acceptable level (0-10 scale):     Dyspnea:                           [ ]Mild [ ]Moderate [ ]Severe  Anxiety:                             [ ]Mild [ ]Moderate [ ]Severe  Fatigue:                             [ ]Mild [ ]Moderate [ ]Severe  Nausea:                             [ ]Mild [ ]Moderate [ ]Severe  Loss of appetite:              [ ]Mild [ ]Moderate [ ]Severe  Constipation:                    [ ]Mild [ ]Moderate [ ]Severe    PAIN AD Score:	  http://geriatrictoolkit.[place].edu/cog/painad.pdf (Ctrl + left click to view)    Other Symptoms:  [ ]All other review of systems negative     Palliative Performance Status Version 2:  30       %      http://npcrc.org/files/news/palliative_performance_scale_ppsv2.pdf  PHYSICAL EXAM:  Vital Signs Last 24 Hrs  T(C): 36.7 (15 Noel 2020 04:54), Max: 36.7 (14 Jan 2020 20:22)  T(F): 98 (15 Noel 2020 04:54), Max: 98 (14 Jan 2020 20:22)  HR: 65 (15 Noel 2020 04:54) (60 - 65)  BP: 116/62 (15 Noel 2020 04:54) (112/56 - 138/59)  BP(mean): --  RR: 18 (15 Noel 2020 08:24) (17 - 18)  SpO2: 98% (15 Noel 2020 08:24) (96% - 99%) I&O's Summary    14 Jan 2020 07:01  -  15 Noel 2020 07:00  --------------------------------------------------------  IN: 1330 mL / OUT: 2800 mL / NET: -1470 mL       GENERAL:  [ ]Alert  [x ]Oriented    [ x]Lethargic  [ ]Cachexia  [ ]Unarousable  [x ]Verbal  [ ]Non-Verbal  Behavioral:   [ ]Anxiety  [ ]Delirium [ ]Agitation [ ]Other  HEENT:  [ ]Normal   [x ]Dry mouth   [ ]ET Tube/Trach  [ ]Oral lesions  PULMONARY:   [ ]Clear [ ]Tachypnea  [ ]Audible excessive secretions   [ ]Rhonchi        [ ]Right [ ]Left [ ]Bilateral  [x ]Crackles        [ ]Right [x ]Left [ ]Bilateral  [ ]Wheezing     [ ]Right [ ]Left [ ]Bilateral  [x ]Diminished BS [ ] Right [ ]Left [x ]Bilateral  CARDIOVASCULAR:    [ ]Regular [ ]Irregular [ ]Tachy  [ ]Kaveh [ ]Murmur [ ]Other  GASTROINTESTINAL:  [ ]Soft  [ ]Distended   [ ]+BS  [ ]Non tender [ ]Tender  [ ]PEG [ ]OGT/ NGT   Last BM:      GENITOURINARY:  [ x]Normal [ ]Incontinent   [ ]Oliguria/Anuria   [ ]Acosta  MUSCULOSKELETAL:   [ ]Normal   [x ]Weakness  [ ]Bed/Wheelchair bound [ ]Edema  NEUROLOGIC:   [ ]No focal deficits  [ ] Cognitive impairment  [ ] Dysphagia [ ]Dysarthria [ ] Paresis [x ]Other: Lethargic.   SKIN:   [x ]Normal  [ ]Rash   [ ]Pressure ulcer(s) [ ]y [ ]n present on admission    CRITICAL CARE:  [ ]Shock Present  [ ]Septic [ ]Cardiogenic [ ]Neurologic [ ]Hypovolemic  [ ]Vasopressors [ ]Inotropes  [ ]Respiratory failure present [ ]Mechanical Ventilation [ ]Non-invasive ventilatory support [ ]High-Flow  [ ]Acute  [ ]Chronic [ ]Hypoxic  [ ]Hypercarbic [ ]Other  [ ]Other organ failure     LABS:                                         13.9   14.42 )-----------( 247      ( 15 Noel 2020 04:58 )             48.1   01-15    133<L>  |  89<L>  |  20  ----------------------------<  88  4.7   |  26  |  3.02<H>    Ca    10.3      15 Noel 2020 04:58  Phos  3.0     01-15  Mg     2.2     01-15        RADIOLOGY & ADDITIONAL STUDIES:    < from: Xray Chest 1 View- PORTABLE-Urgent (01.12.20 @ 20:17) >  EXAM:  XR CHEST PORTABLE URGENT 1V                            PROCEDURE DATE:  01/12/2020  IMPRESSION:   Right middle lobe collapse.                KIRSTEN DAVID M.D., RADIOLOGY RESIDENT  This document has been electronically signed.  YINKA JOHNSON M.D., ATTENDING RADIOLOGIST  This document has been electronically signed. Jan 13 2020  9:53AM    < end of copied text >  Protein Calorie Malnutrition Present: [ ]mild [ ]moderate [ ]severe [ ]underweight [ ]morbid obesity  https://www.andeal.org/vault/2440/web/files/ONC/Table_Clinical%20Characteristics%20to%20Document%20Malnutrition-White%20JV%20et%20al%202012.pdf    Height (cm): 162.56 (01-13-20 @ 16:57), 162.6 (12-01-19 @ 16:18), 160.02 (10-02-19 @ 08:12)  Weight (kg): 78.4 (01-13-20 @ 16:57), 77.3 (12-01-19 @ 16:18), 79.8 (10-02-19 @ 08:12)  BMI (kg/m2): 29.7 (01-13-20 @ 16:57), 29.2 (12-01-19 @ 16:18), 31.2 (10-02-19 @ 08:12)    [ ]PPSV2 < or = 30%  [ ]significant weight loss [ ]poor nutritional intake [ ]anasarca   Albumin, Serum: 3.2 g/dL (01-13-20 @ 05:22)   [ ]Artificial Nutrition    REFERRALS:   [ ]Chaplaincy  [ ]Hospice  [ ]Child Life  [ ]Social Work  [ ]Case management [ ]Holistic Therapy     Goals of Care Document:

## 2020-01-15 NOTE — PROGRESS NOTE ADULT - SUBJECTIVE AND OBJECTIVE BOX
S: Denies chest pain or SOB. Review of systems otherwise (-)  	    MEDICATIONS  (STANDING):  acetaminophen  IVPB .. 1000 milliGRAM(s) IV Intermittent once  allopurinol 300 milliGRAM(s) Oral <User Schedule>  artificial tears (preservative free) Ophthalmic Solution 1 Drop(s) Both EYES two times a day  crizotinib 200 milliGRAM(s) Oral two times a day  lidocaine   Patch 1 Patch Transdermal daily  midodrine. 10 milliGRAM(s) Oral <User Schedule>  senna 2 Tablet(s) Oral at bedtime  sevelamer carbonate 800 milliGRAM(s) Oral three times a day with meals    MEDICATIONS  (PRN):      LABS:                            13.9   14.42 )-----------( 247      ( 15 Noel 2020 04:58 )             48.1     Hemoglobin: 13.9 g/dL (01-15 @ 04:58)  Hemoglobin: 12.1 g/dL (01-14 @ 05:01)  Hemoglobin: 10.7 g/dL (01-13 @ 05:22)  Hemoglobin: 10.7 g/dL (01-13 @ 03:24)  Hemoglobin: 7.2 g/dL (01-12 @ 17:20)    01-15    133<L>  |  89<L>  |  20  ----------------------------<  88  4.7   |  26  |  3.02<H>    Ca    10.3      15 Noel 2020 04:58  Phos  3.0     01-15  Mg     2.2     01-15      Creatinine Trend: 3.02<--, 3.69<--, 6.53<--, 5.67<--, 5.09<--, 4.97<--   PT/INR - ( 15 Noel 2020 04:58 )   PT: 15.9 sec;   INR: 1.37 ratio         PTT - ( 15 Noel 2020 04:58 )  PTT:30.0 sec          01-14-20 @ 07:01  -  01-15-20 @ 07:00  --------------------------------------------------------  IN: 1330 mL / OUT: 2800 mL / NET: -1470 mL    01-15-20 @ 07:01  -  01-15-20 @ 13:10  --------------------------------------------------------  IN: 0 mL / OUT: 0 mL / NET: 0 mL        PHYSICAL EXAM  Vital Signs Last 24 Hrs  T(C): 37.3 (15 Noel 2020 08:52), Max: 37.3 (15 Noel 2020 08:52)  T(F): 99.1 (15 Noel 2020 08:52), Max: 99.1 (15 Noel 2020 08:52)  HR: 92 (15 Noel 2020 08:52) (60 - 92)  BP: 112/64 (15 Noel 2020 08:52) (112/56 - 138/59)  BP(mean): --  RR: 18 (15 Noel 2020 08:52) (17 - 18)  SpO2: 94% (15 Noel 2020 08:52) (94% - 99%)      Gen: Appears well in NAD  HEENT:  (-)icterus (-)pallor  CV: N S1 S2 1/6 GARY (+)2 Pulses B/l  Resp:  Clear to auscultation B/L, normal effort  GI: (+) BS Soft, NT, ND  Lymph:  (+)Trace RLE Edema, (-)obvious lymphadenopathy  Skin: Warm to touch, Normal turgor  Psych: Appropriate mood and affect      TELEMETRY: None	      ECG: VPaced at 63 bpm 	    ASSESSMENT/PLAN: 85 y/o female known to our office with PMHx of ESRD on HD, persistent Afib (on Coumadin), s/p Bio MVR, Tricuspid valve repair and ASD closure in 2016, hx complete heart block s/p PPM, Lung CA, COPD, remote colon CA (s/p R hemicolectomy), had normal nuclear stress test and TTE with normal LV/RV with well seated bio MV with normal function both in 7/2019 who presented to ED with worsening right hip pain s/p recent mechanical fall found to have right hip fracture and supratherapeutic INR requiring reversal for OR. Cardiology consulted for preop clearance.    - No evidence of clinical HF or anginal symptoms  - Denies LOC/syncope  - Normal Nuclear stress and TTE with normal LV/RV with well seated bio MV with normal function both in 7/2019  - AC with coumadin currently on hold pending OR s/p INR reversal - monitor INR, resume when cleared postop  - PPM interrogation noted, episodes of likely SVT but no correlating events to recent fall  - Based on patient's RCRI score, would consider her low cardiac risk for planned ortho procedure. Patient is optimized from CV perspective.  - HD per renal - renal optimization noted  - Pulm eval noted  - Ortho follow up - pending OR today    Adam Jim PA-C  [facility]  Pager: [phone]

## 2020-01-15 NOTE — PROGRESS NOTE ADULT - PROBLEM SELECTOR PLAN 8
Will continue to f/u for Pain and will help with GOC if necessary. Will continue to f/u for Pain and will help with GOC if necessary.  d/w Primary NP.

## 2020-01-15 NOTE — PROGRESS NOTE ADULT - ASSESSMENT
A/P: Pt is a 86y Female with a pathologic right basicervical femoral neck fracture.  -Pain Control  -Hold all chemical DVT PPx  -NWB RLE, bedrest  -Supplemental O2 as needed--hypoxia appears improved  -Had HD yesterday 1/14  -FU pre-op labs  -Med Management  - Medical medical/nephrology/pulmonary clearance documented  -Plan for OR today 1/15 with Dr. Dean for R Hip IMN.

## 2020-01-15 NOTE — PROGRESS NOTE ADULT - PROBLEM SELECTOR PLAN 7
- patient currently wishes to be full code  - she has appointed her daughter Yasemin Zaman as healthcare proxy. - Based on our initial ACP discussion, the patient is to be full code.   - she has appointed her daughter Yasemin Zaman as healthcare proxy.

## 2020-01-16 NOTE — PROGRESS NOTE ADULT - PROBLEM SELECTOR PLAN 7
- Based on our initial ACP discussion, the patient is to be full code.   - she has appointed her daughter Yasemin Zaman as healthcare proxy. - Based on our initial ACP discussion, the patient is to be full code.   - she has appointed her daughter Yasemin Zaman as healthcare proxy.  - Will continue to f/u for Pain.

## 2020-01-16 NOTE — PROGRESS NOTE ADULT - ATTENDING COMMENTS
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 Now postop doing well. RML atelectasis now resolved.     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 Now postop doing well. RML atelectasis now resolved.     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 Now postop doing well. Mild hypoxemia. RML atelectasis now resolved.     - Incentive spirometry   - Keep euvolemic  - Wean supplemental O2 as tolerated  - OOB once ok from ortho perspective  - Bronchodilators, chest PT  - Would check EKG for abdominal pain

## 2020-01-16 NOTE — PROGRESS NOTE ADULT - ATTENDING COMMENTS
Dr Hemanth Matthew  Nephrology  Office [phone]  Ans Serv [phone]  [facility] [phone]

## 2020-01-16 NOTE — PROGRESS NOTE ADULT - PROBLEM SELECTOR PLAN 2
- On DMT.   - Patient's desire if for continuing DMT if possible; heme/onc consulted with primary oncologist Dr. Travis currently continuing crizotinib - On DMT.   - Patient's desire is for continuing DMT if possible; heme/onc consulted with primary oncologist Dr. Travis currently continuing crizotinib

## 2020-01-16 NOTE — PROGRESS NOTE ADULT - SUBJECTIVE AND OBJECTIVE BOX
Nephrology Followup Note - [phone] - Dr Matthew / Dr Meier / Dr Schaefer / Dr Gillette / Dr Galeano / Dr Alarcon / Dr Leary / Dr Serrano  Pt seen and examined at bedside  No acute events overnight. Pt c/o reflux. Denies pain or SOB     Allergies:  No Known Allergies    Hospital Medications:   MEDICATIONS  (STANDING):  acetaminophen   Tablet .. 650 milliGRAM(s) Oral every 6 hours  allopurinol 300 milliGRAM(s) Oral <User Schedule>  artificial tears (preservative free) Ophthalmic Solution 1 Drop(s) Both EYES two times a day  crizotinib 200 milliGRAM(s) Oral two times a day  enoxaparin Injectable 30 milliGRAM(s) SubCutaneous every 24 hours  lidocaine   Patch 1 Patch Transdermal daily  midodrine. 10 milliGRAM(s) Oral <User Schedule>  pantoprazole    Tablet 40 milliGRAM(s) Oral daily  polyethylene glycol 3350 17 Gram(s) Oral two times a day  senna 2 Tablet(s) Oral at bedtime  sevelamer carbonate 800 milliGRAM(s) Oral three times a day with meals    VITALS:  T(F): 98.3 (01-16-20 @ 09:16), Max: 98.3 (01-16-20 @ 09:16)  HR: 60 (01-16-20 @ 09:16)  BP: 112/64 (01-16-20 @ 09:18)  RR: 18 (01-16-20 @ 09:16)  SpO2: 100% (01-16-20 @ 09:16)  Wt(kg): --    01-15 @ 07:01 - 01-16 @ 07:00  --------------------------------------------------------  IN: 420 mL / OUT: 0 mL / NET: 420 mL    01-16 @ 07:01 - 01-16 @ 14:29  --------------------------------------------------------  IN: 120 mL / OUT: 0 mL / NET: 120 mL      Height (cm): 162.56 (01-15 @ 19:28)  Weight (kg): 78.4 (01-15 @ 19:28)  BMI (kg/m2): 29.7 (01-15 @ 19:28)  BSA (m2): 1.84 (01-15 @ 19:28)  PHYSICAL EXAM:  Constitutional: NAD  HEENT: anicteric sclera, oropharynx clear, MMM  Neck: No JVD  Respiratory: CTAB, no wheezes, rales or rhonchi  Cardiovascular: S1, S2, RRR  Gastrointestinal: BS+, soft, NT/ND  Extremities: No cyanosis or clubbing. No peripheral edema  Neurological: A/O x 3, no focal deficits  Psychiatric: Normal mood, normal affect  : No CVA tenderness. No ortiz.   Skin: No rashes  Vascular Access: LUE AV access +thrill and bruit.     LABS:  01-16    130<L>  |  88<L>  |  57<H>  ----------------------------<  120<H>  5.8<H>   |  23  |  5.40<H>    Ca    9.7      16 Jan 2020 12:45  Phos  6.8     01-16  Mg     2.3     01-16      Creatinine Trend: 5.40 <--, 0.59 <--, 4.15 <--, 3.02 <--, 3.69 <--, 6.53 <--, 5.67 <--, 5.09 <--, 4.97 <--                        11.5   14.17 )-----------( 200      ( 15 Noel 2020 20:51 )             39.7     Urine Studies:      RADIOLOGY & ADDITIONAL STUDIES:

## 2020-01-16 NOTE — PHYSICAL THERAPY INITIAL EVALUATION ADULT - MD ORDER
PT Orders: PT Evaluate and treat ; NWB RLE PT Orders: PT Evaluate and treat ; WBAT RLE PT Orders: PT Evaluate and treat ; WBAT RLE per Ortho note

## 2020-01-16 NOTE — PROGRESS NOTE ADULT - PROBLEM SELECTOR PLAN 4
- likely in setting of hypoxia and inpatient hospitalization in older female with multiple medical problems  Promote:   -Frequent reassurance and verbal orientation   -Family members or other familiar persons by his bedside.   -Delusions and hallucinations should be neither endorsed nor challenged.   -Physical restraints should be avoided. Alternatives to restraint use, such as constant observation (preferably by someone familiar to the patient such as a family member), may be more effective.  -Pain controlled and PT eval and early ambulation when possible  -Update the calendar date in the room.   -Continue care in [facility]. - likely in setting of hypoxia and inpatient hospitalization in older female with multiple medical problems  Promote:   -Frequent reassurance and verbal orientation   -Family members or other familiar persons by his bedside.   -Delusions and hallucinations should be neither endorsed nor challenged.   -Physical restraints should be avoided. Alternatives to restraint use, such as constant observation (preferably by someone familiar to the patient such as a family member), may be more effective.  -Pain controlled and PT eval and early ambulation when possible  -Update the calendar date in the room.   -Continue care in [facility].  Work up and Rx as per Primary team.

## 2020-01-16 NOTE — PROGRESS NOTE ADULT - SUBJECTIVE AND OBJECTIVE BOX
SUBJECTIVE AND OBJECTIVE:  INTERVAL HPI/OVERNIGHT EVENTS: Overnight, patient received IMN per ortho; tolerated procedure well all VSS overnight. Patient seen and examined at bedside.     DNR on chart:   Allergies    No Known Allergies    Intolerances    MEDICATIONS  (STANDING):  allopurinol 300 milliGRAM(s) Oral <User Schedule>  artificial tears (preservative free) Ophthalmic Solution 1 Drop(s) Both EYES two times a day  crizotinib 200 milliGRAM(s) Oral two times a day  enoxaparin Injectable 30 milliGRAM(s) SubCutaneous every 24 hours  lidocaine   Patch 1 Patch Transdermal daily  midodrine. 10 milliGRAM(s) Oral <User Schedule>  senna 2 Tablet(s) Oral at bedtime  sevelamer carbonate 800 milliGRAM(s) Oral three times a day with meals    MEDICATIONS  (PRN):      ITEMS UNCHECKED ARE NOT PRESENT    PRESENT SYMPTOMS: [ ]Unable to obtain due to poor mentation   Source if other than patient:  [ ]Family   [ ]Team     Pain:  [ ]yes [ ]no  QOL impact -   Location -                    Aggravating factors -  Quality -  Radiation -  Timing-  Severity (0-10 scale):  Minimal acceptable level (0-10 scale):     Dyspnea:                           [ ]Mild [ ]Moderate [ ]Severe  Anxiety:                             [ ]Mild [ ]Moderate [ ]Severe  Fatigue:                             [ ]Mild [ ]Moderate [ ]Severe  Nausea:                             [ ]Mild [ ]Moderate [ ]Severe  Loss of appetite:              [ ]Mild [ ]Moderate [ ]Severe  Constipation:                    [ ]Mild [ ]Moderate [ ]Severe    PAIN AD Score:	  [email or web address] (Ctrl + left click to view)    Other Symptoms:  [ ]All other review of systems negative     Palliative Performance Status Version 2:         %      http://npcrc.org/files/news/palliative_performance_scale_[email or web address].pdf  PHYSICAL EXAM:  Vital Signs Last 24 Hrs  T(C): 36.3 (16 Jan 2020 05:06), Max: 36.5 (15 Noel 2020 19:30)  T(F): 97.4 (16 Jan 2020 05:06), Max: 97.7 (15 Noel 2020 19:30)  HR: 60 (16 Jan 2020 09:16) (60 - 67)  BP: 112/64 (16 Jan 2020 09:18) (105/54 - 140/60)  BP(mean): 86 (15 Noel 2020 21:30) (81 - 86)  RR: 18 (16 Jan 2020 09:16) (15 - 18)  SpO2: 100% (16 Jan 2020 09:16) (95% - 100%) I&O's Summary    15 Noel 2020 07:01  -  16 Jan 2020 07:00  --------------------------------------------------------  IN: 420 mL / OUT: 0 mL / NET: 420 mL    16 Jan 2020 07:01  -  16 Jan 2020 10:16  --------------------------------------------------------  IN: 0 mL / OUT: 0 mL / NET: 0 mL       GENERAL:  [ ]Alert  [ ]Oriented  [ ]Lethargic  [ ]Cachexia  [ ]Unarousable  [ ]Verbal  [ ]Non-Verbal  Behavioral:   [ ]Anxiety  [ ]Delirium [ ]Agitation [ ]Other  HEENT:  [ ]Normal   [ ]Dry mouth   [ ]ET Tube/Trach  [ ]Oral lesions  PULMONARY:   [ ]Clear [ ]Tachypnea  [ ]Audible excessive secretions   [ ]Rhonchi        [ ]Right [ ]Left [ ]Bilateral  [ ]Crackles        [ ]Right [ ]Left [ ]Bilateral  [ ]Wheezing     [ ]Right [ ]Left [ ]Bilateral  [ ]Diminished BS [ ] Right [ ]Left [ ]Bilateral  CARDIOVASCULAR:    [ ]Regular [ ]Irregular [ ]Tachy  [ ]Kaveh [ ]Murmur [ ]Other  GASTROINTESTINAL:  [ ]Soft  [ ]Distended   [ ]+BS  [ ]Non tender [ ]Tender  [ ]PEG [ ]OGT/ NGT   Last BM:   01-15-20 @ 07:01  -  01-16-20 @ 07:00  --------------------------------------------------------  OUT: 0 mL    01-16-20 @ 07:01  -  01-16-20 @ 10:16  --------------------------------------------------------  OUT: 0 mL       GENITOURINARY:  [ ]Normal [ ]Incontinent   [ ]Oliguria/Anuria   [ ]Acosta  MUSCULOSKELETAL:   [ ]Normal   [ ]Weakness  [ ]Bed/Wheelchair bound [ ]Edema  NEUROLOGIC:   [ ]No focal deficits  [ ] Cognitive impairment  [ ] Dysphagia [ ]Dysarthria [ ] Paresis [ ]Other   SKIN:   [ ]Normal  [ ]Rash   [ ]Pressure ulcer(s) [ ]y [ ]n present on admission    CRITICAL CARE:  [ ]Shock Present  [ ]Septic [ ]Cardiogenic [ ]Neurologic [ ]Hypovolemic  [ ]Vasopressors [ ]Inotropes  [ ]Respiratory failure present [ ]Mechanical Ventilation [ ]Non-invasive ventilatory support [ ]High-Flow  [ ]Acute  [ ]Chronic [ ]Hypoxic  [ ]Hypercarbic [ ]Other  [ ]Other organ failure     LABS:                        11.5   14.17 )-----------( 200      ( 15 Noel 2020 20:51 )             39.7   01-16    138  |  107  |  6<L>  ----------------------------<  106<H>  3.0<L>   |  21<L>  |  0.59    Ca    7.7<L>      16 Jan 2020 06:49  Phos  2.1     01-16  Mg     1.3     01-16    PT/INR - ( 15 Noel 2020 04:58 )   PT: 15.9 sec;   INR: 1.37 ratio         PTT - ( 15 Noel 2020 04:58 )  PTT:30.0 sec      RADIOLOGY & ADDITIONAL STUDIES:    Protein Calorie Malnutrition Present: [ ]mild [ ]moderate [ ]severe [ ]underweight [ ]morbid obesity  https://www.andeal.org/vault/7859/web/files/ONC/Table_Clinical%20Characteristics%20to%20Document%20Malnutrition-White%20JV%20et%20al%202012.pdf    Height (cm): 162.56 (01-15-20 @ 19:28), 162.6 (12-01-19 @ 16:18), 160.02 (10-02-19 @ 08:12)  Weight (kg): 78.4 (01-15-20 @ 19:28), 77.3 (12-01-19 @ 16:18), 79.8 (10-02-19 @ 08:12)  BMI (kg/m2): 29.7 (01-15-20 @ 19:28), 29.2 (12-01-19 @ 16:18), 31.2 (10-02-19 @ 08:12)    [ ]PPSV2 < or = 30%  [ ]significant weight loss [ ]poor nutritional intake [ ]anasarca   Albumin, Serum: 3.2 g/dL (01-13-20 @ 05:22)   [ ]Artificial Nutrition    REFERRALS:   [ ]Chaplaincy  [ ]Hospice  [ ]Child Life  [ ]Social Work  [ ]Case management [ ]Holistic Therapy     Goals of Care Document: SUBJECTIVE AND OBJECTIVE:  INTERVAL HPI/OVERNIGHT EVENTS: Overnight, patient received IMN per ortho; tolerated procedure well all VSS overnight. Patient seen and examined at bedside.     DNR on chart:   Allergies    No Known Allergies    Intolerances    MEDICATIONS  (STANDING):  allopurinol 300 milliGRAM(s) Oral <User Schedule>  artificial tears (preservative free) Ophthalmic Solution 1 Drop(s) Both EYES two times a day  crizotinib 200 milliGRAM(s) Oral two times a day  enoxaparin Injectable 30 milliGRAM(s) SubCutaneous every 24 hours  lidocaine   Patch 1 Patch Transdermal daily  midodrine. 10 milliGRAM(s) Oral <User Schedule>  senna 2 Tablet(s) Oral at bedtime  sevelamer carbonate 800 milliGRAM(s) Oral three times a day with meals    MEDICATIONS  (PRN):      ITEMS UNCHECKED ARE NOT PRESENT    PRESENT SYMPTOMS: [ ]Unable to obtain due to poor mentation   Source if other than patient:  [ ]Family   [ ]Team     Pain:  [X ]yes - on movement and palpation and burning pain during eating [ ]no  QOL impact -   Location -                    Aggravating factors -  Quality -  Radiation -  Timing-  Severity (0-10 scale):  Minimal acceptable level (0-10 scale):     Dyspnea:                           [ ]Mild [ ]Moderate [ ]Severe  Anxiety:                             [ ]Mild [ ]Moderate [ ]Severe  Fatigue:                             [ ]Mild [ ]Moderate [ ]Severe  Nausea:                             [ ]Mild [ ]Moderate [ ]Severe  Loss of appetite:              [ ]Mild [ ]Moderate [ ]Severe  Constipation:                    [ ]Mild [ ]Moderate [ ]Severe    PAIN AD Score:	  [email or web address] (Ctrl + left click to view)    Other Symptoms:  [ ]All other review of systems negative     Palliative Performance Status Version 2:         %      http://npcrc.org/files/news/palliative_performance_scale_ppsv2.pdf  PHYSICAL EXAM:  Vital Signs Last 24 Hrs  T(C): 36.3 (16 Jan 2020 05:06), Max: 36.5 (15 Noel 2020 19:30)  T(F): 97.4 (16 Jan 2020 05:06), Max: 97.7 (15 Noel 2020 19:30)  HR: 60 (16 Jan 2020 09:16) (60 - 67)  BP: 112/64 (16 Jan 2020 09:18) (105/54 - 140/60)  BP(mean): 86 (15 Noel 2020 21:30) (81 - 86)  RR: 18 (16 Jan 2020 09:16) (15 - 18)  SpO2: 100% (16 Jan 2020 09:16) (95% - 100%) I&O's Summary    15 Noel 2020 07:01  -  16 Jan 2020 07:00  --------------------------------------------------------  IN: 420 mL / OUT: 0 mL / NET: 420 mL    16 Jan 2020 07:01  -  16 Jan 2020 10:16  --------------------------------------------------------  IN: 0 mL / OUT: 0 mL / NET: 0 mL       GENERAL:  [X ]Alert  [ X]Oriented  [ ]Lethargic  [ ]Cachexia  [ ]Unarousable  [ ]Verbal  [ ]Non-Verbal  Behavioral:   [ ]Anxiety  [ ]Delirium [ ]Agitation [ ]Other  HEENT:  [ ]Normal   [ X]Dry mouth   [ ]ET Tube/Trach  [ ]Oral lesions  PULMONARY:   [ ]Clear [ ]Tachypnea  [ ]Audible excessive secretions   [ ]Rhonchi        [ ]Right [ ]Left [ ]Bilateral  [ ]Crackles        [ ]Right [ ]Left [ ]Bilateral  [ ]Wheezing     [ ]Right [ ]Left [ ]Bilateral  [ ]Diminished BS [ ] Right [ ]Left [ ]Bilateral  CARDIOVASCULAR:    [ ]Regular [ ]Irregular [ ]Tachy  [ ]Kaveh [ ]Murmur [ ]Other  GASTROINTESTINAL:  [ ]Soft  [ ]Distended   [ ]+BS  [ ]Non tender [ ]Tender  [ ]PEG [ ]OGT/ NGT   Last BM:   01-15-20 @ 07:01  -  01-16-20 @ 07:00  --------------------------------------------------------  OUT: 0 mL    01-16-20 @ 07:01  -  01-16-20 @ 10:16  --------------------------------------------------------  OUT: 0 mL       GENITOURINARY:  [ ]Normal [ ]Incontinent   [ ]Oliguria/Anuria   [ ]Acosta  MUSCULOSKELETAL:   [ ]Normal   [ ]Weakness  [ ]Bed/Wheelchair bound [ ]Edema [ X] TTP of right trochanter, tenderness on active and passive ROM at right hip. Bandage in place of surgical site around R trochanter  NEUROLOGIC:   [ ]No focal deficits  [ ] Cognitive impairment  [ ] Dysphagia [ ]Dysarthria [ ] Paresis [ ]Other   SKIN:   [ ]Normal  [ ]Rash   [ ]Pressure ulcer(s) [ ]y [ ]n present on admission    CRITICAL CARE:  [ ]Shock Present  [ ]Septic [ ]Cardiogenic [ ]Neurologic [ ]Hypovolemic  [ ]Vasopressors [ ]Inotropes  [ ]Respiratory failure present [ ]Mechanical Ventilation [ ]Non-invasive ventilatory support [ ]High-Flow  [ ]Acute  [ ]Chronic [ ]Hypoxic  [ ]Hypercarbic [ ]Other  [ ]Other organ failure     LABS:                        11.5   14.17 )-----------( 200      ( 15 Noel 2020 20:51 )             39.7   01-16    138  |  107  |  6<L>  ----------------------------<  106<H>  3.0<L>   |  21<L>  |  0.59    Ca    7.7<L>      16 Jan 2020 06:49  Phos  2.1     01-16  Mg     1.3     01-16    PT/INR - ( 15 Noel 2020 04:58 )   PT: 15.9 sec;   INR: 1.37 ratio         PTT - ( 15 Noel 2020 04:58 )  PTT:30.0 sec      RADIOLOGY & ADDITIONAL STUDIES:    Protein Calorie Malnutrition Present: [ ]mild [ ]moderate [ ]severe [ ]underweight [ ]morbid obesity  https://www.andeal.org/vault/9649/web/files/ONC/Table_Clinical%20Characteristics%20to%20Document%20Malnutrition-White%20JV%20et%20al%202012.pdf    Height (cm): 162.56 (01-15-20 @ 19:28), 162.6 (12-01-19 @ 16:18), 160.02 (10-02-19 @ 08:12)  Weight (kg): 78.4 (01-15-20 @ 19:28), 77.3 (12-01-19 @ 16:18), 79.8 (10-02-19 @ 08:12)  BMI (kg/m2): 29.7 (01-15-20 @ 19:28), 29.2 (12-01-19 @ 16:18), 31.2 (10-02-19 @ 08:12)    [ ]PPSV2 < or = 30%  [ ]significant weight loss [ ]poor nutritional intake [ ]anasarca   Albumin, Serum: 3.2 g/dL (01-13-20 @ 05:22)   [ ]Artificial Nutrition    REFERRALS:   [ ]Chaplaincy  [ ]Hospice  [ ]Child Life  [ ]Social Work  [ ]Case management [ ]Holistic Therapy     Goals of Care Document: SUBJECTIVE AND OBJECTIVE:  INTERVAL HPI/OVERNIGHT EVENTS: Overnight, patient received IMN per ortho; tolerated procedure well all VSS overnight. Patient seen and examined at bedside; she is significantly more alert, AAOx2-3 (still says [facility] but remembers this is [facility] with prompting). She is reporting burning epigastric pain after eating breakfast this AM. She endorses pain in RLE on movement/repositioning of right leg.     DNR on chart:   Allergies    No Known Allergies    Intolerances    MEDICATIONS  (STANDING):  allopurinol 300 milliGRAM(s) Oral <User Schedule>  artificial tears (preservative free) Ophthalmic Solution 1 Drop(s) Both EYES two times a day  crizotinib 200 milliGRAM(s) Oral two times a day  enoxaparin Injectable 30 milliGRAM(s) SubCutaneous every 24 hours  lidocaine   Patch 1 Patch Transdermal daily  midodrine. 10 milliGRAM(s) Oral <User Schedule>  senna 2 Tablet(s) Oral at bedtime  sevelamer carbonate 800 milliGRAM(s) Oral three times a day with meals    MEDICATIONS  (PRN):      ITEMS UNCHECKED ARE NOT PRESENT    PRESENT SYMPTOMS: [ ]Unable to obtain due to poor mentation   Source if other than patient:  [ ]Family   [ ]Team     Pain:  [X ]yes - on movement and palpation and burning pain during eating [ ]no  QOL impact -   Location -                    Aggravating factors -  Quality -  Radiation -  Timing-  Severity (0-10 scale):  Minimal acceptable level (0-10 scale):     Dyspnea:                           [ ]Mild [ ]Moderate [ ]Severe  Anxiety:                             [ ]Mild [ ]Moderate [ ]Severe  Fatigue:                             [ ]Mild [ ]Moderate [ ]Severe  Nausea:                             [ ]Mild [ ]Moderate [ ]Severe  Loss of appetite:              [ ]Mild [ ]Moderate [ ]Severe  Constipation:                    [ ]Mild [ ]Moderate [ ]Severe    PAIN AD Score:	  [email or web address]/[email or web address] (Ctrl + left click to view)    Other Symptoms:  [ ]All other review of systems negative     Palliative Performance Status Version 2:         %      http://npcrc.org/files/news/palliative_performance_scale_ppsv2.pdf  PHYSICAL EXAM:  Vital Signs Last 24 Hrs  T(C): 36.3 (16 Jan 2020 05:06), Max: 36.5 (15 Noel 2020 19:30)  T(F): 97.4 (16 Jan 2020 05:06), Max: 97.7 (15 Noel 2020 19:30)  HR: 60 (16 Jan 2020 09:16) (60 - 67)  BP: 112/64 (16 Jan 2020 09:18) (105/54 - 140/60)  BP(mean): 86 (15 Noel 2020 21:30) (81 - 86)  RR: 18 (16 Jan 2020 09:16) (15 - 18)  SpO2: 100% (16 Jan 2020 09:16) (95% - 100%) I&O's Summary    15 Noel 2020 07:01  -  16 Jan 2020 07:00  --------------------------------------------------------  IN: 420 mL / OUT: 0 mL / NET: 420 mL    16 Jan 2020 07:01  -  16 Jan 2020 10:16  --------------------------------------------------------  IN: 0 mL / OUT: 0 mL / NET: 0 mL       GENERAL:  [X ]Alert  [ X]Oriented  [ ]Lethargic  [ ]Cachexia  [ ]Unarousable  [ ]Verbal  [ ]Non-Verbal  Behavioral:   [ ]Anxiety  [ ]Delirium [ ]Agitation [ ]Other  HEENT:  [ ]Normal   [ X]Dry mouth   [ ]ET Tube/Trach  [ ]Oral lesions  PULMONARY:   [ ]Clear [ ]Tachypnea  [ ]Audible excessive secretions   [ ]Rhonchi        [ ]Right [ ]Left [ ]Bilateral  [ ]Crackles        [ ]Right [ ]Left [ ]Bilateral  [ ]Wheezing     [ ]Right [ ]Left [ ]Bilateral  [ ]Diminished BS [ ] Right [ ]Left [ ]Bilateral  CARDIOVASCULAR:    [ ]Regular [ ]Irregular [ ]Tachy  [ ]Kaveh [ ]Murmur [ ]Other  GASTROINTESTINAL:  [ ]Soft  [ ]Distended   [ ]+BS  [ ]Non tender [ ]Tender  [ ]PEG [ ]OGT/ NGT   Last BM:   01-15-20 @ 07:01  -  01-16-20 @ 07:00  --------------------------------------------------------  OUT: 0 mL    01-16-20 @ 07:01  -  01-16-20 @ 10:16  --------------------------------------------------------  OUT: 0 mL       GENITOURINARY:  [ ]Normal [ ]Incontinent   [ ]Oliguria/Anuria   [ ]Acosta  MUSCULOSKELETAL:   [ ]Normal   [ ]Weakness  [ ]Bed/Wheelchair bound [ ]Edema [ X] TTP of right trochanter, tenderness on active and passive ROM at right hip. Bandage in place of surgical site around R trochanter  NEUROLOGIC:   [ ]No focal deficits  [ ] Cognitive impairment  [ ] Dysphagia [ ]Dysarthria [ ] Paresis [ ]Other   SKIN:   [ ]Normal  [ ]Rash   [ ]Pressure ulcer(s) [ ]y [ ]n present on admission    CRITICAL CARE:  [ ]Shock Present  [ ]Septic [ ]Cardiogenic [ ]Neurologic [ ]Hypovolemic  [ ]Vasopressors [ ]Inotropes  [ ]Respiratory failure present [ ]Mechanical Ventilation [ ]Non-invasive ventilatory support [ ]High-Flow  [ ]Acute  [ ]Chronic [ ]Hypoxic  [ ]Hypercarbic [ ]Other  [ ]Other organ failure     LABS:                        11.5   14.17 )-----------( 200      ( 15 Noel 2020 20:51 )             39.7   01-16    138  |  107  |  6<L>  ----------------------------<  106<H>  3.0<L>   |  21<L>  |  0.59    Ca    7.7<L>      16 Jan 2020 06:49  Phos  2.1     01-16  Mg     1.3     01-16    PT/INR - ( 15 Noel 2020 04:58 )   PT: 15.9 sec;   INR: 1.37 ratio         PTT - ( 15 Noel 2020 04:58 )  PTT:30.0 sec      RADIOLOGY & ADDITIONAL STUDIES:    Protein Calorie Malnutrition Present: [ ]mild [ ]moderate [ ]severe [ ]underweight [ ]morbid obesity  https://www.andeal.org/vault/3780/web/files/ONC/Table_Clinical%20Characteristics%20to%20Document%20Malnutrition-White%20JV%20et%20al%202012.pdf    Height (cm): 162.56 (01-15-20 @ 19:28), 162.6 (12-01-19 @ 16:18), 160.02 (10-02-19 @ 08:12)  Weight (kg): 78.4 (01-15-20 @ 19:28), 77.3 (12-01-19 @ 16:18), 79.8 (10-02-19 @ 08:12)  BMI (kg/m2): 29.7 (01-15-20 @ 19:28), 29.2 (12-01-19 @ 16:18), 31.2 (10-02-19 @ 08:12)    [ ]PPSV2 < or = 30%  [ ]significant weight loss [ ]poor nutritional intake [ ]anasarca   Albumin, Serum: 3.2 g/dL (01-13-20 @ 05:22)   [ ]Artificial Nutrition    REFERRALS:   [ ]Chaplaincy  [ ]Hospice  [ ]Child Life  [ ]Social Work  [ ]Case management [ ]Holistic Therapy     Goals of Care Document: SUBJECTIVE AND OBJECTIVE/INTERVAL HPI/OVERNIGHT EVENTS: Overnight, patient received IMN per ortho; tolerated procedure well all VSS overnight. Patient seen and examined at bedside; she is significantly more alert, AAOx2-3 (still says [facility] but remembers this is [facility] with prompting). She is reporting burning epigastric pain after eating breakfast this AM. She endorses pain in RLE on movement/repositioning of right leg.     DNR on chart:   Allergies    No Known Allergies    Intolerances    MEDICATIONS  (STANDING):  allopurinol 300 milliGRAM(s) Oral <User Schedule>  artificial tears (preservative free) Ophthalmic Solution 1 Drop(s) Both EYES two times a day  crizotinib 200 milliGRAM(s) Oral two times a day  enoxaparin Injectable 30 milliGRAM(s) SubCutaneous every 24 hours  lidocaine   Patch 1 Patch Transdermal daily  midodrine. 10 milliGRAM(s) Oral <User Schedule>  senna 2 Tablet(s) Oral at bedtime  sevelamer carbonate 800 milliGRAM(s) Oral three times a day with meals    MEDICATIONS  (PRN):      ITEMS UNCHECKED ARE NOT PRESENT    PRESENT SYMPTOMS: [ ]Unable to obtain due to poor mentation   Source if other than patient:  [ ]Family   [ ]Team     Pain:  [X ]yes - on movement and palpation and burning pain during eating [ ]no  QOL impact -   Location -                    Aggravating factors -  Quality -  Radiation -  Timing-  Severity (0-10 scale):  Minimal acceptable level (0-10 scale):     Dyspnea:                           [ ]Mild [ ]Moderate [ ]Severe  Anxiety:                             [ ]Mild [ ]Moderate [ ]Severe  Fatigue:                             [ ]Mild [ ]Moderate [ ]Severe  Nausea:                             [ ]Mild [ ]Moderate [ ]Severe  Loss of appetite:              [ ]Mild [ ]Moderate [ ]Severe  Constipation:                    [ ]Mild [ ]Moderate [ ]Severe    PAIN AD Score:	  http://geriatrictoolkit.[place].[email or web address]/cog/painad.pdf (Ctrl + left click to view)    Other Symptoms:  [x ]All other review of systems negative     Palliative Performance Status Version 2:   40      %      http://npcrc.org/files/news/palliative_performance_scale_[email or web address].pdf  PHYSICAL EXAM:  Vital Signs Last 24 Hrs  T(C): 36.3 (16 Jan 2020 05:06), Max: 36.5 (15 Noel 2020 19:30)  T(F): 97.4 (16 Jan 2020 05:06), Max: 97.7 (15 Noel 2020 19:30)  HR: 60 (16 Jan 2020 09:16) (60 - 67)  BP: 112/64 (16 Jan 2020 09:18) (105/54 - 140/60)  BP(mean): 86 (15 Noel 2020 21:30) (81 - 86)  RR: 18 (16 Jan 2020 09:16) (15 - 18)  SpO2: 100% (16 Jan 2020 09:16) (95% - 100%) I&O's Summary    15 Noel 2020 07:01  -  16 Jan 2020 07:00  --------------------------------------------------------  IN: 420 mL / OUT: 0 mL / NET: 420 mL    16 Jan 2020 07:01  -  16 Jan 2020 10:16  --------------------------------------------------------  IN: 0 mL / OUT: 0 mL / NET: 0 mL       GENERAL:  [X ]Alert  [ X]Oriented  [ ]Lethargic  [ ]Cachexia  [ ]Unarousable  [ ]Verbal  [ ]Non-Verbal  Behavioral:   [ ]Anxiety  [ ]Delirium [ ]Agitation [ ]Other  HEENT:  [ ]Normal   [ X]Dry mouth   [ ]ET Tube/Trach  [ ]Oral lesions  PULMONARY:   [ ]Clear [ ]Tachypnea  [ ]Audible excessive secretions   [ ]Rhonchi        [ ]Right [ ]Left [ ]Bilateral  [ ]Crackles        [ ]Right [ ]Left [ ]Bilateral  [ ]Wheezing     [ ]Right [ ]Left [ ]Bilateral  [ ]Diminished BS [ ] Right [ ]Left [x ]Bilateral  CARDIOVASCULAR:    [ ]Regular [ ]Irregular [ ]Tachy  [ ]Kaveh [ ]Murmur [ ]Other  GASTROINTESTINAL:  [x ]Soft  [ ]Distended   [x ]+BS  [x ]Non tender [ ]Tender  [ ]PEG [ ]OGT/ NGT   Last BM: 1/11  GENITOURINARY:  [ ]Normal [ ]Incontinent   [ ]Oliguria/Anuria   [ ]Acosta  MUSCULOSKELETAL:   [ ]Normal   [ ]Weakness  [ ]Bed/Wheelchair bound [ ]Edema [ X] TTP of right trochanter, tenderness on active and passive ROM at right hip. Bandage in place of surgical site around R trochanter  NEUROLOGIC:   [ x]No focal deficits  [ ] Cognitive impairment  [ ] Dysphagia [ ]Dysarthria [ ] Paresis [ ]Other   SKIN:   [ x]Normal  [ ]Rash   [ ]Pressure ulcer(s) [ ]y [ ]n present on admission    CRITICAL CARE:  [ ]Shock Present  [ ]Septic [ ]Cardiogenic [ ]Neurologic [ ]Hypovolemic  [ ]Vasopressors [ ]Inotropes  [ ]Respiratory failure present [ ]Mechanical Ventilation [ ]Non-invasive ventilatory support [ ]High-Flow  [ ]Acute  [ ]Chronic [ ]Hypoxic  [ ]Hypercarbic [ ]Other  [ ]Other organ failure     LABS:                        11.5   14.17 )-----------( 200      ( 15 Noel 2020 20:51 )             39.7   01-16    138  |  107  |  6<L>  ----------------------------<  106<H>  3.0<L>   |  21<L>  |  0.59    Ca    7.7<L>      16 Jan 2020 06:49  Phos  2.1     01-16  Mg     1.3     01-16    PT/INR - ( 15 Noel 2020 04:58 )   PT: 15.9 sec;   INR: 1.37 ratio         PTT - ( 15 Noel 2020 04:58 )  PTT:30.0 sec      RADIOLOGY & ADDITIONAL STUDIES:  < from: Xray Chest 1 View- PORTABLE-Urgent (01.15.20 @ 15:35) >  EXAM:  XR CHEST PORTABLE URGENT 1V                            PROCEDURE DATE:  01/15/2020            INTERPRETATION:  Clinical history: Lung cancer, reevaluate atelectasis    Comparison is made to prior study of 1/12/2020    A single frontal view the chest demonstrates no evidence of effusion or pneumothorax. 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      LUIS A OVALLES M.D. ATTENDING RADIOLOGIST  This document has been electronically signed. Jan 16 2020  1:55PM        < end of copied text >    Protein Calorie Malnutrition Present: [ ]mild [ ]moderate [ ]severe [ ]underweight [ ]morbid obesity  https://www.andeal.org/vault/2440/web/files/ONC/Table_Clinical%20Characteristics%20to%20Document%20Malnutrition-White%20JV%20et%20al%202012.pdf    Height (cm): 162.56 (01-15-20 @ 19:28), 162.6 (12-01-19 @ 16:18), 160.02 (10-02-19 @ 08:12)  Weight (kg): 78.4 (01-15-20 @ 19:28), 77.3 (12-01-19 @ 16:18), 79.8 (10-02-19 @ 08:12)  BMI (kg/m2): 29.7 (01-15-20 @ 19:28), 29.2 (12-01-19 @ 16:18), 31.2 (10-02-19 @ 08:12)    [ ]PPSV2 < or = 30%  [ ]significant weight loss [ ]poor nutritional intake [ ]anasarca   Albumin, Serum: 3.2 g/dL (01-13-20 @ 05:22)   [ ]Artificial Nutrition    REFERRALS:   [ ]Chaplaincy  [ ]Hospice  [ ]Child Life  [ ]Social Work  [ ]Case management [ ]Holistic Therapy     Goals of Care Document: SUBJECTIVE AND OBJECTIVE/INTERVAL HPI/OVERNIGHT EVENTS: Overnight, patient received IMN per ortho; tolerated procedure well all VSS overnight. Patient seen and examined at bedside; she is significantly more alert, AAOx2-3 (still says [facility] but remembers this is [facility] with prompting). She is reporting burning epigastric pain after eating breakfast this AM. She endorses pain in RLE on movement/repositioning of right leg.     DNR on chart:   Allergies    No Known Allergies    Intolerances    MEDICATIONS  (STANDING):  allopurinol 300 milliGRAM(s) Oral <User Schedule>  artificial tears (preservative free) Ophthalmic Solution 1 Drop(s) Both EYES two times a day  crizotinib 200 milliGRAM(s) Oral two times a day  enoxaparin Injectable 30 milliGRAM(s) SubCutaneous every 24 hours  lidocaine   Patch 1 Patch Transdermal daily  midodrine. 10 milliGRAM(s) Oral <User Schedule>  senna 2 Tablet(s) Oral at bedtime  sevelamer carbonate 800 milliGRAM(s) Oral three times a day with meals    MEDICATIONS  (PRN):      ITEMS UNCHECKED ARE NOT PRESENT    PRESENT SYMPTOMS: [ ]Unable to obtain due to poor mentation   Source if other than patient:  [ ]Family   [ ]Team     Pain:  [X ]yes - on movement and palpation and burning pain during eating [ ]no  QOL impact - Limiting her mobility   Location -  RLE   Aggravating factors - movement   Quality - ache, burning   Radiation - none   Timing- with movement and with food   Severity (0-10 scale): moderate   Minimal acceptable level (0-10 scale): moderate     Dyspnea:                           [ ]Mild [ ]Moderate [ ]Severe  Anxiety:                             [ ]Mild [ ]Moderate [ ]Severe  Fatigue:                             [ ]Mild [ ]Moderate [ ]Severe  Nausea:                             [ ]Mild [ ]Moderate [ ]Severe  Loss of appetite:              [ ]Mild [ ]Moderate [ ]Severe  Constipation:                    [ ]Mild [ ]Moderate [ ]Severe    PAIN AD Score:	  http://geriatrictoolkit.[employer].edu/cog/painad.pdf (Ctrl + left click to view)    Other Symptoms:  [x ]All other review of systems negative     Palliative Performance Status Version 2:   40      %      [email or web address]  PHYSICAL EXAM:  Vital Signs Last 24 Hrs  T(C): 36.3 (16 Jan 2020 05:06), Max: 36.5 (15 Noel 2020 19:30)  T(F): 97.4 (16 Jan 2020 05:06), Max: 97.7 (15 Noel 2020 19:30)  HR: 60 (16 Jan 2020 09:16) (60 - 67)  BP: 112/64 (16 Jan 2020 09:18) (105/54 - 140/60)  BP(mean): 86 (15 Noel 2020 21:30) (81 - 86)  RR: 18 (16 Jan 2020 09:16) (15 - 18)  SpO2: 100% (16 Jan 2020 09:16) (95% - 100%) I&O's Summary    15 Noel 2020 07:01  -  16 Jan 2020 07:00  --------------------------------------------------------  IN: 420 mL / OUT: 0 mL / NET: 420 mL    16 Jan 2020 07:01  -  16 Jan 2020 10:16  --------------------------------------------------------  IN: 0 mL / OUT: 0 mL / NET: 0 mL       GENERAL:  [X ]Alert  [ X]Oriented  [ ]Lethargic  [ ]Cachexia  [ ]Unarousable  [ ]Verbal  [ ]Non-Verbal  Behavioral:   [ ]Anxiety  [ ]Delirium [ ]Agitation [ ]Other  HEENT:  [ ]Normal   [ X]Dry mouth   [ ]ET Tube/Trach  [ ]Oral lesions  PULMONARY:   [ ]Clear [ ]Tachypnea  [ ]Audible excessive secretions   [ ]Rhonchi        [ ]Right [ ]Left [ ]Bilateral  [ ]Crackles        [ ]Right [ ]Left [ ]Bilateral  [ ]Wheezing     [ ]Right [ ]Left [ ]Bilateral  [x ]Diminished BS [ ] Right [ ]Left [x ]Bilateral  CARDIOVASCULAR:    [ ]Regular [x ]Irregular [ ]Tachy  [ ]Kaveh [ ]Murmur [ ]Other  GASTROINTESTINAL:  [x ]Soft  [ ]Distended   [x ]+BS  [x ]Non tender [ ]Tender  [ ]PEG [ ]OGT/ NGT   Last BM: 1/11  GENITOURINARY:  [ ]Normal [ ]Incontinent   [ ]Oliguria/Anuria   [ ]Acosta  MUSCULOSKELETAL:   [ ]Normal   [ ]Weakness  [ ]Bed/Wheelchair bound [ ]Edema [ X] TTP of right trochanter, tenderness on active and passive ROM at right hip. Bandage in place of surgical site around R trochanter  NEUROLOGIC:   [ x]No focal deficits  [ ] Cognitive impairment  [ ] Dysphagia [ ]Dysarthria [ ] Paresis [ ]Other   SKIN:   [ x]Normal  [ ]Rash   [ ]Pressure ulcer(s) [ ]y [ ]n present on admission    CRITICAL CARE:  [ ]Shock Present  [ ]Septic [ ]Cardiogenic [ ]Neurologic [ ]Hypovolemic  [ ]Vasopressors [ ]Inotropes  [ ]Respiratory failure present [ ]Mechanical Ventilation [ ]Non-invasive ventilatory support [ ]High-Flow  [ ]Acute  [ ]Chronic [ ]Hypoxic  [ ]Hypercarbic [ ]Other  [ ]Other organ failure     LABS:                        11.5   14.17 )-----------( 200      ( 15 Noel 2020 20:51 )             39.7   01-16    138  |  107  |  6<L>  ----------------------------<  106<H>  3.0<L>   |  21<L>  |  0.59    Ca    7.7<L>      16 Jan 2020 06:49  Phos  2.1     01-16  Mg     1.3     01-16    PT/INR - ( 15 Noel 2020 04:58 )   PT: 15.9 sec;   INR: 1.37 ratio         PTT - ( 15 Nole 2020 04:58 )  PTT:30.0 sec      RADIOLOGY & ADDITIONAL STUDIES:  < from: Xray Chest 1 View- PORTABLE-Urgent (01.15.20 @ 15:35) >  EXAM:  XR CHEST PORTABLE URGENT 1V                            PROCEDURE DATE:  01/15/2020            INTERPRETATION:  Clinical history: Lung cancer, reevaluate atelectasis    Comparison is made to prior study of 1/12/2020    A single frontal view the chest demonstrates no evidence of effusion or pneumothorax. 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      LUIS A OVALLES M.D. ATTENDING RADIOLOGIST  This document has been electronically signed. Jan 16 2020  1:55PM        < end of copied text >    Protein Calorie Malnutrition Present: [ ]mild [ ]moderate [ ]severe [ ]underweight [ ]morbid obesity  https://www.andeal.org/vault/5750/web/files/ONC/Table_Clinical%20Characteristics%20to%20Document%20Malnutrition-White%20JV%20et%20al%202012.pdf    Height (cm): 162.56 (01-15-20 @ 19:28), 162.6 (12-01-19 @ 16:18), 160.02 (10-02-19 @ 08:12)  Weight (kg): 78.4 (01-15-20 @ 19:28), 77.3 (12-01-19 @ 16:18), 79.8 (10-02-19 @ 08:12)  BMI (kg/m2): 29.7 (01-15-20 @ 19:28), 29.2 (12-01-19 @ 16:18), 31.2 (10-02-19 @ 08:12)    [ ]PPSV2 < or = 30%  [ ]significant weight loss [ ]poor nutritional intake [ ]anasarca   Albumin, Serum: 3.2 g/dL (01-13-20 @ 05:22)   [ ]Artificial Nutrition    REFERRALS:   [ ]Chaplaincy  [ ]Hospice  [ ]Child Life  [ ]Social Work  [ ]Case management [ ]Holistic Therapy     Goals of Care Document:

## 2020-01-16 NOTE — PROVIDER CONTACT NOTE (OTHER) - ASSESSMENT
pt a&ox3. pt bp 88/45 manually. ot is post dialysis. pt asymptomatic. pt also c/o of heart burn. pt states Mylanta that was given previously did not work and wants Tums.

## 2020-01-16 NOTE — PROGRESS NOTE ADULT - SUBJECTIVE AND OBJECTIVE BOX
S: c/o burning epigastric discomfort after eating this morning.  Denies anginal chest pain or SOB. Review of systems otherwise (-)  	      MEDICATIONS  (STANDING):  acetaminophen   Tablet .. 650 milliGRAM(s) Oral every 6 hours  allopurinol 300 milliGRAM(s) Oral <User Schedule>  artificial tears (preservative free) Ophthalmic Solution 1 Drop(s) Both EYES two times a day  crizotinib 200 milliGRAM(s) Oral two times a day  enoxaparin Injectable 30 milliGRAM(s) SubCutaneous every 24 hours  lidocaine   Patch 1 Patch Transdermal daily  midodrine. 10 milliGRAM(s) Oral <User Schedule>  pantoprazole    Tablet 40 milliGRAM(s) Oral daily  polyethylene glycol 3350 17 Gram(s) Oral two times a day  senna 2 Tablet(s) Oral at bedtime  sevelamer carbonate 800 milliGRAM(s) Oral three times a day with meals    MEDICATIONS  (PRN):  HYDROmorphone  Injectable 0.2 milliGRAM(s) IV Push every 4 hours PRN moderate-severe pain, prior to physical therapy      LABS:                            11.5   14.17 )-----------( 200      ( 15 Noel 2020 20:51 )             39.7     Hemoglobin: 11.5 g/dL (01-15 @ 20:51)  Hemoglobin: 13.9 g/dL (01-15 @ 04:58)  Hemoglobin: 12.1 g/dL (01-14 @ 05:01)  Hemoglobin: 10.7 g/dL (01-13 @ 05:22)  Hemoglobin: 10.7 g/dL (01-13 @ 03:24)    01-16    130<L>  |  88<L>  |  57<H>  ----------------------------<  120<H>  5.8<H>   |  23  |  5.40<H>    Ca    9.7      16 Jan 2020 12:45  Phos  6.8     01-16  Mg     2.3     01-16      Creatinine Trend: 5.40<--, 0.59<--, 4.15<--, 3.02<--, 3.69<--, 6.53<--           PHYSICAL EXAM  Vital Signs Last 24 Hrs  T(C): 36.8 (16 Jan 2020 09:16), Max: 36.8 (16 Jan 2020 09:16)  T(F): 98.3 (16 Jan 2020 09:16), Max: 98.3 (16 Jan 2020 09:16)  HR: 60 (16 Jan 2020 09:16) (60 - 67)  BP: 112/64 (16 Jan 2020 09:18) (105/54 - 140/60)  BP(mean): 86 (15 Noel 2020 21:30) (81 - 86)  RR: 18 (16 Jan 2020 09:16) (15 - 18)  SpO2: 100% (16 Jan 2020 09:16) (95% - 100%)      Gen: Appears well in NAD  HEENT:  (-)icterus (-)pallor  CV: N S1 S2 1/6 GARY (+)2 Pulses B/l  Resp:  Clear to auscultation B/L, normal effort  GI: (+) BS Soft, NT, ND  Lymph:  (+)Trace RLE Edema, (-)obvious lymphadenopathy  Skin: Warm to touch, Normal turgor  Psych: Appropriate mood and affect      TELEMETRY: None	      ECG: VPaced at 63 bpm 	    ASSESSMENT/PLAN: 85 y/o female known to our office with PMHx of ESRD on HD, persistent Afib (on Coumadin), s/p Bio MVR, Tricuspid valve repair and ASD closure in 2016, hx complete heart block s/p PPM, Lung CA, COPD, remote colon CA (s/p R hemicolectomy), had normal nuclear stress test and TTE with normal LV/RV with well seated bio MV with normal function both in 7/2019 who presented to ED with worsening right hip pain s/p recent mechanical fall found to have right hip fracture and supratherapeutic INR requiring reversal for OR. Cardiology consulted for preop clearance. Patient underwent right femoral intramedullary rodding 1/15/20 .     - Patient tolerated procedure well  -  No evidence of clinical HF or anginal symptoms  - Denies LOC/syncope -- PPM interrogation noted, episodes of likely SVT but no correlating events to recent fall  - Normal Nuclear stress and TTE with normal LV/RV with well seated bio MV with normal function both in 7/2019  - AF - resume Coumadin for lifelong CVA prevention when bleeding risk acceptable per surgery    - HD per renal  - Pulm eval noted  - Ortho follow up noted

## 2020-01-16 NOTE — PROGRESS NOTE ADULT - PROBLEM SELECTOR PLAN 5
-[facility] [place] PRN for now. -Senna 2 tags HS, Miralax BID. Dulcolax PO PRN.

## 2020-01-16 NOTE — PHYSICAL THERAPY INITIAL EVALUATION ADULT - ADDITIONAL COMMENTS
(continuation fo H&P) Pt found to have pathologic intertrochanteric fracture of the right femur in setting of femur metastasis, c/b supratherapeutic INR to 15. Ortho consulted, INR reversed in ED. (continuation fo H&P) Pt found to have pathologic intertrochanteric fracture of the right femur in setting of femur metastasis, c/b supratherapeutic INR to 15. Ortho consulted, INR reversed in ED. Pt lives in a private house with (continuation fo H&P) Pt found to have pathologic intertrochanteric fracture of the right femur in setting of femur metastasis, c/b supratherapeutic INR to 15. Ortho consulted, INR reversed in ED. Pt lives in a private house with her daughter; pt has chair lift.

## 2020-01-16 NOTE — PROGRESS NOTE ADULT - SUBJECTIVE AND OBJECTIVE BOX
Ortho Progress Note    S: Patient seen and examined. No acute events overnight. Pain well controlled with current regimen. Denies lightheadedness/dizziness, CP/SOB. Tolerating diet.       O:  Physical Exam:  Gen: Laying in bed, NAD, alert and oriented.   Resp: Unlabored breathing  Ext: EHL/FHL/TA/Sol intact          + SILT DP/SP/GONZALEZ/Sa/Tib          +DP, extremity WWP  dressing c/d/i    Vital Signs Last 24 Hrs  T(C): 36.8 (16 Jan 2020 09:16), Max: 36.8 (16 Jan 2020 09:16)  T(F): 98.3 (16 Jan 2020 09:16), Max: 98.3 (16 Jan 2020 09:16)  HR: 60 (16 Jan 2020 09:16) (60 - 67)  BP: 112/64 (16 Jan 2020 09:18) (105/54 - 140/60)  BP(mean): 86 (15 Noel 2020 21:30) (81 - 86)  RR: 18 (16 Jan 2020 09:16) (15 - 18)  SpO2: 100% (16 Jan 2020 09:16) (95% - 100%)                          11.5   14.17 )-----------( 200      ( 15 Noel 2020 20:51 )             39.7                         13.9   14.42 )-----------( 247      ( 15 Noel 2020 04:58 )             48.1       01-16    130<L>  |  88<L>  |  57<H>  ----------------------------<  120<H>  5.8<H>   |  23  |  5.40<H>        PT/INR - ( 15 Noel 2020 04:58 )   PT: 15.9 sec;   INR: 1.37 ratio         PTT - ( 15 Noel 2020 04:58 )  PTT:30.0 sec

## 2020-01-16 NOTE — PHYSICAL THERAPY INITIAL EVALUATION ADULT - BALANCE TRAINING, PT EVAL
Goal: Pt will improve balance one half grade to improve performance and safety of transfers and ambulation in 4 weeks.

## 2020-01-16 NOTE — PROGRESS NOTE ADULT - ASSESSMENT
86F PMH ESRD (HD on T/Th/Sat) via left forearm AVF, stage IV squamous cell lung cancer (diagnosed 10/2019, mets to bone, on crizotinib), afib (on Coumadin), PPM, MS/TR (s/p annuloplasty in 2016), chronic hypotension (on midodrine HD days), remote colon CA (s/p R hemicolectomy), GI bleed (2/2 Dieulafoy lesion), anemia, pulmonary HTN, COPD, and restrictive lung disease, presented to ED with R knee found to have  pathologic intertrochanteric fracture of the right femur, s/p R IMN for metastatic femur

## 2020-01-16 NOTE — PROGRESS NOTE ADULT - PROBLEM SELECTOR PLAN 3
- noted on CXR and CT A/P on presentation; PNA cannot be excluded  - could be etiology of hypoxia  - per pulmonary recs: incentive spirometer, bronchodilators prior to surgery, OOBTC as tolerated per ortho; patient now s/p IMN 1/15  -Consider f/u CXR - noted on CXR and CT A/P on presentation; PNA cannot be excluded  - could be etiology of hypoxia; ABG with O2 sat 92, pO2 68 reduced, pCO2 mildly elevated 51  - per pulmonary recs: incentive spirometer, bronchodilators prior to surgery, OOBTC as tolerated per ortho; patient now s/p IMN 1/15  -Consider f/u CXR

## 2020-01-16 NOTE — PROGRESS NOTE ADULT - SUBJECTIVE AND OBJECTIVE BOX
Pulmonary Progress Note     Interval Events:  Post RIMN surgery by ortho    SUBJECTIVE:  Feeling OK, has some incisional pain.     OBJECTIVE:  ICU Vital Signs Last 24 Hrs  T(C): 36.3 (16 Jan 2020 05:06), Max: 36.5 (15 Noel 2020 19:30)  T(F): 97.4 (16 Jan 2020 05:06), Max: 97.7 (15 Noel 2020 19:30)  HR: 64 (16 Jan 2020 05:06) (60 - 67)  BP: 106/68 (16 Jan 2020 05:06) (106/68 - 140/60)  BP(mean): 86 (15 Noel 2020 21:30) (81 - 86)  ABP: 142/43 (15 Noel 2020 21:00) (97/30 - 152/45)  ABP(mean): 69 (15 Noel 2020 21:00) (45 - 91)  RR: 18 (16 Jan 2020 05:06) (15 - 18)  SpO2: 98% (16 Jan 2020 05:06) (95% - 100%)    01-15 @ 07:01  -  01-16 @ 07:00  --------------------------------------------------------  IN: 420 mL / OUT: 0 mL / NET: 420 mL      PHYSICAL EXAM:  GENERAL: NAD, well-groomed, well-developed  HEAD:  Atraumatic, Normocephalic  EYES: EOMI, conjunctiva and sclera clear  NERVOUS SYSTEM:  Alert & Oriented X3, Good concentration; Moves all extremities  CHEST/LUNG: Clear to percussion bilaterally; No rales, rhonchi, wheezing, or rubs  HEART: Regular rate and rhythm; No murmurs, rubs, or gallops  ABDOMEN: Soft, Nontender, Nondistended;   EXTREMITIES:  2+ Peripheral Pulses, No LE edema, wound dressing c/d/i  SKIN: No rashes or lesions    HOSPITAL MEDICATIONS:  MEDICATIONS  (STANDING):  acetaminophen  IVPB .. 1000 milliGRAM(s) IV Intermittent <User Schedule>  allopurinol 300 milliGRAM(s) Oral <User Schedule>  artificial tears (preservative free) Ophthalmic Solution 1 Drop(s) Both EYES two times a day  ceFAZolin   IVPB 2000 milliGRAM(s) IV Intermittent every 8 hours  crizotinib 200 milliGRAM(s) Oral two times a day  enoxaparin Injectable 30 milliGRAM(s) SubCutaneous every 24 hours  lidocaine   Patch 1 Patch Transdermal daily  midodrine. 10 milliGRAM(s) Oral <User Schedule>  senna 2 Tablet(s) Oral at bedtime  sevelamer carbonate 800 milliGRAM(s) Oral three times a day with meals    MEDICATIONS  (PRN):    LABS:                        11.5   14.17 )-----------( 200      ( 15 Noel 2020 20:51 )             39.7     Hgb Trend: 11.5<--, 13.9<--, 12.1<--, 10.7<--, 10.7<--  01-16    138  |  107  |  6<L>  ----------------------------<  106<H>  3.0<L>   |  21<L>  |  0.59    Ca    7.7<L>      16 Jan 2020 06:49  Phos  2.1     01-16  Mg     1.3     01-16    Creatinine Trend: 0.59<--, 4.15<--, 3.02<--, 3.69<--, 6.53<--, 5.67<--  PT/INR - ( 15 Noel 2020 04:58 )   PT: 15.9 sec;   INR: 1.37 ratio      PTT - ( 15 Noel 2020 04:58 )  PTT:30.0 sec    Arterial Blood Gas:  01-15 @ 14:28  7.36/51/68/29/92/2.8  ABG lactate: --    MICROBIOLOGY:     RADIOLOGY:  [ ] Reviewed and interpreted by me Pulmonary Progress Note     Interval Events:  Post RIMN surgery by ortho    SUBJECTIVE:  Feeling OK, has some incisional pain. Breathing comfortably on nasal canula.     OBJECTIVE:  ICU Vital Signs Last 24 Hrs  T(C): 36.3 (16 Jan 2020 05:06), Max: 36.5 (15 Noel 2020 19:30)  T(F): 97.4 (16 Jan 2020 05:06), Max: 97.7 (15 Noel 2020 19:30)  HR: 64 (16 Jan 2020 05:06) (60 - 67)  BP: 106/68 (16 Jan 2020 05:06) (106/68 - 140/60)  BP(mean): 86 (15 Noel 2020 21:30) (81 - 86)  ABP: 142/43 (15 Noel 2020 21:00) (97/30 - 152/45)  ABP(mean): 69 (15 Noel 2020 21:00) (45 - 91)  RR: 18 (16 Jan 2020 05:06) (15 - 18)  SpO2: 98% (16 Jan 2020 05:06) (95% - 100%)    01-15 @ 07:01  -  01-16 @ 07:00  --------------------------------------------------------  IN: 420 mL / OUT: 0 mL / NET: 420 mL    PHYSICAL EXAM:  GENERAL: NAD, well-groomed, chronically ill appearing.   HEAD:  Atraumatic, Normocephalic  EYES: EOMI, conjunctiva and sclera clear  NERVOUS SYSTEM:  Alert & Oriented X3, Good concentration; Moves all extremities  CHEST/LUNG: Clear to percussion bilaterally; No rales, rhonchi, wheezing, or rubs  HEART: Regular rate and rhythm; No murmurs, rubs, or gallops  ABDOMEN: Soft, Nontender, Nondistended;   EXTREMITIES:  2+ Peripheral Pulses, No LE edema, wound dressing c/d/i  SKIN: No rashes or lesions    HOSPITAL MEDICATIONS:  MEDICATIONS  (STANDING):  acetaminophen  IVPB .. 1000 milliGRAM(s) IV Intermittent <User Schedule>  allopurinol 300 milliGRAM(s) Oral <User Schedule>  artificial tears (preservative free) Ophthalmic Solution 1 Drop(s) Both EYES two times a day  ceFAZolin   IVPB 2000 milliGRAM(s) IV Intermittent every 8 hours  crizotinib 200 milliGRAM(s) Oral two times a day  enoxaparin Injectable 30 milliGRAM(s) SubCutaneous every 24 hours  lidocaine   Patch 1 Patch Transdermal daily  midodrine. 10 milliGRAM(s) Oral <User Schedule>  senna 2 Tablet(s) Oral at bedtime  sevelamer carbonate 800 milliGRAM(s) Oral three times a day with meals    MEDICATIONS  (PRN):    LABS:                        11.5   14.17 )-----------( 200      ( 15 Noel 2020 20:51 )             39.7     Hgb Trend: 11.5<--, 13.9<--, 12.1<--, 10.7<--, 10.7<--  01-16    138  |  107  |  6<L>  ----------------------------<  106<H>  3.0<L>   |  21<L>  |  0.59    Ca    7.7<L>      16 Jan 2020 06:49  Phos  2.1     01-16  Mg     1.3     01-16    Creatinine Trend: 0.59<--, 4.15<--, 3.02<--, 3.69<--, 6.53<--, 5.67<--  PT/INR - ( 15 Noel 2020 04:58 )   PT: 15.9 sec;   INR: 1.37 ratio      PTT - ( 15 Noel 2020 04:58 )  PTT:30.0 sec    Arterial Blood Gas:  01-15 @ 14:28  7.36/51/68/29/92/2.8  ABG lactate: --    MICROBIOLOGY:     RADIOLOGY:  [ ] Reviewed and interpreted by me

## 2020-01-16 NOTE — PROVIDER CONTACT NOTE (OTHER) - ACTION/TREATMENT ORDERED:
SHEILA Orellana made aware. continue to monitor pt. will assess pt again in 15m. awaiting for possible orders. SHEILA Luque made aware. continue to monitor pt. will assess pt again in 15m. awaiting for possible orders.

## 2020-01-16 NOTE — PROGRESS NOTE ADULT - ASSESSMENT
86F sp R IMN for metastatic femur    ·	Neuro: Pain control  ·	Resp: IS  ·	GI: Regular diet, bowel reg  ·	MSK: WBAT, PT/OT  ·	Heme: DVT PPX  ·	Care per onc    Ortho [id number]

## 2020-01-16 NOTE — PROGRESS NOTE ADULT - SUBJECTIVE AND OBJECTIVE BOX
Patient is a 86y old  Female who presents with a chief complaint of R Knee pain (16 Jan 2020 13:26)      SUBJECTIVE / OVERNIGHT EVENTS: repors she has some burning in her chest, pain is ok, no cp. sob    MEDICATIONS  (STANDING):  acetaminophen   Tablet .. 650 milliGRAM(s) Oral every 6 hours  allopurinol 300 milliGRAM(s) Oral <User Schedule>  artificial tears (preservative free) Ophthalmic Solution 1 Drop(s) Both EYES two times a day  crizotinib 200 milliGRAM(s) Oral two times a day  enoxaparin Injectable 30 milliGRAM(s) SubCutaneous every 24 hours  lidocaine   Patch 1 Patch Transdermal daily  midodrine. 10 milliGRAM(s) Oral <User Schedule>  pantoprazole    Tablet 40 milliGRAM(s) Oral daily  polyethylene glycol 3350 17 Gram(s) Oral two times a day  senna 2 Tablet(s) Oral at bedtime  sevelamer carbonate 800 milliGRAM(s) Oral three times a day with meals    MEDICATIONS  (PRN):  HYDROmorphone  Injectable 0.2 milliGRAM(s) IV Push every 4 hours PRN moderate-severe pain, prior to physical therapy        CAPILLARY BLOOD GLUCOSE      POCT Blood Glucose.: 121 mg/dL (16 Jan 2020 12:28)    I&O's Summary    15 Noel 2020 07:01  -  16 Jan 2020 07:00  --------------------------------------------------------  IN: 420 mL / OUT: 0 mL / NET: 420 mL    16 Jan 2020 07:01  -  16 Jan 2020 13:30  --------------------------------------------------------  IN: 120 mL / OUT: 0 mL / NET: 120 mL        PHYSICAL EXAM:  GENERAL: NAD, frail  HEAD:  Atraumatic, Normocephalic  NECK: No JVD  CHEST/LUNG: decreased breath sounds bases   HEART: Regular rate and rhythm; S1S2  ABDOMEN: Soft, Nontender, Nondistended; Bowel sounds present  EXTREMITIES:  2+ Peripheral Pulses, No clubbing, cyanosis, or edema  PSYCH: AAOx2      LABS:                        11.5   14.17 )-----------( 200      ( 15 Noel 2020 20:51 )             39.7     01-16    130<L>  |  88<L>  |  57<H>  ----------------------------<  120<H>  5.8<H>   |  23  |  5.40<H>    Ca    9.7      16 Jan 2020 12:45  Phos  6.8     01-16  Mg     2.3     01-16      PT/INR - ( 15 Noel 2020 04:58 )   PT: 15.9 sec;   INR: 1.37 ratio         PTT - ( 15 Noel 2020 04:58 )  PTT:30.0 sec          RADIOLOGY & ADDITIONAL TESTS:    Imaging Personally Reviewed:    Consultant(s) Notes Reviewed:      Care Discussed with Consultants/Other Providers:

## 2020-01-16 NOTE — PROGRESS NOTE ADULT - PROBLEM SELECTOR PLAN 1
- will schedule for IV tylenol RTC for now, and will advise PRN IV pain medications for moderate-severe pain: dilaudid 0.2mg q4 PRN for moderate to severe pain with holding parameters. The last dose of Dilaudid the patient received was at 17:57 on 1/14, therefore I strongly doubt Dilaudid is causing her change in mental status.   ABG showing mild hypercarbia PCO2 51, O2 sat 92, pO2 somewhat reduced 68   - standing lidocaine patch daily to right trochanteric region  - may benefit from radiation therapy if high suspicion for pathologic fracture 2/2 metastatic bony involvement; rad-onc planning to pursue as outpatient s/p IMN to right trochanter  - s/p IMN 1/15 - s/p IMN 1/15  - will schedule for IV tylenol RTC for now, and will advise PRN IV pain medications for moderate-severe pain: dilaudid 0.2mg q4 PRN for moderate to severe pain and prior to working with PT with holding parameters. The last dose of Dilaudid the patient received was at 17:57 on 1/14, therefore I strongly doubt Dilaudid is causing her change in mental status.   - standing lidocaine patch daily to right trochanteric region  - may benefit from radiation therapy if high suspicion for pathologic fracture 2/2 metastatic bony involvement; rad-onc planning to pursue as outpatient s/p IMN to right trochanter  - maalox PRN and pantoprazole daily for abdominal pain - s/p IMN 1/15  - will schedule for IV tylenol RTC for now, and will advise PRN IV pain medications for moderate-severe pain: dilaudid 0.2mg q4 PRN for moderate to severe pain and prior to working with PT with holding parameters.   - standing lidocaine patch daily to right trochanteric region  - may benefit from radiation therapy if high suspicion for pathologic fracture 2/2 metastatic bony involvement; rad-onc planning to pursue as outpatient s/p IMN to right trochanter  - maalox PRN and pantoprazole daily for abdominal pain  - Narcan PRN   - Monitoring and holding opioids for sedation/respiratory depression.

## 2020-01-16 NOTE — PROGRESS NOTE ADULT - PROBLEM SELECTOR PLAN 1
CT abd and pelvic x-ray showed right femur intertochanteric pathologic Fx  - S/p OR for RIMN with ortho   - Renal, pulm and Cardiology ( Dr Victor) consults and clearance noted

## 2020-01-16 NOTE — PHYSICAL THERAPY INITIAL EVALUATION ADULT - PLANNED THERAPY INTERVENTIONS, PT EVAL
Stairs Goal: Pt will go up/down 5 steps with + handrail and min assist x 1./balance training/transfer training/bed mobility training/strengthening/gait training

## 2020-01-16 NOTE — PROGRESS NOTE ADULT - ASSESSMENT
86 y.o. F with PMH of ESRD on HD on T/Th/Sat, Afib (on Coumadin), PPM Implanted, Mitral stenosis/Tricuspid Regurgitation s/p annuloplasty (2016), chronic hypotension, remote hx of colon Ca s/p resection, pulmonary HTN, COPD, restrictive lung disease and anemia who presents with complaint of hemoptysis.  Pt with known RML lung mass and metastatic disease to bone s/p IR biopsy of R femur bone biopsy consistent with squamous cell carcinoma. Pt found to have right femur intertochanteric pathologic Fx s/p RIMN with ortho

## 2020-01-16 NOTE — PHYSICAL THERAPY INITIAL EVALUATION ADULT - ACTIVE RANGE OF MOTION EXAMINATION, REHAB EVAL
bilateral upper extremity Active ROM was WFL (within functional limits)/bilateral  lower extremity Active ROM was WFL (within functional limits)/except R hip pt requires assist to move

## 2020-01-16 NOTE — PROGRESS NOTE ADULT - ASSESSMENT
86F PMH ESRD (HD on T/Th/Sat), stage IV squamous cell lung cancer (diagnosed 10/2019, mets to bone, on crizotinib), afib (on Coumadin), PPM, MS/TR (s/p annuloplasty in 2016), chronic hypotension (on midodrine HD days), remote colon CA (s/p R hemicolectomy), GI bleed (2/2 Dieulafoy lesion), anemia, pulmonary HTN, COPD, and restrictive lung disease, presented to ED with R knee and hip pain, found with right femoral neck fracture s/p IMN on 1/16, palliative care consulted for GOC and pain management. 86F PMH ESRD (HD on T/Th/Sat), stage IV squamous cell lung cancer (diagnosed 10/2019, mets to bone, on crizotinib), afib (on Coumadin), PPM, MS/TR (s/p annuloplasty in 2016), chronic hypotension (on midodrine HD days), remote colon CA (s/p R hemicolectomy), GI bleed (2/2 Dieulafoy lesion), anemia, pulmonary HTN, COPD, and restrictive lung disease, presented to ED with R knee and hip pain, found with right femoral neck fracture s/p IMN on 1/15, palliative care consulted for GOC and pain management.

## 2020-01-16 NOTE — PROGRESS NOTE ADULT - ASSESSMENT
85 yo woman with ESRD, atrial fibrillation, valvular heart disease s/p mitral valve replacement, stage IV squamous cell carcinoma of the lung recently started on immunotherapy presenting for right lower extremity pain after a fall and found to have a right femur intertochanteric pathologic fracture. Pulmonary service consulted for preoperative risk stratification. Went to OR for RIMN surgery on 1/15/2020    Postoperative follow up:  Preoperatively, she was at high risk for pulmonary complications postoperatively based on Ariscat score, but had no absolute contraindication to proceed with surgery. She had been dialyzed prior to OR was optimized from volume status standpoint.   - Continue incentive spirometry   - Encourage out of bed to chair as tolerated  - DVT prophylaxis during perioperative period, resume full dose anticoagulation when safe as per surgery    --------------------------------------------------------  Ana María Snow MD  Pulmonary & Critical Care Medicine Fellow | PGY-5  Pager: [phone]  Spectra:  [phone] ([facility]) 87 yo woman with ESRD, atrial fibrillation, valvular heart disease s/p mitral valve replacement, stage IV squamous cell carcinoma of the lung, chronic pulmonary hypertension (likely combination of group II and group III) who presented with right lower extremity pain after a fall and found to have a right femur intertochanteric pathologic fracture. Pulmonary service consulted for preoperative risk stratification. Went to OR for RIMN surgery on 1/15/2020. Now postop, doing well. Mild hypoxemia. RML atelectasis now resolved.     Postoperative follow up:  Preoperatively, she was at high risk for pulmonary complications postoperatively based on Ariscat score, but had no absolute contraindication to proceed with surgery. She had been dialyzed prior to OR was optimized from volume status standpoint.   - Continue incentive spirometry   - Bronchodilators ATC, chest PT  - Keep euvolemic  - Wean supplemental O2 as tolerated  - Encourage out of bed to chair as tolerated, once ok from ortho perspective  - DVT prophylaxis during perioperative period, resume full dose anticoagulation when safe as per surgery  - Would check EKG for epigastric abdominal pain    --------------------------------------------------------  Ana María Snow MD  Pulmonary & Critical Care Medicine Fellow | PGY-5  Pager: [phone]  Spectra:  [phone] ([facility])

## 2020-01-16 NOTE — PHYSICAL THERAPY INITIAL EVALUATION ADULT - PERTINENT HX OF CURRENT PROBLEM, REHAB EVAL
86F PMH ESRD (HD on T/Th/Sat), stage IV squamous cell lung cancer (diagnosed 10/2019, mets to bone, on crizotinib), Afib (on Coumadin), PPM, MS/TR (s/p annuloplasty in 2016), chronic hypotension, remote hx of colon Ca (s/p R hemicolectomy), GI bleed (2/2 Dieulafoy lesion), anemia, pulmonary HTN, COPD, and restrictive lung disease, presented to ED with R knee and hip pain,

## 2020-01-17 NOTE — PROGRESS NOTE ADULT - PROBLEM SELECTOR PLAN 4
- likely in setting of hypoxia and inpatient hospitalization in older female with multiple medical problems  Promote:   -Frequent reassurance and verbal orientation   -Family members or other familiar persons by his bedside.   -Delusions and hallucinations should be neither endorsed nor challenged.   -Physical restraints should be avoided. Alternatives to restraint use, such as constant observation (preferably by someone familiar to the patient such as a family member), may be more effective.  -Pain controlled and PT eval and early ambulation when possible  -Update the calendar date in the room.   -Continue care in [facility] [place].  Work up and Rx as per Primary team.

## 2020-01-17 NOTE — PROGRESS NOTE ADULT - PROBLEM SELECTOR PLAN 1
CT abd and pelvic x-ray showed right femur intertochanteric pathologic Fx  - S/p OR for RIMN with ortho   - pain control with dilaudid prn

## 2020-01-17 NOTE — PROGRESS NOTE ADULT - ATTENDING COMMENTS
Patient seen and examined, agree with above assessment and plan as transcribed above.    - cont coumadin   - No need for further inpatient cardiac work up.    Zheng Patel MD, [facility]  BEEPER [phone]

## 2020-01-17 NOTE — PROGRESS NOTE ADULT - ASSESSMENT
86F sp R IMN for metastatic femur    Care per onc   Pain control  IS  Regular diet, bowel reg  WBAT, PT/OT  DVT PPX    Ortho   [id number]

## 2020-01-17 NOTE — PROGRESS NOTE ADULT - ASSESSMENT
86F PMH ESRD (HD on T/Th/Sat), stage IV squamous cell lung cancer (diagnosed 10/2019, mets to bone, on crizotinib), afib (on Coumadin), PPM, MS/TR (s/p annuloplasty in 2016), chronic hypotension (on midodrine HD days), remote colon CA (s/p R hemicolectomy), GI bleed (2/2 Dieulafoy lesion), anemia, pulmonary HTN, COPD, and restrictive lung disease, presented to ED with R knee and hip pain, found with right femoral neck fracture s/p IMN on 1/15, palliative care consulted for GOC and pain management.

## 2020-01-17 NOTE — PROGRESS NOTE ADULT - SUBJECTIVE AND OBJECTIVE BOX
SUBJECTIVE AND OBJECTIVE:  INTERVAL HPI/OVERNIGHT EVENTS: Overnight, patient with persistent abdominal pain, received tums, and also with hypotensive episode 88/45 s/p dialysis 1/16 PM, self-resolved. Patient seen and examined at bedside this AM.     DNR on chart:   Allergies    No Known Allergies    Intolerances    MEDICATIONS  (STANDING):  acetaminophen   Tablet .. 650 milliGRAM(s) Oral every 6 hours  allopurinol 300 milliGRAM(s) Oral <User Schedule>  artificial tears (preservative free) Ophthalmic Solution 1 Drop(s) Both EYES two times a day  chlorhexidine 4% Liquid 1 Application(s) Topical daily  crizotinib 200 milliGRAM(s) Oral two times a day  enoxaparin Injectable 30 milliGRAM(s) SubCutaneous every 24 hours  lidocaine   Patch 1 Patch Transdermal daily  midodrine. 10 milliGRAM(s) Oral <User Schedule>  pantoprazole    Tablet 40 milliGRAM(s) Oral daily  polyethylene glycol 3350 17 Gram(s) Oral two times a day  senna 2 Tablet(s) Oral at bedtime  sevelamer carbonate 800 milliGRAM(s) Oral three times a day with meals    MEDICATIONS  (PRN):  bisacodyl 5 milliGRAM(s) Oral every 12 hours PRN Constipation  HYDROmorphone  Injectable 0.2 milliGRAM(s) IV Push every 4 hours PRN moderate-severe pain, prior to physical therapy      ITEMS UNCHECKED ARE NOT PRESENT    PRESENT SYMPTOMS: [ ]Unable to obtain due to poor mentation   Source if other than patient:  [ ]Family   [ ]Team     Pain:  [ ]yes [ ]no  QOL impact -   Location -                    Aggravating factors -  Quality -  Radiation -  Timing-  Severity (0-10 scale):  Minimal acceptable level (0-10 scale):     Dyspnea:                           [ ]Mild [ ]Moderate [ ]Severe  Anxiety:                             [ ]Mild [ ]Moderate [ ]Severe  Fatigue:                             [ ]Mild [ ]Moderate [ ]Severe  Nausea:                             [ ]Mild [ ]Moderate [ ]Severe  Loss of appetite:              [ ]Mild [ ]Moderate [ ]Severe  Constipation:                    [ ]Mild [ ]Moderate [ ]Severe    PAIN AD Score:	  [email or web address] (Ctrl + left click to view)    Other Symptoms:  [ ]All other review of systems negative     Palliative Performance Status Version 2:         %      http://npcrc.org/files/news/palliative_performance_scale_[email or web address].pdf  PHYSICAL EXAM:  Vital Signs Last 24 Hrs  T(C): 36.4 (17 Jan 2020 04:09), Max: 36.9 (16 Jan 2020 15:10)  T(F): 97.6 (17 Jan 2020 04:09), Max: 98.4 (16 Jan 2020 15:10)  HR: 60 (17 Jan 2020 04:09) (59 - 62)  BP: 103/58 (17 Jan 2020 04:09) (88/45 - 145/74)  BP(mean): --  RR: 18 (17 Jan 2020 04:09) (18 - 19)  SpO2: 100% (17 Jan 2020 04:09) (95% - 100%) I&O's Summary    16 Jan 2020 07:01  -  17 Jan 2020 07:00  --------------------------------------------------------  IN: 1760 mL / OUT: 2300 mL / NET: -540 mL       GENERAL:  [ ]Alert  [ ]Oriented x   [ ]Lethargic  [ ]Cachexia  [ ]Unarousable  [ ]Verbal  [ ]Non-Verbal  Behavioral:   [ ]Anxiety  [ ]Delirium [ ]Agitation [ ]Other  HEENT:  [ ]Normal   [ ]Dry mouth   [ ]ET Tube/Trach  [ ]Oral lesions  PULMONARY:   [ ]Clear [ ]Tachypnea  [ ]Audible excessive secretions   [ ]Rhonchi        [ ]Right [ ]Left [ ]Bilateral  [ ]Crackles        [ ]Right [ ]Left [ ]Bilateral  [ ]Wheezing     [ ]Right [ ]Left [ ]Bilateral  [ ]Diminished BS [ ] Right [ ]Left [ ]Bilateral  CARDIOVASCULAR:    [ ]Regular [ ]Irregular [ ]Tachy  [ ]Kaveh [ ]Murmur [ ]Other  GASTROINTESTINAL:  [ ]Soft  [ ]Distended   [ ]+BS  [ ]Non tender [ ]Tender  [ ]PEG [ ]OGT/ NGT   Last BM:   01-15-20 @ 07:01  -  01-16-20 @ 07:00  --------------------------------------------------------  OUT: 0 mL    01-16-20 @ 07:01  -  01-17-20 @ 07:00  --------------------------------------------------------  OUT: 0 mL       GENITOURINARY:  [ ]Normal [ ]Incontinent   [ ]Oliguria/Anuria   [ ]Acosta  MUSCULOSKELETAL:   [ ]Normal   [ ]Weakness  [ ]Bed/Wheelchair bound [ ]Edema  NEUROLOGIC:   [ ]No focal deficits  [ ] Cognitive impairment  [ ] Dysphagia [ ]Dysarthria [ ] Paresis [ ]Other   SKIN:   [ ]Normal  [ ]Rash   [ ]Pressure ulcer(s) [ ]y [ ]n present on admission    CRITICAL CARE:  [ ]Shock Present  [ ]Septic [ ]Cardiogenic [ ]Neurologic [ ]Hypovolemic  [ ]Vasopressors [ ]Inotropes  [ ]Respiratory failure present [ ]Mechanical Ventilation [ ]Non-invasive ventilatory support [ ]High-Flow  [ ]Acute  [ ]Chronic [ ]Hypoxic  [ ]Hypercarbic [ ]Other  [ ]Other organ failure     LABS:                        12.6   12.67 )-----------( 181      ( 17 Jan 2020 09:27 )             42.6   01-17    133<L>  |  88<L>  |  36<H>  ----------------------------<  106<H>  4.5   |  27  |  3.83<H>    Ca    10.1      17 Jan 2020 06:55  Phos  4.6     01-17  Mg     2.4     01-17    PT/INR - ( 17 Jan 2020 09:14 )   PT: 33.7 sec;   INR: 2.89 ratio               RADIOLOGY & ADDITIONAL STUDIES:    Protein Calorie Malnutrition Present: [ ]mild [ ]moderate [ ]severe [ ]underweight [ ]morbid obesity  https://www.andeal.org/vault/1240/web/files/ONC/Table_Clinical%20Characteristics%20to%20Document%20Malnutrition-White%20JV%20et%20al%202012.pdf    Height (cm): 162.56 (01-15-20 @ 19:28), 162.6 (12-01-19 @ 16:18), 160.02 (10-02-19 @ 08:12)  Weight (kg): 78.4 (01-15-20 @ 19:28), 77.3 (12-01-19 @ 16:18), 79.8 (10-02-19 @ 08:12)  BMI (kg/m2): 29.7 (01-15-20 @ 19:28), 29.2 (12-01-19 @ 16:18), 31.2 (10-02-19 @ 08:12)    [ ]PPSV2 < or = 30%  [ ]significant weight loss [ ]poor nutritional intake [ ]anasarca   Albumin, Serum: 3.2 g/dL (01-13-20 @ 05:22)   [ ]Artificial Nutrition    REFERRALS:   [ ]Chaplaincy  [ ]Hospice  [ ]Child Life  [ ]Social Work  [ ]Case management [ ]Holistic Therapy     Goals of Care Document: SUBJECTIVE AND OBJECTIVE:  INTERVAL HPI/OVERNIGHT EVENTS: Overnight, patient with persistent abdominal pain, received tums, and also with hypotensive episode 88/45 s/p dialysis 1/16 PM, self-resolved. Patient seen and examined at bedside this AM; she is AAOx2-3 but lethargic; rouses to call but becomes sleepy throughout the interview. Responds to questions in quiet voice with hand motions and single words. She is grimacing and visibly uncomfortable. She reports pain in her upper abdomen and pain in her right hip; she points to the locations of the pain to indicate pain.    DNR on chart:   Allergies    No Known Allergies    Intolerances    MEDICATIONS  (STANDING):  acetaminophen   Tablet .. 650 milliGRAM(s) Oral every 6 hours  allopurinol 300 milliGRAM(s) Oral <User Schedule>  artificial tears (preservative free) Ophthalmic Solution 1 Drop(s) Both EYES two times a day  chlorhexidine 4% Liquid 1 Application(s) Topical daily  crizotinib 200 milliGRAM(s) Oral two times a day  enoxaparin Injectable 30 milliGRAM(s) SubCutaneous every 24 hours  lidocaine   Patch 1 Patch Transdermal daily  midodrine. 10 milliGRAM(s) Oral <User Schedule>  pantoprazole    Tablet 40 milliGRAM(s) Oral daily  polyethylene glycol 3350 17 Gram(s) Oral two times a day  senna 2 Tablet(s) Oral at bedtime  sevelamer carbonate 800 milliGRAM(s) Oral three times a day with meals    MEDICATIONS  (PRN):  bisacodyl 5 milliGRAM(s) Oral every 12 hours PRN Constipation  HYDROmorphone  Injectable 0.2 milliGRAM(s) IV Push every 4 hours PRN moderate-severe pain, prior to physical therapy      ITEMS UNCHECKED ARE NOT PRESENT    PRESENT SYMPTOMS: [ ]Unable to obtain due to poor mentation   Source if other than patient:  [ ]Family   [ ]Team     Pain:  [ X]yes [ ]no  QOL impact -   Location - epigastrium and right trochanter               Aggravating factors -  Quality -  Radiation -  Timing-  Severity (0-10 scale):  Minimal acceptable level (0-10 scale):     Dyspnea:                           [ ]Mild [ ]Moderate [ ]Severe  Anxiety:                             [ ]Mild [ ]Moderate [ ]Severe  Fatigue:                             [ ]Mild [ ]Moderate [ ]Severe  Nausea:                             [ ]Mild [ ]Moderate [ ]Severe  Loss of appetite:              [ ]Mild [ ]Moderate [ ]Severe  Constipation:                    [ ]Mild [ ]Moderate [ ]Severe    PAIN AD Score:	  [email or web address]/cog/[email or web address] (Ctrl + left click to view)    Other Symptoms:  [ ]All other review of systems negative     Palliative Performance Status Version 2:         %      [email or web address]  PHYSICAL EXAM:  Vital Signs Last 24 Hrs  T(C): 36.4 (17 Jan 2020 04:09), Max: 36.9 (16 Jan 2020 15:10)  T(F): 97.6 (17 Jan 2020 04:09), Max: 98.4 (16 Jan 2020 15:10)  HR: 60 (17 Jan 2020 04:09) (59 - 62)  BP: 103/58 (17 Jan 2020 04:09) (88/45 - 145/74)  BP(mean): --  RR: 18 (17 Jan 2020 04:09) (18 - 19)  SpO2: 100% (17 Jan 2020 04:09) (95% - 100%) I&O's Summary    16 Jan 2020 07:01  -  17 Jan 2020 07:00  --------------------------------------------------------  IN: 1760 mL / OUT: 2300 mL / NET: -540 mL       GENERAL:  [ ]Alert  [ ]Oriented   [X ]Lethargic  [ ]Cachexia  [ ]Unarousable  [ ]Verbal  [ ]Non-Verbal  Behavioral:   [ ]Anxiety  [ ]Delirium [ ]Agitation [ ]Other  HEENT:  [ ]Normal   [ ]Dry mouth   [ ]ET Tube/Trach  [ ]Oral lesions  PULMONARY:   [ ]Clear [ ]Tachypnea  [ ]Audible excessive secretions   [ ]Rhonchi        [ ]Right [ ]Left [ ]Bilateral  [ ]Crackles        [ ]Right [ ]Left [ ]Bilateral  [ ]Wheezing     [ ]Right [ ]Left [ ]Bilateral  [ ]Diminished BS [ ] Right [ ]Left [ ]Bilateral  CARDIOVASCULAR:    [ ]Regular [ ]Irregular [ ]Tachy  [ ]Kaveh [ ]Murmur [ ]Other  GASTROINTESTINAL:  [ X]Soft  [ ]Distended   [ X]+BS  [ ]Non tender [ X]Tender - to epigastrium  [ ]PEG [ ]OGT/ NGT   Last BM:   01-15-20 @ 07:01  -  01-16-20 @ 07:00  --------------------------------------------------------  OUT: 0 mL    01-16-20 @ 07:01  -  01-17-20 @ 07:00  --------------------------------------------------------  OUT: 0 mL       GENITOURINARY:  [ ]Normal [ ]Incontinent   [ ]Oliguria/Anuria   [ ]Acosta  MUSCULOSKELETAL:   [ ]Normal   [ ]Weakness  [ ]Bed/Wheelchair bound [ ]Edema  NEUROLOGIC:   [ ]No focal deficits  [ ] Cognitive impairment  [ ] Dysphagia [ ]Dysarthria [ ] Paresis [ ]Other   SKIN:   [ ]Normal  [ ]Rash   [ ]Pressure ulcer(s) [ ]y [ ]n present on admission    CRITICAL CARE:  [ ]Shock Present  [ ]Septic [ ]Cardiogenic [ ]Neurologic [ ]Hypovolemic  [ ]Vasopressors [ ]Inotropes  [ ]Respiratory failure present [ ]Mechanical Ventilation [ ]Non-invasive ventilatory support [ ]High-Flow  [ ]Acute  [ ]Chronic [ ]Hypoxic  [ ]Hypercarbic [ ]Other  [ ]Other organ failure     LABS:                        12.6   12.67 )-----------( 181      ( 17 Jan 2020 09:27 )             42.6   01-17    133<L>  |  88<L>  |  36<H>  ----------------------------<  106<H>  4.5   |  27  |  3.83<H>    Ca    10.1      17 Jan 2020 06:55  Phos  4.6     01-17  Mg     2.4     01-17    PT/INR - ( 17 Jan 2020 09:14 )   PT: 33.7 sec;   INR: 2.89 ratio               RADIOLOGY & ADDITIONAL STUDIES:    Protein Calorie Malnutrition Present: [ ]mild [ ]moderate [ ]severe [ ]underweight [ ]morbid obesity  https://www.andeal.org/vault/3040/web/files/ONC/Table_Clinical%20Characteristics%20to%20Document%20Malnutrition-White%20JV%20et%20al%202012.pdf    Height (cm): 162.56 (01-15-20 @ 19:28), 162.6 (12-01-19 @ 16:18), 160.02 (10-02-19 @ 08:12)  Weight (kg): 78.4 (01-15-20 @ 19:28), 77.3 (12-01-19 @ 16:18), 79.8 (10-02-19 @ 08:12)  BMI (kg/m2): 29.7 (01-15-20 @ 19:28), 29.2 (12-01-19 @ 16:18), 31.2 (10-02-19 @ 08:12)    [ ]PPSV2 < or = 30%  [ ]significant weight loss [ ]poor nutritional intake [ ]anasarca   Albumin, Serum: 3.2 g/dL (01-13-20 @ 05:22)   [ ]Artificial Nutrition    REFERRALS:   [ ]Chaplaincy  [ ]Hospice  [ ]Child Life  [ ]Social Work  [ ]Case management [ ]Holistic Therapy     Goals of Care Document: SUBJECTIVE AND OBJECTIVE:  INTERVAL HPI/OVERNIGHT EVENTS: Overnight, patient with persistent abdominal pain, received tums, and also with hypotensive episode 88/45 s/p dialysis 1/16 PM, self-resolved. Patient seen and examined at bedside this AM; she is AAOx2-3 but lethargic; rouses to call but becomes sleepy throughout the interview. Responds to questions in quiet voice with hand motions and single words. She is grimacing and visibly uncomfortable. She reports pain in her upper abdomen and pain in her right hip; she points to the locations of the pain to indicate pain.    DNR on chart:   Allergies    No Known Allergies    Intolerances    MEDICATIONS  (STANDING):  acetaminophen   Tablet .. 650 milliGRAM(s) Oral every 6 hours  allopurinol 300 milliGRAM(s) Oral <User Schedule>  artificial tears (preservative free) Ophthalmic Solution 1 Drop(s) Both EYES two times a day  chlorhexidine 4% Liquid 1 Application(s) Topical daily  crizotinib 200 milliGRAM(s) Oral two times a day  enoxaparin Injectable 30 milliGRAM(s) SubCutaneous every 24 hours  lidocaine   Patch 1 Patch Transdermal daily  midodrine. 10 milliGRAM(s) Oral <User Schedule>  pantoprazole    Tablet 40 milliGRAM(s) Oral daily  polyethylene glycol 3350 17 Gram(s) Oral two times a day  senna 2 Tablet(s) Oral at bedtime  sevelamer carbonate 800 milliGRAM(s) Oral three times a day with meals    MEDICATIONS  (PRN):  bisacodyl 5 milliGRAM(s) Oral every 12 hours PRN Constipation  HYDROmorphone  Injectable 0.2 milliGRAM(s) IV Push every 4 hours PRN moderate-severe pain, prior to physical therapy      ITEMS UNCHECKED ARE NOT PRESENT    PRESENT SYMPTOMS: [ ]Unable to obtain due to poor mentation   Source if other than patient:  [ ]Family   [ ]Team     Pain:  [ X]yes [ ]no  QOL impact - cau  Location - epigasing strium and right trochanter               Aggravating factors -  Quality -  Radiation -  Timing-  Severity (0-10 scale):  Minimal acceptassssble level (0-10 scale):   sss  Dyspnea:                           [ ]Mild [ ]Moderate [ ]Severe  Anxiety:                             [ ]Mild [ ]Moderate [ ]Severe  Fatigue:                             [ ]Mild [ ]Moderate [ ]Severe  Nausea:                             [ ]Mild [ ]Moderate [ ]Severe  Loss of appetite:              [ ]Mild [ ]Moderate [ ]Severe  Constipation:                    [ ]Mild [ ]Moderate [ ]Severe    PAIN AD Score:	  [email or web address] (Ctrl + left click to view)    Other Symptoms:  [ ]All other review of systems negative     Palliative Performance Status Version 2:         %      [email or web address]  PHYSICAL EXAM:  Vital Signs Last 24 Hrs  T(C): 36.4 (17 Jan 2020 04:09), Max: 36.9 (16 Jan 2020 15:10)  T(F): 97.6 (17 Jan 2020 04:09), Max: 98.4 (16 Jan 2020 15:10)  HR: 60 (17 Jan 2020 04:09) (59 - 62)  BP: 103/58 (17 Jan 2020 04:09) (88/45 - 145/74)  BP(mean): --  RR: 18 (17 Jan 2020 04:09) (18 - 19)  SpO2: 100% (17 Jan 2020 04:09) (95% - 100%) I&O's Summary    16 Jan 2020 07:01  -  17 Jan 2020 07:00  --------------------------------------------------------  IN: 1760 mL / OUT: 2300 mL / NET: -540 mL       GENERAL:  [ ]Alert  [ ]Oriented   [X ]Lethargic  [ ]Cachexia  [ ]Unarousable  [ ]Verbal  [ ]Non-Verbal  Behavioral:   [ ]Anxiety  [ ]Delirium [ ]Agitation [ ]Other  HEENT:  [ ]Normal   [ ]Dry mouth   [ ]ET Tube/Trach  [ ]Oral lesions  PULMONARY:   [ ]Clear [ ]Tachypnea  [ ]Audible excessive secretions   [ ]Rhonchi        [ ]Right [ ]Left [ ]Bilateral  [ ]Crackles        [ ]Right [ ]Left [ ]Bilateral  [ ]Wheezing     [ ]Right [ ]Left [ ]Bilateral  [ ]Diminished BS [ ] Right [ ]Left [ ]Bilateral  CARDIOVASCULAR:    [ ]Regular [ ]Irregular [ ]Tachy  [ ]Kaveh [ ]Murmur [ ]Other  GASTROINTESTINAL:  [ X]Soft  [ ]Distended   [ X]+BS  [ ]Non tender [ X]Tender - to epigastrium  [ ]PEG [ ]OGT/ NGT   Last BM:   01-15-20 @ 07:01  -  01-16-20 @ 07:00  --------------------------------------------------------  OUT: 0 mL    01-16-20 @ 07:01  -  01-17-20 @ 07:00  --------------------------------------------------------  OUT: 0 mL       GENITOURINARY:  [ ]Normal [ ]Incontinent   [ ]Oliguria/Anuria   [ ]Acosta  MUSCULOSKELETAL:   [ ]Normal   [ ]Weakness  [ ]Bed/Wheelchair bound [ ]Edema  NEUROLOGIC:   [ ]No focal deficits  [ ] Cognitive impairment  [ ] Dysphagia [ ]Dysarthria [ ] Paresis [ ]Other   SKIN:   [ ]Normal  [ ]Rash   [ ]Pressure ulcer(s) [ ]y [ ]n present on admission    CRITICAL CARE:  [ ]Shock Present  [ ]Septic [ ]Cardiogenic [ ]Neurologic [ ]Hypovolemic  [ ]Vasopressors [ ]Inotropes  [ ]Respiratory failure present [ ]Mechanical Ventilation [ ]Non-invasive ventilatory support [ ]High-Flow  [ ]Acute  [ ]Chronic [ ]Hypoxic  [ ]Hypercarbic [ ]Other  [ ]Other organ failure     LABS:                        12.6   12.67 )-----------( 181      ( 17 Jan 2020 09:27 )             42.6   01-17    133<L>  |  88<L>  |  36<H>  ----------------------------<  106<H>  4.5   |  27  |  3.83<H>    Ca    10.1      17 Jan 2020 06:55  Phos  4.6     01-17  Mg     2.4     01-17    PT/INR - ( 17 Jan 2020 09:14 )   PT: 33.7 sec;   INR: 2.89 ratio               RADIOLOGY & ADDITIONAL STUDIES:    Protein Calorie Malnutrition Present: [ ]mild [ ]moderate [ ]severe [ ]underweight [ ]morbid obesity  https://www.andeal.org/vault/8360/web/files/ONC/Table_Clinical%20Characteristics%20to%20Document%20Malnutrition-White%20JV%20et%20al%202012.pdf    Height (cm): 162.56 (01-15-20 @ 19:28), 162.6 (12-01-19 @ 16:18), 160.02 (10-02-19 @ 08:12)  Weight (kg): 78.4 (01-15-20 @ 19:28), 77.3 (12-01-19 @ 16:18), 79.8 (10-02-19 @ 08:12)  BMI (kg/m2): 29.7 (01-15-20 @ 19:28), 29.2 (12-01-19 @ 16:18), 31.2 (10-02-19 @ 08:12)    [ ]PPSV2 < or = 30%  [ ]significant weight loss [ ]poor nutritional intake [ ]anasarca   Albumin, Serum: 3.2 g/dL (01-13-20 @ 05:22)   [ ]Artificial Nutrition    REFERRALS:   [ ]Chaplaincy  [ ]Hospice  [ ]Child Life  [ ]Social Work  [ ]Case management [ ]Holistic Therapy     Goals of Care Document: SUBJECTIVE AND OBJECTIVE/INTERVAL HPI/OVERNIGHT EVENTS: Overnight, patient with persistent abdominal pain, received tums, and also with hypotensive episode 88/45 s/p dialysis 1/16 PM, self-resolved. Patient seen and examined at bedside this AM; she is AAOx2-3 but lethargic; rouses to call but becomes sleepy throughout the interview. Responds to questions in quiet voice with hand motions and single words. She is grimacing and visibly uncomfortable. She reports pain in her upper abdomen and pain in her right hip; she points to the locations of the pain to indicate pain.    DNR on chart: No   Allergies    No Known Allergies    Intolerances    MEDICATIONS  (STANDING):  acetaminophen   Tablet .. 650 milliGRAM(s) Oral every 6 hours  allopurinol 300 milliGRAM(s) Oral <User Schedule>  artificial tears (preservative free) Ophthalmic Solution 1 Drop(s) Both EYES two times a day  chlorhexidine 4% Liquid 1 Application(s) Topical daily  crizotinib 200 milliGRAM(s) Oral two times a day  enoxaparin Injectable 30 milliGRAM(s) SubCutaneous every 24 hours  lidocaine   Patch 1 Patch Transdermal daily  midodrine. 10 milliGRAM(s) Oral <User Schedule>  pantoprazole    Tablet 40 milliGRAM(s) Oral daily  polyethylene glycol 3350 17 Gram(s) Oral two times a day  senna 2 Tablet(s) Oral at bedtime  sevelamer carbonate 800 milliGRAM(s) Oral three times a day with meals    MEDICATIONS  (PRN):  bisacodyl 5 milliGRAM(s) Oral every 12 hours PRN Constipation  HYDROmorphone  Injectable 0.2 milliGRAM(s) IV Push every 4 hours PRN moderate-severe pain, prior to physical therapy      ITEMS UNCHECKED ARE NOT PRESENT    PRESENT SYMPTOMS: [ ]Unable to obtain due to poor mentation   Source if other than patient:  [ ]Family   [ ]Team     Pain:  [ X]yes [ ]no  QOL impact - sadness   Location - epigastrium and right trochanter               Aggravating factors - after eating on epigastrium and movement over R trochanter   Quality - not able to indicate   Radiation -none   Timing- after meals/with movement   Severity (0-10 scale): moderate to severe   Minimal acceptable level (0-10 scale): mild to moderate   Dyspnea:                           [ ]Mild [ ]Moderate [ ]Severe  Anxiety:                             [ ]Mild [ ]Moderate [ ]Severe  Fatigue:                             [ ]Mild [ ]Moderate [ ]Severe  Nausea:                             [ ]Mild [ ]Moderate [ ]Severe  Loss of appetite:              [ ]Mild [ ]Moderate [ ]Severe  Constipation:                    [ ]Mild [x ]Moderate [ ]Severe    PAIN AD Score:	  [email or web address] (Ctrl + left click to view)    Other Symptoms:  [ x]All other review of systems negative but depressive mood due to pain and not being able to get out of bed.     Palliative Performance Status Version 2:     40    %      http://npcrc.org/files/news/palliative_performance_scale_[email or web address].pdf  PHYSICAL EXAM:  Vital Signs Last 24 Hrs  T(C): 36.4 (17 Jan 2020 04:09), Max: 36.9 (16 Jan 2020 15:10)  T(F): 97.6 (17 Jan 2020 04:09), Max: 98.4 (16 Jan 2020 15:10)  HR: 60 (17 Jan 2020 04:09) (59 - 62)  BP: 103/58 (17 Jan 2020 04:09) (88/45 - 145/74)  BP(mean): --  RR: 18 (17 Jan 2020 04:09) (18 - 19)  SpO2: 100% (17 Jan 2020 04:09) (95% - 100%) I&O's Summary    16 Jan 2020 07:01  -  17 Jan 2020 07:00  --------------------------------------------------------  IN: 1760 mL / OUT: 2300 mL / NET: -540 mL       GENERAL:  [ ]Alert  [ ]Oriented   [X ]Lethargic but alert when called and able to f/u commands  [ ]Cachexia  [ ]Unarousable  [ ]Verbal  [ ]Non-Verbal  Behavioral:   [ ]Anxiety  [ ]Delirium [ ]Agitation [ ]Other  HEENT:  [x ]Normal   [ ]Dry mouth   [ ]ET Tube/Trach  [ ]Oral lesions  PULMONARY:   [ x]Clear [ ]Tachypnea  [ ]Audible excessive secretions   [ ]Rhonchi        [ ]Right [ ]Left [ ]Bilateral  [ ]Crackles        [ ]Right [ ]Left [ ]Bilateral  [ ]Wheezing     [ ]Right [ ]Left [ ]Bilateral  [ ]Diminished BS [ ] Right [ ]Left [ ]Bilateral  CARDIOVASCULAR:    [ ]Regular [ x]Irregular [ ]Tachy  [ ]Kaveh [ ]Murmur [ ]Other  GASTROINTESTINAL:  [ X]Soft  [ ]Distended   [ X]+BS  [ ]Non tender [ X]Tender - to epigastrium  [ ]PEG [ ]OGT/ NGT   Last BM:   01-15-20 @ 07:01  -  01-16-20 @ 07:00  --------------------------------------------------------  OUT: 0 mL    01-16-20 @ 07:01  -  01-17-20 @ 07:00  --------------------------------------------------------  OUT: 0 mL       GENITOURINARY:  [x ]Normal [ ]Incontinent   [ ]Oliguria/Anuria   [ ]Acosta  MUSCULOSKELETAL:   [ ]Normal   [x ]Weakness  [ ]Bed/Wheelchair bound [ ]Edema  NEUROLOGIC:   [x ]No focal deficits  [ ] Cognitive impairment  [ ] Dysphagia [ ]Dysarthria [ ] Paresis [ ]Other   SKIN:   [x ]Normal  [ ]Rash   [ ]Pressure ulcer(s) [ ]y [ ]n present on admission    CRITICAL CARE:  [ ]Shock Present  [ ]Septic [ ]Cardiogenic [ ]Neurologic [ ]Hypovolemic  [ ]Vasopressors [ ]Inotropes  [ ]Respiratory failure present [ ]Mechanical Ventilation [ ]Non-invasive ventilatory support [ ]High-Flow  [ ]Acute  [ ]Chronic [ ]Hypoxic  [ ]Hypercarbic [ ]Other  [ ]Other organ failure     LABS:                        12.6   12.67 )-----------( 181      ( 17 Jan 2020 09:27 )             42.6   01-17    133<L>  |  88<L>  |  36<H>  ----------------------------<  106<H>  4.5   |  27  |  3.83<H>    Ca    10.1      17 Jan 2020 06:55  Phos  4.6     01-17  Mg     2.4     01-17    PT/INR - ( 17 Jan 2020 09:14 )   PT: 33.7 sec;   INR: 2.89 ratio               RADIOLOGY & ADDITIONAL STUDIES:  Reviewed.   Protein Calorie Malnutrition Present: [ ]mild [ ]moderate [ ]severe [ ]underweight [ ]morbid obesity  https://www.andeal.org/vault/2440/web/files/ONC/Table_Clinical%20Characteristics%20to%20Document%20Malnutrition-White%20JV%20et%20al%325182.pdf    Height (cm): 162.56 (01-15-20 @ 19:28), 162.6 (12-01-19 @ 16:18), 160.02 (10-02-19 @ 08:12)  Weight (kg): 78.4 (01-15-20 @ 19:28), 77.3 (12-01-19 @ 16:18), 79.8 (10-02-19 @ 08:12)  BMI (kg/m2): 29.7 (01-15-20 @ 19:28), 29.2 (12-01-19 @ 16:18), 31.2 (10-02-19 @ 08:12)    [ ]PPSV2 < or = 30%  [ ]significant weight loss [ ]poor nutritional intake [ ]anasarca   Albumin, Serum: 3.2 g/dL (01-13-20 @ 05:22)   [ ]Artificial Nutrition    REFERRALS:   [ ]Chaplaincy  [ ]Hospice  [ ]Child Life  [ ]Social Work  [ ]Case management [ ]Holistic Therapy     Goals of Care Document:

## 2020-01-17 NOTE — PROGRESS NOTE ADULT - SUBJECTIVE AND OBJECTIVE BOX
Pt seen/examined. Doing well. Pain controlled. No acute overnight complaints or events. Tolerating diet.     T(C): 36.4 (01-17-20 @ 04:09), Max: 36.9 (01-16-20 @ 15:10)  HR: 60 (01-17-20 @ 04:09) (59 - 62)  BP: 103/58 (01-17-20 @ 04:09) (88/45 - 145/74)  RR: 18 (01-17-20 @ 04:09) (18 - 19)  SpO2: 100% (01-17-20 @ 04:09) (95% - 100%)  Wt(kg): --    Physical Exam:  Gen: Laying in bed, NAD, alert and oriented.   Resp: Unlabored breathing  Ext: EHL/FHL/TA/Sol intact          + SILT DP/SP/GONZALEZ/Sa/Tib          +DP, extremity WWP  dressing c/d/i                          11.5   14.17 )-----------( 200      ( 15 Noel 2020 20:51 )             39.7       01-16    130<L>  |  87<L>  |  59<H>  ----------------------------<  120<H>  5.2   |  25  |  5.59<H> Pt seen/examined. Doing well. Pain controlled. Received dialysis yesterday. Tolerating diet.     T(C): 36.4 (01-17-20 @ 04:09), Max: 36.9 (01-16-20 @ 15:10)  HR: 60 (01-17-20 @ 04:09) (59 - 62)  BP: 103/58 (01-17-20 @ 04:09) (88/45 - 145/74)  RR: 18 (01-17-20 @ 04:09) (18 - 19)  SpO2: 100% (01-17-20 @ 04:09) (95% - 100%)  Wt(kg): --    Physical Exam:  Gen: Laying in bed, NAD, alert and oriented.   Resp: Unlabored breathing  Ext: EHL/FHL/TA/Sol intact          + SILT DP/SP/GONZALEZ/Sa/Tib          +DP, extremity WWP  dressing c/d/i                          11.5   14.17 )-----------( 200      ( 15 Noel 2020 20:51 )             39.7       01-16    130<L>  |  87<L>  |  59<H>  ----------------------------<  120<H>  5.2   |  25  |  5.59<H>

## 2020-01-17 NOTE — PROGRESS NOTE ADULT - ASSESSMENT
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

## 2020-01-17 NOTE — PROGRESS NOTE ADULT - PROBLEM SELECTOR PLAN 3
- noted on CXR and CT A/P on presentation; PNA cannot be excluded  - could be etiology of hypoxia; ABG with O2 sat 92, pO2 68 reduced, pCO2 mildly elevated 51  - per pulmonary recs: incentive spirometer, bronchodilators prior to surgery, OOBTC as tolerated per ortho; patient now s/p IMN 1/15  -Consider f/u CXR

## 2020-01-17 NOTE — PROGRESS NOTE ADULT - PROBLEM SELECTOR PLAN 7
- Based on our initial ACP discussion, the patient is to be full code.   - she has appointed her daughter Yasemin Zaman as healthcare proxy.  - Will continue to f/u for Pain.

## 2020-01-17 NOTE — PROGRESS NOTE ADULT - SUBJECTIVE AND OBJECTIVE BOX
Patient  seen and examined  s/p Right IMN      No Known Allergies    Hospital Medications:   MEDICATIONS  (STANDING):  acetaminophen   Tablet .. 650 milliGRAM(s) Oral every 6 hours  allopurinol 300 milliGRAM(s) Oral <User Schedule>  artificial tears (preservative free) Ophthalmic Solution 1 Drop(s) Both EYES two times a day  chlorhexidine 4% Liquid 1 Application(s) Topical daily  crizotinib 200 milliGRAM(s) Oral two times a day  lidocaine   Patch 1 Patch Transdermal daily  midodrine. 10 milliGRAM(s) Oral <User Schedule>  pantoprazole    Tablet 40 milliGRAM(s) Oral once  polyethylene glycol 3350 17 Gram(s) Oral two times a day  senna 2 Tablet(s) Oral at bedtime  sevelamer carbonate 800 milliGRAM(s) Oral three times a day with meals  warfarin 2.5 milliGRAM(s) Oral once        VITALS:  T(F): 98.3 (01-17-20 @ 09:59), Max: 98.4 (01-16-20 @ 15:10)  HR: 60 (01-17-20 @ 11:10)  BP: 107/58 (01-17-20 @ 11:10)  RR: 16 (01-17-20 @ 09:59)  SpO2: 100% (01-17-20 @ 11:10)  Wt(kg): --    01-16 @ 07:01  -  01-17 @ 07:00  --------------------------------------------------------  IN: 1760 mL / OUT: 2300 mL / NET: -540 mL      PHYSICAL EXAM:  Constitutional: NAD  HEENT: anicteric sclera, oropharynx clear, MMM  Neck: No JVD  Respiratory: CTAB, no wheezes, rales or rhonchi  Cardiovascular: S1, S2, RRR  Gastrointestinal: BS+, soft, NT/ND  Extremities: No cyanosis or clubbing. No peripheral edema  Neurological: A/O x 3, no focal deficits  Psychiatric: Normal mood, normal affect  : No CVA tenderness. No ortiz.   Skin: No rashes  Vascular Access: LUE AV access +thrill and bruit.     LABS:  01-17    133<L>  |  88<L>  |  36<H>  ----------------------------<  106<H>  4.5   |  27  |  3.83<H>    Ca    10.1      17 Jan 2020 06:55  Phos  4.6     01-17  Mg     2.4     01-17      Creatinine Trend: 3.83 <--, 5.59 <--, 5.40 <--, 0.59 <--, 4.15 <--, 3.02 <--, 3.69 <--, 6.53 <--, 5.67 <--, 5.09 <--, 4.97 <--                        12.6   12.67 )-----------( 181      ( 17 Jan 2020 09:27 )             42.6     Urine Studies:      RADIOLOGY & ADDITIONAL STUDIES:

## 2020-01-17 NOTE — PROGRESS NOTE ADULT - PROBLEM SELECTOR PLAN 5
-Senna 2 tabs HS, Miralax BID. Dulcolax PO PRN. -Senna 2 tabs HS, Miralax BID. Dulcolax PO PRN. May require enema or suppository to promote BM

## 2020-01-17 NOTE — PROGRESS NOTE ADULT - SUBJECTIVE AND OBJECTIVE BOX
S: Denies chest pain or SOB. Review of systems otherwise (-)  	      MEDICATIONS  (STANDING):  acetaminophen   Tablet .. 650 milliGRAM(s) Oral every 6 hours  allopurinol 300 milliGRAM(s) Oral <User Schedule>  artificial tears (preservative free) Ophthalmic Solution 1 Drop(s) Both EYES two times a day  chlorhexidine 4% Liquid 1 Application(s) Topical daily  crizotinib 200 milliGRAM(s) Oral two times a day  lidocaine   Patch 1 Patch Transdermal daily  midodrine. 10 milliGRAM(s) Oral <User Schedule>  polyethylene glycol 3350 17 Gram(s) Oral two times a day  senna 2 Tablet(s) Oral at bedtime  sevelamer carbonate 800 milliGRAM(s) Oral three times a day with meals  warfarin 2.5 milliGRAM(s) Oral once    MEDICATIONS  (PRN):  bisacodyl 5 milliGRAM(s) Oral every 12 hours PRN Constipation  calcium carbonate    500 mG (Tums) Chewable 1 Tablet(s) Chew every 8 hours PRN Heartburn  HYDROmorphone  Injectable 0.2 milliGRAM(s) IV Push every 4 hours PRN moderate-severe pain, prior to physical therapy      LABS:                            12.6   12.67 )-----------( 181      ( 17 Jan 2020 09:27 )             42.6     Hemoglobin: 12.6 g/dL (01-17 @ 09:27)  Hemoglobin: 11.5 g/dL (01-15 @ 20:51)  Hemoglobin: 13.9 g/dL (01-15 @ 04:58)  Hemoglobin: 12.1 g/dL (01-14 @ 05:01)  Hemoglobin: 10.7 g/dL (01-13 @ 05:22)    01-17    133<L>  |  88<L>  |  36<H>  ----------------------------<  106<H>  4.5   |  27  |  3.83<H>    Ca    10.1      17 Jan 2020 06:55  Phos  4.6     01-17  Mg     2.4     01-17      Creatinine Trend: 3.83<--, 5.59<--, 5.40<--, 0.59<--, 4.15<--, 3.02<--   PT/INR - ( 17 Jan 2020 09:14 )   PT: 33.7 sec;   INR: 2.89 ratio                   01-16-20 @ 07:01  -  01-17-20 @ 07:00  --------------------------------------------------------  IN: 1760 mL / OUT: 2300 mL / NET: -540 mL    01-17-20 @ 07:01  -  01-17-20 @ 15:24  --------------------------------------------------------  IN: 120 mL / OUT: 0 mL / NET: 120 mL        PHYSICAL EXAM  Vital Signs Last 24 Hrs  T(C): 36.8 (17 Jan 2020 09:59), Max: 36.9 (16 Jan 2020 18:40)  T(F): 98.3 (17 Jan 2020 09:59), Max: 98.4 (16 Jan 2020 18:40)  HR: 60 (17 Jan 2020 11:10) (59 - 62)  BP: 107/58 (17 Jan 2020 11:10) (88/45 - 128/41)  BP(mean): --  RR: 16 (17 Jan 2020 09:59) (16 - 18)  SpO2: 100% (17 Jan 2020 11:10) (95% - 100%)      Gen: Appears well in NAD  HEENT:  (-)icterus (-)pallor  CV: N S1 S2 1/6 GARY (+)2 Pulses B/l  Resp:  Clear to auscultation B/L, normal effort  GI: (+) BS Soft, NT, ND  Lymph:  (-) Edema, (-)obvious lymphadenopathy  Skin: Warm to touch, Normal turgor  Psych: Appropriate mood and affect      TELEMETRY: None	      ECG: VPaced at 63 bpm 	    ASSESSMENT/PLAN: 85 y/o female known to our office with PMHx of ESRD on HD, persistent Afib (on Coumadin), s/p Bio MVR, Tricuspid valve repair and ASD closure in 2016, hx complete heart block s/p PPM, Lung CA, COPD, remote colon CA (s/p R hemicolectomy), had normal nuclear stress test and TTE with normal LV/RV with well seated bio MV with normal function both in 7/2019 who presented to ED with worsening right hip pain s/p recent mechanical fall found to have right hip fracture and supratherapeutic INR requiring reversal for OR. Cardiology consulted for preop clearance. Patient underwent right femoral intramedullary rodding 1/15/20 .     - Patient tolerated procedure well from CV perspective  - No evidence of clinical HF or anginal symptoms  - Denies LOC/syncope -- PPM interrogation noted, episodes of likely SVT but no correlating events to recent fall  - Normal Nuclear stress and TTE with normal LV/RV with well seated bio MV with normal function both in 7/2019  - AF - continue Coumadin for lifelong CVA prevention if okay with ortho/primary team  - Pulm f/u  - HD per renal  - Ortho f/u  - No further inpatient cardiac w/u needed at this time    dAam Jim PA-C  [facility]  Pager: [phone]

## 2020-01-17 NOTE — PROGRESS NOTE ADULT - PROBLEM SELECTOR PLAN 2
- On DMT.   - Patient's desire is for continuing DMT if possible; heme/onc consulted with primary oncologist Dr. Travis currently continuing crizotinib

## 2020-01-17 NOTE — PROGRESS NOTE ADULT - SUBJECTIVE AND OBJECTIVE BOX
Pt has been doing OK, denies any pain or any other active symptoms on detailed ROS questions except sleepiness.      Meds:  acetaminophen   Tablet .. 650 milliGRAM(s) Oral every 6 hours  allopurinol 300 milliGRAM(s) Oral <User Schedule>  artificial tears (preservative free) Ophthalmic Solution 1 Drop(s) Both EYES two times a day  bisacodyl 5 milliGRAM(s) Oral every 12 hours PRN  calcium carbonate    500 mG (Tums) Chewable 1 Tablet(s) Chew every 8 hours PRN  chlorhexidine 4% Liquid 1 Application(s) Topical daily  crizotinib 200 milliGRAM(s) Oral two times a day  enoxaparin Injectable 30 milliGRAM(s) SubCutaneous every 24 hours  HYDROmorphone  Injectable 0.2 milliGRAM(s) IV Push every 4 hours PRN  lidocaine   Patch 1 Patch Transdermal daily  midodrine. 10 milliGRAM(s) Oral <User Schedule>  pantoprazole    Tablet 40 milliGRAM(s) Oral once  polyethylene glycol 3350 17 Gram(s) Oral two times a day  senna 2 Tablet(s) Oral at bedtime  sevelamer carbonate 800 milliGRAM(s) Oral three times a day with meals      Vital Signs Last 24 Hrs  T(C): 36.8 (17 Jan 2020 09:59), Max: 36.9 (16 Jan 2020 15:10)  T(F): 98.3 (17 Jan 2020 09:59), Max: 98.4 (16 Jan 2020 15:10)  HR: 62 (17 Jan 2020 09:59) (59 - 62)  BP: 120/63 (17 Jan 2020 09:59) (88/45 - 145/74)  BP(mean): --  RR: 16 (17 Jan 2020 09:59) (16 - 19)  SpO2: 97% (17 Jan 2020 09:59) (95% - 100%)                          12.6   12.67 )-----------( 181      ( 17 Jan 2020 09:27 )             42.6       01-17    133<L>  |  88<L>  |  36<H>  ----------------------------<  106<H>  4.5   |  27  |  3.83<H>    Ca    10.1      17 Jan 2020 06:55  Phos  4.6     01-17  Mg     2.4     01-17                PT/INR - ( 17 Jan 2020 09:14 )   PT: 33.7 sec;   INR: 2.89 ratio

## 2020-01-17 NOTE — PROGRESS NOTE ADULT - SUBJECTIVE AND OBJECTIVE BOX
Patient is a 86y old  Female who presents with a chief complaint of R Knee pain (17 Jan 2020 10:36)      SUBJECTIVE / OVERNIGHT EVENTS: feels ok, no bm, pain is ok     MEDICATIONS  (STANDING):  acetaminophen   Tablet .. 650 milliGRAM(s) Oral every 6 hours  allopurinol 300 milliGRAM(s) Oral <User Schedule>  artificial tears (preservative free) Ophthalmic Solution 1 Drop(s) Both EYES two times a day  chlorhexidine 4% Liquid 1 Application(s) Topical daily  crizotinib 200 milliGRAM(s) Oral two times a day  lidocaine   Patch 1 Patch Transdermal daily  midodrine. 10 milliGRAM(s) Oral <User Schedule>  pantoprazole    Tablet 40 milliGRAM(s) Oral once  polyethylene glycol 3350 17 Gram(s) Oral two times a day  senna 2 Tablet(s) Oral at bedtime  sevelamer carbonate 800 milliGRAM(s) Oral three times a day with meals  warfarin 2.5 milliGRAM(s) Oral once    MEDICATIONS  (PRN):  bisacodyl 5 milliGRAM(s) Oral every 12 hours PRN Constipation  calcium carbonate    500 mG (Tums) Chewable 1 Tablet(s) Chew every 8 hours PRN Heartburn  HYDROmorphone  Injectable 0.2 milliGRAM(s) IV Push every 4 hours PRN moderate-severe pain, prior to physical therapy        CAPILLARY BLOOD GLUCOSE        I&O's Summary    16 Jan 2020 07:01  -  17 Jan 2020 07:00  --------------------------------------------------------  IN: 1760 mL / OUT: 2300 mL / NET: -540 mL        PHYSICAL EXAM:  GENERAL: NAD, frail   HEAD:  Atraumatic, Normocephalic  NECK:  No JVD  CHEST/LUNG: Clear to auscultation bilaterally; No wheeze  HEART: Regular rate and rhythm; S1S2  ABDOMEN: Soft, Nontender, Nondistended; Bowel sounds present  EXTREMITIES:  left leg ace wrap   PSYCH: AAOx2      LABS:                        12.6   12.67 )-----------( 181      ( 17 Jan 2020 09:27 )             42.6     01-17    133<L>  |  88<L>  |  36<H>  ----------------------------<  106<H>  4.5   |  27  |  3.83<H>    Ca    10.1      17 Jan 2020 06:55  Phos  4.6     01-17  Mg     2.4     01-17      PT/INR - ( 17 Jan 2020 09:14 )   PT: 33.7 sec;   INR: 2.89 ratio                   RADIOLOGY & ADDITIONAL TESTS:    Imaging Personally Reviewed:    Consultant(s) Notes Reviewed:      Care Discussed with Consultants/Other Providers:

## 2020-01-17 NOTE — PROGRESS NOTE ADULT - PROBLEM SELECTOR PLAN 2
-Pt with hypoxic episode  -cxr with right middle lobe collapse   -CT A/P commenting on Right middle lobe lobar atelectasis.  -rvp negative  -le dopplers negative  -c/w O2 supplementation   -incentive spirometry  -pulm recs noted

## 2020-01-17 NOTE — PROGRESS NOTE ADULT - PROBLEM SELECTOR PLAN 1
- s/p IMN 1/15  - will schedule for oral tylenol RTC x 2 more days, and will advise PRN IV pain medications for moderate-severe pain: dilaudid 0.2mg q4 PRN for moderate to severe pain and prior to working with PT with holding parameters.   - standing lidocaine patch daily to right trochanteric region  - may benefit from radiation therapy if high suspicion for pathologic fracture 2/2 metastatic bony involvement; rad-onc planning to pursue as outpatient s/p IMN to right trochanter  - maalox or tums PRN and pantoprazole daily for abdominal pain  - Narcan PRN   - Monitoring and holding opioids for sedation/respiratory depression.

## 2020-01-18 NOTE — PROGRESS NOTE ADULT - SUBJECTIVE AND OBJECTIVE BOX
INCOMPLETE    Patient is a 86y old  Female who presents with a chief complaint of R Knee pain (18 Jan 2020 02:56)    SUBJECTIVE / OVERNIGHT EVENTS: Patient seen and examined. No acute overnight events, no subjective complaints.    OBJECTIVE:  Vital Signs Last 24 Hrs  T(F): 97.9 (18 Jan 2020 08:00), Max: 98.3 (17 Jan 2020 17:24)  HR: 62 (18 Jan 2020 08:00) (60 - 62)  BP: 110/61 (18 Jan 2020 08:00) (98/51 - 111/53)  RR: 18 (18 Jan 2020 08:00) (18 - 18)  SpO2: 93% (18 Jan 2020 08:00) (93% - 100%)    I&O's Summary  17 Jan 2020 07:01  -  18 Jan 2020 07:00  --------------------------------------------------------  IN: 1260 mL / OUT: 0 mL / NET: 1260 mL    18 Jan 2020 07:01  -  18 Jan 2020 10:46  --------------------------------------------------------  IN: 240 mL / OUT: 0 mL / NET: 240 mL    Physical Examination:  GEN: thin man, laying in stretcher in NAD  PSYCH: A&Ox3, mood and affect appear appropriate   SKIN: intact, no e/o rash  NEURO: no focal neurologic deficits appreciated; CN II-XII intact, sensation intact B/L, motor 5/5 in all mm groups  EYES: PERRL, anicteric, EOMI, no vertical/horizontal nystagmus  HEAD: NC, AT  NECK: supple  RESPI: no accessory muscle use, B/L air entry, CTAB   CARDIO: regular rate/rhythm, no LE edema B/L  ABD: soft, NT, ND, +BS  EXT: patient able to move all extremities spontaneously  VASC: peripheral pulses palpated    Labs:                     12.6   12.67 )-----------( 181      ( 17 Jan 2020 09:27 )             42.6     01-18  133<L>  |  88<L>  |  60<H>  ----------------------------<  89  4.8   |  25  |  5.40<H>    Ca    9.8      18 Jan 2020 06:57  Phos  4.6     01-17  Mg     2.4     01-17    TPro  8.4<H>  /  Alb  3.2<L>  /  TBili  0.4  /  DBili  x   /  AST  20  /  ALT  <5<L>  /  AlkPhos  146<H>  01-18    PT/INR - ( 18 Jan 2020 09:45 )   PT: 30.3 sec;   INR: 2.58 ratio      Consultant(s) Notes Reviewed: Orthopedics, Nephrology, Heme-Onc, Palliative, Cardiology  Care Discussed with Consultants/Other Providers: TYSON Zina    MEDICATIONS  (STANDING):  acetaminophen   Tablet .. 650 milliGRAM(s) Oral every 6 hours  allopurinol 300 milliGRAM(s) Oral <User Schedule>  artificial tears (preservative free) Ophthalmic Solution 1 Drop(s) Both EYES two times a day  chlorhexidine 4% Liquid 1 Application(s) Topical daily  crizotinib 200 milliGRAM(s) Oral two times a day  lidocaine   Patch 1 Patch Transdermal daily  midodrine. 10 milliGRAM(s) Oral <User Schedule>  pantoprazole    Tablet 40 milliGRAM(s) Oral before breakfast  polyethylene glycol 3350 17 Gram(s) Oral two times a day  senna 2 Tablet(s) Oral at bedtime  sevelamer carbonate 800 milliGRAM(s) Oral three times a day with meals    MEDICATIONS  (PRN):  bisacodyl 5 milliGRAM(s) Oral every 12 hours PRN Constipation  calcium carbonate    500 mG (Tums) Chewable 1 Tablet(s) Chew every 8 hours PRN Heartburn  HYDROmorphone  Injectable 0.2 milliGRAM(s) IV Push every 4 hours PRN moderate-severe pain, prior to physical therapy Patient is a 86y old  Female who presents with a chief complaint of R Knee pain (18 Jan 2020 02:56)    SUBJECTIVE / OVERNIGHT EVENTS: Patient seen and examined. No acute overnight events, no subjective complaints. Nurse at bedside noting that pt has difficulty swallowing when given Rx.     OBJECTIVE:  Vital Signs Last 24 Hrs  T(F): 97.9 (18 Jan 2020 08:00), Max: 98.3 (17 Jan 2020 17:24)  HR: 62 (18 Jan 2020 08:00) (60 - 62)  BP: 110/61 (18 Jan 2020 08:00) (98/51 - 111/53)  RR: 18 (18 Jan 2020 08:00) (18 - 18)  SpO2: 93% (18 Jan 2020 08:00) (93% - 100%)    I&O's Summary  17 Jan 2020 07:01  -  18 Jan 2020 07:00  --------------------------------------------------------  IN: 1260 mL / OUT: 0 mL / NET: 1260 mL    18 Jan 2020 07:01  -  18 Jan 2020 10:46  --------------------------------------------------------  IN: 240 mL / OUT: 0 mL / NET: 240 mL    Physical Examination:  GEN: elderly woman, laying in bed in NAD  PSYCH: A&Ox2, mood and affect appear appropriate   NECK: supple, no e/o elevated JVP  RESPI: no accessory muscle use, B/L air entry, CTAB   CARDIO: regular rate/rhythm, no LE edema B/L  ABD: soft, NT, ND, +BS  EXT: patient able to move all extremities spontaneously  VASC: peripheral pulses palpated    Labs:                     12.6   12.67 )-----------( 181      ( 17 Jan 2020 09:27 )             42.6     01-18  133<L>  |  88<L>  |  60<H>  ----------------------------<  89  4.8   |  25  |  5.40<H>    Ca    9.8      18 Jan 2020 06:57  Phos  4.6     01-17  Mg     2.4     01-17    TPro  8.4<H>  /  Alb  3.2<L>  /  TBili  0.4  /  DBili  x   /  AST  20  /  ALT  <5<L>  /  AlkPhos  146<H>  01-18    PT/INR - ( 18 Jan 2020 09:45 )   PT: 30.3 sec;   INR: 2.58 ratio      Consultant(s) Notes Reviewed: Orthopedics, Nephrology, Heme-Onc, Palliative, Cardiology  Care Discussed with Consultants/Other Providers: TYSON Izaguirre    MEDICATIONS  (STANDING):  acetaminophen   Tablet .. 650 milliGRAM(s) Oral every 6 hours  allopurinol 300 milliGRAM(s) Oral <User Schedule>  artificial tears (preservative free) Ophthalmic Solution 1 Drop(s) Both EYES two times a day  chlorhexidine 4% Liquid 1 Application(s) Topical daily  crizotinib 200 milliGRAM(s) Oral two times a day  lidocaine   Patch 1 Patch Transdermal daily  midodrine. 10 milliGRAM(s) Oral <User Schedule>  pantoprazole    Tablet 40 milliGRAM(s) Oral before breakfast  polyethylene glycol 3350 17 Gram(s) Oral two times a day  senna 2 Tablet(s) Oral at bedtime  sevelamer carbonate 800 milliGRAM(s) Oral three times a day with meals    MEDICATIONS  (PRN):  bisacodyl 5 milliGRAM(s) Oral every 12 hours PRN Constipation  calcium carbonate    500 mG (Tums) Chewable 1 Tablet(s) Chew every 8 hours PRN Heartburn  HYDROmorphone  Injectable 0.2 milliGRAM(s) IV Push every 4 hours PRN moderate-severe pain, prior to physical therapy

## 2020-01-18 NOTE — PROGRESS NOTE ADULT - ATTENDING COMMENTS
CARDIOLOGY ATTENDING    Agree with above. Continue lifelong coumadin for afib. No further inpatient cardiac workup needed.

## 2020-01-18 NOTE — PROGRESS NOTE ADULT - PROBLEM SELECTOR PLAN 1
CT abd and pelvis x-ray showed right femur intertochanteric pathologic Fx  - S/p OR for RIMN with ortho   - pain control with dilaudid prn

## 2020-01-18 NOTE — PROGRESS NOTE ADULT - ASSESSMENT
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 Dean in 1-2 weeks after discharge. Call [phone] for appointment

## 2020-01-18 NOTE — PROGRESS NOTE ADULT - SUBJECTIVE AND OBJECTIVE BOX
S: Denies chest pain or SOB. Review of systems otherwise (-)  	    MEDICATIONS  (STANDING):  acetaminophen   Tablet .. 650 milliGRAM(s) Oral every 6 hours  allopurinol 300 milliGRAM(s) Oral <User Schedule>  artificial tears (preservative free) Ophthalmic Solution 1 Drop(s) Both EYES two times a day  chlorhexidine 4% Liquid 1 Application(s) Topical daily  crizotinib 200 milliGRAM(s) Oral two times a day  lidocaine   Patch 1 Patch Transdermal daily  midodrine. 10 milliGRAM(s) Oral <User Schedule>  pantoprazole    Tablet 40 milliGRAM(s) Oral before breakfast  polyethylene glycol 3350 17 Gram(s) Oral two times a day  senna 2 Tablet(s) Oral at bedtime  sevelamer carbonate 800 milliGRAM(s) Oral three times a day with meals  warfarin 2.5 milliGRAM(s) Oral once    MEDICATIONS  (PRN):  bisacodyl 5 milliGRAM(s) Oral every 12 hours PRN Constipation  calcium carbonate    500 mG (Tums) Chewable 1 Tablet(s) Chew every 8 hours PRN Heartburn  HYDROmorphone  Injectable 0.2 milliGRAM(s) IV Push every 4 hours PRN moderate-severe pain, prior to physical therapy      LABS:                      12.1   10.18 )-----------( 206      ( 18 Jan 2020 10:55 )             41.4     133<L>  |  88<L>  |  60<H>  ----------------------------<  89  4.8   |  25  |  5.40<H>    Ca    9.8      18 Jan 2020 06:57  Phos  4.6     01-17  Mg     2.4     01-17    TPro  8.4<H>  /  Alb  3.2<L>  /  TBili  0.4  /  DBili  x   /  AST  20  /  ALT  <5<L>  /  AlkPhos  146<H>  01-18    Creatinine Trend: 5.40<--, 3.83<--, 5.59<--, 5.40<--, 0.59<--, 4.15<--   PT/INR - ( 18 Jan 2020 09:45 )   PT: 30.3 sec;   INR: 2.58 ratio      PHYSICAL EXAM  Vital Signs Last 24 Hrs  T(C): 36.6 (18 Jan 2020 13:11), Max: 36.8 (17 Jan 2020 17:24)  T(F): 97.8 (18 Jan 2020 13:11), Max: 98.3 (17 Jan 2020 17:24)  HR: 71 (18 Jan 2020 13:11) (60 - 71)  BP: 97/47 (18 Jan 2020 13:11) (97/47 - 131/63)  RR: 18 (18 Jan 2020 13:11) (18 - 18)  SpO2: 98% (18 Jan 2020 13:11) (93% - 100%)      Gen: Appears well in NAD  HEENT:  (-)icterus (-)pallor  CV: N S1 S2 1/6 GARY (+)2 Pulses B/l  Resp:  Clear to auscultation B/L, normal effort  GI: (+) BS Soft, NT, ND  Lymph:  (-) Edema, (-)obvious lymphadenopathy  Skin: Warm to touch, Normal turgor  Psych: Appropriate mood and affect      TELEMETRY: None	      ECG: VPaced at 63 bpm 	    ASSESSMENT/PLAN: 85 y/o female known to our office with PMHx of ESRD on HD, persistent Afib (on Coumadin), s/p Bio MVR, Tricuspid valve repair and ASD closure in 2016, hx complete heart block s/p PPM, Lung CA, COPD, remote colon CA (s/p R hemicolectomy), had normal nuclear stress test and TTE with normal LV/RV with well seated bio MV with normal function both in 7/2019 who presented to ED with worsening right hip pain s/p recent mechanical fall found to have right hip fracture and supratherapeutic INR requiring reversal for OR. Cardiology consulted for preop clearance. Patient underwent right femoral intramedullary rodding 1/15/20 .     - Patient tolerated procedure well from CV perspective  - No evidence of clinical HF or anginal symptoms  - Denies LOC/syncope -- PPM interrogation noted, episodes of likely SVT but no correlating events to recent fall  - Normal Nuclear stress and TTE with normal LV/RV with well seated bio MV with normal function both in 7/2019  - AF - continue Coumadin for lifelong CVA prevention if okay with ortho/primary team  - No further inpatient cardiac w/u needed at this time

## 2020-01-18 NOTE — PROGRESS NOTE ADULT - SUBJECTIVE AND OBJECTIVE BOX
Pt seen/examined. Doing well. Pain controlled.     Physical Exam:  Gen: Laying in bed, NAD, alert and oriented.   Resp: Unlabored breathing  Ext: EHL/FHL/TA/Sol intact          + SILT DP/SP/GONZALEZ/Sa/Tib          +DP, extremity WWP  dressing c/d/i    Vitals 24hrs  Vital Signs Last 24 Hrs  T(C): 36.3 (17 Jan 2020 23:47), Max: 36.8 (17 Jan 2020 09:59)  T(F): 97.3 (17 Jan 2020 23:47), Max: 98.3 (17 Jan 2020 09:59)  HR: 61 (17 Jan 2020 23:47) (60 - 62)  BP: 98/51 (17 Jan 2020 23:47) (98/51 - 120/63)  BP(mean): --  RR: 18 (17 Jan 2020 23:47) (16 - 18)  SpO2: 100% (17 Jan 2020 23:47) (97% - 100%)      01-16-20 @ 07:01  - 01-17-20 @ 07:00  --------------------------------------------------------  IN: 1760 mL / OUT: 2300 mL / NET: -540 mL    01-17-20 @ 07:01  -  01-18-20 @ 02:57  --------------------------------------------------------  IN: 940 mL / OUT: 0 mL / NET: 940 mL        Lab Results 24hrs:                        12.6   12.67 )-----------( 181      ( 17 Jan 2020 09:27 )             42.6     01-17    133<L>  |  88<L>  |  36<H>  ----------------------------<  106<H>  4.5   |  27  |  3.83<H>    Ca    10.1      17 Jan 2020 06:55  Phos  4.6     01-17  Mg     2.4     01-17

## 2020-01-18 NOTE — PROGRESS NOTE ADULT - SUBJECTIVE AND OBJECTIVE BOX
[facility] : Progress Note :: [phone], (office [phone]),   Dr Serrano / Dr Leary / Dr Schaefer / Dr Matthew / Dr Froylan ALCAZAR / Dr Gillette / Dr Meier / Dr González jain  _____________________________________________________________________________________________    ESRD for HD today    No Known Allergies    Hospital Medications:   MEDICATIONS  (STANDING):  acetaminophen   Tablet .. 650 milliGRAM(s) Oral every 6 hours  allopurinol 300 milliGRAM(s) Oral <User Schedule>  artificial tears (preservative free) Ophthalmic Solution 1 Drop(s) Both EYES two times a day  chlorhexidine 4% Liquid 1 Application(s) Topical daily  crizotinib 200 milliGRAM(s) Oral two times a day  lidocaine   Patch 1 Patch Transdermal daily  midodrine. 10 milliGRAM(s) Oral <User Schedule>  pantoprazole    Tablet 40 milliGRAM(s) Oral before breakfast  polyethylene glycol 3350 17 Gram(s) Oral two times a day  senna 2 Tablet(s) Oral at bedtime  sevelamer carbonate 800 milliGRAM(s) Oral three times a day with meals  warfarin 2.5 milliGRAM(s) Oral once        VITALS:  T(F): 98.1 (01-18-20 @ 17:57), Max: 98.2 (01-18-20 @ 05:33)  HR: 61 (01-18-20 @ 17:57)  BP: 103/47 (01-18-20 @ 17:57)  RR: 18 (01-18-20 @ 17:57)  SpO2: 91% (01-18-20 @ 17:57)  Wt(kg): --    01-17 @ 07:01  -  01-18 @ 07:00  --------------------------------------------------------  IN: 1260 mL / OUT: 0 mL / NET: 1260 mL    01-18 @ 07:01  -  01-18 @ 18:14  --------------------------------------------------------  IN: 480 mL / OUT: 700 mL / NET: -220 mL        PHYSICAL EXAM:  Constitutional: NAD  HEENT: anicteric sclera, oropharynx clear.  Neck: No JVD  Respiratory: CTAB, no wheezes, rales or rhonchi  Cardiovascular: S1, S2, RRR  Gastrointestinal: BS+, soft, NT/ND  Extremities: No peripheral edema  Neurological: A/O x 3, no focal deficits  Vascular Access: AVF with thrill and bruit    LABS:  01-18    133<L>  |  88<L>  |  60<H>  ----------------------------<  89  4.8   |  25  |  5.40<H>    Ca    9.8      18 Jan 2020 06:57  Phos  4.6     01-17  Mg     2.4     01-17    TPro  8.4<H>  /  Alb  3.2<L>  /  TBili  0.4  /  DBili      /  AST  20  /  ALT  <5<L>  /  AlkPhos  146<H>  01-18    Creatinine Trend: 5.40 <--, 3.83 <--, 5.59 <--, 5.40 <--, 0.59 <--, 4.15 <--, 3.02 <--, 3.69 <--, 6.53 <--, 5.67 <--, 5.09 <--, 4.97 <--                        12.1   10.18 )-----------( 206      ( 18 Jan 2020 10:55 )             41.4     Urine Studies:      RADIOLOGY & ADDITIONAL STUDIES:

## 2020-01-18 NOTE — PROGRESS NOTE ADULT - ATTENDING COMMENTS
Marc Peguero MD  [facility] Medicine  [phone]

## 2020-01-18 NOTE — PROGRESS NOTE ADULT - ASSESSMENT
86yoF w/ PMHx significant for ESRD on HD on T/Th/Sat, Afib (on Coumadin), PPM Implanted, Mitral stenosis/Tricuspid Regurgitation s/p annuloplasty (2016), chronic hypotension, remote hx of colon Ca s/p resection, pulmonary HTN, COPD, restrictive lung disease and anemia who presents with complaint of hemoptysis.  Pt with known RML lung mass and metastatic disease to bone s/p IR biopsy of R femur bone biopsy consistent with squamous cell carcinoma. Pt found to have right femur intertochanteric pathologic fx s/p RIMN with ortho.

## 2020-01-19 NOTE — PROGRESS NOTE ADULT - SUBJECTIVE AND OBJECTIVE BOX
S: Denies chest pain or SOB. Review of systems otherwise (-)  	       allopurinol 300 milliGRAM(s) Oral <User Schedule>  artificial tears (preservative free) Ophthalmic Solution 1 Drop(s) Both EYES two times a day  bisacodyl 5 milliGRAM(s) Oral every 12 hours PRN  calcium carbonate    500 mG (Tums) Chewable 1 Tablet(s) Chew every 8 hours PRN  chlorhexidine 4% Liquid 1 Application(s) Topical daily  crizotinib 200 milliGRAM(s) Oral two times a day  HYDROmorphone  Injectable 0.2 milliGRAM(s) IV Push every 4 hours PRN  lidocaine   Patch 1 Patch Transdermal daily  midodrine. 10 milliGRAM(s) Oral <User Schedule>  pantoprazole    Tablet 40 milliGRAM(s) Oral before breakfast  polyethylene glycol 3350 17 Gram(s) Oral two times a day  senna 2 Tablet(s) Oral at bedtime  sevelamer carbonate 800 milliGRAM(s) Oral three times a day with meals                            12.1   10.18 )-----------( 206      ( 18 Jan 2020 10:55 )             41.4       Hemoglobin: 12.1 g/dL (01-18 @ 10:55)  Hemoglobin: 12.6 g/dL (01-17 @ 09:27)  Hemoglobin: 11.5 g/dL (01-15 @ 20:51)  Hemoglobin: 13.9 g/dL (01-15 @ 04:58)      01-18    133<L>  |  88<L>  |  60<H>  ----------------------------<  89  4.8   |  25  |  5.40<H>    Ca    9.8      18 Jan 2020 06:57  Phos  4.6     01-17  Mg     2.4     01-17    TPro  8.4<H>  /  Alb  3.2<L>  /  TBili  0.4  /  DBili  x   /  AST  20  /  ALT  <5<L>  /  AlkPhos  146<H>  01-18    Creatinine Trend: 5.40<--, 3.83<--, 5.59<--, 5.40<--, 0.59<--, 4.15<--    COAGS:           T(C): 37.1 (01-19-20 @ 05:13), Max: 37.1 (01-19-20 @ 05:13)  HR: 70 (01-19-20 @ 05:13) (60 - 71)  BP: 132/69 (01-19-20 @ 05:13) (97/47 - 132/69)  RR: 18 (01-19-20 @ 05:13) (18 - 18)  SpO2: 97% (01-19-20 @ 05:13) (91% - 100%)  Wt(kg): --    I&O's Summary    17 Jan 2020 07:01  -  18 Jan 2020 07:00  --------------------------------------------------------  IN: 1260 mL / OUT: 0 mL / NET: 1260 mL    18 Jan 2020 07:01  -  19 Jan 2020 06:09  --------------------------------------------------------  IN: 960 mL / OUT: 700 mL / NET: 260 mL      Gen: Appears well in NAD  HEENT:  (-)icterus (-)pallor  CV: N S1 S2 1/6 GARY (+)2 Pulses B/l  Resp:  Clear to auscultation B/L, normal effort  GI: (+) BS Soft, NT, ND  Lymph:  (-) Edema, (-)obvious lymphadenopathy  Skin: Warm to touch, Normal turgor  Psych: Appropriate mood and affect      TELEMETRY: None	      ECG: VPaced at 63 bpm 	    ASSESSMENT/PLAN:   85 y/o female known to our office with PMHx of ESRD on HD, persistent Afib (on Coumadin), s/p Bio MVR, Tricuspid valve repair and ASD closure in 2016, hx complete heart block s/p PPM, Lung CA, COPD, remote colon CA (s/p R hemicolectomy), had normal nuclear stress test and TTE with normal LV/RV with well seated bio MV with normal function both in 7/2019 who presented to ED with worsening right hip pain s/p recent mechanical fall found to have right hip fracture and supratherapeutic INR requiring reversal for OR. Cardiology consulted for preop clearance. Patient underwent right femoral intramedullary rodding 1/15/20 .     - Patient tolerated procedure well from CV perspective  - No evidence of clinical HF or anginal symptoms  - Denies LOC/syncope -- PPM interrogation noted, episodes of likely SVT but no correlating events to recent fall  - Normal Nuclear stress and TTE with normal LV/RV with well seated bio MV with normal function both in 7/2019  - AF - continue Coumadin for lifelong CVA prevention if okay with ortho/primary team  - No further inpatient cardiac w/u needed at this time

## 2020-01-19 NOTE — PROGRESS NOTE ADULT - PROBLEM SELECTOR PLAN 6
Hgb 7.2 on admission in setting of supratherapeutic INR, baseline around 9. No evidence of active bleeding. 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 FOBT neg on this admission. Given 1u pRBCs in ED  - active T&S  - monitor CBC daily. 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 Currently on crizotinib. Follows with Onc Dr. Travis.  seen by rad onc no role for radiation at this time

## 2020-01-19 NOTE — PROGRESS NOTE ADULT - PROBLEM SELECTOR PLAN 7
Transitions of Care Status:  1.  Name of PCP: Dr. Espana  2.  PCP Contacted on Admission: [ ] Y    [x] N    3.  PCP contacted at Discharge: [ ] Y    [ ] N    [ ] N/A  4.  Post-Discharge Appointment Date and Location:  5.  Summary of Handoff given to PCP: Hgb 7.2 on admission in setting of supratherapeutic INR, baseline around 9. No evidence of active bleeding. 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 FOBT neg on this admission. Given 1u pRBCs in ED  - active T&S  - monitor CBC daily.

## 2020-01-19 NOTE — PROGRESS NOTE ADULT - PROBLEM SELECTOR PLAN 5
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 Currently on crizotinib. Follows with Onc Dr. Travis.  seen by rad onc no role for radiation at this time - made aware of lack of BM for many days despite bowel regimen and enemas initiated  - continue aggressive bowel regimen, KUB  - also made aware of pt having difficulty swallowing: obtain S+S, aspiration precautions with diet at this time pending further recommendations

## 2020-01-19 NOTE — CHART NOTE - NSCHARTNOTEFT_GEN_A_CORE
Informed by RN pt having difficulty swallowing medication and food. Pt also constipated. Abd soft, non-tender, BS normal present. Dw Dr. Peguero, ordered speech and swallow, dysphagia diet with Htl and abd xray.  Continue to monitor pt. Aspiration precautions.

## 2020-01-19 NOTE — PROGRESS NOTE ADULT - SUBJECTIVE AND OBJECTIVE BOX
INCOMPLETE    Patient is a 86y old  Female who presents with a chief complaint of R Knee pain (19 Jan 2020 06:09)    SUBJECTIVE / OVERNIGHT EVENTS: Patient seen and examined. No acute overnight events, no subjective complaints.    OBJECTIVE:  Vital Signs Last 24 Hrs  T(F): 98.8 (19 Jan 2020 08:00), Max: 98.8 (19 Jan 2020 08:00)  HR: 67 (19 Jan 2020 08:00) (60 - 71)  BP: 113/57 (19 Jan 2020 08:00) (97/47 - 132/69)  RR: 18 (19 Jan 2020 08:00) (18 - 18)  SpO2: 100% (19 Jan 2020 08:00) (91% - 100%)    I&O's Summary  18 Jan 2020 07:01  -  19 Jan 2020 07:00  --------------------------------------------------------  IN: 1050 mL / OUT: 700 mL / NET: 350 mL    Physical Examination:  GEN: elderly woman, laying in bed in NAD  PSYCH: A&Ox2, mood and affect appear appropriate   NECK: supple, no e/o elevated JVP  RESPI: no accessory muscle use, B/L air entry, CTAB   CARDIO: regular rate/rhythm, no LE edema B/L  ABD: soft, NT, ND, +BS  EXT: patient able to move all extremities spontaneously  VASC: peripheral pulses palpated    Labs:                      12.1   10.18 )-----------( 206      ( 18 Jan 2020 10:55 )             41.4     01-19  134<L>  |  90<L>  |  37<H>  ----------------------------<  86  5.2   |  27  |  4.24<H>    Ca    9.8      19 Jan 2020 07:04    TPro  8.9<H>  /  Alb  3.2<L>  /  TBili  0.4  /  DBili  x   /  AST  27  /  ALT  <5<L>  /  AlkPhos  146<H>  01-19    PT/INR - ( 19 Jan 2020 09:02 )   PT: 30.4 sec;   INR: 2.57 ratio      Consultant(s) Notes Reviewed: Cardiology, Nephrology  Care Discussed with Consultants/Other Providers: TYSON Parsii    MEDICATIONS  (STANDING):  allopurinol 300 milliGRAM(s) Oral <User Schedule>  artificial tears (preservative free) Ophthalmic Solution 1 Drop(s) Both EYES two times a day  bisacodyl Suppository 10 milliGRAM(s) Rectal once  chlorhexidine 4% Liquid 1 Application(s) Topical daily  crizotinib 200 milliGRAM(s) Oral two times a day  lidocaine   Patch 1 Patch Transdermal daily  midodrine. 10 milliGRAM(s) Oral <User Schedule>  pantoprazole    Tablet 40 milliGRAM(s) Oral before breakfast  polyethylene glycol 3350 17 Gram(s) Oral two times a day  senna 2 Tablet(s) Oral at bedtime  sevelamer carbonate 800 milliGRAM(s) Oral three times a day with meals    MEDICATIONS  (PRN):  bisacodyl 5 milliGRAM(s) Oral every 12 hours PRN Constipation  calcium carbonate    500 mG (Tums) Chewable 1 Tablet(s) Chew every 8 hours PRN Heartburn  HYDROmorphone  Injectable 0.2 milliGRAM(s) IV Push every 4 hours PRN moderate-severe pain, prior to physical therapy Patient is a 86y old  Female who presents with a chief complaint of R Knee pain (19 Jan 2020 06:09)    SUBJECTIVE / OVERNIGHT EVENTS: Patient seen and examined. No acute overnight events, no subjective complaints.    OBJECTIVE:  Vital Signs Last 24 Hrs  T(F): 98.8 (19 Jan 2020 08:00), Max: 98.8 (19 Jan 2020 08:00)  HR: 67 (19 Jan 2020 08:00) (60 - 71)  BP: 113/57 (19 Jan 2020 08:00) (97/47 - 132/69)  RR: 18 (19 Jan 2020 08:00) (18 - 18)  SpO2: 100% (19 Jan 2020 08:00) (91% - 100%)    I&O's Summary  18 Jan 2020 07:01  -  19 Jan 2020 07:00  --------------------------------------------------------  IN: 1050 mL / OUT: 700 mL / NET: 350 mL    Physical Examination:  GEN: elderly woman, laying in bed in NAD  PSYCH: A&Ox2, mood and affect appear appropriate   NECK: supple, no e/o elevated JVP  RESPI: no accessory muscle use, B/L air entry, CTAB   CARDIO: regular rate/rhythm, no LE edema B/L  ABD: soft, NT, ND, +BS  EXT: patient able to move all extremities spontaneously  VASC: peripheral pulses palpated    Labs:                      12.1   10.18 )-----------( 206      ( 18 Jan 2020 10:55 )             41.4     01-19  134<L>  |  90<L>  |  37<H>  ----------------------------<  86  5.2   |  27  |  4.24<H>    Ca    9.8      19 Jan 2020 07:04    TPro  8.9<H>  /  Alb  3.2<L>  /  TBili  0.4  /  DBili  x   /  AST  27  /  ALT  <5<L>  /  AlkPhos  146<H>  01-19    PT/INR - ( 19 Jan 2020 09:02 )   PT: 30.4 sec;   INR: 2.57 ratio      Consultant(s) Notes Reviewed: Cardiology, Nephrology  Care Discussed with Consultants/Other Providers: TYSON Parisi    MEDICATIONS  (STANDING):  allopurinol 300 milliGRAM(s) Oral <User Schedule>  artificial tears (preservative free) Ophthalmic Solution 1 Drop(s) Both EYES two times a day  bisacodyl Suppository 10 milliGRAM(s) Rectal once  chlorhexidine 4% Liquid 1 Application(s) Topical daily  crizotinib 200 milliGRAM(s) Oral two times a day  lidocaine   Patch 1 Patch Transdermal daily  midodrine. 10 milliGRAM(s) Oral <User Schedule>  pantoprazole    Tablet 40 milliGRAM(s) Oral before breakfast  polyethylene glycol 3350 17 Gram(s) Oral two times a day  senna 2 Tablet(s) Oral at bedtime  sevelamer carbonate 800 milliGRAM(s) Oral three times a day with meals    MEDICATIONS  (PRN):  bisacodyl 5 milliGRAM(s) Oral every 12 hours PRN Constipation  calcium carbonate    500 mG (Tums) Chewable 1 Tablet(s) Chew every 8 hours PRN Heartburn  HYDROmorphone  Injectable 0.2 milliGRAM(s) IV Push every 4 hours PRN moderate-severe pain, prior to physical therapy

## 2020-01-20 NOTE — SWALLOW BEDSIDE ASSESSMENT ADULT - SWALLOW EVAL: CRITERIA FOR SKILLED INTERVENTION MET
Patient Education     Dental Abscess   A dental abscess is an infection of the tooth socket. It often starts with a crack or cavity in the tooth. A pocket of pus forms between the tooth and the bone. The infection causes pain and swelling of the gum, cheek, or jaw. The pain is often made worse by drinking hot or cold fluids, or biting on hard foods. Pain may be felt in the facial sinus or in the ear. A severe infection can interfere with swallowing and breathing.  Causes  · Cavities  · Trauma  · Previous dental work  Symptoms  · Pain  · Swelling around the tooth or face and cheek  · Redness  · Bad breath  · Bad taste in the mouth  · Fever  You will be started on an antibiotic. However, final treatment requires drainage of the pus. This can be done by removing the tooth or performing a root canal. A root canal is done by an oral surgeon. It involves drilling an opening in the tooth to drain the pus. After the infection has healed, a crown is placed over the tooth.  Home care  The following guidelines will help you care for your abscess at home:  · Don't have hot or cold foods and liquids. Your tooth may be sensitive to temperature changes.  · If your tooth is chipped or cracked, or if there is a large open cavity, apply oil of cloves (available over-the-counter in drugstores) directly to the tooth to reduce pain. Some drugstores carry an over-the-counter \"toothache kit.\" This contains oil of cloves and a paste, which can be applied over the exposed tooth to decrease sensitivity.  · Apply an ice pack (ice cubes in a plastic bag, wrapped in a towel) over the injured area for 10 to 20 minutes every 1 to 2 hours the first day for pain relief. Continue this 3 to 4 times a day until the pain and swelling goes away.  · You can take acetaminophen or ibuprofen for pain, unless you were given a different pain medicine to use. If you have chronic liver or kidney disease, have ever had a stomach ulcer or gastrointestinal  bleeding, or are taking blood-thinning medicines, talk with your healthcare provider before using these medicines.  · An antibiotic will be prescribed. Take it as directed until completed, even if you are feeling better sooner.  Follow-up care  Follow up as advised with a dentist or oral surgeon. Even though your pain may improve with the treatment given today, only a dentist or oral surgeon can provide full treatment for this problem.  · If a culture was done, you will be notified if the treatment needs to be changed. You can call in as directed for the results.  · If X-rays were taken, they will be reviewed by a specialist. You will be notified of the results, especially if they affect treatment.  Call 911  Call 911 if any of these occur:  · Trouble breathing or swallowing, or wheezing  · Hoarse voice or trouble speaking  · Confusion  · Extreme drowsiness or trouble awakening  · Fainting or loss of consciousness  · Rapid heart rate  When to seek medical advice  Call your healthcare provider right away if any of these occur:  · Swollen or red face or eyelid  · Pain gets worse or spreads to the neck  · Fever of 100.4ºF (38ºC) or higher, or as directed by your healthcare provider  · Unusual drowsiness, a headache or stiff neck, or weakness  · Pus drains from the gum or tooth  · You can't open your mouth wide  Date Last Reviewed: 7/30/2015  © 7318-3524 [email or web address]. [address] [place] [address], [place] [address]. All rights reserved. This information is not intended as a substitute for professional medical care. Always follow your healthcare professional's instructions.            tbd

## 2020-01-20 NOTE — PROGRESS NOTE ADULT - ATTENDING COMMENTS
Marc Peguero MD  [facility] Medicine  [phone]

## 2020-01-20 NOTE — SWALLOW BEDSIDE ASSESSMENT ADULT - COMMENTS
1/14/2020 per Pulm consult: CXR showing RML consolidation likely collapse unclear if from progression of malignancy vs mucous plugging.  1/17/2020 Palliative care consulted for GOC and pain management; Pt with lung collapse, delirium and constipation; the patient is to be full code; - she has appointed her daughter Yasemin Zaman as healthcare proxy.- Will continue to f/u for Pain. 1/19/2020 Per MD:  pain control with dilaudid prn. Pt with hypoxic episode; -CT A/P commenting on Right middle lobe lobar atelectasis. Pt denied sensation of pharyngeal residue or throat pain during/after swallowing.

## 2020-01-20 NOTE — PROGRESS NOTE ADULT - SUBJECTIVE AND OBJECTIVE BOX
S: Denies chest pain or SOB. Review of systems otherwise (-)      MEDICATIONS  (STANDING):  allopurinol 300 milliGRAM(s) Oral <User Schedule>  artificial tears (preservative free) Ophthalmic Solution 1 Drop(s) Both EYES two times a day  bisacodyl Suppository 10 milliGRAM(s) Rectal once  chlorhexidine 4% Liquid 1 Application(s) Topical daily  crizotinib 200 milliGRAM(s) Oral two times a day  lidocaine   Patch 1 Patch Transdermal daily  midodrine. 10 milliGRAM(s) Oral <User Schedule>  pantoprazole    Tablet 40 milliGRAM(s) Oral before breakfast  polyethylene glycol 3350 17 Gram(s) Oral two times a day  senna 2 Tablet(s) Oral at bedtime  sevelamer carbonate 800 milliGRAM(s) Oral three times a day with meals  warfarin 1.5 milliGRAM(s) Oral once    MEDICATIONS  (PRN):  bisacodyl 5 milliGRAM(s) Oral every 12 hours PRN Constipation  calcium carbonate    500 mG (Tums) Chewable 1 Tablet(s) Chew every 8 hours PRN Heartburn  HYDROmorphone  Injectable 0.2 milliGRAM(s) IV Push every 4 hours PRN moderate-severe pain, prior to physical therapy      LABS:                            12.4   12.43 )-----------( 198      ( 20 Jan 2020 08:58 )             42.8     Hemoglobin: 12.4 g/dL (01-20 @ 08:58)  Hemoglobin: 12.1 g/dL (01-18 @ 10:55)  Hemoglobin: 12.6 g/dL (01-17 @ 09:27)  Hemoglobin: 11.5 g/dL (01-15 @ 20:51)    01-20    134<L>  |  90<L>  |  63<H>  ----------------------------<  85  5.1   |  27  |  6.38<H>    Ca    10.2      20 Jan 2020 07:04    TPro  8.9<H>  /  Alb  3.2<L>  /  TBili  0.4  /  DBili  x   /  AST  27  /  ALT  <5<L>  /  AlkPhos  146<H>  01-19    Creatinine Trend: 6.38<--, 4.24<--, 5.40<--, 3.83<--, 5.59<--, 5.40<--   PT/INR - ( 20 Jan 2020 08:54 )   PT: 34.9 sec;   INR: 2.99 ratio                   01-19-20 @ 07:01  -  01-20-20 @ 07:00  --------------------------------------------------------  IN: 640 mL / OUT: 0 mL / NET: 640 mL    01-20-20 @ 07:01  -  01-20-20 @ 11:30  --------------------------------------------------------  IN: 120 mL / OUT: 0 mL / NET: 120 mL        PHYSICAL EXAM  Vital Signs Last 24 Hrs  T(C): 36.5 (20 Jan 2020 10:43), Max: 36.9 (20 Jan 2020 00:23)  T(F): 97.7 (20 Jan 2020 10:43), Max: 98.5 (20 Jan 2020 00:23)  HR: 60 (20 Jan 2020 10:43) (60 - 73)  BP: 95/53 (20 Jan 2020 10:43) (95/53 - 115/67)  BP(mean): --  RR: 18 (20 Jan 2020 10:43) (18 - 18)  SpO2: 97% (20 Jan 2020 10:43) (97% - 100%)    Gen: Appears well in NAD  HEENT:  (-)icterus (-)pallor  CV: N S1 S2 1/6 GARY (+)2 Pulses B/l  Resp:  Clear to auscultation B/L, normal effort  GI: (+) BS Soft, NT, ND  Lymph:  (-) Edema, (-)obvious lymphadenopathy  Skin: Warm to touch, Normal turgor  Psych: Appropriate mood and affect      TELEMETRY: None	      ECG: VPaced at 63 bpm 	    ASSESSMENT/PLAN:   85 y/o female known to our office with PMHx of ESRD on HD, persistent Afib (on Coumadin), s/p Bio MVR, Tricuspid valve repair and ASD closure in 2016, hx complete heart block s/p PPM, Lung CA, COPD, remote colon CA (s/p R hemicolectomy), had normal nuclear stress test and TTE with normal LV/RV with well seated bio MV with normal function both in 7/2019 who presented to ED with worsening right hip pain s/p recent mechanical fall found to have right hip fracture and supratherapeutic INR requiring reversal for OR. Cardiology consulted for preop clearance. Patient underwent right femoral intramedullary rodding 1/15/20 .     - Patient tolerated procedure well from CV perspective  - No evidence of clinical HF or anginal symptoms  - Denies LOC/syncope -- PPM interrogation noted, episodes of likely SVT but no correlating events to recent fall  - Normal Nuclear stress and TTE with normal LV/RV with well seated bio MV with normal function both in 7/2019  - AF - continue Coumadin for lifelong CVA prevention if okay with ortho/primary team  - No further inpatient cardiac w/u needed at this time  - Pending S+S eval per primary team    Adam Jim PA-C  [facility]  Pager: [phone]

## 2020-01-20 NOTE — SWALLOW BEDSIDE ASSESSMENT ADULT - SWALLOW EVAL: RECOMMENDED FEEDING/EATING TECHNIQUES
alternate food with liquid/position upright (90 degrees)/small sips/bites alternate food with liquid/position upright (90 degrees)/small sips/bites/allow for swallow between intakes/crush medication (when feasible)

## 2020-01-20 NOTE — SWALLOW BEDSIDE ASSESSMENT ADULT - SLP PERTINENT HISTORY OF CURRENT PROBLEM
85 y/o female with PMHx of ESRD on HD, tage IV squamous cell lung cancer (diagnosed 10/2019, mets to bone, persistent Afib (on Coumadin), s/p Bio MVR, Tricuspid valve repair and ASD closure in 2016, hx complete heart block s/p PPM, Lung CA, COPD, remote colon CA (s/p R hemicolectomy), GI bleed (2/2 Dieulafoy lesion), anemia, pulmonary HTN, COPD, and restrictive lung disease.  Pt presented to ED with worsening right hip pain s/p recent mechanical fall found to have right hip fracture and supratherapeutic INR requiring reversal for OR. Patient underwent right femoral intramedullary rodding 1/15/20 .  1/19/2020 NP reports; pt having difficulty swallowing medication and food. Pt also constipated. Abd soft, non-tender, BS normal present. Dw Dr. Peguero, ordered speech and swallow, dysphagia diet and abdominal xray. 85 y/o female with PMHx of ESRD on HD, stage IV squamous cell lung cancer (diagnosed 10/2019, mets to bone, persistent Afib (on Coumadin), s/p Bio MVR, Tricuspid valve repair and ASD closure in 2016, hx complete heart block s/p PPM, Lung CA, COPD, remote colon CA (s/p R hemicolectomy), GI bleed (2/2 Dieulafoy lesion), anemia, pulmonary HTN, COPD, and restrictive lung disease.  Pt presented to ED with worsening right hip pain s/p recent mechanical fall found to have right hip fracture and supratherapeutic INR requiring reversal for OR. Patient underwent right femoral intramedullary rodding 1/15/20 .  1/19/2020 NP reports; pt having difficulty swallowing medication and food. Pt also constipated. Abd soft, non-tender, BS normal present. Dw Dr. Peguero, ordered speech and swallow, dysphagia diet and abdominal xray.

## 2020-01-20 NOTE — SWALLOW BEDSIDE ASSESSMENT ADULT - PHARYNGEAL PHASE
No signs or symptoms of laryngeal penetration/aspiration evident with any consistency presented.  Pharyngeal swallow judged to be timely with adequate laryngeal elevation.  clear vocal quality post swallow No signs or symptoms of laryngeal penetration/aspiration evident with any consistency presented.  Pharyngeal swallow judged to be timely with adequate laryngeal elevation.

## 2020-01-20 NOTE — PROGRESS NOTE ADULT - SUBJECTIVE AND OBJECTIVE BOX
Patient is a 86y old  Female who presents with a chief complaint of R Knee pain (20 Jan 2020 12:08)    SUBJECTIVE / OVERNIGHT EVENTS: Patient seen and examined. No acute overnight events, no subjective complaints.    OBJECTIVE:  Vital Signs Last 24 Hrs  T(F): 97.7 (20 Jan 2020 10:43), Max: 98.5 (20 Jan 2020 00:23)  HR: 60 (20 Jan 2020 10:43) (60 - 73)  BP: 95/53 (20 Jan 2020 10:43) (95/53 - 115/67)  RR: 18 (20 Jan 2020 10:43) (18 - 18)  SpO2: 97% (20 Jan 2020 10:43) (97% - 100%)    I&O's Summary  19 Jan 2020 07:01  -  20 Jan 2020 07:00  --------------------------------------------------------  IN: 640 mL / OUT: 0 mL / NET: 640 mL    20 Jan 2020 07:01  -  20 Jan 2020 15:21  --------------------------------------------------------  IN: 240 mL / OUT: 2 mL / NET: 238 mL    Physical Examination:  GEN: elderly woman, laying in bed in NAD  PSYCH: A&Ox2, mood and affect appear appropriate   NECK: supple, no e/o elevated JVP  RESPI: no accessory muscle use, B/L air entry, CTAB   CARDIO: regular rate/rhythm, no LE edema B/L  ABD: soft, NT, ND, +BS  EXT: patient able to move all extremities spontaneously  VASC: peripheral pulses palpated    Labs:                     12.4   12.43 )-----------( 198      ( 20 Jan 2020 08:58 )             42.8     01-20  134<L>  |  90<L>  |  63<H>  ----------------------------<  85  5.1   |  27  |  6.38<H>    Ca    10.2      20 Jan 2020 07:04    TPro  8.9<H>  /  Alb  3.2<L>  /  TBili  0.4  /  DBili  x   /  AST  27  /  ALT  <5<L>  /  AlkPhos  146<H>  01-19    PT/INR - ( 20 Jan 2020 08:54 )   PT: 34.9 sec;   INR: 2.99 ratio      Consultant(s) Notes Reviewed: Speech and Swallow, Nephrology, Cardiology  Care Discussed with Consultants/Other Providers: NP Joseph    MEDICATIONS  (STANDING):  allopurinol 300 milliGRAM(s) Oral <User Schedule>  artificial tears (preservative free) Ophthalmic Solution 1 Drop(s) Both EYES two times a day  chlorhexidine 4% Liquid 1 Application(s) Topical daily  crizotinib 200 milliGRAM(s) Oral two times a day  lidocaine   Patch 1 Patch Transdermal daily  midodrine. 10 milliGRAM(s) Oral <User Schedule>  pantoprazole    Tablet 40 milliGRAM(s) Oral before breakfast  polyethylene glycol 3350 17 Gram(s) Oral two times a day  senna 2 Tablet(s) Oral at bedtime  sevelamer carbonate 800 milliGRAM(s) Oral three times a day with meals  warfarin 1.5 milliGRAM(s) Oral once    MEDICATIONS  (PRN):  aluminum hydroxide/magnesium hydroxide/simethicone Suspension 30 milliLiter(s) Oral every 6 hours PRN Dyspepsia  bisacodyl 5 milliGRAM(s) Oral every 12 hours PRN Constipation  calcium carbonate    500 mG (Tums) Chewable 1 Tablet(s) Chew every 8 hours PRN Heartburn  HYDROmorphone  Injectable 0.2 milliGRAM(s) IV Push every 4 hours PRN moderate-severe pain, prior to physical therapy

## 2020-01-20 NOTE — SWALLOW BEDSIDE ASSESSMENT ADULT - SWALLOW EVAL: PATIENT/FAMILY GOALS STATEMENT
Pt initially stated that she has not had a bowel movement x 5 days and was on bed pan upon initial encounter.  However, upon SLP return she reports a small bowel movement prior to evaluation.

## 2020-01-20 NOTE — PROGRESS NOTE ADULT - PROBLEM SELECTOR PLAN 5
- made aware of lack of BM for many days despite bowel regimen and enemas initiated -- pt has large BM on 1/20  - also made aware of pt having difficulty swallowing: S+S evaluation w/ no motility findings, thought pt complaining of burning discomfort w/ swallowing -- will continue diet as is, and initiate maalox and PPI w/ monitoring and ask nurse to crush Renvela

## 2020-01-20 NOTE — PROGRESS NOTE ADULT - SUBJECTIVE AND OBJECTIVE BOX
Patient seen and examined  no complaints      No Known Allergies    Hospital Medications:   MEDICATIONS  (STANDING):  allopurinol 300 milliGRAM(s) Oral <User Schedule>  artificial tears (preservative free) Ophthalmic Solution 1 Drop(s) Both EYES two times a day  bisacodyl Suppository 10 milliGRAM(s) Rectal once  chlorhexidine 4% Liquid 1 Application(s) Topical daily  crizotinib 200 milliGRAM(s) Oral two times a day  lidocaine   Patch 1 Patch Transdermal daily  midodrine. 10 milliGRAM(s) Oral <User Schedule>  pantoprazole    Tablet 40 milliGRAM(s) Oral before breakfast  polyethylene glycol 3350 17 Gram(s) Oral two times a day  senna 2 Tablet(s) Oral at bedtime  sevelamer carbonate 800 milliGRAM(s) Oral three times a day with meals  warfarin 1.5 milliGRAM(s) Oral once        VITALS:  T(F): 97.7 (01-20-20 @ 10:43), Max: 98.5 (01-20-20 @ 00:23)  HR: 60 (01-20-20 @ 10:43)  BP: 95/53 (01-20-20 @ 10:43)  RR: 18 (01-20-20 @ 10:43)  SpO2: 97% (01-20-20 @ 10:43)  Wt(kg): --    01-19 @ 07:01  -  01-20 @ 07:00  --------------------------------------------------------  IN: 640 mL / OUT: 0 mL / NET: 640 mL    01-20 @ 07:01  -  01-20 @ 12:09  --------------------------------------------------------  IN: 120 mL / OUT: 0 mL / NET: 120 mL        PHYSICAL EXAM:  Constitutional: NAD  HEENT: anicteric sclera, oropharynx clear.  Neck: No JVD  Respiratory: CTAB, no wheezes, rales or rhonchi  Cardiovascular: S1, S2, RRR  Gastrointestinal: BS+, soft, NT/ND  Extremities: No peripheral edema  Neurological: A/O x 3, no focal deficits  Vascular Access: AVF with thrill and bruit    LABS:  01-20    134<L>  |  90<L>  |  63<H>  ----------------------------<  85  5.1   |  27  |  6.38<H>    Ca    10.2      20 Jan 2020 07:04    TPro  8.9<H>  /  Alb  3.2<L>  /  TBili  0.4  /  DBili      /  AST  27  /  ALT  <5<L>  /  AlkPhos  146<H>  01-19    Creatinine Trend: 6.38 <--, 4.24 <--, 5.40 <--, 3.83 <--, 5.59 <--, 5.40 <--, 0.59 <--, 4.15 <--, 3.02 <--, 3.69 <--                        12.4   12.43 )-----------( 198      ( 20 Jan 2020 08:58 )             42.8     Urine Studies:      RADIOLOGY & ADDITIONAL STUDIES:

## 2020-01-20 NOTE — SWALLOW BEDSIDE ASSESSMENT ADULT - SWALLOW EVAL: DIAGNOSIS
Pt presents with mild oral prep and oral stage dysphagia as acceptance of PO is limited and formation/transfer of the bolus are delayed.  Underlying behavioral issues, underlying medical condition and possible symptoms of esophageal dysphagia may be contributing factors to mild deficits.  Pt c/o burning in mid sternal region during PO intake of purees and stated that sips of water helped it go down.  No signs or symptoms of laryngeal penetration/aspiration evident with any consistency presented.  Pharyngeal swallow judged to be timely with adequate laryngeal elevation. Pt presents with mild oral prep and oral stage dysphagia as acceptance of PO is limited and formation/transfer of the bolus are delayed.  Underlying behavioral issues, underlying medical condition and possible symptoms of esophageal dysphagia may be contributing factors to mild deficits.  Pt c/o burning in mid sternal region during PO intake of purees and stated that sips of water helped it go down.  No signs or symptoms of laryngeal penetration/aspiration evident with any consistency presented.  Pharyngeal swallow judged to be timely with adequate laryngeal elevation.  Pt denied sensation of pharyngeal residue or throat pain during/after swallowing.

## 2020-01-20 NOTE — PROGRESS NOTE ADULT - ATTENDING COMMENTS
Agree with above  Tolerate procedure well in terms of cv perspective  No further inpatient cardiac workup needed at this time.     Poncho Alba MD

## 2020-01-20 NOTE — SWALLOW BEDSIDE ASSESSMENT ADULT - SWALLOW EVAL: RECOMMENDED DIET
Puree diet with assistance/supervision as needed Puree diet with assistance/supervision as needed; crush meds as feasible.

## 2020-01-21 NOTE — PROGRESS NOTE ADULT - PROBLEM SELECTOR PLAN 4
- likely in setting of hypoxia and inpatient hospitalization in older female with multiple medical problems  Promote:   -Frequent reassurance and verbal orientation   -Family members or other familiar persons by his bedside.   -Delusions and hallucinations should be neither endorsed nor challenged.   -Physical restraints should be avoided. Alternatives to restraint use, such as constant observation (preferably by someone familiar to the patient such as a family member), may be more effective.  -Pain controlled and PT eval and early ambulation when possible  -Update the calendar date in the room.   -Continue care in [facility].  Work up and Rx as per Primary team. - Now with BMs and as per EMR with loose stools. Will change Miralax to Q daily with holding parameters. Conitnue Senna 2 tabs HS (also holding for loose stools) and Dulcolax PO PRN.  Primary team to f/u and adjust bowel regiment as needed.

## 2020-01-21 NOTE — PROGRESS NOTE ADULT - PROBLEM SELECTOR PLAN 2
-Pt with hypoxic episode  -cxr with right middle lobe collapse   -CT A/P commenting on Right middle lobe lobar atelectasis.  -rvp negative  -le dopplers negative  -now off oxygen   -incentive spirometry  -pulm recs noted

## 2020-01-21 NOTE — PROGRESS NOTE ADULT - ATTENDING COMMENTS
Plan for d/c to [facility]  Palliative care input appreciated - patient full code at this time  Follow up with onc as outpatient

## 2020-01-21 NOTE — PROGRESS NOTE ADULT - PROBLEM SELECTOR PLAN 3
- noted on CXR and CT A/P on presentation; PNA cannot be excluded  - could be etiology of hypoxia; ABG with O2 sat 92, pO2 68 reduced, pCO2 mildly elevated 51  - per pulmonary recs: incentive spirometer, bronchodilators prior to surgery, OOBTC as tolerated per ortho; patient now s/p IMN 1/15  -Consider f/u CXR - likely in setting of hypoxia and inpatient hospitalization in older female with multiple medical problems  Promote:   -Frequent reassurance and verbal orientation   -Family members or other familiar persons by his bedside.   -Delusions and hallucinations should be neither endorsed nor challenged.   -Physical restraints should be avoided. Alternatives to restraint use, such as constant observation (preferably by someone familiar to the patient such as a family member), may be more effective.  -Pain controlled and PT eval and early ambulation when possible  -Update the calendar date in the room.   -Continue care in [facility].  Work up and Rx as per Primary team. - Progressively improving   - likely in setting of hypoxia and inpatient hospitalization in older female with multiple medical problems  Promote:   -Frequent reassurance and verbal orientation   -Family members or other familiar persons by his bedside.   -Delusions and hallucinations should be neither endorsed nor challenged.   -Physical restraints should be avoided. Alternatives to restraint use, such as constant observation (preferably by someone familiar to the patient such as a family member), may be more effective.  -Pain controlled and PT eval and early ambulation when possible  -Update the calendar date in the room.   -Continue care in [facility].  Work up and Rx as per Primary team.

## 2020-01-21 NOTE — DISCHARGE NOTE PROVIDER - NSDCCPCAREPLAN_GEN_ALL_CORE_FT
PRINCIPAL DISCHARGE DIAGNOSIS  Diagnosis: Hip fracture, right  Assessment and Plan of Treatment: s/p IMN on 15 th   Please follow up with DR Dean in 1-2 weeks      SECONDARY DISCHARGE DIAGNOSES  Diagnosis: Anemia due to chronic kidney disease, on chronic dialysis  Assessment and Plan of Treatment: Symptoms to report, bleeding, palpitations, fatigue, pale skin, cold skin, dizziness. Take medications as ordered by PCP  FOBT negative   s/p 1 unit prbc in ED   No s/s of bleding       Diagnosis: Hypoxia  Assessment and Plan of Treatment: Resolved   RM lobe collapse /CT chest negative for pneumonia   c/w Incentive spiraometry   c/w HD as tolerated    Diagnosis: Squamous cell carcinomatosis  Assessment and Plan of Treatment: c/w Crizotinib   please follow up with DR mendoza

## 2020-01-21 NOTE — PROGRESS NOTE ADULT - SUBJECTIVE AND OBJECTIVE BOX
PROGRESS NOTE:   Authoted by Dr. Eleni Bradley MD  Pager [phone]     Patient is a 86y old  Female who presents with a chief complaint of R Knee pain (20 Jan 2020 15:20)      SUBJECTIVE / OVERNIGHT EVENTS: Patient seen and examined at bedside - patient had multiple BMs since yesterday, one this morning, patient slightly confused. Says she has a little bit of pain in her R side. Says she feels like she is taking too many medications. For HD this am.    ADDITIONAL REVIEW OF SYSTEMS: as above, + cough with clear sputum    MEDICATIONS  (STANDING):  allopurinol 300 milliGRAM(s) Oral <User Schedule>  artificial tears (preservative free) Ophthalmic Solution 1 Drop(s) Both EYES two times a day  chlorhexidine 4% Liquid 1 Application(s) Topical daily  crizotinib 200 milliGRAM(s) Oral two times a day  lidocaine   Patch 1 Patch Transdermal daily  midodrine. 10 milliGRAM(s) Oral <User Schedule>  pantoprazole    Tablet 40 milliGRAM(s) Oral before breakfast  polyethylene glycol 3350 17 Gram(s) Oral two times a day  senna 2 Tablet(s) Oral at bedtime  sevelamer carbonate 800 milliGRAM(s) Oral three times a day with meals    MEDICATIONS  (PRN):  aluminum hydroxide/magnesium hydroxide/simethicone Suspension 30 milliLiter(s) Oral every 6 hours PRN Dyspepsia  bisacodyl 5 milliGRAM(s) Oral every 12 hours PRN Constipation  calcium carbonate    500 mG (Tums) Chewable 1 Tablet(s) Chew every 8 hours PRN Heartburn  HYDROmorphone  Injectable 0.2 milliGRAM(s) IV Push every 4 hours PRN moderate-severe pain, prior to physical therapy      CAPILLARY BLOOD GLUCOSE        I&O's Summary    20 Jan 2020 07:01  -  21 Jan 2020 07:00  --------------------------------------------------------  IN: 660 mL / OUT: 4 mL / NET: 656 mL        PHYSICAL EXAM:  Vital Signs Last 24 Hrs  T(C): 37 (21 Jan 2020 05:38), Max: 37.2 (20 Jan 2020 20:21)  T(F): 98.6 (21 Jan 2020 05:38), Max: 99 (20 Jan 2020 20:21)  HR: 65 (21 Jan 2020 05:38) (60 - 65)  BP: 108/62 (21 Jan 2020 05:38) (95/53 - 136/61)  BP(mean): --  RR: 17 (21 Jan 2020 05:38) (17 - 18)  SpO2: 95% (21 Jan 2020 05:38) (93% - 100%)    CONSTITUTIONAL: NAD, frail  RESPIRATORY: Normal respiratory effort; lungs are clear to auscultation bilaterally  CARDIOVASCULAR: Regular rate and rhythm, normal S1 and S2, no murmur/rub/gallop; No lower extremity edema; Peripheral pulses are 2+ bilaterally  ABDOMEN: Nontender to palpation, normoactive bowel sounds, no rebound/guarding; No hepatosplenomegaly  MUSCLOSKELETAL: no clubbing or cyanosis of digits; no joint swelling or tenderness to palpation  PSYCH: A+O to person, place, not time; affect appropriate    LABS:                        12.4   12.43 )-----------( 198      ( 20 Jan 2020 08:58 )             42.8     01-21    133<L>  |  91<L>  |  90<H>  ----------------------------<  89  5.8<H>   |  23  |  8.08<H>    Ca    10.0      21 Jan 2020 06:57      PT/INR - ( 20 Jan 2020 08:54 )   PT: 34.9 sec;   INR: 2.99 ratio                       COORDINATION OF CARE:  Care Discussed with Consultants/Other Providers [Y/N]:  Prior or Outpatient Records Reviewed [Y/N]:

## 2020-01-21 NOTE — PROGRESS NOTE ADULT - SUBJECTIVE AND OBJECTIVE BOX
S: Seen at . Denies chest pain or SOB. Review of systems otherwise (-)      MEDICATIONS  (STANDING):  allopurinol 300 milliGRAM(s) Oral <User Schedule>  artificial tears (preservative free) Ophthalmic Solution 1 Drop(s) Both EYES two times a day  chlorhexidine 4% Liquid 1 Application(s) Topical daily  crizotinib 200 milliGRAM(s) Oral two times a day  lidocaine   Patch 1 Patch Transdermal daily  midodrine. 10 milliGRAM(s) Oral <User Schedule>  pantoprazole    Tablet 40 milliGRAM(s) Oral before breakfast  polyethylene glycol 3350 17 Gram(s) Oral two times a day  senna 2 Tablet(s) Oral at bedtime  sevelamer carbonate 800 milliGRAM(s) Oral three times a day with meals    MEDICATIONS  (PRN):  aluminum hydroxide/magnesium hydroxide/simethicone Suspension 30 milliLiter(s) Oral every 6 hours PRN Dyspepsia  bisacodyl 5 milliGRAM(s) Oral every 12 hours PRN Constipation  calcium carbonate    500 mG (Tums) Chewable 1 Tablet(s) Chew every 8 hours PRN Heartburn  HYDROmorphone  Injectable 0.2 milliGRAM(s) IV Push every 4 hours PRN moderate-severe pain, prior to physical therapy      LABS:                            12.5   14.67 )-----------( 213      ( 21 Jan 2020 08:22 )             41.6     Hemoglobin: 12.5 g/dL (01-21 @ 08:22)  Hemoglobin: 12.4 g/dL (01-20 @ 08:58)  Hemoglobin: 12.1 g/dL (01-18 @ 10:55)  Hemoglobin: 12.6 g/dL (01-17 @ 09:27)    01-21    133<L>  |  91<L>  |  90<H>  ----------------------------<  89  5.8<H>   |  23  |  8.08<H>    Ca    10.0      21 Jan 2020 06:57      Creatinine Trend: 8.08<--, 6.38<--, 4.24<--, 5.40<--, 3.83<--, 5.59<--   PT/INR - ( 21 Jan 2020 08:46 )   PT: 44.9 sec;   INR: 3.81 ratio           01-20-20 @ 07:01  -  01-21-20 @ 07:00  --------------------------------------------------------  IN: 660 mL / OUT: 4 mL / NET: 656 mL        PHYSICAL EXAM  Vital Signs Last 24 Hrs  T(C): 36.8 (21 Jan 2020 09:30), Max: 37.2 (20 Jan 2020 20:21)  T(F): 98.2 (21 Jan 2020 09:30), Max: 99 (20 Jan 2020 20:21)  HR: 61 (21 Jan 2020 09:30) (60 - 65)  BP: 115/52 (21 Jan 2020 09:30) (10/68 - 136/61)  BP(mean): --  RR: 18 (21 Jan 2020 09:30) (17 - 18)  SpO2: 92% (21 Jan 2020 09:30) (92% - 100%)      Gen: Appears well in NAD  HEENT:  (-)icterus (-)pallor  CV: N S1 S2 1/6 GARY (+)2 Pulses B/l  Resp:  Clear to auscultation B/L, normal effort  GI: (+) BS Soft, NT, ND  Lymph:  (-) Edema, (-)obvious lymphadenopathy  Skin: Warm to touch, Normal turgor  Psych: Appropriate mood and affect      TELEMETRY: None	      ECG: VPaced at 63 bpm 	    ASSESSMENT/PLAN:   85 y/o female known to our office with PMHx of ESRD on HD, persistent Afib (on Coumadin), s/p Bio MVR, Tricuspid valve repair and ASD closure in 2016, hx complete heart block s/p PPM, Lung CA, COPD, remote colon CA (s/p R hemicolectomy), had normal nuclear stress test and TTE with normal LV/RV with well seated bio MV with normal function both in 7/2019 who presented to ED with worsening right hip pain s/p recent mechanical fall found to have right hip fracture and supratherapeutic INR requiring reversal for OR. Cardiology consulted for preop clearance. Patient underwent right femoral intramedullary rodding 1/15/20 .     - Patient tolerated procedure well from CV perspective  - No evidence of clinical HF or anginal symptoms  - Denies LOC/syncope -- PPM interrogation noted, episodes of likely SVT but no correlating events to recent fall  - Normal Nuclear stress and TTE with normal LV/RV with well seated bio MV with normal function both in 7/2019  - AF - continue Coumadin for lifelong CVA prevention if okay with ortho/primary team  - No further inpatient cardiac w/u needed at this time  - D/C planning SVETLANA per primary team  - Patient to follow up in our [place] office with Dr. Seay on 2/24 at 2 PM ([address], [address], [place] [address] - Office #[phone])    Adam Jim PA-C  [facility]  Pager: [phone]

## 2020-01-21 NOTE — PROGRESS NOTE ADULT - SUBJECTIVE AND OBJECTIVE BOX
SUBJECTIVE AND OBJECTIVE/INTERVAL HPI/OVERNIGHT EVENTS: Patient denied any abdominal pain. She indicated pain over her RLE has decreased. She was stating that she needed to go to [facility] in order to improve her functionality and that staying in the hospital was not longer useful.      DNR on chart: No   Allergies    No Known Allergies    Intolerances    MEDICATIONS  (STANDING):  allopurinol 300 milliGRAM(s) Oral <User Schedule>  artificial tears (preservative free) Ophthalmic Solution 1 Drop(s) Both EYES two times a day  chlorhexidine 4% Liquid 1 Application(s) Topical daily  crizotinib 200 milliGRAM(s) Oral two times a day  lidocaine   Patch 1 Patch Transdermal daily  midodrine. 10 milliGRAM(s) Oral <User Schedule>  pantoprazole    Tablet 40 milliGRAM(s) Oral before breakfast  polyethylene glycol 3350 17 Gram(s) Oral two times a day  senna 2 Tablet(s) Oral at bedtime  sevelamer carbonate 800 milliGRAM(s) Oral three times a day with meals    MEDICATIONS  (PRN):  aluminum hydroxide/magnesium hydroxide/simethicone Suspension 30 milliLiter(s) Oral every 6 hours PRN Dyspepsia  bisacodyl 5 milliGRAM(s) Oral every 12 hours PRN Constipation  calcium carbonate    500 mG (Tums) Chewable 1 Tablet(s) Chew every 8 hours PRN Heartburn  HYDROmorphone  Injectable 0.2 milliGRAM(s) IV Push every 4 hours PRN moderate-severe pain, prior to physical therapy    ITEMS UNCHECKED ARE NOT PRESENT    PRESENT SYMPTOMS: [ ]Unable to obtain due to poor mentation   Source if other than patient:  [ ]Family   [ ]Team     Pain:  [ X]yes [ ]no  QOL impact - sadness   Location - right trochanter               Aggravating factors - movement over R trochanter   Quality - not able to indicate   Radiation - none   Timing- with movement   Severity (0-10 scale): moderate to severe   Minimal acceptable level (0-10 scale): mild to moderate     Dyspnea:                           [ ]Mild [ ]Moderate [ ]Severe  Anxiety:                             [ ]Mild [ ]Moderate [ ]Severe  Fatigue:                             [ ]Mild [ ]Moderate [ ]Severe  Nausea:                             [ ]Mild [ ]Moderate [ ]Severe  Loss of appetite:              [ ]Mild [ ]Moderate [ ]Severe  Constipation:                    [ ]Mild [ ]Moderate [ ]Severe    PAIN AD Score:	  [email or web address] (Ctrl + left click to view)    Other Symptoms:  [ x]All other review of systems negative but depressive mood due to pain and not being able to get out of bed.     Palliative Performance Status Version 2:   40    %      [email or web address]  PHYSICAL EXAM:  Vital Signs Last 24 Hrs  T(C): 36.4 (17 Jan 2020 04:09), Max: 36.9 (16 Jan 2020 15:10)  T(F): 97.6 (17 Jan 2020 04:09), Max: 98.4 (16 Jan 2020 15:10)  HR: 60 (17 Jan 2020 04:09) (59 - 62)  BP: 103/58 (17 Jan 2020 04:09) (88/45 - 145/74)  BP(mean): --  RR: 18 (17 Jan 2020 04:09) (18 - 19)  SpO2: 100% (17 Jan 2020 04:09) (95% - 100%) I&O's Summary    16 Jan 2020 07:01  -  17 Jan 2020 07:00  --------------------------------------------------------  IN: 1760 mL / OUT: 2300 mL / NET: -540 mL       GENERAL:  [x ]Alert  [x ]Oriented  x 1-2 [X ]  [ ]Cachexia  [ ]Unarousable  [ ]Verbal  [ ]Non-Verbal  Behavioral:   [ ]Anxiety  [x ]Delirium [ ]Agitation [ ]Other  HEENT:  [x ]Normal   [ ]Dry mouth   [ ]ET Tube/Trach  [ ]Oral lesions  PULMONARY:   [x]Clear [ ]Tachypnea  [ ]Audible excessive secretions   [ ]Rhonchi        [ ]Right [ ]Left [ ]Bilateral  [ ]Crackles        [ ]Right [ ]Left [ ]Bilateral  [ ]Wheezing     [ ]Right [ ]Left [ ]Bilateral  [ ]Diminished BS [ ] Right [ ]Left [ ]Bilateral  CARDIOVASCULAR:    [ ]Regular [x]Irregular [ ]Tachy  [ ]Kaveh [ ]Murmur [ ]Other  GASTROINTESTINAL:  [ X]Soft  [ ]Distended   [ X]+BS  [ ]Non tender [ X]Tender - to epigastrium  [ ]PEG [ ]OGT/ NGT   Last BM: 1/21   GENITOURINARY:  [x ]Normal [ ]Incontinent   [ ]Oliguria/Anuria   [ ]Acosta  MUSCULOSKELETAL:   [ ]Normal   [x ]Weakness  [ ]Bed/Wheelchair bound [ ]Edema  NEUROLOGIC:   [x ]No focal deficits  [ ] Cognitive impairment  [ ] Dysphagia [ ]Dysarthria [ ] Paresis [ ]Other   SKIN:   [x ]Normal  [ ]Rash   [ ]Pressure ulcer(s) [ ]y [ ]n present on admission    CRITICAL CARE:  [ ]Shock Present  [ ]Septic [ ]Cardiogenic [ ]Neurologic [ ]Hypovolemic  [ ]Vasopressors [ ]Inotropes  [ ]Respiratory failure present [ ]Mechanical Ventilation [ ]Non-invasive ventilatory support [ ]High-Flow  [ ]Acute  [ ]Chronic [ ]Hypoxic  [ ]Hypercarbic [ ]Other  [ ]Other organ failure     LABS:                           12.5   14.67 )-----------( 213      ( 21 Jan 2020 08:22 )             41.6   01-21    133<L>  |  91<L>  |  90<H>  ----------------------------<  89  5.8<H>   |  23  |  8.08<H>    Ca    10.0      21 Jan 2020 06:57               RADIOLOGY & ADDITIONAL STUDIES:  Reviewed.   Protein Calorie Malnutrition Present: [ ]mild [ ]moderate [ ]severe [ ]underweight [ ]morbid obesity  https://www.andeal.org/vault/2440/web/files/ONC/Table_Clinical%20Characteristics%20to%20Document%20Malnutrition-White%20JV%20et%20al%820064.pdf    Height (cm): 162.56 (01-15-20 @ 19:28), 162.6 (12-01-19 @ 16:18), 160.02 (10-02-19 @ 08:12)  Weight (kg): 78.4 (01-15-20 @ 19:28), 77.3 (12-01-19 @ 16:18), 79.8 (10-02-19 @ 08:12)  BMI (kg/m2): 29.7 (01-15-20 @ 19:28), 29.2 (12-01-19 @ 16:18), 31.2 (10-02-19 @ 08:12)    [ ]PPSV2 < or = 30%  [ ]significant weight loss [ ]poor nutritional intake [ ]anasarca   Albumin, Serum: 3.2 g/dL (01-13-20 @ 05:22)   [ ]Artificial Nutrition    REFERRALS:   [ ]Chaplaincy  [ ]Hospice  [ ]Child Life  [ ]Social Work  [ ]Case management [ ]Holistic Therapy     Goals of Care Document: SUBJECTIVE AND OBJECTIVE/INTERVAL HPI/OVERNIGHT EVENTS: Patient denied any abdominal pain. She indicated pain over her RLE has decreased. She was stating that she needed to go to [facility] in order to improve her functionality and that staying in the hospital was not longer useful. She was slightly confused but able to be re-directed. She  has not used any Dilaudid PRN over the last 2 days.     DNR on chart: No   Allergies    No Known Allergies    Intolerances    MEDICATIONS  (STANDING):  allopurinol 300 milliGRAM(s) Oral <User Schedule>  artificial tears (preservative free) Ophthalmic Solution 1 Drop(s) Both EYES two times a day  chlorhexidine 4% Liquid 1 Application(s) Topical daily  crizotinib 200 milliGRAM(s) Oral two times a day  lidocaine   Patch 1 Patch Transdermal daily  midodrine. 10 milliGRAM(s) Oral <User Schedule>  pantoprazole    Tablet 40 milliGRAM(s) Oral before breakfast  polyethylene glycol 3350 17 Gram(s) Oral two times a day  senna 2 Tablet(s) Oral at bedtime  sevelamer carbonate 800 milliGRAM(s) Oral three times a day with meals    MEDICATIONS  (PRN):  aluminum hydroxide/magnesium hydroxide/simethicone Suspension 30 milliLiter(s) Oral every 6 hours PRN Dyspepsia  bisacodyl 5 milliGRAM(s) Oral every 12 hours PRN Constipation  calcium carbonate    500 mG (Tums) Chewable 1 Tablet(s) Chew every 8 hours PRN Heartburn  HYDROmorphone  Injectable 0.2 milliGRAM(s) IV Push every 4 hours PRN moderate-severe pain, prior to physical therapy    ITEMS UNCHECKED ARE NOT PRESENT    PRESENT SYMPTOMS: [ ]Unable to obtain due to poor mentation   Source if other than patient:  [ ]Family   [ ]Team     Pain:  [ X]yes [ ]no  QOL impact - sadness   Location - right trochanter               Aggravating factors - movement over R trochanter   Quality - not able to indicate   Radiation - none   Timing- with movement   Severity (0-10 scale): mild to moderate  Minimal acceptable level (0-10 scale): mild to moderate     Dyspnea:                           [ ]Mild [ ]Moderate [ ]Severe  Anxiety:                             [ ]Mild [ ]Moderate [ ]Severe  Fatigue:                             [ ]Mild [ ]Moderate [ ]Severe  Nausea:                             [ ]Mild [ ]Moderate [ ]Severe  Loss of appetite:              [ ]Mild [ ]Moderate [ ]Severe  Constipation:                    [ ]Mild [ ]Moderate [ ]Severe    PAIN AD Score:	  [email or web address] (Ctrl + left click to view)    Other Symptoms:  [ x]All other review of systems negative     Palliative Performance Status Version 2:   40    %      [email or web address]  PHYSICAL EXAM:  Vital Signs Last 24 Hrs  T(C): 36.4 (17 Jan 2020 04:09), Max: 36.9 (16 Jan 2020 15:10)  T(F): 97.6 (17 Jan 2020 04:09), Max: 98.4 (16 Jan 2020 15:10)  HR: 60 (17 Jan 2020 04:09) (59 - 62)  BP: 103/58 (17 Jan 2020 04:09) (88/45 - 145/74)  BP(mean): --  RR: 18 (17 Jan 2020 04:09) (18 - 19)  SpO2: 100% (17 Jan 2020 04:09) (95% - 100%) I&O's Summary    16 Jan 2020 07:01  -  17 Jan 2020 07:00  --------------------------------------------------------  IN: 1760 mL / OUT: 2300 mL / NET: -540 mL       GENERAL:  [x ]Alert  [x ]Oriented  x 1-2 [X ]  [ ]Cachexia  [ ]Unarousable  [x ]Verbal  [ ]Non-Verbal  Behavioral:   [ ]Anxiety  [ ]Delirium  [ ]Agitation [x ]Other: Slightly confused but able to be re-directed.   HEENT:  [x ]Normal   [ ]Dry mouth   [ ]ET Tube/Trach  [ ]Oral lesions  PULMONARY:   [x]Clear [ ]Tachypnea  [ ]Audible excessive secretions   [ ]Rhonchi        [ ]Right [ ]Left [ ]Bilateral  [ ]Crackles        [ ]Right [ ]Left [ ]Bilateral  [ ]Wheezing     [ ]Right [ ]Left [ ]Bilateral  [ ]Diminished BS [ ] Right [ ]Left [ ]Bilateral  CARDIOVASCULAR:    [ ]Regular [x]Irregular [ ]Tachy  [ ]Kaveh [ ]Murmur [ ]Other  GASTROINTESTINAL:  [ X]Soft  [ ]Distended   [ X]+BS  [ ]Non tender [ X]Tender - to epigastrium  [ ]PEG [ ]OGT/ NGT   Last BM: 1/21   GENITOURINARY:  [x ]Normal [ ]Incontinent   [ ]Oliguria/Anuria   [ ]Acosta  MUSCULOSKELETAL:   [ ]Normal   [x ]Weakness  [ ]Bed/Wheelchair bound [ ]Edema  NEUROLOGIC:   [x ]No focal deficits  [ ] Cognitive impairment  [ ] Dysphagia [ ]Dysarthria [ ] Paresis [ ]Other   SKIN:   [x ]Normal  [ ]Rash   [ ]Pressure ulcer(s) [ ]y [ ]n present on admission    CRITICAL CARE:  [ ]Shock Present  [ ]Septic [ ]Cardiogenic [ ]Neurologic [ ]Hypovolemic  [ ]Vasopressors [ ]Inotropes  [ ]Respiratory failure present [ ]Mechanical Ventilation [ ]Non-invasive ventilatory support [ ]High-Flow  [ ]Acute  [ ]Chronic [ ]Hypoxic  [ ]Hypercarbic [ ]Other  [ ]Other organ failure     LABS:                           12.5   14.67 )-----------( 213      ( 21 Jan 2020 08:22 )             41.6   01-21    133<L>  |  91<L>  |  90<H>  ----------------------------<  89  5.8<H>   |  23  |  8.08<H>    Ca    10.0      21 Jan 2020 06:57               RADIOLOGY & ADDITIONAL STUDIES:  Reviewed.   Protein Calorie Malnutrition Present: [ ]mild [ ]moderate [ ]severe [ ]underweight [ ]morbid obesity  https://www.andeal.org/vault/2440/web/files/ONC/Table_Clinical%20Characteristics%20to%20Document%20Malnutrition-White%20JV%20et%20al%636423.pdf    Height (cm): 162.56 (01-15-20 @ 19:28), 162.6 (12-01-19 @ 16:18), 160.02 (10-02-19 @ 08:12)  Weight (kg): 78.4 (01-15-20 @ 19:28), 77.3 (12-01-19 @ 16:18), 79.8 (10-02-19 @ 08:12)  BMI (kg/m2): 29.7 (01-15-20 @ 19:28), 29.2 (12-01-19 @ 16:18), 31.2 (10-02-19 @ 08:12)    [ ]PPSV2 < or = 30%  [ ]significant weight loss [ ]poor nutritional intake [ ]anasarca   Albumin, Serum: 3.2 g/dL (01-13-20 @ 05:22)   [ ]Artificial Nutrition    REFERRALS:   [ ]Chaplaincy  [ ]Hospice  [ ]Child Life  [ ]Social Work  [ ]Case management [ ]Holistic Therapy     Goals of Care Document:

## 2020-01-21 NOTE — DISCHARGE NOTE PROVIDER - NSDCMRMEDTOKEN_GEN_ALL_CORE_FT
acetaminophen 325 mg oral tablet: 1 tab(s) orally every 6 hours, As Needed - 3), Moderate Pain (4 - 6)  allopurinol 300 mg oral tablet: 1 tab(s) orally once a day  bisacodyl 5 mg oral delayed release tablet: 1 tab(s) orally every 12 hours, As needed, Constipation  calcium carbonate 500 mg (200 mg elemental calcium) oral tablet, chewable: 1 tab(s) orally every 8 hours, As needed, Heartburn  lidocaine 5% topical film: Apply topically to affected area once a day  midodrine 10 mg oral tablet: 1 tab(s) orally Tuesday, Thursday, Saturday  ocular lubricant ophthalmic solution: 1 drop(s) to each affected eye 2 times a day, As needed, Dry Eyes  pantoprazole 40 mg oral delayed release tablet: 1 tab(s) orally once a day (before a meal)  polyethylene glycol 3350 oral powder for reconstitution: 17 gram(s) orally once a day  senna oral tablet: 2 tab(s) orally once a day (at bedtime)  sevelamer hydrochloride 800 mg oral tablet: 3 tab(s) orally 3 times a day  Ventolin HFA 90 mcg/inh inhalation aerosol: 2 puff(s) inhaled 4 times a day, As Needed  warfarin 5 mg oral tablet: 1 tab(s) orally once a day  Xalkori 200 mg oral capsule: 1 cap(s) orally 2 times a day acetaminophen 325 mg oral tablet: 2 tab(s) orally every 6 hours, As needed, Mild Pain (1 - 3)  allopurinol 300 mg oral tablet: 1 tab(s) orally once a day  bisacodyl 5 mg oral delayed release tablet: 1 tab(s) orally every 12 hours, As needed, Constipation  calcium carbonate 500 mg (200 mg elemental calcium) oral tablet, chewable: 1 tab(s) orally every 8 hours, As needed, Heartburn  lidocaine 5% topical film: Apply topically to affected area once a day  midodrine 10 mg oral tablet: 1 tab(s) orally 3 times a day  ocular lubricant ophthalmic solution: 1 drop(s) to each affected eye 2 times a day, As needed, Dry Eyes  oxyCODONE 5 mg/5 mL oral solution: 2 milliliter(s) orally every 6 hours, As needed, Moderate to severe pain  pantoprazole 40 mg oral delayed release tablet: 1 tab(s) orally once a day (before a meal)  polyethylene glycol 3350 oral powder for reconstitution: 17 gram(s) orally once a day  senna oral tablet: 2 tab(s) orally once a day (at bedtime), As needed, Constipation  sevelamer hydrochloride 800 mg oral tablet: 3 tab(s) orally 3 times a day  Ventolin HFA 90 mcg/inh inhalation aerosol: 2 puff(s) inhaled 4 times a day, As Needed  warfarin 5 mg oral tablet: 1 tab(s) orally once a day  Xalkori 200 mg oral capsule: 1 cap(s) orally 2 times a day acetaminophen 325 mg oral tablet: 2 tab(s) orally every 6 hours, As needed, Mild Pain (1 - 3)  allopurinol 300 mg oral tablet: 1 tab(s) orally once a day  aluminum hydroxide-magnesium hydroxide 200 mg-200 mg/5 mL oral suspension: 30 milliliter(s) orally every 6 hours, As needed, Dyspepsia  bisacodyl 5 mg oral delayed release tablet: 1 tab(s) orally every 12 hours, As needed, Constipation  calcium carbonate 500 mg (200 mg elemental calcium) oral tablet, chewable: 1 tab(s) orally every 8 hours, As needed, Heartburn  lidocaine 5% topical film: Apply topically to affected area once a day  midodrine 10 mg oral tablet: 1 tab(s) orally 3 times a day  ocular lubricant ophthalmic solution: 1 drop(s) to each affected eye 2 times a day, As needed, Dry Eyes  oxyCODONE 5 mg/5 mL oral solution: 2 milliliter(s) orally every 6 hours, As needed, Moderate to severe pain  pantoprazole 40 mg oral delayed release tablet: 1 tab(s) orally once a day (before a meal)  polyethylene glycol 3350 oral powder for reconstitution: 17 gram(s) orally once a day, As needed, Constipation  senna oral tablet: 2 tab(s) orally once a day (at bedtime), As needed, Constipation  sevelamer hydrochloride 800 mg oral tablet: 3 tab(s) orally 3 times a day  Ventolin HFA 90 mcg/inh inhalation aerosol: 2 puff(s) inhaled 4 times a day, As Needed  warfarin 5 mg oral tablet: 1 tab(s) orally once a day  Xalkori 200 mg oral capsule: 1 cap(s) orally 2 times a day acetaminophen 325 mg oral tablet: 2 tab(s) orally every 6 hours, As needed, Mild Pain (1 - 3)  allopurinol 300 mg oral tablet: 1 tab(s) orally once a day  aluminum hydroxide-magnesium hydroxide 200 mg-200 mg/5 mL oral suspension: 30 milliliter(s) orally every 6 hours, As needed, Dyspepsia  bisacodyl 5 mg oral delayed release tablet: 1 tab(s) orally every 12 hours, As needed, Constipation  calcium carbonate 500 mg (200 mg elemental calcium) oral tablet, chewable: 1 tab(s) orally every 8 hours, As needed, Heartburn  Coumadin 1 mg oral tablet: 1 tab(s) orally once a day  lidocaine 5% topical film: Apply topically to affected area once a day  midodrine 10 mg oral tablet: 1 tab(s) orally 3 times a day  ocular lubricant ophthalmic solution: 1 drop(s) to each affected eye 2 times a day, As needed, Dry Eyes  oxyCODONE 5 mg/5 mL oral solution: 2 milliliter(s) orally every 6 hours, As needed, Moderate to severe pain  pantoprazole 40 mg oral delayed release tablet: 1 tab(s) orally once a day (before a meal)  polyethylene glycol 3350 oral powder for reconstitution: 17 gram(s) orally once a day, As needed, Constipation  senna oral tablet: 2 tab(s) orally once a day (at bedtime), As needed, Constipation  sevelamer hydrochloride 800 mg oral tablet: 3 tab(s) orally 3 times a day  Ventolin HFA 90 mcg/inh inhalation aerosol: 2 puff(s) inhaled 4 times a day, As Needed  Xalkori 200 mg oral capsule: 1 cap(s) orally 2 times a day

## 2020-01-21 NOTE — PROGRESS NOTE ADULT - PROBLEM SELECTOR PLAN 5
- Multiple BMs since yesterday  - s/p S/S recommend puree diet with thins, assistance with meals, alternate liquid/solid -- c/w maalox and PPI w/ monitoring and crush meds

## 2020-01-21 NOTE — PROGRESS NOTE ADULT - PROBLEM SELECTOR PLAN 6
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 Pathologic fracture s/p IM 1/15.  Currently on crizotinib. Follows with Onc Dr. Travis.  seen by rad onc no role for radiation at this time

## 2020-01-21 NOTE — PROGRESS NOTE ADULT - PROBLEM SELECTOR PLAN 1
- s/p IMN 1/15  - will schedule for oral tylenol RTC x 2 more days, and will advise PRN IV pain medications for moderate-severe pain: dilaudid 0.2mg q4 PRN for moderate to severe pain and prior to working with PT with holding parameters.   - standing lidocaine patch daily to right trochanteric region  - may benefit from radiation therapy if high suspicion for pathologic fracture 2/2 metastatic bony involvement; rad-onc planning to pursue as outpatient s/p IMN to right trochanter  - maalox or tums PRN and pantoprazole daily for abdominal pain  - Narcan PRN   - Monitoring and holding opioids for sedation/respiratory depression. - s/p IMN 1/15  - Will continue Tylenol 650 mg PO q 6 PRN mild pain and prior to working with PT. Will also add Oxy solution 2 mg PO q 6 PRN moderate to severe pain.   - standing lidocaine patch daily to right trochanteric region  - may benefit from radiation therapy if high suspicion for pathologic fracture 2/2 metastatic bony involvement; rad-onc planning to pursue as outpatient s/p IMN to right trochanter  - maalox or tums PRN and pantoprazole daily for abdominal pain  - Narcan PRN   - Monitoring and holding opioids for sedation/respiratory depression.

## 2020-01-21 NOTE — PROGRESS NOTE ADULT - PROBLEM SELECTOR PLAN 7
- Based on our initial ACP discussion, the patient is to be full code.   - she has appointed her daughter Yasemin Zaman as healthcare proxy.  - Will continue to f/u for Pain. - Based on our initial ACP discussion, the patient is to be full code.   - she has appointed her daughter Yasemin Zaman as healthcare proxy.  - Since GOC are define and symptoms have progressively improve, will sing off. Please call us back if having issues with GOC or ACP.   Natanael Phelan MD.   [phone]

## 2020-01-21 NOTE — PROGRESS NOTE ADULT - ATTENDING COMMENTS
Agree with above  Lifelong ac if no contraindications   No further inpatient cardiac workup needed at this time.     Poncho Alba MD

## 2020-01-21 NOTE — PROGRESS NOTE ADULT - SUBJECTIVE AND OBJECTIVE BOX
Patient seen and examined  no complaints    No Known Allergies    Hospital Medications:   MEDICATIONS  (STANDING):  allopurinol 300 milliGRAM(s) Oral <User Schedule>  artificial tears (preservative free) Ophthalmic Solution 1 Drop(s) Both EYES two times a day  chlorhexidine 4% Liquid 1 Application(s) Topical daily  crizotinib 200 milliGRAM(s) Oral two times a day  lidocaine   Patch 1 Patch Transdermal daily  midodrine. 10 milliGRAM(s) Oral <User Schedule>  pantoprazole    Tablet 40 milliGRAM(s) Oral before breakfast  polyethylene glycol 3350 17 Gram(s) Oral two times a day  senna 2 Tablet(s) Oral at bedtime  sevelamer carbonate 800 milliGRAM(s) Oral three times a day with meals      VITALS:  T(F): 98.2 (01-21-20 @ 09:30), Max: 99 (01-20-20 @ 20:21)  HR: 47 (01-21-20 @ 13:45)  BP: 104/61 (01-21-20 @ 13:45)  RR: 18 (01-21-20 @ 09:30)  SpO2: 92% (01-21-20 @ 09:30)  Wt(kg): --    01-20 @ 07:01  -  01-21 @ 07:00  --------------------------------------------------------  IN: 660 mL / OUT: 4 mL / NET: 656 mL        PHYSICAL EXAM:  Constitutional: NAD  HEENT: anicteric sclera, oropharynx clear.  Neck: No JVD  Respiratory: CTAB, no wheezes, rales or rhonchi  Cardiovascular: S1, S2, RRR  Gastrointestinal: BS+, soft, NT/ND  Extremities: No peripheral edema  Neurological: A/O x 3, no focal deficits  Vascular Access: AVF with thrill and bruit    LABS:  01-21    133<L>  |  91<L>  |  90<H>  ----------------------------<  89  5.8<H>   |  23  |  8.08<H>    Ca    10.0      21 Jan 2020 06:57      Creatinine Trend: 8.08 <--, 6.38 <--, 4.24 <--, 5.40 <--, 3.83 <--, 5.59 <--, 5.40 <--, 0.59 <--, 4.15 <--, 3.02 <--                        12.5   14.67 )-----------( 213      ( 21 Jan 2020 08:22 )             41.6     Urine Studies:      RADIOLOGY & ADDITIONAL STUDIES:

## 2020-01-21 NOTE — PROGRESS NOTE ADULT - PROBLEM SELECTOR PLAN 4
Renal consult called- Dr Kamilla Galeano, HD T/Th/Sat outpatient  - HD per Renal  - BP has been stable  For HD today

## 2020-01-21 NOTE — PROGRESS NOTE ADULT - ASSESSMENT
86yoF w/ PMHx significant for ESRD on HD on T/Th/Sat, Afib (on Coumadin), PPM Implanted, Mitral stenosis/Tricuspid Regurgitation s/p annuloplasty (2016), chronic hypotension, remote hx of colon Ca s/p resection, pulmonary HTN, COPD, restrictive lung disease and anemia who presents with complaint of hemoptysis.  Pt with known RML lung mass and metastatic disease to bone s/p IR biopsy of R femur bone biopsy consistent with squamous cell carcinoma. Pt found to have right femur intertochanteric pathologic fx s/p RIMN 1/15.

## 2020-01-21 NOTE — DISCHARGE NOTE PROVIDER - HOSPITAL COURSE
86yoF w/ PMHx significant for ESRD on HD on T/Th/Sat, Afib (on Coumadin), PPM Implanted, Mitral stenosis/Tricuspid Regurgitation s/p annuloplasty (2016), chronic hypotension, remote hx of colon Ca s/p resection, pulmonary HTN, COPD, restrictive lung disease and anemia who presents with complaint of hemoptysis.  Pt with known RML lung mass and metastatic disease to bone s/p IR biopsy of R femur bone biopsy consistent with squamous cell carcinoma. Pt found to have right femur intertochanteric pathologic fx s/p RIMN 1/15.      Hospital stay complicated with Hypoxia  and Delirium . Xray chest with RM lobe collapse . CT chest r/o Pneumonia , palliative care consulted for GOC  discussions and Pain management  , Pt wished to be a full code  and daughter  Yasemin nieves  as HCP . Pt was placed on ATC  tylenol and low dose IV Dilaudid  for pain .    Pt remained hd stable  and asymptomatic , will be discharged to Rehab once auth available---------------------------------    Please follow up PT/INR and  dose coumadin ------------------------------------------ 86yoF w/ PMHx significant for ESRD on HD on T/Th/Sat, Afib (on Coumadin), PPM Implanted, Mitral stenosis/Tricuspid Regurgitation s/p annuloplasty (2016), chronic hypotension, remote hx of colon Ca s/p resection, pulmonary HTN, COPD, restrictive lung disease and anemia who presents with complaint of hemoptysis.  Pt with known RML lung mass and metastatic disease to bone s/p IR biopsy of R femur bone biopsy consistent with squamous cell carcinoma. Pt found to have right femur intertochanteric pathologic fx s/p RIMN 1/15.  Hospital stay complicated with Hypoxia  and Delirium . Xray chest with RM lobe collapse . CT chest r/o Pneumonia , palliative care consulted for GOC  discussions and Pain management , Pt wished to be a full code  and daughter  Yasemin nieves  as HCP . Pt was placed on ATC  tylenol and low dose IV Dilaudid  for pain. pt also developed orthostatic hypotension, placed on midodrin, now stable for discharge to rehab 86yoF w/ PMHx significant for ESRD on HD on T/Th/Sat, Afib (on Coumadin), PPM Implanted, Mitral stenosis/Tricuspid Regurgitation s/p annuloplasty (2016), chronic hypotension, remote hx of colon Ca s/p resection, pulmonary HTN, COPD, restrictive lung disease and anemia who presents with complaint of hemoptysis.  Pt with known NSCLC RML lung mass and metastatic disease to bone s/p IR biopsy of R femur bone biopsy consistent with squamous cell carcinoma. Pt found to have right femur intertochanteric pathologic fx s/p RIMN 1/15. Hospital stay complicated by Hypoxia due to R middle lobe collapse due to mucus plugging and mass and metabolic encephalopathy. Palliative care consulted for GOC  discussions and Pain management , Pt wished to be a full code  and daughter  Yasemin nieves  as HCP . Pt was placed on ATC  tylenol and PRN percocet for pain. pt also developed orthostatic hypotension, placed on midodrine, now stable for discharge to rehab.     3 daughters - want to continue with immunologic treatment of lung cancer, seen by radiation oncology - not a candidate at this time but will follow up as outpatient    Patient with GERD/indigestion - especially to nepro - PPI and diet modifications     Hyponatremia - high ADH state, improved with fluid restriction - stable, asymptomatic     Functionally improved, insurance auth obtained for SVETLANA    Short term vs. long term care depending on response.

## 2020-01-21 NOTE — PROGRESS NOTE ADULT - PROBLEM SELECTOR PLAN 1
CT abd and pelvis x-ray showed right femur intertochanteric pathologic Fx  - S/p OR for RIMN 1/15  - has not required dilaudid IV PRN, change to PRN tylenol, if needed can consider oxycodone 2.5mg PRN

## 2020-01-21 NOTE — DISCHARGE NOTE PROVIDER - CARE PROVIDER_API CALL
Jeremiah Dean)  Orthopaedic Surgery  [address]  [place] [address]  Phone: [phone]  Fax: [phone]  Follow Up Time:     Ihsan Travis)  Hematology; Medical Oncology  [address]  [place] [address]  Phone: [phone]  Fax: [phone]  Follow Up Time:

## 2020-01-21 NOTE — DISCHARGE NOTE PROVIDER - CARE PROVIDERS DIRECT ADDRESSES
[email or web address]

## 2020-01-21 NOTE — PROGRESS NOTE ADULT - PROBLEM SELECTOR PLAN 5
-Senna 2 tabs HS, Miralax BID. Dulcolax PO PRN. May require enema or suppository to promote BM -No evident respiratory symptoms at this time.   -Incentive spirometry.

## 2020-01-22 NOTE — PROGRESS NOTE ADULT - PROBLEM SELECTOR PLAN 1
CT abd and pelvis x-ray showed right femur intertochanteric pathologic Fx  - S/p OR for RIMN 1/15  - has not used dilaudid IV PRN, c/w PRN tylenol and oxycodone solution - palliative input appreciated

## 2020-01-22 NOTE — PROGRESS NOTE ADULT - ATTENDING COMMENTS
Plan for d/c to [facility]  Palliative care input appreciated - patient full code at this time  Follow up with onc as outpatient Plan for d/c to [facility]  Palliative care input appreciated - patient full code at this time  Follow up with onc as outpatient  PT re-eval for OOB

## 2020-01-22 NOTE — PROGRESS NOTE ADULT - PROBLEM SELECTOR PLAN 6
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 Pathologic fracture s/p IM 1/15.  Currently on crizotinib. Follows with Onc Dr. Travis.  seen by rad onc no role for radiation at this time - follow up as outpatient

## 2020-01-22 NOTE — PROGRESS NOTE ADULT - SUBJECTIVE AND OBJECTIVE BOX
PROGRESS NOTE:   Authoted by Dr. Eleni Bradley MD  Pager [phone]     Patient is a 86y old  Female who presents with a chief complaint of R Knee pain (22 Jan 2020 07:33)      SUBJECTIVE / OVERNIGHT EVENTS: Patient seen and examined at bedside - laying in bed, c/o pain on RLE and stiffness. Pain is 7/10 when trying to move it. Otherwise she does not have any complaints. Moving her bowels.    ADDITIONAL REVIEW OF SYSTEMS: as above    MEDICATIONS  (STANDING):  allopurinol 300 milliGRAM(s) Oral <User Schedule>  artificial tears (preservative free) Ophthalmic Solution 1 Drop(s) Both EYES two times a day  chlorhexidine 4% Liquid 1 Application(s) Topical daily  crizotinib 200 milliGRAM(s) Oral two times a day  lidocaine   Patch 1 Patch Transdermal daily  midodrine. 10 milliGRAM(s) Oral <User Schedule>  pantoprazole    Tablet 40 milliGRAM(s) Oral before breakfast  polyethylene glycol 3350 17 Gram(s) Oral daily  senna 2 Tablet(s) Oral at bedtime  sevelamer carbonate 800 milliGRAM(s) Oral three times a day with meals    MEDICATIONS  (PRN):  acetaminophen   Tablet .. 650 milliGRAM(s) Oral every 6 hours PRN Mild Pain (1 - 3)  aluminum hydroxide/magnesium hydroxide/simethicone Suspension 30 milliLiter(s) Oral every 6 hours PRN Dyspepsia  bisacodyl 5 milliGRAM(s) Oral every 12 hours PRN Constipation  calcium carbonate    500 mG (Tums) Chewable 1 Tablet(s) Chew every 8 hours PRN Heartburn  oxyCODONE    Solution 2 milliGRAM(s) Oral every 6 hours PRN Moderate to severe pain      CAPILLARY BLOOD GLUCOSE        I&O's Summary    21 Jan 2020 07:01  -  22 Jan 2020 07:00  --------------------------------------------------------  IN: 1460 mL / OUT: 1000 mL / NET: 460 mL    22 Jan 2020 07:01  -  22 Jan 2020 10:43  --------------------------------------------------------  IN: 120 mL / OUT: 0 mL / NET: 120 mL        PHYSICAL EXAM:  Vital Signs Last 24 Hrs  T(C): 36.7 (22 Jan 2020 09:13), Max: 36.8 (21 Jan 2020 14:18)  T(F): 98 (22 Jan 2020 09:13), Max: 98.2 (21 Jan 2020 14:18)  HR: 66 (22 Jan 2020 09:13) (47 - 79)  BP: 110/68 (22 Jan 2020 09:13) (102/59 - 120/68)  BP(mean): --  RR: 17 (22 Jan 2020 06:32) (17 - 18)  SpO2: 99% (22 Jan 2020 09:13) (91% - 99%)    CONSTITUTIONAL: NAD  RESPIRATORY: Normal respiratory effort; lungs are clear to auscultation bilaterally  CARDIOVASCULAR: Regular rate and rhythm, normal S1 and S2, no murmur/rub/gallop; No lower extremity edema; Peripheral pulses are 2+ bilaterally  ABDOMEN: Nontender to palpation, normoactive bowel sounds, no rebound/guarding; No hepatosplenomegaly  MUSCLOSKELETAL: no clubbing or cyanosis of digits; no joint swelling or tenderness to palpation  PSYCH: A+O to person, place, and time but slightly forgetful; affect appropriate  R leg/hip - well healing, ACE wrapped    LABS:                        12.5   14.67 )-----------( 213      ( 21 Jan 2020 08:22 )             41.6     01-21    133<L>  |  91<L>  |  90<H>  ----------------------------<  89  5.8<H>   |  23  |  8.08<H>    Ca    10.0      21 Jan 2020 06:57      PT/INR - ( 22 Jan 2020 07:14 )   PT: 43.6 sec;   INR: 3.67 ratio                     RADIOLOGY & ADDITIONAL TESTS:  Results Reviewed:   Imaging Personally Reviewed:  Electrocardiogram Personally Reviewed:    COORDINATION OF CARE:  Care Discussed with Consultants/Other Providers [Y/N]: heme, palliative, nephrology, cardiology  Prior or Outpatient Records Reviewed [Y/N]:

## 2020-01-22 NOTE — PROGRESS NOTE ADULT - SUBJECTIVE AND OBJECTIVE BOX
Events since last visit noted, Pt is till here, has no pain, bleeding, cough, SOB or any other active symptoms on ROS.       Meds:  acetaminophen   Tablet .. 650 milliGRAM(s) Oral every 6 hours PRN  allopurinol 300 milliGRAM(s) Oral <User Schedule>  aluminum hydroxide/magnesium hydroxide/simethicone Suspension 30 milliLiter(s) Oral every 6 hours PRN  artificial tears (preservative free) Ophthalmic Solution 1 Drop(s) Both EYES two times a day  bisacodyl 5 milliGRAM(s) Oral every 12 hours PRN  calcium carbonate    500 mG (Tums) Chewable 1 Tablet(s) Chew every 8 hours PRN  chlorhexidine 4% Liquid 1 Application(s) Topical daily  crizotinib 200 milliGRAM(s) Oral two times a day  lidocaine   Patch 1 Patch Transdermal daily  midodrine. 10 milliGRAM(s) Oral <User Schedule>  oxyCODONE    Solution 2 milliGRAM(s) Oral every 6 hours PRN  pantoprazole    Tablet 40 milliGRAM(s) Oral before breakfast  polyethylene glycol 3350 17 Gram(s) Oral daily  senna 2 Tablet(s) Oral at bedtime  sevelamer carbonate 800 milliGRAM(s) Oral three times a day with meals      Vital Signs Last 24 Hrs  T(C): 36.7 (22 Jan 2020 06:32), Max: 36.8 (21 Jan 2020 08:44)  T(F): 98 (22 Jan 2020 06:32), Max: 98.2 (21 Jan 2020 08:44)  HR: 72 (22 Jan 2020 06:32) (47 - 79)  BP: 112/63 (22 Jan 2020 06:32) (10/68 - 120/68)  BP(mean): --  RR: 17 (22 Jan 2020 06:32) (17 - 18)  SpO2: 98% (22 Jan 2020 06:32) (91% - 98%)                          12.5   14.67 )-----------( 213      ( 21 Jan 2020 08:22 )             41.6       01-21    133<L>  |  91<L>  |  90<H>  ----------------------------<  89  5.8<H>   |  23  |  8.08<H>    Ca    10.0      21 Jan 2020 06:57                PT/INR - ( 21 Jan 2020 10:31 )   PT: 44.8 sec;   INR: 3.77 ratio

## 2020-01-22 NOTE — PROGRESS NOTE ADULT - SUBJECTIVE AND OBJECTIVE BOX
S: Denies chest pain or SOB. Review of systems otherwise (-)      MEDICATIONS  (STANDING):  allopurinol 300 milliGRAM(s) Oral <User Schedule>  artificial tears (preservative free) Ophthalmic Solution 1 Drop(s) Both EYES two times a day  chlorhexidine 4% Liquid 1 Application(s) Topical daily  crizotinib 200 milliGRAM(s) Oral two times a day  lidocaine   Patch 1 Patch Transdermal daily  midodrine. 10 milliGRAM(s) Oral <User Schedule>  pantoprazole    Tablet 40 milliGRAM(s) Oral before breakfast  polyethylene glycol 3350 17 Gram(s) Oral daily  senna 2 Tablet(s) Oral at bedtime  sevelamer carbonate 800 milliGRAM(s) Oral three times a day with meals    MEDICATIONS  (PRN):  acetaminophen   Tablet .. 650 milliGRAM(s) Oral every 6 hours PRN Mild Pain (1 - 3)  aluminum hydroxide/magnesium hydroxide/simethicone Suspension 30 milliLiter(s) Oral every 6 hours PRN Dyspepsia  bisacodyl 5 milliGRAM(s) Oral every 12 hours PRN Constipation  calcium carbonate    500 mG (Tums) Chewable 1 Tablet(s) Chew every 8 hours PRN Heartburn  oxyCODONE    Solution 2 milliGRAM(s) Oral every 6 hours PRN Moderate to severe pain      LABS:                            12.5   14.67 )-----------( 213      ( 21 Jan 2020 08:22 )             41.6     Hemoglobin: 12.5 g/dL (01-21 @ 08:22)  Hemoglobin: 12.4 g/dL (01-20 @ 08:58)  Hemoglobin: 12.1 g/dL (01-18 @ 10:55)    01-21    133<L>  |  91<L>  |  90<H>  ----------------------------<  89  5.8<H>   |  23  |  8.08<H>    Ca    10.0      21 Jan 2020 06:57      Creatinine Trend: 8.08<--, 6.38<--, 4.24<--, 5.40<--, 3.83<--, 5.59<--   PT/INR - ( 22 Jan 2020 07:14 )   PT: 43.6 sec;   INR: 3.67 ratio                   01-21-20 @ 07:01  -  01-22-20 @ 07:00  --------------------------------------------------------  IN: 1460 mL / OUT: 1000 mL / NET: 460 mL    01-22-20 @ 07:01  -  01-22-20 @ 14:22  --------------------------------------------------------  IN: 200 mL / OUT: 0 mL / NET: 200 mL        PHYSICAL EXAM  Vital Signs Last 24 Hrs  T(C): 36.7 (22 Jan 2020 09:13), Max: 36.7 (22 Jan 2020 01:29)  T(F): 98 (22 Jan 2020 09:13), Max: 98.1 (22 Jan 2020 01:29)  HR: 64 (22 Jan 2020 11:12) (60 - 79)  BP: 118/67 (22 Jan 2020 11:12) (105/70 - 120/68)  BP(mean): --  RR: 17 (22 Jan 2020 06:32) (17 - 18)  SpO2: 99% (22 Jan 2020 11:12) (95% - 99%)        Gen: Appears well in NAD  HEENT:  (-)icterus (-)pallor  CV: N S1 S2 1/6 GARY (+)2 Pulses B/l  Resp:  Clear to auscultation B/L, normal effort  GI: (+) BS Soft, NT, ND  Lymph:  (-) Edema, (-)obvious lymphadenopathy  Skin: Warm to touch, Normal turgor  Psych: Appropriate mood and affect      TELEMETRY: None	      ASSESSMENT/PLAN:   87 y/o female known to our office with PMHx of ESRD on HD, persistent Afib (on Coumadin), s/p Bio MVR, Tricuspid valve repair and ASD closure in 2016, hx complete heart block s/p PPM, Lung CA, COPD, remote colon CA (s/p R hemicolectomy), had normal nuclear stress test and TTE with normal LV/RV with well seated bio MV with normal function both in 7/2019 who presented to ED with worsening right hip pain s/p recent mechanical fall found to have right hip fracture and supratherapeutic INR requiring reversal for OR. Cardiology consulted for preop clearance. Patient underwent right femoral intramedullary rodding 1/15/20 .     - No evidence of clinical HF or anginal symptoms  - Denies LOC/syncope -- PPM interrogation noted, episodes of likely SVT but no correlating events to recent fall  - Normal Nuclear stress and TTE with normal LV/RV with well seated bio MV with normal function both in 7/2019  - AF - continue Coumadin for lifelong CVA prevention if okay with ortho/primary team  - No further inpatient cardiac w/u needed at this time  - D/C planning SVETLANA per primary team  - Patient to follow up in our [place] office with Dr. Seay on 2/24 at 2 PM ([address], [address], [place] [address] - Office #[phone])    Adam Jim PA-C  [facility]  Pager: [phone]

## 2020-01-22 NOTE — PROGRESS NOTE ADULT - ASSESSMENT
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

## 2020-01-22 NOTE — PROGRESS NOTE ADULT - PROBLEM SELECTOR PLAN 2
Lung cancer/mass  -Pt with hypoxic episode likely due to right middle lobe collapse   -CT A/P commenting on Right middle lobe lobar atelectasis.  -rvp negative  -le dopplers negative  -now off oxygen   -incentive spirometry  -pulm recs noted

## 2020-01-22 NOTE — PROGRESS NOTE ADULT - SUBJECTIVE AND OBJECTIVE BOX
Patient seen and examined  no complaints    No Known Allergies    Hospital Medications:   MEDICATIONS  (STANDING):  allopurinol 300 milliGRAM(s) Oral <User Schedule>  artificial tears (preservative free) Ophthalmic Solution 1 Drop(s) Both EYES two times a day  chlorhexidine 4% Liquid 1 Application(s) Topical daily  crizotinib 200 milliGRAM(s) Oral two times a day  lidocaine   Patch 1 Patch Transdermal daily  midodrine. 10 milliGRAM(s) Oral <User Schedule>  pantoprazole    Tablet 40 milliGRAM(s) Oral before breakfast  polyethylene glycol 3350 17 Gram(s) Oral daily  senna 2 Tablet(s) Oral at bedtime  sevelamer carbonate 800 milliGRAM(s) Oral three times a day with meals        VITALS:  T(F): 98 (01-22-20 @ 09:13), Max: 98.2 (01-21-20 @ 14:18)  HR: 64 (01-22-20 @ 11:12)  BP: 118/67 (01-22-20 @ 11:12)  RR: 17 (01-22-20 @ 06:32)  SpO2: 99% (01-22-20 @ 11:12)  Wt(kg): --    01-21 @ 07:01 - 01-22 @ 07:00  --------------------------------------------------------  IN: 1460 mL / OUT: 1000 mL / NET: 460 mL    01-22 @ 07:01 - 01-22 @ 14:12  --------------------------------------------------------  IN: 120 mL / OUT: 0 mL / NET: 120 mL      PHYSICAL EXAM:  Constitutional: NAD  HEENT: anicteric sclera, oropharynx clear.  Neck: No JVD  Respiratory: CTAB, no wheezes, rales or rhonchi  Cardiovascular: S1, S2, RRR  Gastrointestinal: BS+, soft, NT/ND  Extremities: No peripheral edema  Neurological: A/O x 3, no focal deficits  Vascular Access: AVF with thrill and bruit    LABS:  01-21    133<L>  |  91<L>  |  90<H>  ----------------------------<  89  5.8<H>   |  23  |  8.08<H>    Ca    10.0      21 Jan 2020 06:57      Creatinine Trend: 8.08 <--, 6.38 <--, 4.24 <--, 5.40 <--, 3.83 <--, 5.59 <--, 5.40 <--, 0.59 <--, 4.15 <--                        12.5   14.67 )-----------( 213      ( 21 Jan 2020 08:22 )             41.6     Urine Studies:      RADIOLOGY & ADDITIONAL STUDIES:

## 2020-01-22 NOTE — PROGRESS NOTE ADULT - ATTENDING COMMENTS
Patient seen and examined.  Agree with above.   Continue with lifelong ac for cva prevention if no contraindications   No further inpatient cardiac workup needed at this time.     Poncho Alba MD

## 2020-01-22 NOTE — PROGRESS NOTE ADULT - ASSESSMENT
86yoF w/ PMHx significant for ESRD on HD on T/Th/Sat, Afib (on Coumadin), PPM Implanted, Mitral stenosis/Tricuspid Regurgitation s/p annuloplasty (2016), chronic hypotension, remote hx of colon Ca s/p resection, pulmonary HTN, COPD, restrictive lung disease, anemia, RML lung mass and metastatic disease to bone s/p IR biopsy of R femur bone biopsy consistent with squamous cell carcinoma a/w hemoptysis likely due to metastatic lung cancer and found with pathologic R femur intertroch fracture s/p R IMN 1/15 86yoF w/ PMHx significant for ESRD on HD on T/Th/Sat, Afib (on Coumadin), PPM Implanted, Mitral stenosis/Tricuspid Regurgitation s/p annuloplasty (2016), chronic hypotension, remote hx of colon Ca s/p resection, pulmonary HTN, COPD, restrictive lung disease, anemia, RML lung mass and metastatic disease to bone s/p IR biopsy of R femur bone biopsy consistent with squamous cell carcinoma a/w hemoptysis likely due to metastatic lung cancer and supratherapeutic INR and found with pathologic R femur intertroch fracture s/p R IMN 1/15

## 2020-01-22 NOTE — PROGRESS NOTE ADULT - PROBLEM SELECTOR PLAN 5
- Multiple BMs in last few days  - s/p S/S recommend puree diet with thins, assistance with meals, alternate liquid/solid -- c/w maalox and PPI w/ monitoring and crush meds

## 2020-01-23 NOTE — PROGRESS NOTE ADULT - PROBLEM SELECTOR PLAN 7
Hgb 7.2 on admission in setting of supratherapeutic INR, baseline around 9. No evidence of active bleeding. 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 FOBT neg on this admission. Given 1u pRBCs in ED

## 2020-01-23 NOTE — PROGRESS NOTE ADULT - SUBJECTIVE AND OBJECTIVE BOX
PROGRESS NOTE:   Authoted by Dr. Eleni Bradley MD  Pager [phone]     Patient is a 86y old  Female who presents with a chief complaint of R Knee pain (22 Jan 2020 14:21)      SUBJECTIVE / OVERNIGHT EVENTS: patient seen and examined at bedside - patient laying in bed, reports feeling well, no new complaints, says she was able to walk with PT yesterday. Per family, they would like to liberalize diet but would like her re-evaluated by s/s    ADDITIONAL REVIEW OF SYSTEMS: as above    MEDICATIONS  (STANDING):  allopurinol 300 milliGRAM(s) Oral <User Schedule>  artificial tears (preservative free) Ophthalmic Solution 1 Drop(s) Both EYES two times a day  chlorhexidine 4% Liquid 1 Application(s) Topical daily  crizotinib 200 milliGRAM(s) Oral two times a day  lidocaine   Patch 1 Patch Transdermal daily  midodrine. 10 milliGRAM(s) Oral <User Schedule>  pantoprazole    Tablet 40 milliGRAM(s) Oral before breakfast  polyethylene glycol 3350 17 Gram(s) Oral daily  senna 2 Tablet(s) Oral at bedtime  sevelamer carbonate 800 milliGRAM(s) Oral three times a day with meals    MEDICATIONS  (PRN):  acetaminophen   Tablet .. 650 milliGRAM(s) Oral every 6 hours PRN Mild Pain (1 - 3)  aluminum hydroxide/magnesium hydroxide/simethicone Suspension 30 milliLiter(s) Oral every 6 hours PRN Dyspepsia  bisacodyl 5 milliGRAM(s) Oral every 12 hours PRN Constipation  calcium carbonate    500 mG (Tums) Chewable 1 Tablet(s) Chew every 8 hours PRN Heartburn  oxyCODONE    Solution 2 milliGRAM(s) Oral every 6 hours PRN Moderate to severe pain      CAPILLARY BLOOD GLUCOSE        I&O's Summary    22 Jan 2020 07:01  -  23 Jan 2020 07:00  --------------------------------------------------------  IN: 320 mL / OUT: 0 mL / NET: 320 mL        PHYSICAL EXAM:  Vital Signs Last 24 Hrs  T(C): 36.8 (23 Jan 2020 08:39), Max: 36.8 (23 Jan 2020 08:39)  T(F): 98.3 (23 Jan 2020 08:39), Max: 98.3 (23 Jan 2020 08:39)  HR: 62 (23 Jan 2020 08:39) (60 - 65)  BP: 101/64 (23 Jan 2020 08:39) (100/56 - 126/73)  BP(mean): --  RR: 18 (23 Jan 2020 08:39) (18 - 18)  SpO2: 94% (23 Jan 2020 08:39) (94% - 99%)    CONSTITUTIONAL: NAD  RESPIRATORY: Normal respiratory effort; lungs are clear to auscultation bilaterally  CARDIOVASCULAR: Regular rate and rhythm, normal S1 and S2, no murmur/rub/gallop; No lower extremity edema; Peripheral pulses are 2+ bilaterally  ABDOMEN: Nontender to palpation, normoactive bowel sounds, no rebound/guarding; No hepatosplenomegaly  MUSCLOSKELETAL: no clubbing or cyanosis of digits; no joint swelling or tenderness to palpation  PSYCH: A+O to person and place; affect appropriate    LABS:          PT/INR - ( 23 Jan 2020 08:36 )   PT: 39.3 sec;   INR: 3.32 ratio                     RADIOLOGY & ADDITIONAL TESTS:  Results Reviewed:   Imaging Personally Reviewed:  Electrocardiogram Personally Reviewed:    COORDINATION OF CARE:  Care Discussed with Consultants/Other Providers [Y/N]:  Prior or Outpatient Records Reviewed [Y/N]:

## 2020-01-23 NOTE — PROGRESS NOTE ADULT - PROBLEM SELECTOR PLAN 6
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 Pathologic fracture s/p IM 1/15.  c/w crizotinib. Follows with Onc Dr. Travis.  seen by rad onc no role for radiation at this time - follow up as outpatient

## 2020-01-23 NOTE — SWALLOW BEDSIDE ASSESSMENT ADULT - SWALLOW EVAL: DIAGNOSIS
SLP attempted to see Pt for swallowing evaluation however the Pt is currently off the floor.  D/W Nsg Nara Gibbons and TYSON Hernandez as Pt's family is requesting diet upgrade.  SLP to f/u as schedule permits.

## 2020-01-23 NOTE — SWALLOW BEDSIDE ASSESSMENT ADULT - SLP PERTINENT HISTORY OF CURRENT PROBLEM
87 y/o female with PMHx of ESRD on HD, stage IV squamous cell lung cancer (diagnosed 10/2019, mets to bone, persistent Afib (on Coumadin), s/p Bio MVR, Tricuspid valve repair and ASD closure in 2016, hx complete heart block s/p PPM, Lung CA, COPD, remote colon CA (s/p R hemicolectomy), GI bleed (2/2 Dieulafoy lesion), anemia, pulmonary HTN, COPD, and restrictive lung disease.  Pt presented to ED with worsening right hip pain s/p recent mechanical fall found to have right hip fracture and supratherapeutic INR requiring reversal for OR. Patient underwent right femoral intramedullary rodding 1/15/20 .  1/19/2020 NP reports; pt having difficulty swallowing medication and food. Pt also constipated. Abd soft, non-tender, BS normal present. Dw Dr. Peguero, ordered speech and swallow, dysphagia diet and abdominal xray.

## 2020-01-23 NOTE — PROGRESS NOTE ADULT - ASSESSMENT
86yoF w/ PMHx significant for ESRD on HD on T/Th/Sat, Afib (on Coumadin), PPM Implanted, Mitral stenosis/Tricuspid Regurgitation s/p annuloplasty (2016), chronic hypotension, remote hx of colon Ca s/p resection, pulmonary HTN, COPD, restrictive lung disease, anemia, RML lung mass and metastatic disease to bone s/p IR biopsy of R femur bone biopsy consistent with squamous cell carcinoma a/w hemoptysis likely due to metastatic lung cancer and supratherapeutic INR and found with pathologic R femur intertroch fracture s/p R IMN 1/15

## 2020-01-23 NOTE — SWALLOW BEDSIDE ASSESSMENT ADULT - COMMENTS
1/14/2020 per Pulm consult: CXR showing RML consolidation likely collapse unclear if from progression of malignancy vs mucous plugging.  1/17/2020 Palliative care consulted for GOC and pain management; Pt with lung collapse, delirium and constipation; the patient is to be full code; - she has appointed her daughter Yasemin Zaman as healthcare proxy.- Will continue to f/u for Pain. 1/19/2020 Per MD:  pain control with dilaudid prn. Pt with hypoxic episode; -CT A/P commenting on Right middle lobe lobar atelectasis.

## 2020-01-23 NOTE — PROGRESS NOTE ADULT - SUBJECTIVE AND OBJECTIVE BOX
Patient denies CP, SOB seen at HD Review of systems otherwise (-)    acetaminophen   Tablet .. 650 milliGRAM(s) Oral every 6 hours PRN  allopurinol 300 milliGRAM(s) Oral <User Schedule>  aluminum hydroxide/magnesium hydroxide/simethicone Suspension 30 milliLiter(s) Oral every 6 hours PRN  artificial tears (preservative free) Ophthalmic Solution 1 Drop(s) Both EYES two times a day  bisacodyl 5 milliGRAM(s) Oral every 12 hours PRN  calcium carbonate    500 mG (Tums) Chewable 1 Tablet(s) Chew every 8 hours PRN  chlorhexidine 4% Liquid 1 Application(s) Topical daily  crizotinib 200 milliGRAM(s) Oral two times a day  lidocaine   Patch 1 Patch Transdermal daily  midodrine. 10 milliGRAM(s) Oral <User Schedule>  oxyCODONE    Solution 2 milliGRAM(s) Oral every 6 hours PRN  pantoprazole    Tablet 40 milliGRAM(s) Oral before breakfast  polyethylene glycol 3350 17 Gram(s) Oral daily  senna 2 Tablet(s) Oral at bedtime  sevelamer carbonate 800 milliGRAM(s) Oral three times a day with meals  warfarin 0.5 milliGRAM(s) Oral once          Hemoglobin: 12.5 g/dL (01-21 @ 08:22)  Hemoglobin: 12.4 g/dL (01-20 @ 08:58)            Creatinine Trend: 8.08<--, 6.38<--, 4.24<--, 5.40<--, 3.83<--, 5.59<--    COAGS: PT/INR - ( 23 Jan 2020 08:36 )   PT: 39.3 sec;   INR: 3.32 ratio                   T(C): 36.3 (01-23-20 @ 13:37), Max: 36.8 (01-23-20 @ 08:39)  HR: 62 (01-23-20 @ 13:37) (60 - 65)  BP: 101/51 (01-23-20 @ 13:37) (101/51 - 126/73)  RR: 19 (01-23-20 @ 13:37) (18 - 19)  SpO2: 94% (01-23-20 @ 13:37) (94% - 99%)  Wt(kg): --    I&O's Summary    22 Jan 2020 07:01  -  23 Jan 2020 07:00  --------------------------------------------------------  IN: 320 mL / OUT: 0 mL / NET: 320 mL        Gen: Appears well in NAD  HEENT:  (-)icterus (-)pallor  CV: N S1 S2 1/6 GARY (+)2 Pulses B/l  Resp:  Clear to auscultation B/L, normal effort  GI: (+) BS Soft, NT, ND  Lymph:  (-) Edema, (-)obvious lymphadenopathy  Skin: Warm to touch, Normal turgor  Psych: Appropriate mood and affect      TELEMETRY: None	      ASSESSMENT/PLAN:   85 y/o female known to our office with PMHx of ESRD on HD, persistent Afib (on Coumadin), s/p Bio MVR, Tricuspid valve repair and ASD closure in 2016, hx complete heart block s/p PPM, Lung CA, COPD, remote colon CA (s/p R hemicolectomy), had normal nuclear stress test and TTE with normal LV/RV with well seated bio MV with normal function both in 7/2019 who presented to ED with worsening right hip pain s/p recent mechanical fall found to have right hip fracture and supratherapeutic INR requiring reversal for OR. Cardiology consulted for preop clearance. Patient underwent right femoral intramedullary rodding 1/15/20 .     - No evidence of clinical HF or anginal symptoms  - Denies LOC/syncope -- PPM interrogation noted, episodes of likely SVT but no correlating events to recent fall  - Normal Nuclear stress and TTE with normal LV/RV with well seated bio MV with normal function both in 7/2019  - AF - continue Coumadin for lifelong CVA prevention if okay with ortho/primary team  - No further inpatient cardiac w/u needed at this time  - D/C planning [facility] per primary team  - Patient to follow up in our [place] office with Dr. Seay on 2/24 at 2 PM ([address], [address], [place] [address] - Office #[phone])    Zheng Patel MD, [facility]  BEEPER [phone]

## 2020-01-23 NOTE — PROGRESS NOTE ADULT - SUBJECTIVE AND OBJECTIVE BOX
Patient seen and examined  no complaints    No Known Allergies    Hospital Medications:   MEDICATIONS  (STANDING):  allopurinol 300 milliGRAM(s) Oral <User Schedule>  artificial tears (preservative free) Ophthalmic Solution 1 Drop(s) Both EYES two times a day  chlorhexidine 4% Liquid 1 Application(s) Topical daily  crizotinib 200 milliGRAM(s) Oral two times a day  lidocaine   Patch 1 Patch Transdermal daily  midodrine. 10 milliGRAM(s) Oral <User Schedule>  pantoprazole    Tablet 40 milliGRAM(s) Oral before breakfast  polyethylene glycol 3350 17 Gram(s) Oral daily  senna 2 Tablet(s) Oral at bedtime  sevelamer carbonate 800 milliGRAM(s) Oral three times a day with meals        VITALS:  T(F): 97.4 (01-23-20 @ 13:37), Max: 98.3 (01-23-20 @ 08:39)  HR: 62 (01-23-20 @ 13:37)  BP: 101/51 (01-23-20 @ 13:37)  RR: 19 (01-23-20 @ 13:37)  SpO2: 94% (01-23-20 @ 13:37)  Wt(kg): --    01-22 @ 07:01  -  01-23 @ 07:00  --------------------------------------------------------  IN: 320 mL / OUT: 0 mL / NET: 320 mL      PHYSICAL EXAM:  Constitutional: NAD  HEENT: anicteric sclera, oropharynx clear.  Neck: No JVD  Respiratory: CTAB, no wheezes, rales or rhonchi  Cardiovascular: S1, S2, RRR  Gastrointestinal: BS+, soft, NT/ND  Extremities: No peripheral edema  Neurological: A/O x 3, no focal deficits  Vascular Access: AVF with thrill and bruit    LABS:        Creatinine Trend: 8.08 <--, 6.38 <--, 4.24 <--, 5.40 <--, 3.83 <--, 5.59 <--    Urine Studies:      RADIOLOGY & ADDITIONAL STUDIES:

## 2020-01-23 NOTE — PROGRESS NOTE ADULT - ATTENDING COMMENTS
Plan for d/c to [facility]  Palliative care input appreciated - patient full code at this time, overall poor prognosis  Follow up with onc as outpatient  s/s re-eval

## 2020-01-23 NOTE — PROGRESS NOTE ADULT - PROBLEM SELECTOR PLAN 5
- Multiple BMs in last few days  - s/p S/S recommend puree diet with thins, assistance with meals, alternate liquid/solid -- c/w maalox and PPI w/ monitoring and crush meds  Family would like to liberalize diet but want s/s re-eval - per nurse report, patient has some cough with intake, c/w current diet for now

## 2020-01-23 NOTE — PROGRESS NOTE ADULT - PROBLEM SELECTOR PLAN 1
CT abd and pelvis x-ray showed right femur intertochanteric pathologic Fx  - S/p OR for RIMN 1/15  - c/w PRN tylenol and oxycodone solution, pain is controlled - palliative input appreciated

## 2020-01-23 NOTE — PROGRESS NOTE ADULT - PROBLEM SELECTOR PLAN 2
Lung cancer/mass  -Pt with hypoxic episode likely due to right middle lobe collapse, Lung cancer   -CT A/P commenting on Right middle lobe lobar atelectasis.  -rvp negative  -le dopplers negative  - has been on/off oxygen - wean off as tolerated   -incentive spirometry  - seen by pulm.

## 2020-01-24 NOTE — DIETITIAN INITIAL EVALUATION ADULT. - PROBLEM SELECTOR PLAN 8
Discussed with patient, states she would not want to be on ventilator for too long if there is no hope. Not ready to make decision on DNR/DNI status currently. Explained that if patient were to have respiratory failure/code, it would likely be in setting of worsening disease burden, which would not be reversible.   - Palliative consult for [facility]

## 2020-01-24 NOTE — PROGRESS NOTE ADULT - ATTENDING COMMENTS
Agree with above  No further inpatient cardiac workup needed at this time.   DC planning per primary team    Poncho Alba MD

## 2020-01-24 NOTE — DIETITIAN INITIAL EVALUATION ADULT. - PROBLEM SELECTOR PLAN 2
INR elevated to 15.62. Unclear underlying etiology, no recent change in meds, no recent abx use. Does not appear septic, no obvious source of infection. AST/ALT mildly elevated, but no bilirubin or albumin derangements to suggest liver synthetic dysfunction. s/p Kcentra and Vit K 10mg IV in ED. No evidence of active bleeding. CT Head neg for hemorrhage, CT A/P no RP bleed.  - INR daily  - monitor for bleeding  - holding Coumadin  - neuro checks

## 2020-01-24 NOTE — SWALLOW BEDSIDE ASSESSMENT ADULT - SWALLOW EVAL: RECOMMENDED FEEDING/EATING TECHNIQUES
position upright (90 degrees)/small sips/bites/allow for swallow between intakes/crush medication (when feasible)/alternate food with liquid

## 2020-01-24 NOTE — SWALLOW BEDSIDE ASSESSMENT ADULT - SWALLOW EVAL: DIAGNOSIS
Pt continues to present with oral preparatory and oral stage dysphagia as acceptance of PO is limited and formation/transfer of the bolus are delayed.  Underlying behavioral issues, underlying medical condition and possible symptoms of esophageal dysphagia may be contributing factors to deficits.  Pt continues to report sensation of "burning" in mid sternal region during PO intake of purees and intermittently stated that the bolus was not going down.  No signs or symptoms of laryngeal penetration/aspiration evident with any consistency presented.  Pharyngeal swallow judged to be timely with adequate laryngeal elevation.  Pt denied sensation of pharyngeal residue or throat pain during/after swallowing.

## 2020-01-24 NOTE — DIETITIAN INITIAL EVALUATION ADULT. - OTHER INFO
Diet PTA: Pt reports poor appetite and PO intake x 3-4 weeks PTA. Pt lives at home and receives meals from [employer] on the weekdays and reports her daughter prepares her meals on the weekend. Per pt, meals are compliant with HD diet recommendations. Per dietary recall, pt is eating <50% of her meals. Reports taking vitamin D and C at home. Reports she drinks half a bottle of Boost occasionally. Confirms NKFA. No Voodoo or cultural food preferences. Reports burning sensation with swallowing and abdominal discomfort that began during current admission.     Weights: Pt reports UBW 172lbs with no recent wt loss. Reported UBW consistent with current in house wts. Diet PTA: Pt reports poor appetite and PO intake x 3-4 weeks PTA. Pt lives at home and receives meals from [employer] on the weekdays and reports her daughter prepares her meals on the weekend. Per pt, meals are compliant with HD diet recommendations. Per dietary recall, pt is eating <50% of her meals. Reports taking vitamin D and C at home. Noted pt takes coumadin at home. Reports she drinks half a bottle of Boost occasionally. Confirms NKFA. No Latter-day or cultural food preferences. Reports burning sensation with swallowing and abdominal discomfort that began during current admission.     Weights: Pt reports UBW 172lbs with no recent wt loss. Last admission dosing wt 170lbs (12-01). Pt's admission wt post .3lbs (01-13) and current wt post .5lbs (01-23). ~10.6% wt loss x 10 days     Pt reports appetite and PO intake has been poor in house; eats <50% of her meal tray. No c/o N+V, constipation or diarrhea. Per RN, last BM (01-23). Noted pt on bowel regimen. Per SLP bedside swallow (01-24), recommends to continue the Puree diet with assistance/supervision as needed.     Nutrition focused physical exam performed with permission from the pt. RD preceptor was present. Nutrition focused physical exam showed mild triceps fat depletion, mild temporal muscle wasting and mild shoulder muscle depletion.     Diet Education: Emphasized importance of consuming adequate protein. Discussed with pt importance of nutrition oral supplements to optimize caloric and protein intake. Pt amenable to Nepro. Encouraged pt to consume supplement in between meals to optimize PO intake. Pt was lethargic but demonstrated understanding. Deferred coumadin and extensive HD nutrition therapy recommendations at this time due to pt being sleepy and not fully receptive. Will reinforce education at follow up. Diet PTA: Pt reports poor appetite and PO intake for the last 3-4 weeks. Pt lives at home and receives meals from [employer] on the weekdays and reports her daughter prepares her meals on the weekend. Per pt, meals are compliant with HD diet recommendations. Per dietary recall, pt is consuming <50% of nutritional needs. Pt usually skips breakfast and lunch, may consume dinner and snacks sometimes include crackers.  Reports taking vitamin D and C at home. Noted pt takes coumadin at home. Reports she drinks half a bottle of Boost occasionally. Confirms NKFA. No Synagogue or cultural food preferences.     Weights: Pt reports UBW 172lbs, pt unaware of wt loss. Last admission dosing wt 170lbs (12-01). Pt's admission wt post .3lbs (01-13) and current wt post .5lbs (01-23). ~10.6% wt loss x 10 days     Pt reports appetite and PO intake has been poor in house; eats <50% of her meal tray. No c/o N+V, constipation or diarrhea. Per RN, last BM (01-23). Noted pt on bowel regimen. Per SLP bedside swallow (01-24), recommends to continue the Puree diet with assistance/supervision as needed. Reports burning sensation with swallowing and abdominal discomfort that began during current admission.     Nutrition focused physical exam performed with permission from the pt. RD preceptor was present. Nutrition focused physical exam showed mild triceps fat depletion, mild temporal muscle wasting and moderate shoulder and clavicle muscle depletion.     Diet Education: Emphasized importance of consuming adequate protein. Discussed with pt importance of nutrition oral supplements to optimize caloric and protein intake. Pt amenable to Nepro. Encouraged pt to consume supplement in between meals to optimize PO intake. Pt was lethargic but demonstrated understanding. Deferred coumadin and extensive HD nutrition therapy recommendations at this time due to pt being sleepy and not fully receptive. Will reinforce education at follow up.

## 2020-01-24 NOTE — PROGRESS NOTE ADULT - ASSESSMENT
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

## 2020-01-24 NOTE — SWALLOW BEDSIDE ASSESSMENT ADULT - ESOPHAGEAL PHASE
Pt c/o "burning" referring to midsternal region during PO intake; frequent pauses between PO intake stating it takes time to go down; although no s/s of aspiration evident.  Pt continues to prefer to alternate purees with sip of thin liquid.
Pt c/o "pain like a fire burning right there" referring to midsternal region during PO intake; frequent pauses between PO intake stating it takes time to go down; although no s/s of aspiration evident.  Pt preferred to alternate purees with sip of thin liquid.

## 2020-01-24 NOTE — SWALLOW BEDSIDE ASSESSMENT ADULT - ASR SWALLOW LABIAL MOBILITY
impaired retraction/mild asymmetry upon retraction on Right side
impaired retraction/mild asymmetry upon retraction on Right side

## 2020-01-24 NOTE — SWALLOW BEDSIDE ASSESSMENT ADULT - PHARYNGEAL PHASE
No signs or symptoms of laryngeal penetration/aspiration evident with any consistency presented.  Pharyngeal swallow judged to be timely with adequate laryngeal elevation.  clear vocal quality post swallow Pt c/o "burning" referring to midsternal region during PO intake; frequent pauses between PO intake stating it takes time to go down

## 2020-01-24 NOTE — PROGRESS NOTE ADULT - PROBLEM SELECTOR PLAN 5
- Multiple BMs in last few days  - s/p S/S recommend puree diet with thins, assistance with meals, alternate liquid/solid -- c/w maalox and PPI w/ monitoring and crush meds  Family would like to liberalize diet but want s/s re-eval - per nurse report, patient has some cough with intake, c/w current diet for now - Multiple BMs in last few days  - s/p S/S recommend puree diet with thins, assistance with meals, alternate liquid/solid -- c/w maalox and PPI w/ monitoring and crush meds  Family would like to liberalize diet but s/s re-evaluated still recommending puree with thin liquids

## 2020-01-24 NOTE — CHART NOTE - NSCHARTNOTEFT_GEN_A_CORE
Upon Nutritional Assessment by the Registered Dietitian your patient was determined to meet criteria / has evidence of the following diagnosis/diagnoses:          [ ]  Mild Protein Calorie Malnutrition        [ x]  Moderate Protein Calorie Malnutrition        [ ] Severe Protein Calorie Malnutrition        [ ] Unspecified Protein Calorie Malnutrition        [ ] Underweight / BMI <19        [ ] Morbid Obesity / BMI > 40      Findings as based on:  [ x] Comprehensive nutrition assessment   [x ] Nutrition Focused Physical Exam  [ x] Other: <75% po intake x 4 weeks, ~10.6% wt loss x 10 days, moderate muscle wasting and mild fat loss.      Nutrition Plan/Recommendations:    1. Continue current consistency diet per SLP recommendation   2. Provide Nepro twice daily  (provides additional 440kcal, 39g protein)   3. Brief diet education provided, RD to reinforce as able   4. Malnutrition alert placed in EMR. Provider alerted.   5. Monitor PO intake/tolerance, weights, labs, hydration status, bowels, and skin integrity.  	        PROVIDER Section:     By signing this assessment you are acknowledging and agree with the diagnosis/diagnoses assigned by the Registered Dietitian    Comments:

## 2020-01-24 NOTE — DIETITIAN INITIAL EVALUATION ADULT. - REASON INDICATOR FOR ASSESSMENT
Pt seen for length of stay, information obtained from pt and comprehensive chart review     Per chart Pt seen for length of stay, information obtained from pt and comprehensive chart review     Per chart 86yoF w/ PMHx significant for ESRD on HD on T/Th/Sat, Afib (on Coumadin), PPM Implanted, chronic hypotension, remote hx of colon Ca s/p resection, pulmonary HTN, COPD, restrictive lung disease, anemia, RML lung mass and metastatic disease to bone, found with pathologic R femur intertroch fracture (1/15)

## 2020-01-24 NOTE — PROGRESS NOTE ADULT - ATTENDING COMMENTS
Plan for d/c to [facility], insurance denying [facility] due to inability of patient to ambulate. Will have PT re-eval with the hope of improvement  Palliative care input appreciated - patient full code at this time, overall poor prognosis  Follow up with onc as outpatient  s/s re-eval to see if diet could be advanced Plan for d/c to [facility], insurance denying [facility] due to inability of patient to ambulate. Will have PT re-eval with the hope of improvement  Palliative care input appreciated - patient full code at this time, overall poor prognosis  Follow up with onc as outpatient

## 2020-01-24 NOTE — PROGRESS NOTE ADULT - SUBJECTIVE AND OBJECTIVE BOX
Patient seen and examined  no complaints    No Known Allergies    Hospital Medications:   MEDICATIONS  (STANDING):  allopurinol 300 milliGRAM(s) Oral <User Schedule>  artificial tears (preservative free) Ophthalmic Solution 1 Drop(s) Both EYES two times a day  chlorhexidine 4% Liquid 1 Application(s) Topical daily  crizotinib 200 milliGRAM(s) Oral two times a day  lidocaine   Patch 1 Patch Transdermal daily  midodrine. 10 milliGRAM(s) Oral <User Schedule>  pantoprazole    Tablet 40 milliGRAM(s) Oral before breakfast  polyethylene glycol 3350 17 Gram(s) Oral daily  senna 2 Tablet(s) Oral at bedtime  sevelamer carbonate 800 milliGRAM(s) Oral three times a day with meals        VITALS:  T(F): 98.1 (01-24-20 @ 11:57), Max: 98.2 (01-23-20 @ 16:46)  HR: 61 (01-24-20 @ 11:57)  BP: 99/62 (01-24-20 @ 11:57)  RR: 18 (01-24-20 @ 05:50)  SpO2: 94% (01-24-20 @ 11:57)  Wt(kg): --    01-23 @ 07:01 - 01-24 @ 07:00  --------------------------------------------------------  IN: 600 mL / OUT: 0 mL / NET: 600 mL    01-24 @ 07:01 - 01-24 @ 12:42  --------------------------------------------------------  IN: 80 mL / OUT: 0 mL / NET: 80 mL      PHYSICAL EXAM:  Constitutional: NAD  HEENT: anicteric sclera, oropharynx clear.  Neck: No JVD  Respiratory: CTAB, no wheezes, rales or rhonchi  Cardiovascular: S1, S2, RRR  Gastrointestinal: BS+, soft, NT/ND  Extremities: No peripheral edema  Neurological: A/O x 3, no focal deficits  Vascular Access: AVF with thrill and bruit    LABS:        Creatinine Trend: 8.08 <--, 6.38 <--, 4.24 <--, 5.40 <--    Urine Studies:      RADIOLOGY & ADDITIONAL STUDIES:

## 2020-01-24 NOTE — SWALLOW BEDSIDE ASSESSMENT ADULT - SWALLOW EVAL: RECOMMENDED DIET
Based upon limited PO intake, continue the Puree diet with assistance/supervision as needed; crush meds as feasible.

## 2020-01-24 NOTE — SWALLOW BEDSIDE ASSESSMENT ADULT - ASR SWALLOW REFERRAL
Registered Dietitian/Consider GI consult given Pt c/o sensation of "burning" in mid sternal area.  Consider MD f/u for cont GOC discussion.
Consider GI consult given Pt c/o sensation of "burning" in mid sternal area.  Consider MD f/u for cont GOC discussion./Registered Dietitian

## 2020-01-24 NOTE — PROGRESS NOTE ADULT - SUBJECTIVE AND OBJECTIVE BOX
PROGRESS NOTE:   Authoted by Dr. Eleni Bradley MD  Pager [phone]     Patient is a 86y old  Female who presents with a chief complaint of R Knee pain (24 Jan 2020 08:09)      SUBJECTIVE / OVERNIGHT EVENTS: Patient seen and examined at bedside - patient sitting up in bed, denies pain, ate breakfast    ADDITIONAL REVIEW OF SYSTEMS: as above    MEDICATIONS  (STANDING):  allopurinol 300 milliGRAM(s) Oral <User Schedule>  artificial tears (preservative free) Ophthalmic Solution 1 Drop(s) Both EYES two times a day  chlorhexidine 4% Liquid 1 Application(s) Topical daily  crizotinib 200 milliGRAM(s) Oral two times a day  lidocaine   Patch 1 Patch Transdermal daily  midodrine. 10 milliGRAM(s) Oral <User Schedule>  pantoprazole    Tablet 40 milliGRAM(s) Oral before breakfast  polyethylene glycol 3350 17 Gram(s) Oral daily  senna 2 Tablet(s) Oral at bedtime  sevelamer carbonate 800 milliGRAM(s) Oral three times a day with meals    MEDICATIONS  (PRN):  acetaminophen   Tablet .. 650 milliGRAM(s) Oral every 6 hours PRN Mild Pain (1 - 3)  aluminum hydroxide/magnesium hydroxide/simethicone Suspension 30 milliLiter(s) Oral every 6 hours PRN Dyspepsia  bisacodyl 5 milliGRAM(s) Oral every 12 hours PRN Constipation  calcium carbonate    500 mG (Tums) Chewable 1 Tablet(s) Chew every 8 hours PRN Heartburn  oxyCODONE    Solution 2 milliGRAM(s) Oral every 6 hours PRN Moderate to severe pain      CAPILLARY BLOOD GLUCOSE        I&O's Summary    23 Jan 2020 07:01  -  24 Jan 2020 07:00  --------------------------------------------------------  IN: 600 mL / OUT: 0 mL / NET: 600 mL    24 Jan 2020 07:01  -  24 Jan 2020 12:05  --------------------------------------------------------  IN: 80 mL / OUT: 0 mL / NET: 80 mL        PHYSICAL EXAM:  Vital Signs Last 24 Hrs  T(C): 36.7 (24 Jan 2020 11:57), Max: 36.8 (23 Jan 2020 16:46)  T(F): 98.1 (24 Jan 2020 11:57), Max: 98.2 (23 Jan 2020 16:46)  HR: 61 (24 Jan 2020 11:57) (60 - 81)  BP: 99/62 (24 Jan 2020 11:57) (84/50 - 116/62)  BP(mean): --  RR: 18 (24 Jan 2020 05:50) (18 - 19)  SpO2: 94% (24 Jan 2020 11:57) (93% - 100%)    CONSTITUTIONAL: NAD  RESPIRATORY: Normal respiratory effort; lungs are clear to auscultation bilaterally  CARDIOVASCULAR: Regular rate and rhythm, normal S1 and S2, no murmur/rub/gallop; No lower extremity edema; Peripheral pulses are 2+ bilaterally  ABDOMEN: Nontender to palpation, normoactive bowel sounds, no rebound/guarding; No hepatosplenomegaly  MUSCLOSKELETAL: no clubbing or cyanosis of digits; no joint swelling or tenderness to palpation  PSYCH: A+O to person, place; affect appropriate    LABS:          PT/INR - ( 24 Jan 2020 08:15 )   PT: 31.9 sec;   INR: 2.74 ratio                     RADIOLOGY & ADDITIONAL TESTS:  Results Reviewed:   Imaging Personally Reviewed:  Electrocardiogram Personally Reviewed:    COORDINATION OF CARE:  Care Discussed with Consultants/Other Providers [Y/N]:  Prior or Outpatient Records Reviewed [Y/N]:

## 2020-01-24 NOTE — SWALLOW BEDSIDE ASSESSMENT ADULT - NS SPL SWALLOW CLINIC TRIAL FT
Pt denied sensation of pharyngeal residue or throat pain during/after swallowing.
Pt denied sensation of pharyngeal residue or throat pain during/after swallowing.

## 2020-01-24 NOTE — SWALLOW BEDSIDE ASSESSMENT ADULT - ASR SWALLOW ASPIRATION MONITOR
throat clearing/pneumonia/*If evident, report to MD immediately and d/c oral diet./change of breathing pattern/cough/gurgly voice/fever/upper respiratory infection
fever/pneumonia/throat clearing/*If evident, report to MD immediately and d/c oral diet./gurgly voice/upper respiratory infection/change of breathing pattern/cough

## 2020-01-24 NOTE — DIETITIAN INITIAL EVALUATION ADULT. - PROBLEM SELECTOR PLAN 5
HD on T,TH,SAT. Last HD 1/11/20. Currently no severe electrolyte derangements. Appears near euvolemic.   - monitor electrolytes  - monitor I/Os  - Nephrology consult in AM

## 2020-01-24 NOTE — PROGRESS NOTE ADULT - SUBJECTIVE AND OBJECTIVE BOX
Pt feels OK, denies any pain, cough, n/v or any other bothersome symptoms on detailed ROS questions.        Meds:  acetaminophen   Tablet .. 650 milliGRAM(s) Oral every 6 hours PRN  allopurinol 300 milliGRAM(s) Oral <User Schedule>  aluminum hydroxide/magnesium hydroxide/simethicone Suspension 30 milliLiter(s) Oral every 6 hours PRN  artificial tears (preservative free) Ophthalmic Solution 1 Drop(s) Both EYES two times a day  bisacodyl 5 milliGRAM(s) Oral every 12 hours PRN  calcium carbonate    500 mG (Tums) Chewable 1 Tablet(s) Chew every 8 hours PRN  chlorhexidine 4% Liquid 1 Application(s) Topical daily  crizotinib 200 milliGRAM(s) Oral two times a day  lidocaine   Patch 1 Patch Transdermal daily  midodrine. 10 milliGRAM(s) Oral <User Schedule>  oxyCODONE    Solution 2 milliGRAM(s) Oral every 6 hours PRN  pantoprazole    Tablet 40 milliGRAM(s) Oral before breakfast  polyethylene glycol 3350 17 Gram(s) Oral daily  senna 2 Tablet(s) Oral at bedtime  sevelamer carbonate 800 milliGRAM(s) Oral three times a day with meals      Vital Signs Last 24 Hrs  T(C): 36.4 (24 Jan 2020 05:50), Max: 36.8 (23 Jan 2020 08:39)  T(F): 97.5 (24 Jan 2020 05:50), Max: 98.3 (23 Jan 2020 08:39)  HR: 60 (24 Jan 2020 05:50) (60 - 81)  BP: 111/68 (24 Jan 2020 05:50) (84/50 - 116/62)  BP(mean): --  RR: 18 (24 Jan 2020 05:50) (18 - 19)  SpO2: 100% (24 Jan 2020 05:50) (94% - 100%)                        PT/INR - ( 23 Jan 2020 08:36 )   PT: 39.3 sec;   INR: 3.32 ratio

## 2020-01-24 NOTE — SWALLOW BEDSIDE ASSESSMENT ADULT - ORAL PREPARATORY PHASE
limited acceptance upon onset of exam; Pt preferred to drink liquids.  Pt accepted very small tspn amounts of purees. limited acceptance/Refuses to accept bolus into oral cavity

## 2020-01-24 NOTE — DIETITIAN INITIAL EVALUATION ADULT. - PHYSICAL APPEARANCE
well nourished/other (specify) Ht:  64"   Wt:   150.5lbs  IBW:  120lbs +/-10%    BMI: 25.8  kg/m2    %IBW: 125%  Skin: No pressure injuries noted per nurse's flow sheets  Edema: +2 R foot, leg, ankle

## 2020-01-24 NOTE — SWALLOW BEDSIDE ASSESSMENT ADULT - NS ASR SWALLOW FINDINGS DISCUS
Nursing/Patient/NP Ailin as GI consult is recommended vs f/u GOC discussion; NP to f/u for supportive care for Pt given statements during this exam.
Patient/Nursing/NP Joseph as GI consult is recommended vs f/u GOC discussion; NP to f/u for supportive care for Pt given statements during this exam.

## 2020-01-24 NOTE — PROGRESS NOTE ADULT - SUBJECTIVE AND OBJECTIVE BOX
S: Denies chest pain or SOB. Review of systems otherwise (-)      MEDICATIONS  (STANDING):  allopurinol 300 milliGRAM(s) Oral <User Schedule>  artificial tears (preservative free) Ophthalmic Solution 1 Drop(s) Both EYES two times a day  chlorhexidine 4% Liquid 1 Application(s) Topical daily  crizotinib 200 milliGRAM(s) Oral two times a day  lidocaine   Patch 1 Patch Transdermal daily  midodrine. 10 milliGRAM(s) Oral <User Schedule>  pantoprazole    Tablet 40 milliGRAM(s) Oral before breakfast  polyethylene glycol 3350 17 Gram(s) Oral daily  senna 2 Tablet(s) Oral at bedtime  sevelamer carbonate 800 milliGRAM(s) Oral three times a day with meals    MEDICATIONS  (PRN):  acetaminophen   Tablet .. 650 milliGRAM(s) Oral every 6 hours PRN Mild Pain (1 - 3)  aluminum hydroxide/magnesium hydroxide/simethicone Suspension 30 milliLiter(s) Oral every 6 hours PRN Dyspepsia  artificial  tears Solution 1 Drop(s) Both EYES every 6 hours PRN Dry Eyes  bisacodyl 5 milliGRAM(s) Oral every 12 hours PRN Constipation  calcium carbonate    500 mG (Tums) Chewable 1 Tablet(s) Chew every 8 hours PRN Heartburn  oxyCODONE    Solution 2 milliGRAM(s) Oral every 6 hours PRN Moderate to severe pain      LABS:        Hemoglobin: 12.5 g/dL (01-21 @ 08:22)  Hemoglobin: 12.4 g/dL (01-20 @ 08:58)          Creatinine Trend: 8.08<--, 6.38<--, 4.24<--, 5.40<--, 3.83<--, 5.59<--   PT/INR - ( 24 Jan 2020 08:15 )   PT: 31.9 sec;   INR: 2.74 ratio                   01-23-20 @ 07:01  -  01-24-20 @ 07:00  --------------------------------------------------------  IN: 600 mL / OUT: 0 mL / NET: 600 mL    01-24-20 @ 07:01  -  01-24-20 @ 14:08  --------------------------------------------------------  IN: 80 mL / OUT: 0 mL / NET: 80 mL        PHYSICAL EXAM  Vital Signs Last 24 Hrs  T(C): 36.7 (24 Jan 2020 11:57), Max: 36.8 (23 Jan 2020 16:46)  T(F): 98.1 (24 Jan 2020 11:57), Max: 98.2 (23 Jan 2020 16:46)  HR: 61 (24 Jan 2020 11:57) (60 - 81)  BP: 99/62 (24 Jan 2020 11:57) (84/50 - 111/68)  BP(mean): --  RR: 18 (24 Jan 2020 05:50) (18 - 18)  SpO2: 94% (24 Jan 2020 11:57) (93% - 100%)      Gen: Appears well in NAD  HEENT:  (-)icterus (-)pallor  CV: N S1 S2 1/6 GARY (+)2 Pulses B/l  Resp:  Clear to auscultation B/L, normal effort  GI: (+) BS Soft, NT, ND  Lymph:  (-) Edema, (-)obvious lymphadenopathy  Skin: Warm to touch, Normal turgor  Psych: Appropriate mood and affect      TELEMETRY: None	      ASSESSMENT/PLAN:   85 y/o female known to our office with PMHx of ESRD on HD, persistent Afib (on Coumadin), s/p Bio MVR, Tricuspid valve repair and ASD closure in 2016, hx complete heart block s/p PPM, Lung CA, COPD, remote colon CA (s/p R hemicolectomy), had normal nuclear stress test and TTE with normal LV/RV with well seated bio MV with normal function both in 7/2019 who presented to ED with worsening right hip pain s/p recent mechanical fall found to have right hip fracture and supratherapeutic INR requiring reversal for OR. Cardiology consulted for preop clearance. Patient underwent right femoral intramedullary rodding 1/15/20 .     - No evidence of clinical HF or anginal symptoms  - Denies LOC/syncope -- PPM interrogation noted, episodes of likely SVT but no correlating events to recent fall  - Normal Nuclear stress and TTE with normal LV/RV with well seated bio MV with normal function both in 7/2019  - Continue Coumadin for lifelong CVA prevention if no contraindications  - No further inpatient cardiac w/u needed at this time  - D/C planning per primary team  - Patient to follow up in our [place] office with Dr. Seay on 2/24 at 2 PM ([address], [address], [place] [address] - Office #[phone])    Zheng Patel MD, Merged with [facility]  BEEPER [phone]

## 2020-01-24 NOTE — SWALLOW BEDSIDE ASSESSMENT ADULT - SLP GENERAL OBSERVATIONS
Pt awake, alert and cooperative; OOB in chair; positioned upright.   Pt c/o blurry vision and NP Ailin informed.  In addition, Pt continues to c/o burning and sensation of bolus feeling stuck in mid thoracic region after a few small bites (1/4 tspn) of mechanical soft.  Nsg reports Pt tolerated approximately 50% of lunch yesterday; s/s of aspiration were not reported. Purposeful proactive rounding reinforced and 5 Ps addressed. Pt left in no distress.
Pt awake, alert and cooperative; semi-upright in bed with lunch tray at b/s; Pt feeding herself lunch.  Pt seen for bedside swallow evaluation; d/w Nsg and NP.  Lunch tray at b/s; Pt semi-upright in bed. Pt denied pain upon encounter although during PO trials Pt c/o "pain like a fire burning right there" frequent pauses noted during lunch.  Pt also stated "not going to be too much longer til they put me in the ground", d/w NP and Nsg.  Clinician assured Pt the staff is available to help her as needed.  Mild hypophonia noted. Purposeful proactive rounding reinforced and 5 Ps addressed. Pt left in no distress.

## 2020-01-24 NOTE — DIETITIAN INITIAL EVALUATION ADULT. - PROBLEM SELECTOR PLAN 1
- Pathologic intertrochanteric fracture of the right femur in setting of known R femur mets from SCLC.  - Ortho planning for OR in AM pending medical optimization and reversal of INR  - Medical Pre-Op Risk Stratification: RCRI Score 1, with 6% 30 day risk of death/MI/CA. NATASHA score 4.6% risk of MI/CA 30 days post-op. Patient is a moderate risk for moderate risk procedure.   - NST wnl in 07/2019  - pain currently controlled, will start with oxycodone 5mg q4h PRN for severe pain. If still uncontrolled, will consider Dilaudid. Avoiding morphine in setting of ESRD  - bedrest  - NWB RLE

## 2020-01-24 NOTE — SWALLOW BEDSIDE ASSESSMENT ADULT - COMMENTS
1/14/2020 per Pulm consult: CXR showing RML consolidation likely collapse unclear if from progression of malignancy vs mucous plugging.  1/17/2020 Palliative care consulted for GOC and pain management; Pt with lung collapse, delirium and constipation; the patient is to be full code; - she has appointed her daughter Yasemin Zaman as healthcare proxy.- Will continue to f/u for Pain. 1/19/2020 Per MD:  pain control with dilaudid prn. Pt with hypoxic episode; -CT A/P commenting on Right middle lobe lobar atelectasis. 1/23/2020 per MD: Plan for d/c to [facility];  Palliative care input appreciated - patient full code at this time, overall poor prognosis. Pt denied sensation of pharyngeal residue or throat pain during/after swallowing.

## 2020-01-24 NOTE — PROGRESS NOTE ADULT - PROBLEM SELECTOR PLAN 3
-c/w coumadin - has been on hold due to supratherapeutic INR    -daily pt/inr  tonight hold coumadin, still supratherapeutic - likely secondary to malnutrition

## 2020-01-24 NOTE — DIETITIAN INITIAL EVALUATION ADULT. - ADD RECOMMEND
1. Continue current consistency diet per SLP recommendation 2. Provide Nepro twice daily  (provides additional 440kcal, 39g protein) 3. Brief diet education provided, RD to reinforce as able 4. Monitor PO intake/tolerance, weights, labs, hydration status, bowels, and skin integrity. 1. Continue current consistency diet per SLP recommendation 2. Provide Nepro twice daily  (provides additional 440kcal, 39g protein) 3. Brief diet education provided, RD to reinforce as able 4. Malnutrition alert placed in EMR. Provider alerted. 5. Monitor PO intake/tolerance, weights, labs, hydration status, bowels, and skin integrity.

## 2020-01-25 NOTE — PROGRESS NOTE ADULT - PROBLEM SELECTOR PLAN 1
CT abd and pelvis x-ray showed right femur intertochanteric pathologic Fx  - S/p OR for RIMN 1/15  - c/w PRN tylenol and oxycodone solution, pain is controlled - palliative input appreciated  - PT reccs SVETLANA ; d/c planning in progress

## 2020-01-25 NOTE — PROGRESS NOTE ADULT - PROBLEM SELECTOR PLAN 8
Transitions of Care Status:  1.  Name of PCP: Dr Espana  2.  PCP Contacted on Admission: [ ] Y    [ ] N    3.  PCP contacted at Discharge: [ ] Y    [ ] N    [ ] N/A  4.  Post-Discharge Appointment Date and Location:  5.  Summary of Handoff given to PCP:

## 2020-01-25 NOTE — PROGRESS NOTE ADULT - PROBLEM SELECTOR PLAN 4
- follows with Dr Kamilla Galeano  - gets HD T/Th/Sat outpatient  - HD per Renal  - BP has been stable  - s/p HD today

## 2020-01-25 NOTE — PROGRESS NOTE ADULT - PROBLEM SELECTOR PLAN 5
- Multiple BMs in last few days  - s/p S/S recommend puree diet with thins, assistance with meals, alternate liquid/solid   - c/w maalox and PPI w/ monitoring and crush meds  Family would like to liberalize diet but s/s re-evaluated still recommending puree with thin liquids

## 2020-01-25 NOTE — PROGRESS NOTE ADULT - PROBLEM SELECTOR PLAN 6
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 Pathologic fracture s/p IM 1/15.  c/w Crizotinib  Follows with Onc Dr. Travis.  seen by Rad onc ; no role for radiation at this time   - follow up as outpatient

## 2020-01-25 NOTE — PROGRESS NOTE ADULT - ATTENDING COMMENTS
Patient seen and examined.  Agree with above.   Lifelong ac if no contraindications  No further inpatient cardiac workup needed at this time.     Poncho Alba MD

## 2020-01-25 NOTE — PROGRESS NOTE ADULT - SUBJECTIVE AND OBJECTIVE BOX
Patient is a 86y old  Female who presents with a chief complaint of R Knee pain (24 Jan 2020 14:07)      SUBJECTIVE / OVERNIGHT EVENTS:    MEDICATIONS  (STANDING):  allopurinol 300 milliGRAM(s) Oral <User Schedule>  artificial tears (preservative free) Ophthalmic Solution 1 Drop(s) Both EYES two times a day  chlorhexidine 4% Liquid 1 Application(s) Topical daily  crizotinib 200 milliGRAM(s) Oral two times a day  lidocaine   Patch 1 Patch Transdermal daily  midodrine. 10 milliGRAM(s) Oral <User Schedule>  pantoprazole    Tablet 40 milliGRAM(s) Oral before breakfast  polyethylene glycol 3350 17 Gram(s) Oral daily  senna 2 Tablet(s) Oral at bedtime  sevelamer carbonate 800 milliGRAM(s) Oral three times a day with meals    MEDICATIONS  (PRN):  acetaminophen   Tablet .. 650 milliGRAM(s) Oral every 6 hours PRN Mild Pain (1 - 3)  aluminum hydroxide/magnesium hydroxide/simethicone Suspension 30 milliLiter(s) Oral every 6 hours PRN Dyspepsia  artificial  tears Solution 1 Drop(s) Both EYES every 6 hours PRN Dry Eyes  bisacodyl 5 milliGRAM(s) Oral every 12 hours PRN Constipation  calcium carbonate    500 mG (Tums) Chewable 1 Tablet(s) Chew every 8 hours PRN Heartburn  oxyCODONE    Solution 2 milliGRAM(s) Oral every 6 hours PRN Moderate to severe pain      Vital Signs Last 24 Hrs  T(C): 36.8 (25 Jan 2020 08:45), Max: 37 (25 Jan 2020 04:59)  T(F): 98.2 (25 Jan 2020 08:45), Max: 98.6 (25 Jan 2020 04:59)  HR: 61 (25 Jan 2020 08:45) (60 - 65)  BP: 97/47 (25 Jan 2020 08:45) (97/47 - 112/68)  BP(mean): --  RR: 18 (25 Jan 2020 08:45) (18 - 18)  SpO2: 95% (25 Jan 2020 08:45) (93% - 96%)  CAPILLARY BLOOD GLUCOSE        I&O's Summary    24 Jan 2020 07:01  -  25 Jan 2020 07:00  --------------------------------------------------------  IN: 640 mL / OUT: 1 mL / NET: 639 mL    25 Jan 2020 07:01  -  25 Jan 2020 10:10  --------------------------------------------------------  IN: 120 mL / OUT: 0 mL / NET: 120 mL        PHYSICAL EXAM:  GENERAL: NAD, well-developed  HEAD:  Atraumatic, Normocephalic  EYES: EOMI, PERRLA, conjunctiva and sclera clear  NECK: Supple, No JVD  CHEST/LUNG: Clear to auscultation bilaterally; No wheeze  HEART: Regular rate and rhythm; No murmurs, rubs, or gallops  ABDOMEN: Soft, Nontender, Nondistended; Bowel sounds present  EXTREMITIES:  2+ Peripheral Pulses, No clubbing, cyanosis, or edema  PSYCH: AAOx3  NEUROLOGY: non-focal  SKIN: No rashes or lesions    LABS:    01-25    130<L>  |  89<L>  |  62<H>  ----------------------------<  113<H>  4.7   |  24  |  7.43<H>    Ca    9.9      25 Jan 2020 09:27      PT/INR - ( 24 Jan 2020 08:15 )   PT: 31.9 sec;   INR: 2.74 ratio                   RADIOLOGY & ADDITIONAL TESTS:    Imaging Personally Reviewed:    Consultant(s) Notes Reviewed:      Care Discussed with Consultants/Other Providers: Patient is a 86y old  Female who presents with a chief complaint of R Knee pain (24 Jan 2020 14:07)      SUBJECTIVE / OVERNIGHT EVENTS:  Pt seen and examined. No acute events overnight. She denies sob, dizziness.     MEDICATIONS  (STANDING):  allopurinol 300 milliGRAM(s) Oral <User Schedule>  artificial tears (preservative free) Ophthalmic Solution 1 Drop(s) Both EYES two times a day  chlorhexidine 4% Liquid 1 Application(s) Topical daily  crizotinib 200 milliGRAM(s) Oral two times a day  lidocaine   Patch 1 Patch Transdermal daily  midodrine. 10 milliGRAM(s) Oral <User Schedule>  pantoprazole    Tablet 40 milliGRAM(s) Oral before breakfast  polyethylene glycol 3350 17 Gram(s) Oral daily  senna 2 Tablet(s) Oral at bedtime  sevelamer carbonate 800 milliGRAM(s) Oral three times a day with meals    MEDICATIONS  (PRN):  acetaminophen   Tablet .. 650 milliGRAM(s) Oral every 6 hours PRN Mild Pain (1 - 3)  aluminum hydroxide/magnesium hydroxide/simethicone Suspension 30 milliLiter(s) Oral every 6 hours PRN Dyspepsia  artificial  tears Solution 1 Drop(s) Both EYES every 6 hours PRN Dry Eyes  bisacodyl 5 milliGRAM(s) Oral every 12 hours PRN Constipation  calcium carbonate    500 mG (Tums) Chewable 1 Tablet(s) Chew every 8 hours PRN Heartburn  oxyCODONE    Solution 2 milliGRAM(s) Oral every 6 hours PRN Moderate to severe pain      Vital Signs Last 24 Hrs  T(C): 36.8 (25 Jan 2020 08:45), Max: 37 (25 Jan 2020 04:59)  T(F): 98.2 (25 Jan 2020 08:45), Max: 98.6 (25 Jan 2020 04:59)  HR: 61 (25 Jan 2020 08:45) (60 - 65)  BP: 97/47 (25 Jan 2020 08:45) (97/47 - 112/68)  BP(mean): --  RR: 18 (25 Jan 2020 08:45) (18 - 18)  SpO2: 95% (25 Jan 2020 08:45) (93% - 96%)  CAPILLARY BLOOD GLUCOSE        I&O's Summary    24 Jan 2020 07:01  -  25 Jan 2020 07:00  --------------------------------------------------------  IN: 640 mL / OUT: 1 mL / NET: 639 mL    25 Jan 2020 07:01  -  25 Jan 2020 10:10  --------------------------------------------------------  IN: 120 mL / OUT: 0 mL / NET: 120 mL        PHYSICAL EXAM:  GENERAL: NAD, anicteric, afebrile  RESPIRATORY: lungs are clear to auscultation bilaterally  CARDIOVASCULAR: Regular rate and rhythm, normal S1 and S2, no murmur/rub/gallop  ABDOMEN: Nontender to palpation, normoactive bowel sounds, no rebound/guarding; No hepatosplenomegaly  MUSCULOSKELETAL: no clubbing or cyanosis of digits; no joint swelling or tenderness to palpation  PSYCH: affect appropriate  NEUROLOGY : AAO X 3, non focal  EXTREMETIES :  No lower extremity edema; Peripheral pulses are 2+ bilaterally    LABS:    01-25    130<L>  |  89<L>  |  62<H>  ----------------------------<  113<H>  4.7   |  24  |  7.43<H>    Ca    9.9      25 Jan 2020 09:27      PT/INR - ( 24 Jan 2020 08:15 )   PT: 31.9 sec;   INR: 2.74 ratio

## 2020-01-25 NOTE — PROGRESS NOTE ADULT - SUBJECTIVE AND OBJECTIVE BOX
S: Denies chest pain or SOB. Review of systems otherwise (-)      MEDICATIONS  (STANDING):  allopurinol 300 milliGRAM(s) Oral <User Schedule>  artificial tears (preservative free) Ophthalmic Solution 1 Drop(s) Both EYES two times a day  chlorhexidine 4% Liquid 1 Application(s) Topical daily  crizotinib 200 milliGRAM(s) Oral two times a day  lidocaine   Patch 1 Patch Transdermal daily  midodrine. 10 milliGRAM(s) Oral <User Schedule>  pantoprazole    Tablet 40 milliGRAM(s) Oral before breakfast  polyethylene glycol 3350 17 Gram(s) Oral daily  senna 2 Tablet(s) Oral at bedtime  sevelamer carbonate 800 milliGRAM(s) Oral three times a day with meals  warfarin 1 milliGRAM(s) Oral once    MEDICATIONS  (PRN):  acetaminophen   Tablet .. 650 milliGRAM(s) Oral every 6 hours PRN Mild Pain (1 - 3)  aluminum hydroxide/magnesium hydroxide/simethicone Suspension 30 milliLiter(s) Oral every 6 hours PRN Dyspepsia  artificial  tears Solution 1 Drop(s) Both EYES every 6 hours PRN Dry Eyes  bisacodyl 5 milliGRAM(s) Oral every 12 hours PRN Constipation  calcium carbonate    500 mG (Tums) Chewable 1 Tablet(s) Chew every 8 hours PRN Heartburn  oxyCODONE    Solution 2 milliGRAM(s) Oral every 6 hours PRN Moderate to severe pain      LABS:    130<L>  |  89<L>  |  62<H>  ----------------------------<  113<H>  4.7   |  24  |  7.43<H>    Ca    9.9      25 Jan 2020 09:27    Creatinine Trend: 7.43<--, 8.08<--, 6.38<--, 4.24<--, 5.40<--, 3.83<--   PT/INR - ( 25 Jan 2020 12:37 )   PT: 25.0 sec;   INR: 2.14 ratio        PHYSICAL EXAM  Vital Signs Last 24 Hrs  T(C): 36.4 (25 Jan 2020 12:15), Max: 37 (25 Jan 2020 04:59)  T(F): 97.6 (25 Jan 2020 12:15), Max: 98.6 (25 Jan 2020 04:59)  HR: 67 (25 Jan 2020 12:15) (60 - 67)  BP: 121/51 (25 Jan 2020 12:15) (97/47 - 121/51)  RR: 18 (25 Jan 2020 08:45) (18 - 18)  SpO2: 95% (25 Jan 2020 08:45) (95% - 96%)      Gen: Appears well in NAD  HEENT:  (-)icterus (-)pallor  CV: N S1 S2 1/6 GARY (+)2 Pulses B/l  Resp:  Clear to auscultation B/L, normal effort  GI: (+) BS Soft, NT, ND  Lymph:  (-) Edema, (-)obvious lymphadenopathy  Skin: Warm to touch, Normal turgor  Psych: Appropriate mood and affect      TELEMETRY: None	      ASSESSMENT/PLAN:   85 y/o female known to our office with PMHx of ESRD on HD, persistent Afib (on Coumadin), s/p Bio MVR, Tricuspid valve repair and ASD closure in 2016, hx complete heart block s/p PPM, Lung CA, COPD, remote colon CA (s/p R hemicolectomy), had normal nuclear stress test and TTE with normal LV/RV with well seated bio MV with normal function both in 7/2019 who presented to ED with worsening right hip pain s/p recent mechanical fall found to have right hip fracture and supratherapeutic INR requiring reversal for OR. Cardiology consulted for preop clearance. Patient underwent right femoral intramedullary rodding 1/15/20 .     - No evidence of clinical HF or anginal symptoms  - Denies LOC/syncope -- PPM interrogation noted, episodes of likely SVT but no correlating events to recent fall  - Normal Nuclear stress and TTE with normal LV/RV with well seated bio MV with normal function both in 7/2019  - Continue Coumadin for lifelong CVA prevention if no contraindications  - No further inpatient cardiac w/u needed at this time  - D/C planning per primary team  - Patient to follow up in our [place] office with Dr. Seay on 2/24 at 2 PM ([address], [address], [place] [address] - Office #[phone])

## 2020-01-25 NOTE — PROGRESS NOTE ADULT - PROBLEM SELECTOR PLAN 7
Hgb 7.2 on admission in setting of supratherapeutic INR, baseline around 9. No evidence of active bleeding; s/p 1 unit prbc in ED  Hb improved to 11.4 today  Does have hx of GI bleed 2/2 Dieulafoy lesion ; FOBT neg on this admission.

## 2020-01-26 NOTE — CHART NOTE - NSCHARTNOTEFT_GEN_A_CORE
Nutrition follow up     Pt seen for malnutrition follow up and nutrition consult received for assessment.   Care plan in progress.     Hospital course as per chart: Pt 87 y/o F with PMH: ESRD on HD, Afib (on Coumadin), PPM Implanted, chronic hypotension, remote hx of colon Ca S/P resection, pulmonary HTN, COPD, restrictive lung disease, anemia, RML lung mass, metastatic disease to bone, admitted with right knee pain, found with pathologic right femur intertroch fracture, S/P right IMN (01/15).     Source: Patient [ ]    Family [ ]     other [x]; Medical record, RN     Pt forgetful at times/disoriented/lethargic as per documentation, unable to wake her up. RN reports pt with fair appetite and PO intake, states pt consumes at least 50% of meals. Noted 50 - 80% PO intake as per flow sheets. RN reports pt tolerating dysphagia 1, pureed diet texture. Denies pt with nausea, vomiting, diarrhea, or constipation, last BM today (01/26). Noted pt amenable to Nepro as per Dietitian Initial Evaluation, spoke to provider.        Diet: dysphagia 1, pureed + thin liquids + renal     Enteral /Parenteral Nutrition: n/a    Current Weight: (01/13) 173 pounds -> (01/18) 155.6 pounds -> (01/22) 165.7 pounds -> (01/25) 158 pounds -?accuracy of weight fluctuations likely due to fluid shifts as pt with edema and on HD, will continue to monitor.   % Weight Change: n/a    Pertinent Medications: MEDICATIONS  (STANDING):  allopurinol 300 milliGRAM(s) Oral <User Schedule>  artificial tears (preservative free) Ophthalmic Solution 1 Drop(s) Both EYES two times a day  chlorhexidine 4% Liquid 1 Application(s) Topical daily  crizotinib 200 milliGRAM(s) Oral two times a day  lidocaine   Patch 1 Patch Transdermal daily  midodrine. 10 milliGRAM(s) Oral every 8 hours  pantoprazole    Tablet 40 milliGRAM(s) Oral before breakfast  polyethylene glycol 3350 17 Gram(s) Oral daily  senna 2 Tablet(s) Oral at bedtime  sevelamer carbonate 800 milliGRAM(s) Oral three times a day with meals  warfarin 2 milliGRAM(s) Oral once    MEDICATIONS  (PRN):  acetaminophen   Tablet .. 650 milliGRAM(s) Oral every 6 hours PRN Mild Pain (1 - 3)  aluminum hydroxide/magnesium hydroxide/simethicone Suspension 30 milliLiter(s) Oral every 6 hours PRN Dyspepsia  artificial  tears Solution 1 Drop(s) Both EYES every 6 hours PRN Dry Eyes  bisacodyl 5 milliGRAM(s) Oral every 12 hours PRN Constipation  calcium carbonate    500 mG (Tums) Chewable 1 Tablet(s) Chew every 8 hours PRN Heartburn  oxyCODONE    Solution 2 milliGRAM(s) Oral every 6 hours PRN Moderate to severe pain    Pertinent Labs: (01/26) Cr  5.52 mg/dL<H> BUN 35 mg/dL<H>    Skin: no noted pressure injuries as per documentation.   Noted +2 right hip and leg edema as per flow sheets.     Estimated Needs:   [x] no change since previous assessment  [ ] recalculated:     Previous Nutrition Diagnosis: [x] Malnutrition; moderate      Nutrition Diagnosis is [x] ongoing - PO intake improving as per RN; being addressed with recommendations for nutritional supplement.   Goal: Pt to meet greater than 75% of estimated needs daily.    New Nutrition Diagnosis: [x] not applicable    Interventions:     1. Recommend continue dysphagia 1, pureed + thin liquids + renal diet. Defer diet/fluid consistencies to medical team/SLP recommendations. Will continue to monitor and adjust as needed.   2. Recommend Nepro 2x daily (850 kcals, 38 g protein) to optimize kcal and protein intake. Spoke to provider.   3. Consider Nephro-Ami if medically feasible to optimize nutrient intake.   4. Encourage PO intake and provide food preferences.   5. Continue to obtain weights to identify changes if any.     Monitoring and Evaluation:     [x] PO intake [x] Tolerance to diet prescription [x] weights [x] follow up per protocol    RD remains available.  Josi Sousa MS RDN CDN #[phone].

## 2020-01-26 NOTE — CHART NOTE - NSCHARTNOTEFT_GEN_A_CORE
Chart/Event Note  [facility] [address]  JOSE C MARCOS, 86y, Female  [id number]    Reason for Notification: Called by PT, patient in chair hypotensive with BP 76/45, HR 68. Patient placed back in bed with repeat BP 98/63 HR 67.    HPI:  86F PMH ESRD (HD on T/Th/Sat), stage IV squamous cell lung cancer (diagnosed 10/2019, mets to bone, on crizotinib), afib (on Coumadin), PPM, MS/TR (s/p annuloplasty in 2016), chronic hypotension (on midodrine HD days), remote colon CA (s/p R hemicolectomy), GI bleed (2/2 Dieulafoy lesion), anemia, pulmonary HTN, COPD, and restrictive lung disease, presented to ED with R knee and hip pain. Patient reports she has had worsening R knee/hip over the last few months, went to see her doctor 3 days ago and had arthrocentesis and intraarticular steroid injection to R knee. Since then pain has continued to worsen, now unable to ambulate due to pain. Endorses last fall was 2 weeks ago at HD, but denies acute trauma to leg at that time. No other falls or trauma. Denies any bruising, melena, BRBPR, hematemesis, hemoptysis, bleeding gums, headache, confusion, or hematuria. Of note, patient had admission for hemoptysis in 12/2019, s/p bronch which was negative for TB and fungal infections. Patient was diagnosed with metastatic SCLC in 10/2019, with mets to R femur. Outpatient PET scan showed hypermetabolic RML lung mass and hypermetabolic lesion in R femur, IR guided biopsy on 11/1 of R femur demonstrated non-small cell carcinoma favoring squamous cell. Patient was discharged to [facility], from where she was d/c'd to home on 12/27/19. Of note, she was diagnosed with pneumonia at [facility], and completed a course of abx there as per daughter. No antibiotics since then, no change in diet or supplement use. No recent use of tylenol. Has had limited mobility since discharge from [facility], requires 1 person assist, uses wheelchair and chairlift. Lives with daughter. last HD prior to admission 1/11/2020    In the ED:  Vitals: afebrile, HR 77, /77, spO2 94% RA  Labs: hgb 7.2 (baseline 9), INR 15.62, .6, AST/ALT 67/30  CT Head neg for hemhorrhage. CT Pelvis showed pathologic intertrochanteric fracture of the right femur with abnormal soft tissue within the left trochanter with surrounding erosive bony changes corresponds to the area of increased uptake on outside facility prior PET/CT imaging dated 8/23/2019.  Given morphine 2mg IVP x1, vitamin K 10mg IV, KCentra, and 1u pRBCs in ED.         Review of Systems:  Constitutional: Extreme fatigue, No chills, fever.  Neurologic: No headache, dizziness, vision/speech changes, numbness, or weakness.  Respiratory: No cough, wheezing, dyspnea, or shortness of breath.  Cardiovascular: No chest pain, pressure, or palpitations.   Musculoskeletal: No joint pain or swelling.   Psychiatric: No depression, anxiety, or mood swings.      T(C): 37.1 (01-26-20 @ 04:13), Max: 37.3 (01-26-20 @ 00:06)  HR: 68 (01-26-20 @ 13:34) (60 - 68)  BP: 76/45 (01-26-20 @ 13:34) (76/45 - 103/61)  RR: 18 (01-26-20 @ 13:34) (18 - 18)  SpO2: 94% (01-26-20 @ 13:34) (94% - 97%)                        12.4   9.53  )-----------( 286      ( 26 Jan 2020 09:08 )             42.9          Physical Examination:  GENERAL: Patient found to be lethargic, but arousable to loud voice. No acute distress noted during examination. Patient is well-groomed and developed.  NERVOUS SYSTEM: Lethargic, Oriented X3 with appropriate concentration. Motor strength is 5/5 in both bilateral upper and lower extremities. No focal or lateralizing neurologic deficits noted.   NECK: Supple with no JVD.   CHEST: Clear to ascultation bilaterally. No rales, rhonchi, wheezing, or rubs heard. Symmetrical/bilateral chest wall rise.   HEART: Regular rate and rhythm with no murmurs, rubs, or gallops.  ABDOMEN: Soft, nontender, and nondistended.   EXTREMITIES:  2+ Peripheral Pulses without clubbing, cyanosis, or edema.      Medical Decision Making/Assessment/Plan:    -A/P: Hypotension    1)CT head urgent  2)Midodrine changed to 10mg PO Q8h  3)NS 250ml bolus x1 - given  4) Orthostatic ordered  5)Continue to monitor  6)Discussed above with Dr Glez and in agreement. Will sign out to night ACP       Kayla Castillo AGACNP-c

## 2020-01-26 NOTE — PROGRESS NOTE ADULT - PROBLEM SELECTOR PLAN 3
- initially supratherapeutic INR  ; now subtherapeutic at 1.7  - dose Coumadin 2mg po tonight  -daily pt/inr

## 2020-01-26 NOTE — PROGRESS NOTE ADULT - PROBLEM SELECTOR PLAN 4
BP 76/45, HR 68 after PT this AM  - give 250cc bolus  - check orthostatics  - gets Midodrine on HD days ; will start Midodrine 10mg po tid

## 2020-01-26 NOTE — PROGRESS NOTE ADULT - ASSESSMENT
86F PMH ESRD (HD on T/Th/Sat) via left forearm AVF, stage IV squamous cell lung cancer (diagnosed 10/2019, mets to bone, on crizotinib), afib (on Coumadin), PPM, MS/TR (s/p annuloplasty in 2016), chronic hypotension (on midodrine HD days), remote colon CA (s/p R hemicolectomy), GI bleed (2/2 Dieulafoy lesion), anemia, pulmonary HTN, COPD, and restrictive lung disease, presented to ED with R knee found to have  pathologic intertrochanteric fracture of the right femur, s/p R IMN for metastatic femur 86F PMH ESRD (HD on T/Th/Sat) via left forearm AVF, stage IV squamous cell lung cancer (diagnosed 10/2019, mets to bone, on crizotinib), afib (on Coumadin), PPM, MS/TR (s/p annuloplasty in 2016), chronic hypotension (on midodrine HD days), remote colon CA (s/p R hemicolectomy), GI bleed (2/2 Dieulafoy lesion), anemia, pulmonary HTN, COPD, and restrictive lung disease, presented to ED with R knee found to have  pathologic intertrochanteric fracture of the right femur, s/p R IMN for metastatic femur. Renal following for ESRD MX.    labs, chart reviewed

## 2020-01-26 NOTE — PROGRESS NOTE ADULT - SUBJECTIVE AND OBJECTIVE BOX
S: Events noted. Patient found to be hypotensive with SBP 70s today after eating lunch. When placed back in chair, SBP improved to 98. She is lethargic but oriented.   CT head pending. IVF and additional midodrine ordered by covering NP.   Denies chest pain or SOB. Review of systems otherwise (-)        MEDICATIONS  (STANDING):  allopurinol 300 milliGRAM(s) Oral <User Schedule>  artificial tears (preservative free) Ophthalmic Solution 1 Drop(s) Both EYES two times a day  chlorhexidine 4% Liquid 1 Application(s) Topical daily  crizotinib 200 milliGRAM(s) Oral two times a day  lidocaine   Patch 1 Patch Transdermal daily  midodrine. 10 milliGRAM(s) Oral every 8 hours  pantoprazole    Tablet 40 milliGRAM(s) Oral before breakfast  polyethylene glycol 3350 17 Gram(s) Oral daily  senna 2 Tablet(s) Oral at bedtime  sevelamer carbonate 800 milliGRAM(s) Oral three times a day with meals  warfarin 2 milliGRAM(s) Oral once    MEDICATIONS  (PRN):  acetaminophen   Tablet .. 650 milliGRAM(s) Oral every 6 hours PRN Mild Pain (1 - 3)  aluminum hydroxide/magnesium hydroxide/simethicone Suspension 30 milliLiter(s) Oral every 6 hours PRN Dyspepsia  artificial  tears Solution 1 Drop(s) Both EYES every 6 hours PRN Dry Eyes  bisacodyl 5 milliGRAM(s) Oral every 12 hours PRN Constipation  calcium carbonate    500 mG (Tums) Chewable 1 Tablet(s) Chew every 8 hours PRN Heartburn  oxyCODONE    Solution 2 milliGRAM(s) Oral every 6 hours PRN Moderate to severe pain      LABS:                            12.4   9.53  )-----------( 286      ( 26 Jan 2020 09:08 )             42.9     Hemoglobin: 12.4 g/dL (01-26 @ 09:08)  Hemoglobin: 11.4 g/dL (01-25 @ 15:13)    01-26    135  |  91<L>  |  35<H>  ----------------------------<  80  5.0   |  24  |  5.52<H>    Ca    9.6      26 Jan 2020 07:09      Creatinine Trend: 5.52<--, 7.43<--, 8.08<--, 6.38<--, 4.24<--, 5.40<--   PT/INR - ( 26 Jan 2020 09:25 )   PT: 20.1 sec;   INR: 1.72 ratio                 PHYSICAL EXAM  Vital Signs Last 24 Hrs  T(C): 37.1 (26 Jan 2020 04:13), Max: 37.3 (26 Jan 2020 00:06)  T(F): 98.8 (26 Jan 2020 04:13), Max: 99.2 (26 Jan 2020 00:06)  HR: 68 (26 Jan 2020 13:34) (60 - 68)  BP: 76/45 (26 Jan 2020 13:34) (76/45 - 103/61)  BP(mean): --  RR: 18 (26 Jan 2020 13:34) (18 - 18)  SpO2: 94% (26 Jan 2020 13:34) (94% - 97%)      Gen: Appears well in NAD  HEENT:  (-)icterus (-)pallor  CV: N S1 S2 1/6 GARY (+)2 Pulses B/l  Resp:  Clear to auscultation B/L, normal effort  GI: (+) BS Soft, NT, ND  Lymph:  (-) Edema, (-)obvious lymphadenopathy  Skin: Warm to touch, Normal turgor  Psych: Appropriate mood and affect      TELEMETRY: None	        ASSESSMENT/PLAN:   87 y/o female known to our office with PMHx of ESRD on HD, persistent Afib (on Coumadin), s/p Bio MVR, Tricuspid valve repair and ASD closure in 2016, hx complete heart block s/p PPM, Lung CA, COPD, remote colon CA (s/p R hemicolectomy), had normal nuclear stress test and TTE with normal LV/RV with well seated bio MV with normal function both in 7/2019 who presented to ED with worsening right hip pain s/p recent mechanical fall found to have right hip fracture and supratherapeutic INR requiring reversal for OR. Cardiology consulted for preop clearance. Patient underwent right femoral intramedullary rodding 1/15/20 .     - Hypotension and Lethargy - CT head pending ; IVF and additional Midodrine ordered by covering NP   - No evidence of clinical HF or anginal symptoms  - Denies LOC/syncope -- PPM interrogation noted, episodes of likely SVT but no correlating events to recent fall  - Normal Nuclear stress and TTE with normal LV/RV with well seated bio MV with normal function both in 7/2019  - Continue Coumadin for lifelong CVA prevention if no contraindications - INR subtherapeutic today - would recommend hep gtt to bridge however, patient pending CT head. Additionally, she has h/o anemia /GIB - guiac negative on this admission but did receive 1unit PRBC on admit and previously had GIB 2/2 Dieulafoy lesion per med documentation   - No further inpatient cardiac w/u needed at this time  - D/C planning per primary team  - Patient to follow up in our [place] office with Dr. Seay on 2/24 at 2 PM ([address], [address], [place] [address] - Office #[phone])

## 2020-01-26 NOTE — PROGRESS NOTE ADULT - PROBLEM SELECTOR PLAN 1
s/p HD yesterday-   plan for next hd Tuesday  trend bmp s/p HD yesterday, bp low during hd, unable to remove much uf  plan for next hd Tuesday  bp low, asympt, c/ midodrine prehd  renal restrictions in diet  dose all meds for ESRD  trend bmp

## 2020-01-26 NOTE — PROGRESS NOTE ADULT - SUBJECTIVE AND OBJECTIVE BOX
[facility] - Hardeep / Tiff ALCAZAR /Nain/ INGE Galeano/ INGE Schaefer/ John Leary / CARA Njyolandeu  ---------------------------------------------------------------------------------------------------------------    Patient seen and examined bedside    Subjective and Objective: No overnight events, no new complaints today. denied sob  tolerated hd well yesterday but had RLE pain    Allergies: No Known Allergies      Hospital Medications:   MEDICATIONS  (STANDING):  allopurinol 300 milliGRAM(s) Oral <User Schedule>  artificial tears (preservative free) Ophthalmic Solution 1 Drop(s) Both EYES two times a day  chlorhexidine 4% Liquid 1 Application(s) Topical daily  crizotinib 200 milliGRAM(s) Oral two times a day  lidocaine   Patch 1 Patch Transdermal daily  midodrine. 10 milliGRAM(s) Oral <User Schedule>  pantoprazole    Tablet 40 milliGRAM(s) Oral before breakfast  polyethylene glycol 3350 17 Gram(s) Oral daily  senna 2 Tablet(s) Oral at bedtime  sevelamer carbonate 800 milliGRAM(s) Oral three times a day with meals  warfarin 2 milliGRAM(s) Oral once      VITALS:  T(F): 98.8 (01-26-20 @ 04:13), Max: 99.2 (01-26-20 @ 00:06)  HR: 67 (01-26-20 @ 04:13)  BP: 97/60 (01-26-20 @ 04:13)  RR: 18 (01-26-20 @ 04:13)  SpO2: 96% (01-26-20 @ 04:13)  Wt(kg): --    01-25 @ 07:01  -  01-26 @ 07:00  --------------------------------------------------------  IN: 360 mL / OUT: 500 mL / NET: -140 mL        PHYSICAL EXAM:  Constitutional: NAD  HEENT: anicteric sclera, oropharynx clear  Neck: No JVD  Respiratory: CTAB, no wheezes, rales or rhonchi  Cardiovascular: S1, S2, RRR  Gastrointestinal: BS+, soft, NT/ND  Extremities: No cyanosis or clubbing. No peripheral edema. RLE ACE dressing+  Neurological: AAO x3  Psychiatric: Normal mood, normal affect  : No ortiz.   Vascular Access: LFA AVF +thrill    LABS:  01-26    135  |  91<L>  |  35<H>  ----------------------------<  80  5.0   |  24  |  5.52<H>    Ca    9.6      26 Jan 2020 07:09      Creatinine Trend: 5.52 <--, 7.43 <--, 8.08 <--, 6.38 <--                        12.4   9.53  )-----------( 286      ( 26 Jan 2020 09:08 )             42.9     Urine Studies:        RADIOLOGY & ADDITIONAL STUDIES: [facility] - Hardeep / Tiff ALCAZAR /Nain/ INGE Galeano/ INGE Schaefer/ John Leary / CARA Njyolandeu  ---------------------------------------------------------------------------------------------------------------    Patient seen and examined bedside    Subjective and Objective: No overnight events, no new complaints today. denied sob  tolerated hd well yesterday but had RLE pain    Allergies: No Known Allergies      Hospital Medications:   MEDICATIONS  (STANDING):  allopurinol 300 milliGRAM(s) Oral <User Schedule>  artificial tears (preservative free) Ophthalmic Solution 1 Drop(s) Both EYES two times a day  chlorhexidine 4% Liquid 1 Application(s) Topical daily  crizotinib 200 milliGRAM(s) Oral two times a day  lidocaine   Patch 1 Patch Transdermal daily  midodrine. 10 milliGRAM(s) Oral <User Schedule>  pantoprazole    Tablet 40 milliGRAM(s) Oral before breakfast  polyethylene glycol 3350 17 Gram(s) Oral daily  senna 2 Tablet(s) Oral at bedtime  sevelamer carbonate 800 milliGRAM(s) Oral three times a day with meals  warfarin 2 milliGRAM(s) Oral once      VITALS:  T(F): 98.8 (01-26-20 @ 04:13), Max: 99.2 (01-26-20 @ 00:06)  HR: 67 (01-26-20 @ 04:13)  BP: 97/60 (01-26-20 @ 04:13)  RR: 18 (01-26-20 @ 04:13)  SpO2: 96% (01-26-20 @ 04:13)  Wt(kg): --    01-25 @ 07:01  -  01-26 @ 07:00  --------------------------------------------------------  IN: 360 mL / OUT: 500 mL / NET: -140 mL    PHYSICAL EXAM:  Constitutional: NAD  HEENT: anicteric sclera, oropharynx clear  Neck: No JVD  Respiratory: CTAB, no wheezes, rales or rhonchi  Cardiovascular: S1, S2, RRR  Gastrointestinal: BS+, soft, NT/ND  Extremities: No cyanosis or clubbing. No peripheral edema. RLE ACE dressing+  Neurological: AAO x3  Psychiatric: Normal mood, normal affect  : No ortiz.   Vascular Access: LFA AVF +thrill    LABS:  01-26    135  |  91<L>  |  35<H>  ----------------------------<  80  5.0   |  24  |  5.52<H>    Ca    9.6      26 Jan 2020 07:09      Creatinine Trend: 5.52 <--, 7.43 <--, 8.08 <--, 6.38 <--                        12.4   9.53  )-----------( 286      ( 26 Jan 2020 09:08 )             42.9     Urine Studies:        RADIOLOGY & ADDITIONAL STUDIES:

## 2020-01-26 NOTE — PROGRESS NOTE ADULT - ATTENDING COMMENTS
Patient seen and examined.  Agree with above.   Follow up CTH  Monitor BP  Lifelong ac if no contraindications    Poncho Alba MD

## 2020-01-26 NOTE — PROGRESS NOTE ADULT - SUBJECTIVE AND OBJECTIVE BOX
Patient is a 86y old  Female who presents with a chief complaint of R Knee pain (26 Jan 2020 13:24)      SUBJECTIVE / OVERNIGHT EVENTS:  Pt seen and examined. No acute events overnight. She denies RLE pain. Also denies dizziness/headache.  Per RN, pt noted to be hypotensive after participating in PT.     MEDICATIONS  (STANDING):  allopurinol 300 milliGRAM(s) Oral <User Schedule>  artificial tears (preservative free) Ophthalmic Solution 1 Drop(s) Both EYES two times a day  chlorhexidine 4% Liquid 1 Application(s) Topical daily  crizotinib 200 milliGRAM(s) Oral two times a day  lidocaine   Patch 1 Patch Transdermal daily  midodrine. 10 milliGRAM(s) Oral every 8 hours  pantoprazole    Tablet 40 milliGRAM(s) Oral before breakfast  polyethylene glycol 3350 17 Gram(s) Oral daily  senna 2 Tablet(s) Oral at bedtime  sevelamer carbonate 800 milliGRAM(s) Oral three times a day with meals  warfarin 2 milliGRAM(s) Oral once    MEDICATIONS  (PRN):  acetaminophen   Tablet .. 650 milliGRAM(s) Oral every 6 hours PRN Mild Pain (1 - 3)  aluminum hydroxide/magnesium hydroxide/simethicone Suspension 30 milliLiter(s) Oral every 6 hours PRN Dyspepsia  artificial  tears Solution 1 Drop(s) Both EYES every 6 hours PRN Dry Eyes  bisacodyl 5 milliGRAM(s) Oral every 12 hours PRN Constipation  calcium carbonate    500 mG (Tums) Chewable 1 Tablet(s) Chew every 8 hours PRN Heartburn  oxyCODONE    Solution 2 milliGRAM(s) Oral every 6 hours PRN Moderate to severe pain      Vital Signs Last 24 Hrs  T(C): 37.1 (26 Jan 2020 04:13), Max: 37.3 (26 Jan 2020 00:06)  T(F): 98.8 (26 Jan 2020 04:13), Max: 99.2 (26 Jan 2020 00:06)  HR: 68 (26 Jan 2020 13:34) (60 - 68)  BP: 76/45 (26 Jan 2020 13:34) (76/45 - 103/61)  BP(mean): --  RR: 18 (26 Jan 2020 13:34) (18 - 18)  SpO2: 94% (26 Jan 2020 13:34) (94% - 97%)  CAPILLARY BLOOD GLUCOSE        I&O's Summary    25 Jan 2020 07:01  -  26 Jan 2020 07:00  --------------------------------------------------------  IN: 360 mL / OUT: 500 mL / NET: -140 mL        PHYSICAL EXAM:  GENERAL: NAD, up in chair,  anicteric, afebrile  RESPIRATORY: lungs are clear to auscultation bilaterally  CARDIOVASCULAR: Regular rate and rhythm, normal S1 and S2, no murmur/rub/gallop  ABDOMEN: Nontender to palpation, normoactive bowel sounds, no rebound/guarding; No hepatosplenomegaly  MUSCULOSKELETAL: no clubbing or cyanosis of digits; no joint swelling or tenderness to palpation  PSYCH: affect appropriate  NEUROLOGY : drowsy but arousable, oriented x 3,  non focal  EXTREMETIES :  No lower extremity edema; Peripheral pulses are 2+ bilaterally    LABS:                        12.4   9.53  )-----------( 286      ( 26 Jan 2020 09:08 )             42.9     01-26    135  |  91<L>  |  35<H>  ----------------------------<  80  5.0   |  24  |  5.52<H>    Ca    9.6      26 Jan 2020 07:09      PT/INR - ( 26 Jan 2020 09:25 )   PT: 20.1 sec;   INR: 1.72 ratio

## 2020-01-27 NOTE — PROGRESS NOTE ADULT - SUBJECTIVE AND OBJECTIVE BOX
S: no chest pain or SOB. Review of systems otherwise (-)    acetaminophen   Tablet .. 650 milliGRAM(s) Oral every 6 hours PRN  allopurinol 300 milliGRAM(s) Oral <User Schedule>  aluminum hydroxide/magnesium hydroxide/simethicone Suspension 30 milliLiter(s) Oral every 6 hours PRN  artificial  tears Solution 1 Drop(s) Both EYES every 6 hours PRN  artificial tears (preservative free) Ophthalmic Solution 1 Drop(s) Both EYES two times a day  bisacodyl 5 milliGRAM(s) Oral every 12 hours PRN  calcium carbonate    500 mG (Tums) Chewable 1 Tablet(s) Chew every 8 hours PRN  chlorhexidine 4% Liquid 1 Application(s) Topical daily  crizotinib 200 milliGRAM(s) Oral two times a day  lidocaine   Patch 1 Patch Transdermal daily  midodrine. 10 milliGRAM(s) Oral three times a day  oxyCODONE    Solution 2 milliGRAM(s) Oral every 6 hours PRN  pantoprazole    Tablet 40 milliGRAM(s) Oral before breakfast  polyethylene glycol 3350 17 Gram(s) Oral daily PRN  senna 2 Tablet(s) Oral at bedtime  sevelamer carbonate 800 milliGRAM(s) Oral three times a day with meals                            12.6   9.92  )-----------( 256      ( 27 Jan 2020 08:38 )             42.5       01-27    133<L>  |  90<L>  |  50<H>  ----------------------------<  65<L>  5.2   |  25  |  7.02<H>    Ca    9.8      27 Jan 2020 07:01  Phos  4.2     01-27  Mg     3.0     01-27              T(C): 36.8 (01-27-20 @ 11:14), Max: 37.1 (01-26-20 @ 16:54)  HR: 60 (01-27-20 @ 11:22) (58 - 68)  BP: 96/43 (01-27-20 @ 11:22) (76/45 - 98/56)  RR: 18 (01-27-20 @ 11:14) (18 - 18)  SpO2: 95% (01-27-20 @ 11:22) (94% - 97%)  Wt(kg): --    I&O's Summary    26 Jan 2020 07:01  -  27 Jan 2020 07:00  --------------------------------------------------------  IN: 450 mL / OUT: 0 mL / NET: 450 mL        Gen: Appears well in NAD  HEENT:  (-)icterus (-)pallor  CV: N S1 S2 1/6 GARY (+)2 Pulses B/l  Resp:  Clear to auscultation B/L, normal effort  GI: (+) BS Soft, NT, ND  Lymph:  (-) Edema, (-)obvious lymphadenopathy  Skin: Warm to touch, Normal turgor  Psych: Appropriate mood and affect      TELEMETRY: None	        ASSESSMENT/PLAN:   85 y/o female known to our office with PMHx of ESRD on HD, persistent Afib (on Coumadin), s/p Bio MVR, Tricuspid valve repair and ASD closure in 2016, hx complete heart block s/p PPM, Lung CA, COPD, remote colon CA (s/p R hemicolectomy), had normal nuclear stress test and TTE with normal LV/RV with well seated bio MV with normal function both in 7/2019 who presented to ED with worsening right hip pain s/p recent mechanical fall found to have right hip fracture and supratherapeutic INR requiring reversal for OR. Cardiology consulted for preop clearance. Patient underwent right femoral intramedullary rodding 1/15/20 .     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 Seay on 2/24 at 2 PM ([address], [address], [place] [address] - Office #[phone])        Poncho Alba MD

## 2020-01-27 NOTE — PROGRESS NOTE ADULT - ASSESSMENT
86F PMH ESRD (HD on T/Th/Sat) via left forearm AVF, stage IV squamous cell lung cancer (diagnosed 10/2019, mets to bone, on crizotinib), afib (on Coumadin), PPM, MS/TR (s/p annuloplasty in 2016), chronic hypotension (on midodrine HD days), remote colon CA (s/p R hemicolectomy), GI bleed (2/2 Dieulafoy lesion), anemia, pulmonary HTN, COPD, and restrictive lung disease, presented to ED with R knee found to have  pathologic intertrochanteric fracture of the right femur, s/p R IMN for metastatic femur. Renal following for ESRD MX.    labs, chart reviewed

## 2020-01-27 NOTE — PROGRESS NOTE ADULT - PROBLEM SELECTOR PLAN 1
Plan for HD tomorrow with 1.5-2kg UF as toelrated by BP  bp low, asympt, c/ midodrine prehd  renal restrictions in diet  dose all meds for ESRD  trend bmp

## 2020-01-27 NOTE — PROGRESS NOTE ADULT - PROBLEM SELECTOR PLAN 6
- Multiple BMs in last few days ; c/o stomach cramping ; d/c Miralax   - c/w  puree diet with thins, assistance with meals, alternate liquid/solid   - c/w maalox and PPI w/ monitoring and crush meds

## 2020-01-27 NOTE — PROGRESS NOTE ADULT - PROBLEM SELECTOR PLAN 8
Hgb 7.2 on admission in setting of supratherapeutic INR, baseline around 9. No evidence of active bleeding; s/p 1 unit prbc in ED  Hb stable at 12.6  Does have hx of GI bleed 2/2 Dieulafoy lesion ; FOBT neg on this admission.

## 2020-01-27 NOTE — PROGRESS NOTE ADULT - SUBJECTIVE AND OBJECTIVE BOX
Patient is a 86y old  Female who presents with a chief complaint of R Knee pain (26 Jan 2020 14:18)      SUBJECTIVE / OVERNIGHT EVENTS:    MEDICATIONS  (STANDING):  allopurinol 300 milliGRAM(s) Oral <User Schedule>  artificial tears (preservative free) Ophthalmic Solution 1 Drop(s) Both EYES two times a day  chlorhexidine 4% Liquid 1 Application(s) Topical daily  crizotinib 200 milliGRAM(s) Oral two times a day  lidocaine   Patch 1 Patch Transdermal daily  midodrine. 10 milliGRAM(s) Oral three times a day  pantoprazole    Tablet 40 milliGRAM(s) Oral before breakfast  polyethylene glycol 3350 17 Gram(s) Oral daily  senna 2 Tablet(s) Oral at bedtime  sevelamer carbonate 800 milliGRAM(s) Oral three times a day with meals    MEDICATIONS  (PRN):  acetaminophen   Tablet .. 650 milliGRAM(s) Oral every 6 hours PRN Mild Pain (1 - 3)  aluminum hydroxide/magnesium hydroxide/simethicone Suspension 30 milliLiter(s) Oral every 6 hours PRN Dyspepsia  artificial  tears Solution 1 Drop(s) Both EYES every 6 hours PRN Dry Eyes  bisacodyl 5 milliGRAM(s) Oral every 12 hours PRN Constipation  calcium carbonate    500 mG (Tums) Chewable 1 Tablet(s) Chew every 8 hours PRN Heartburn  oxyCODONE    Solution 2 milliGRAM(s) Oral every 6 hours PRN Moderate to severe pain      Vital Signs Last 24 Hrs  T(C): 36.9 (27 Jan 2020 05:15), Max: 37.1 (26 Jan 2020 16:54)  T(F): 98.4 (27 Jan 2020 05:15), Max: 98.7 (26 Jan 2020 16:54)  HR: 62 (27 Jan 2020 05:15) (58 - 68)  BP: 93/52 (27 Jan 2020 05:15) (76/45 - 98/56)  BP(mean): --  RR: 18 (27 Jan 2020 05:15) (18 - 18)  SpO2: 94% (27 Jan 2020 05:15) (94% - 97%)  CAPILLARY BLOOD GLUCOSE        I&O's Summary    26 Jan 2020 07:01  -  27 Jan 2020 07:00  --------------------------------------------------------  IN: 450 mL / OUT: 0 mL / NET: 450 mL        PHYSICAL EXAM:  GENERAL: NAD, well-developed  HEAD:  Atraumatic, Normocephalic  EYES: EOMI, PERRLA, conjunctiva and sclera clear  NECK: Supple, No JVD  CHEST/LUNG: Clear to auscultation bilaterally; No wheeze  HEART: Regular rate and rhythm; No murmurs, rubs, or gallops  ABDOMEN: Soft, Nontender, Nondistended; Bowel sounds present  EXTREMITIES:  2+ Peripheral Pulses, No clubbing, cyanosis, or edema  PSYCH: AAOx3  NEUROLOGY: non-focal  SKIN: No rashes or lesions    LABS:                        12.6   9.92  )-----------( 256      ( 27 Jan 2020 08:38 )             42.5     01-27    133<L>  |  90<L>  |  50<H>  ----------------------------<  65<L>  5.2   |  25  |  7.02<H>    Ca    9.8      27 Jan 2020 07:01  Phos  4.2     01-27  Mg     3.0     01-27      PT/INR - ( 27 Jan 2020 08:38 )   PT: 18.4 sec;   INR: 1.60 ratio         PTT - ( 27 Jan 2020 08:38 )  PTT:26.8 sec          RADIOLOGY & ADDITIONAL TESTS:    Imaging Personally Reviewed:    Consultant(s) Notes Reviewed:      Care Discussed with Consultants/Other Providers: Patient is a 86y old  Female who presents with a chief complaint of R Knee pain (26 Jan 2020 14:18)      SUBJECTIVE / OVERNIGHT EVENTS:  Pt seen and examined. No acute events overnight. She denies dizziness cp, sob. Right hip pain is well controlled. Pt c/o stomach cramping and frequent bms.    MEDICATIONS  (STANDING):  allopurinol 300 milliGRAM(s) Oral <User Schedule>  artificial tears (preservative free) Ophthalmic Solution 1 Drop(s) Both EYES two times a day  chlorhexidine 4% Liquid 1 Application(s) Topical daily  crizotinib 200 milliGRAM(s) Oral two times a day  lidocaine   Patch 1 Patch Transdermal daily  midodrine. 10 milliGRAM(s) Oral three times a day  pantoprazole    Tablet 40 milliGRAM(s) Oral before breakfast  polyethylene glycol 3350 17 Gram(s) Oral daily  senna 2 Tablet(s) Oral at bedtime  sevelamer carbonate 800 milliGRAM(s) Oral three times a day with meals    MEDICATIONS  (PRN):  acetaminophen   Tablet .. 650 milliGRAM(s) Oral every 6 hours PRN Mild Pain (1 - 3)  aluminum hydroxide/magnesium hydroxide/simethicone Suspension 30 milliLiter(s) Oral every 6 hours PRN Dyspepsia  artificial  tears Solution 1 Drop(s) Both EYES every 6 hours PRN Dry Eyes  bisacodyl 5 milliGRAM(s) Oral every 12 hours PRN Constipation  calcium carbonate    500 mG (Tums) Chewable 1 Tablet(s) Chew every 8 hours PRN Heartburn  oxyCODONE    Solution 2 milliGRAM(s) Oral every 6 hours PRN Moderate to severe pain      Vital Signs Last 24 Hrs  T(C): 36.9 (27 Jan 2020 05:15), Max: 37.1 (26 Jan 2020 16:54)  T(F): 98.4 (27 Jan 2020 05:15), Max: 98.7 (26 Jan 2020 16:54)  HR: 62 (27 Jan 2020 05:15) (58 - 68)  BP: 93/52 (27 Jan 2020 05:15) (76/45 - 98/56)  BP(mean): --  RR: 18 (27 Jan 2020 05:15) (18 - 18)  SpO2: 94% (27 Jan 2020 05:15) (94% - 97%)  CAPILLARY BLOOD GLUCOSE        I&O's Summary    26 Jan 2020 07:01  -  27 Jan 2020 07:00  --------------------------------------------------------  IN: 450 mL / OUT: 0 mL / NET: 450 mL        PHYSICAL EXAM:  GENERAL: NAD, up in chair,  anicteric, afebrile  RESPIRATORY: lungs are clear to auscultation bilaterally  CARDIOVASCULAR: Regular rate and rhythm, normal S1 and S2, no murmur/rub/gallop  ABDOMEN: Nontender to palpation, normoactive bowel sounds, no rebound/guarding; No hepatosplenomegaly  MUSCULOSKELETAL: no clubbing or cyanosis of digits; no joint swelling or tenderness to palpation  PSYCH: affect appropriate  NEUROLOGY : AAO X 3,  non focal  EXTREMETIES :  No lower extremity edema; Peripheral pulses are 2+ bilaterally      LABS:                        12.6   9.92  )-----------( 256      ( 27 Jan 2020 08:38 )             42.5     01-27    133<L>  |  90<L>  |  50<H>  ----------------------------<  65<L>  5.2   |  25  |  7.02<H>    Ca    9.8      27 Jan 2020 07:01  Phos  4.2     01-27  Mg     3.0     01-27      PT/INR - ( 27 Jan 2020 08:38 )   PT: 18.4 sec;   INR: 1.60 ratio         PTT - ( 27 Jan 2020 08:38 )  PTT:26.8 sec          RADIOLOGY & ADDITIONAL TESTS:    Imaging Personally Reviewed: Nephrology, Cardiology        Care Discussed with Consultants/Other Providers:

## 2020-01-27 NOTE — PROGRESS NOTE ADULT - PROBLEM SELECTOR PLAN 2
Lung cancer/mass  -Pt with hypoxic episode likely due to right middle lobe collapse, Lung cancer   -CT A/P commenting on Right middle lobe lobar atelectasis.  -rvp negative  -le dopplers negative  -saturating well off O2  -incentive spirometry  - seen by pulm.

## 2020-01-27 NOTE — PROGRESS NOTE ADULT - SUBJECTIVE AND OBJECTIVE BOX
[facility] - KAYLEE Meier / KAYLEE Matthew / GRETTA Gillette/ KAYLEE Galeano/ KAYLEE Schaefer/ TRE Leary / ANTWON Serrano / RAGHU Alarcon  ---------------------------------------------------------------------------------------------------------------  seen and examined today for End Stage Renal Disease on Dialysis on HD  Interval : NAD  VITALS:  T(F): 98.4 (01-27-20 @ 05:15), Max: 98.7 (01-26-20 @ 16:54)  HR: 60 (01-27-20 @ 11:22)  BP: 96/43 (01-27-20 @ 11:22)  RR: 18 (01-27-20 @ 05:15)  SpO2: 95% (01-27-20 @ 11:22)  Wt(kg): --    01-26 @ 07:01  -  01-27 @ 07:00  --------------------------------------------------------  IN: 450 mL / OUT: 0 mL / NET: 450 mL      Physical Exam :-  Constitutional: NAD  Neck: Supple.  Respiratory: Bilateral equal breath sounds,  Cardiovascular: S1, S2 normal,  Gastrointestinal: Bowel Sounds present, soft, non tender.  Extremities: No edema  Neurological: Alert and Oriented x 3, no focal deficits  Psychiatric: Normal mood, normal affect  Data:-  Allergies :   No Known Allergies    Hospital Medications:   MEDICATIONS  (STANDING):  allopurinol 300 milliGRAM(s) Oral <User Schedule>  artificial tears (preservative free) Ophthalmic Solution 1 Drop(s) Both EYES two times a day  chlorhexidine 4% Liquid 1 Application(s) Topical daily  crizotinib 200 milliGRAM(s) Oral two times a day  lidocaine   Patch 1 Patch Transdermal daily  midodrine. 10 milliGRAM(s) Oral three times a day  pantoprazole    Tablet 40 milliGRAM(s) Oral before breakfast  senna 2 Tablet(s) Oral at bedtime  sevelamer carbonate 800 milliGRAM(s) Oral three times a day with meals    01-27    133<L>  |  90<L>  |  50<H>  ----------------------------<  65<L>  5.2   |  25  |  7.02<H>    Ca    9.8      27 Jan 2020 07:01  Phos  4.2     01-27  Mg     3.0     01-27      Creatinine Trend: 7.02 <--, 5.52 <--, 7.43 <--, 8.08 <--                        12.6   9.92  )-----------( 256      ( 27 Jan 2020 08:38 )             42.5

## 2020-01-28 NOTE — PROGRESS NOTE ADULT - ATTENDING COMMENTS
Agree with above  INR remains subtherapeutic  Would bridge with hep gtt if no contraindications    Poncho Alba MD Agree with above  If INR remains subtherapeutic, would bridge with hep gtt if no contraindications  Follow up labs     Poncho Alba MD

## 2020-01-28 NOTE — PROGRESS NOTE ADULT - SUBJECTIVE AND OBJECTIVE BOX
S: Seen at . Denies chest pain or SOB. Review of systems otherwise (-)      MEDICATIONS  (STANDING):  allopurinol 300 milliGRAM(s) Oral <User Schedule>  artificial tears (preservative free) Ophthalmic Solution 1 Drop(s) Both EYES two times a day  chlorhexidine 4% Liquid 1 Application(s) Topical daily  crizotinib 200 milliGRAM(s) Oral two times a day  lidocaine   Patch 1 Patch Transdermal daily  midodrine. 10 milliGRAM(s) Oral three times a day  pantoprazole    Tablet 40 milliGRAM(s) Oral before breakfast  sevelamer carbonate 800 milliGRAM(s) Oral three times a day with meals    MEDICATIONS  (PRN):  acetaminophen   Tablet .. 650 milliGRAM(s) Oral every 6 hours PRN Mild Pain (1 - 3)  aluminum hydroxide/magnesium hydroxide/simethicone Suspension 30 milliLiter(s) Oral every 6 hours PRN Dyspepsia  artificial  tears Solution 1 Drop(s) Both EYES every 6 hours PRN Dry Eyes  bisacodyl 5 milliGRAM(s) Oral every 12 hours PRN Constipation  calcium carbonate    500 mG (Tums) Chewable 1 Tablet(s) Chew every 8 hours PRN Heartburn  oxyCODONE    Solution 2 milliGRAM(s) Oral every 6 hours PRN Moderate to severe pain  senna 2 Tablet(s) Oral at bedtime PRN Constipation      LABS:                            12.6   9.92  )-----------( 256      ( 27 Jan 2020 08:38 )             42.5     Hemoglobin: 12.6 g/dL (01-27 @ 08:38)  Hemoglobin: 12.4 g/dL (01-26 @ 09:08)  Hemoglobin: 11.4 g/dL (01-25 @ 15:13)    01-27    133<L>  |  90<L>  |  50<H>  ----------------------------<  65<L>  5.2   |  25  |  7.02<H>    Ca    9.8      27 Jan 2020 07:01  Phos  4.2     01-27  Mg     3.0     01-27      Creatinine Trend: 7.02<--, 5.52<--, 7.43<--, 8.08<--, 6.38<--, 4.24<--             01-27-20 @ 07:01  -  01-28-20 @ 07:00  --------------------------------------------------------  IN: 1090 mL / OUT: 0 mL / NET: 1090 mL        PHYSICAL EXAM  Vital Signs Last 24 Hrs  T(C): 36.5 (28 Jan 2020 08:45), Max: 37.1 (27 Jan 2020 23:52)  T(F): 97.7 (28 Jan 2020 08:45), Max: 98.7 (27 Jan 2020 23:52)  HR: 60 (28 Jan 2020 09:13) (60 - 65)  BP: 92/41 (28 Jan 2020 09:13) (84/41 - 103/53)  BP(mean): --  RR: 16 (28 Jan 2020 08:45) (16 - 18)  SpO2: 99% (28 Jan 2020 08:45) (95% - 99%)      Gen: Appears well in NAD  HEENT:  (-)icterus (-)pallor  CV: N S1 S2 1/6 GARY (+)2 Pulses B/l  Resp:  Clear to auscultation B/L, normal effort  GI: (+) BS Soft, NT, ND  Lymph:  (-) Edema, (-)obvious lymphadenopathy  Skin: Warm to touch, Normal turgor  Psych: Appropriate mood and affect      TELEMETRY: None	        ASSESSMENT/PLAN:   87 y/o female known to our office with PMHx of ESRD on HD, persistent Afib (on Coumadin), s/p Bio MVR, Tricuspid valve repair and ASD closure in 2016, hx complete heart block s/p PPM, Lung CA, COPD, remote colon CA (s/p R hemicolectomy), had normal nuclear stress test and TTE with normal LV/RV with well seated bio MV with normal function both in 7/2019 who presented to ED with worsening right hip pain s/p recent mechanical fall found to have right hip fracture and supratherapeutic INR requiring reversal for OR. Cardiology consulted for preop clearance. Patient underwent right femoral intramedullary rodding 1/15/20 .     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 Seay on 2/24 at 2 PM ([address], [address], [place] [address] - Office #[phone])    Adam Jim PA-C  [facility]  Pager: [phone]

## 2020-01-28 NOTE — PROGRESS NOTE ADULT - SUBJECTIVE AND OBJECTIVE BOX
[facility] - KAYLEE Meier / KAYLEE Matthew / GRETTA Gillette/ KAYLEE Galeano/ KAYLEE Schaefer/ TRE Leary / ANTWON Serrano / RAGHU Alarcon  ---------------------------------------------------------------------------------------------------------------  seen and examined today for ESRD  Interval : NAD  VITALS:  T(F): 97.5 (01-28-20 @ 12:10), Max: 98.7 (01-27-20 @ 23:52)  HR: 60 (01-28-20 @ 12:10)  BP: 103/54 (01-28-20 @ 12:10)  RR: 16 (01-28-20 @ 12:10)  SpO2: 97% (01-28-20 @ 12:10)  Wt(kg): --    01-27 @ 07:01  -  01-28 @ 07:00  --------------------------------------------------------  IN: 1090 mL / OUT: 0 mL / NET: 1090 mL    01-28 @ 07:01  -  01-28 @ 13:50  --------------------------------------------------------  IN: 0 mL / OUT: 500 mL / NET: -500 mL      Physical Exam :-  Constitutional: NAD  Neck: Supple.  Respiratory: Bilateral equal breath sounds,  Cardiovascular: S1, S2 normal,  Gastrointestinal: Bowel Sounds present, soft, non tender.  Extremities: No edema  Neurological: Alert and Oriented x 3, no focal deficits  Psychiatric: Normal mood, normal affect  Data:-  Allergies :   No Known Allergies    Hospital Medications:   MEDICATIONS  (STANDING):  allopurinol 300 milliGRAM(s) Oral <User Schedule>  artificial tears (preservative free) Ophthalmic Solution 1 Drop(s) Both EYES two times a day  chlorhexidine 4% Liquid 1 Application(s) Topical daily  crizotinib 200 milliGRAM(s) Oral two times a day  lidocaine   Patch 1 Patch Transdermal daily  midodrine. 10 milliGRAM(s) Oral three times a day  pantoprazole    Tablet 40 milliGRAM(s) Oral before breakfast  sevelamer carbonate 800 milliGRAM(s) Oral three times a day with meals    01-28    130<L>  |  89<L>  |  73<H>  ----------------------------<  124<H>  4.6   |  24  |  8.76<H>    Ca    9.4      28 Jan 2020 10:08  Phos  4.2     01-27  Mg     3.0     01-27      Creatinine Trend: 8.76 <--, 7.02 <--, 5.52 <--, 7.43 <--                        10.5   9.17  )-----------( 328      ( 28 Jan 2020 11:18 )             34.2

## 2020-01-28 NOTE — PROGRESS NOTE ADULT - PROBLEM SELECTOR PLAN 8
Hgb 7.2 on admission in setting of supratherapeutic INR, baseline around 9. No evidence of active bleeding; s/p 1 unit prbc in ED  Hb stable   Does have hx of GI bleed 2/2 Dieulafoy lesion ; FOBT neg on this admission.

## 2020-01-28 NOTE — PROGRESS NOTE ADULT - SUBJECTIVE AND OBJECTIVE BOX
Patient is a 86y old  Female who presents with a chief complaint of R Knee pain (28 Jan 2020 09:41)      SUBJECTIVE / OVERNIGHT EVENTS:    MEDICATIONS  (STANDING):  allopurinol 300 milliGRAM(s) Oral <User Schedule>  artificial tears (preservative free) Ophthalmic Solution 1 Drop(s) Both EYES two times a day  chlorhexidine 4% Liquid 1 Application(s) Topical daily  crizotinib 200 milliGRAM(s) Oral two times a day  lidocaine   Patch 1 Patch Transdermal daily  midodrine. 10 milliGRAM(s) Oral three times a day  pantoprazole    Tablet 40 milliGRAM(s) Oral before breakfast  sevelamer carbonate 800 milliGRAM(s) Oral three times a day with meals    MEDICATIONS  (PRN):  acetaminophen   Tablet .. 650 milliGRAM(s) Oral every 6 hours PRN Mild Pain (1 - 3)  aluminum hydroxide/magnesium hydroxide/simethicone Suspension 30 milliLiter(s) Oral every 6 hours PRN Dyspepsia  artificial  tears Solution 1 Drop(s) Both EYES every 6 hours PRN Dry Eyes  bisacodyl 5 milliGRAM(s) Oral every 12 hours PRN Constipation  calcium carbonate    500 mG (Tums) Chewable 1 Tablet(s) Chew every 8 hours PRN Heartburn  oxyCODONE    Solution 2 milliGRAM(s) Oral every 6 hours PRN Moderate to severe pain  senna 2 Tablet(s) Oral at bedtime PRN Constipation      Vital Signs Last 24 Hrs  T(C): 36.4 (28 Jan 2020 12:10), Max: 37.1 (27 Jan 2020 23:52)  T(F): 97.5 (28 Jan 2020 12:10), Max: 98.7 (27 Jan 2020 23:52)  HR: 60 (28 Jan 2020 12:10) (60 - 65)  BP: 103/54 (28 Jan 2020 12:10) (84/41 - 103/54)  BP(mean): --  RR: 16 (28 Jan 2020 12:10) (16 - 18)  SpO2: 97% (28 Jan 2020 12:10) (95% - 99%)  CAPILLARY BLOOD GLUCOSE        I&O's Summary    27 Jan 2020 07:01  -  28 Jan 2020 07:00  --------------------------------------------------------  IN: 1090 mL / OUT: 0 mL / NET: 1090 mL    28 Jan 2020 07:01  -  28 Jan 2020 12:56  --------------------------------------------------------  IN: 0 mL / OUT: 500 mL / NET: -500 mL        PHYSICAL EXAM:  GENERAL: NAD, well-developed  HEAD:  Atraumatic, Normocephalic  EYES: EOMI, PERRLA, conjunctiva and sclera clear  NECK: Supple, No JVD  CHEST/LUNG: Clear to auscultation bilaterally; No wheeze  HEART: Regular rate and rhythm; No murmurs, rubs, or gallops  ABDOMEN: Soft, Nontender, Nondistended; Bowel sounds present  EXTREMITIES:  2+ Peripheral Pulses, No clubbing, cyanosis, or edema  PSYCH: AAOx3  NEUROLOGY: non-focal  SKIN: No rashes or lesions    LABS:                        10.5   9.17  )-----------( 328      ( 28 Jan 2020 11:18 )             34.2     01-28    130<L>  |  89<L>  |  73<H>  ----------------------------<  124<H>  4.6   |  24  |  8.76<H>    Ca    9.4      28 Jan 2020 10:08  Phos  4.2     01-27  Mg     3.0     01-27      PT/INR - ( 27 Jan 2020 08:38 )   PT: 18.4 sec;   INR: 1.60 ratio         PTT - ( 27 Jan 2020 08:38 )  PTT:26.8 sec          RADIOLOGY & ADDITIONAL TESTS:    Imaging Personally Reviewed:    Consultant(s) Notes Reviewed:      Care Discussed with Consultants/Other Providers: Patient is a 86y old  Female who presents with a chief complaint of R Knee pain (28 Jan 2020 09:41)      SUBJECTIVE / OVERNIGHT EVENTS:  Pt seen and examined. No acute events overnight. s/p HD today ; had an episode of hypotension, BP now improving to low 100s systolic. Pt denies dizziness, sob.       MEDICATIONS  (STANDING):  allopurinol 300 milliGRAM(s) Oral <User Schedule>  artificial tears (preservative free) Ophthalmic Solution 1 Drop(s) Both EYES two times a day  chlorhexidine 4% Liquid 1 Application(s) Topical daily  crizotinib 200 milliGRAM(s) Oral two times a day  lidocaine   Patch 1 Patch Transdermal daily  midodrine. 10 milliGRAM(s) Oral three times a day  pantoprazole    Tablet 40 milliGRAM(s) Oral before breakfast  sevelamer carbonate 800 milliGRAM(s) Oral three times a day with meals    MEDICATIONS  (PRN):  acetaminophen   Tablet .. 650 milliGRAM(s) Oral every 6 hours PRN Mild Pain (1 - 3)  aluminum hydroxide/magnesium hydroxide/simethicone Suspension 30 milliLiter(s) Oral every 6 hours PRN Dyspepsia  artificial  tears Solution 1 Drop(s) Both EYES every 6 hours PRN Dry Eyes  bisacodyl 5 milliGRAM(s) Oral every 12 hours PRN Constipation  calcium carbonate    500 mG (Tums) Chewable 1 Tablet(s) Chew every 8 hours PRN Heartburn  oxyCODONE    Solution 2 milliGRAM(s) Oral every 6 hours PRN Moderate to severe pain  senna 2 Tablet(s) Oral at bedtime PRN Constipation      Vital Signs Last 24 Hrs  T(C): 36.4 (28 Jan 2020 12:10), Max: 37.1 (27 Jan 2020 23:52)  T(F): 97.5 (28 Jan 2020 12:10), Max: 98.7 (27 Jan 2020 23:52)  HR: 60 (28 Jan 2020 12:10) (60 - 65)  BP: 103/54 (28 Jan 2020 12:10) (84/41 - 103/54)  BP(mean): --  RR: 16 (28 Jan 2020 12:10) (16 - 18)  SpO2: 97% (28 Jan 2020 12:10) (95% - 99%)  CAPILLARY BLOOD GLUCOSE        I&O's Summary    27 Jan 2020 07:01  -  28 Jan 2020 07:00  --------------------------------------------------------  IN: 1090 mL / OUT: 0 mL / NET: 1090 mL    28 Jan 2020 07:01  -  28 Jan 2020 12:56  --------------------------------------------------------  IN: 0 mL / OUT: 500 mL / NET: -500 mL        PHYSICAL EXAM:  GENERAL: NAD, up in chair,  anicteric, afebrile  RESPIRATORY: lungs are clear to auscultation bilaterally  CARDIOVASCULAR: Regular rate and rhythm, normal S1 and S2, no murmur/rub/gallop  ABDOMEN: Nontender to palpation, normoactive bowel sounds, no rebound/guarding; No hepatosplenomegaly  MUSCULOSKELETAL: no clubbing or cyanosis of digits; no joint swelling or tenderness to palpation  PSYCH: affect appropriate  NEUROLOGY : AAO X 3,  non focal  EXTREMETIES :  No lower extremity edema; Peripheral pulses are 2+ bilaterally      LABS:                        10.5   9.17  )-----------( 328      ( 28 Jan 2020 11:18 )             34.2     01-28    130<L>  |  89<L>  |  73<H>  ----------------------------<  124<H>  4.6   |  24  |  8.76<H>    Ca    9.4      28 Jan 2020 10:08  Phos  4.2     01-27  Mg     3.0     01-27      PT/INR - ( 27 Jan 2020 08:38 )   PT: 18.4 sec;   INR: 1.60 ratio         PTT - ( 27 Jan 2020 08:38 )  PTT:26.8 sec            Consultant(s) Notes Reviewed:  Nephrology, Cardiology

## 2020-01-29 NOTE — PROGRESS NOTE ADULT - PROBLEM SELECTOR PLAN 6
- resolved  - c/w  puree diet with thins, assistance with meals, alternate liquid/solid   - c/w maalox and PPI w/ monitoring and crush meds  - c/w senna, bisacodyl prn

## 2020-01-29 NOTE — PROGRESS NOTE ADULT - PROBLEM SELECTOR PLAN 3
- has been subtherapeutic inr 1.4 today  - started on Heparin gtt  - dose Coumadin 2.5mg tonight - has been subtherapeutic inr 1.4 today  - started on Heparin gtt  - received Coumadin 2.5mg this morning ; home dose is 5mg po qd  - dose Coumadin 2.5mg tonight

## 2020-01-29 NOTE — PROGRESS NOTE ADULT - SUBJECTIVE AND OBJECTIVE BOX
[facility] - KAYLEE Meier / KAYLEE Matthew / GRETTA Gillette/ KAYLEE Galeano/ KAYLEE Schaefer/ TRE Leary / ANTWON Serrano / RAGHU Alarcon  ---------------------------------------------------------------------------------------------------------------  seen and examined today for ESRD on HD  Interval : NAD  VITALS:  T(F): 99.1 (01-29-20 @ 05:58), Max: 99.1 (01-29-20 @ 05:58)  HR: 66 (01-29-20 @ 05:58)  BP: 117/67 (01-29-20 @ 05:58)  RR: 18 (01-29-20 @ 05:58)  SpO2: 95% (01-29-20 @ 05:58)  Wt(kg): --    01-28 @ 07:01  -  01-29 @ 07:00  --------------------------------------------------------  IN: 480 mL / OUT: 500 mL / NET: -20 mL      Physical Exam :-  Constitutional: NAD  Neck: Supple.  Respiratory: Bilateral equal breath sounds,  Cardiovascular: S1, S2 normal,  Gastrointestinal: Bowel Sounds present, soft, non tender.  Extremities: No edema  Neurological: Alert and Oriented x 3, no focal deficits  Psychiatric: Normal mood, normal affect  Data:-  Allergies :   No Known Allergies    Hospital Medications:   MEDICATIONS  (STANDING):  allopurinol 300 milliGRAM(s) Oral <User Schedule>  artificial tears (preservative free) Ophthalmic Solution 1 Drop(s) Both EYES two times a day  chlorhexidine 4% Liquid 1 Application(s) Topical daily  crizotinib 200 milliGRAM(s) Oral two times a day  heparin  Infusion.  Unit(s)/Hr (14 mL/Hr) IV Continuous <Continuous>  lidocaine   Patch 1 Patch Transdermal daily  midodrine. 10 milliGRAM(s) Oral three times a day  pantoprazole    Tablet 40 milliGRAM(s) Oral before breakfast  sevelamer carbonate 800 milliGRAM(s) Oral three times a day with meals    01-28    130<L>  |  89<L>  |  73<H>  ----------------------------<  124<H>  4.6   |  24  |  8.76<H>    Ca    9.4      28 Jan 2020 10:08      Creatinine Trend: 8.76 <--, 7.02 <--, 5.52 <--, 7.43 <--                        12.4   7.49  )-----------( 247      ( 29 Jan 2020 07:46 )             42.5

## 2020-01-29 NOTE — PROGRESS NOTE ADULT - PROBLEM SELECTOR PLAN 2
Lung cancer/mass  -Pt with hypoxic episode likely due to right middle lobe collapse, Lung cancer   -CT A/P with  Right middle lobe lobar atelectasis.  - RVP negative  - LE dopplers negative  -saturating well off O2  -incentive spirometry  - seen by Pulm.

## 2020-01-29 NOTE — PROGRESS NOTE ADULT - SUBJECTIVE AND OBJECTIVE BOX
S: Denies chest pain or SOB. Review of systems otherwise (-)      MEDICATIONS  (STANDING):  allopurinol 300 milliGRAM(s) Oral <User Schedule>  artificial tears (preservative free) Ophthalmic Solution 1 Drop(s) Both EYES two times a day  chlorhexidine 4% Liquid 1 Application(s) Topical daily  crizotinib 200 milliGRAM(s) Oral two times a day  heparin  Infusion.  Unit(s)/Hr (14 mL/Hr) IV Continuous <Continuous>  lidocaine   Patch 1 Patch Transdermal daily  midodrine. 10 milliGRAM(s) Oral three times a day  pantoprazole    Tablet 40 milliGRAM(s) Oral before breakfast  sevelamer carbonate 800 milliGRAM(s) Oral three times a day with meals    MEDICATIONS  (PRN):  acetaminophen   Tablet .. 650 milliGRAM(s) Oral every 6 hours PRN Mild Pain (1 - 3)  aluminum hydroxide/magnesium hydroxide/simethicone Suspension 30 milliLiter(s) Oral every 6 hours PRN Dyspepsia  artificial  tears Solution 1 Drop(s) Both EYES every 6 hours PRN Dry Eyes  bisacodyl 5 milliGRAM(s) Oral every 12 hours PRN Constipation  calcium carbonate    500 mG (Tums) Chewable 1 Tablet(s) Chew every 8 hours PRN Heartburn  heparin  Injectable 6500 Unit(s) IV Push every 6 hours PRN For aPTT less than 40  heparin  Injectable 3000 Unit(s) IV Push every 6 hours PRN For aPTT between 40 - 57  oxyCODONE    Solution 2 milliGRAM(s) Oral every 6 hours PRN Moderate to severe pain  senna 2 Tablet(s) Oral at bedtime PRN Constipation      LABS:                            12.4   7.49  )-----------( 247      ( 29 Jan 2020 07:46 )             42.5     Hemoglobin: 12.4 g/dL (01-29 @ 07:46)  Hemoglobin: 10.5 g/dL (01-28 @ 11:18)  Hemoglobin: 12.6 g/dL (01-27 @ 08:38)  Hemoglobin: 12.4 g/dL (01-26 @ 09:08)  Hemoglobin: 11.4 g/dL (01-25 @ 15:13)    01-28    130<L>  |  89<L>  |  73<H>  ----------------------------<  124<H>  4.6   |  24  |  8.76<H>    Ca    9.4      28 Jan 2020 10:08      Creatinine Trend: 8.76<--, 7.02<--, 5.52<--, 7.43<--, 8.08<--, 6.38<--   PT/INR - ( 29 Jan 2020 00:29 )   PT: 16.8 sec;   INR: 1.44 ratio         PTT - ( 29 Jan 2020 07:46 )  PTT:60.2 sec          01-28-20 @ 07:01  -  01-29-20 @ 07:00  --------------------------------------------------------  IN: 480 mL / OUT: 500 mL / NET: -20 mL    01-29-20 @ 07:01  -  01-29-20 @ 11:56  --------------------------------------------------------  IN: 120 mL / OUT: 0 mL / NET: 120 mL        PHYSICAL EXAM  Vital Signs Last 24 Hrs  T(C): 37.5 (29 Jan 2020 11:40), Max: 37.5 (29 Jan 2020 11:40)  T(F): 99.5 (29 Jan 2020 11:40), Max: 99.5 (29 Jan 2020 11:40)  HR: 81 (29 Jan 2020 11:40) (59 - 82)  BP: 108/63 (29 Jan 2020 11:40) (94/52 - 117/67)  BP(mean): --  RR: 18 (29 Jan 2020 05:58) (16 - 18)  SpO2: 96% (29 Jan 2020 11:40) (95% - 100%)      Gen: Appears well in NAD  HEENT:  (-)icterus (-)pallor  CV: N S1 S2 1/6 GARY (+)2 Pulses B/l  Resp:  Clear to auscultation B/L, normal effort  GI: (+) BS Soft, NT, ND  Lymph:  (-) Edema, (-)obvious lymphadenopathy  Skin: Warm to touch, Normal turgor  Psych: Appropriate mood and affect      TELEMETRY: None	        ASSESSMENT/PLAN:   85 y/o female known to our office with PMHx of ESRD on HD, persistent Afib (on Coumadin), s/p Bio MVR, Tricuspid valve repair and ASD closure in 2016, hx complete heart block s/p PPM, Lung CA, COPD, remote colon CA (s/p R hemicolectomy), had normal nuclear stress test and TTE with normal LV/RV with well seated bio MV with normal function both in 7/2019 who presented to ED with worsening right hip pain s/p recent mechanical fall found to have right hip fracture and supratherapeutic INR requiring reversal for OR. Cardiology consulted for preop clearance. Patient underwent right femoral intramedullary rodding 1/15/20 .     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 Seay on 2/24 at 2 PM ([address], [address], [place] [address] - Office #[phone])    Adam Jim PA-C  [facility]  Pager: [phone]

## 2020-01-29 NOTE — PROGRESS NOTE ADULT - PROBLEM SELECTOR PLAN 1
Noted low BP today and could not achieve large UF but patient remained asymptomatic throughout  bp low, asympt, c/ midodrine prehd  Next HD session due for Thursday  renal restrictions in diet  dose all meds for ESRD  trend bmp

## 2020-01-29 NOTE — PROGRESS NOTE ADULT - SUBJECTIVE AND OBJECTIVE BOX
Patient is a 86y old  Female who presents with a chief complaint of R Knee pain (29 Jan 2020 11:55)      SUBJECTIVE / OVERNIGHT EVENTS:    MEDICATIONS  (STANDING):  allopurinol 300 milliGRAM(s) Oral <User Schedule>  artificial tears (preservative free) Ophthalmic Solution 1 Drop(s) Both EYES two times a day  chlorhexidine 4% Liquid 1 Application(s) Topical daily  crizotinib 200 milliGRAM(s) Oral two times a day  heparin  Infusion.  Unit(s)/Hr (14 mL/Hr) IV Continuous <Continuous>  lidocaine   Patch 1 Patch Transdermal daily  midodrine. 10 milliGRAM(s) Oral three times a day  pantoprazole    Tablet 40 milliGRAM(s) Oral before breakfast  sevelamer carbonate 800 milliGRAM(s) Oral three times a day with meals    MEDICATIONS  (PRN):  acetaminophen   Tablet .. 650 milliGRAM(s) Oral every 6 hours PRN Mild Pain (1 - 3)  aluminum hydroxide/magnesium hydroxide/simethicone Suspension 30 milliLiter(s) Oral every 6 hours PRN Dyspepsia  artificial  tears Solution 1 Drop(s) Both EYES every 6 hours PRN Dry Eyes  bisacodyl 5 milliGRAM(s) Oral every 12 hours PRN Constipation  calcium carbonate    500 mG (Tums) Chewable 1 Tablet(s) Chew every 8 hours PRN Heartburn  heparin  Injectable 6500 Unit(s) IV Push every 6 hours PRN For aPTT less than 40  heparin  Injectable 3000 Unit(s) IV Push every 6 hours PRN For aPTT between 40 - 57  oxyCODONE    Solution 2 milliGRAM(s) Oral every 6 hours PRN Moderate to severe pain  senna 2 Tablet(s) Oral at bedtime PRN Constipation      Vital Signs Last 24 Hrs  T(C): 37.5 (29 Jan 2020 11:40), Max: 37.5 (29 Jan 2020 11:40)  T(F): 99.5 (29 Jan 2020 11:40), Max: 99.5 (29 Jan 2020 11:40)  HR: 81 (29 Jan 2020 11:40) (59 - 82)  BP: 108/63 (29 Jan 2020 11:40) (94/52 - 117/67)  BP(mean): --  RR: 18 (29 Jan 2020 05:58) (16 - 18)  SpO2: 96% (29 Jan 2020 11:40) (95% - 100%)  CAPILLARY BLOOD GLUCOSE        I&O's Summary    28 Jan 2020 07:01  -  29 Jan 2020 07:00  --------------------------------------------------------  IN: 480 mL / OUT: 500 mL / NET: -20 mL    29 Jan 2020 07:01  -  29 Jan 2020 12:48  --------------------------------------------------------  IN: 120 mL / OUT: 0 mL / NET: 120 mL        PHYSICAL EXAM:  GENERAL: NAD, well-developed  HEAD:  Atraumatic, Normocephalic  EYES: EOMI, PERRLA, conjunctiva and sclera clear  NECK: Supple, No JVD  CHEST/LUNG: Clear to auscultation bilaterally; No wheeze  HEART: Regular rate and rhythm; No murmurs, rubs, or gallops  ABDOMEN: Soft, Nontender, Nondistended; Bowel sounds present  EXTREMITIES:  2+ Peripheral Pulses, No clubbing, cyanosis, or edema  PSYCH: AAOx3  NEUROLOGY: non-focal  SKIN: No rashes or lesions    LABS:                        12.4   7.49  )-----------( 247      ( 29 Jan 2020 07:46 )             42.5     01-28    130<L>  |  89<L>  |  73<H>  ----------------------------<  124<H>  4.6   |  24  |  8.76<H>    Ca    9.4      28 Jan 2020 10:08      PT/INR - ( 29 Jan 2020 00:29 )   PT: 16.8 sec;   INR: 1.44 ratio         PTT - ( 29 Jan 2020 07:46 )  PTT:60.2 sec          RADIOLOGY & ADDITIONAL TESTS:    Imaging Personally Reviewed:    Consultant(s) Notes Reviewed:      Care Discussed with Consultants/Other Providers: Patient is a 86y old  Female who presents with a chief complaint of R Knee pain (29 Jan 2020 11:55)      SUBJECTIVE / OVERNIGHT EVENTS:  Pt seen and examined with family at bedside. Started on heparin gtt for subtherapeutic INR.   She denies RLE pain, dizziness. Started on heparin gtt for subtherapeutic INR.     MEDICATIONS  (STANDING):  allopurinol 300 milliGRAM(s) Oral <User Schedule>  artificial tears (preservative free) Ophthalmic Solution 1 Drop(s) Both EYES two times a day  chlorhexidine 4% Liquid 1 Application(s) Topical daily  crizotinib 200 milliGRAM(s) Oral two times a day  heparin  Infusion.  Unit(s)/Hr (14 mL/Hr) IV Continuous <Continuous>  lidocaine   Patch 1 Patch Transdermal daily  midodrine. 10 milliGRAM(s) Oral three times a day  pantoprazole    Tablet 40 milliGRAM(s) Oral before breakfast  sevelamer carbonate 800 milliGRAM(s) Oral three times a day with meals    MEDICATIONS  (PRN):  acetaminophen   Tablet .. 650 milliGRAM(s) Oral every 6 hours PRN Mild Pain (1 - 3)  aluminum hydroxide/magnesium hydroxide/simethicone Suspension 30 milliLiter(s) Oral every 6 hours PRN Dyspepsia  artificial  tears Solution 1 Drop(s) Both EYES every 6 hours PRN Dry Eyes  bisacodyl 5 milliGRAM(s) Oral every 12 hours PRN Constipation  calcium carbonate    500 mG (Tums) Chewable 1 Tablet(s) Chew every 8 hours PRN Heartburn  heparin  Injectable 6500 Unit(s) IV Push every 6 hours PRN For aPTT less than 40  heparin  Injectable 3000 Unit(s) IV Push every 6 hours PRN For aPTT between 40 - 57  oxyCODONE    Solution 2 milliGRAM(s) Oral every 6 hours PRN Moderate to severe pain  senna 2 Tablet(s) Oral at bedtime PRN Constipation      Vital Signs Last 24 Hrs  T(C): 37.5 (29 Jan 2020 11:40), Max: 37.5 (29 Jan 2020 11:40)  T(F): 99.5 (29 Jan 2020 11:40), Max: 99.5 (29 Jan 2020 11:40)  HR: 81 (29 Jan 2020 11:40) (59 - 82)  BP: 108/63 (29 Jan 2020 11:40) (94/52 - 117/67)  BP(mean): --  RR: 18 (29 Jan 2020 05:58) (16 - 18)  SpO2: 96% (29 Jan 2020 11:40) (95% - 100%)  CAPILLARY BLOOD GLUCOSE        I&O's Summary    28 Jan 2020 07:01  -  29 Jan 2020 07:00  --------------------------------------------------------  IN: 480 mL / OUT: 500 mL / NET: -20 mL    29 Jan 2020 07:01  -  29 Jan 2020 12:48  --------------------------------------------------------  IN: 120 mL / OUT: 0 mL / NET: 120 mL        PHYSICAL EXAM:  GENERAL: NAD, up in chair,  anicteric, afebrile  RESPIRATORY: lungs are clear to auscultation bilaterally  CARDIOVASCULAR: Regular rate and rhythm, normal S1 and S2, no murmur/rub/gallop  ABDOMEN: Nontender to palpation, normoactive bowel sounds, no rebound/guarding; No hepatosplenomegaly  MUSCULOSKELETAL: no clubbing or cyanosis of digits; no joint swelling or tenderness to palpation  PSYCH: affect appropriate  NEUROLOGY : AAO X 3,  non focal  EXTREMETIES :  No lower extremity edema; Peripheral pulses are 2+ bilaterally    LABS:                        12.4   7.49  )-----------( 247      ( 29 Jan 2020 07:46 )             42.5     01-28    130<L>  |  89<L>  |  73<H>  ----------------------------<  124<H>  4.6   |  24  |  8.76<H>    Ca    9.4      28 Jan 2020 10:08      PT/INR - ( 29 Jan 2020 00:29 )   PT: 16.8 sec;   INR: 1.44 ratio         PTT - ( 29 Jan 2020 07:46 )  PTT:60.2 sec

## 2020-01-30 NOTE — PROGRESS NOTE ADULT - PROBLEM SELECTOR PLAN 1
Tolerating HD well today  bp low, asympt, c/ midodrine prehd  renal restrictions in diet  dose all meds for ESRD  trend bmp

## 2020-01-30 NOTE — PROGRESS NOTE ADULT - PROBLEM SELECTOR PLAN 1
CT abd and pelvis x-ray showed right femur intertochanteric pathologic Fx  - S/p OR for RIMN 1/15  - c/w PRN tylenol  pain is controlled   - palliative input appreciated  - PT reccs SVETLANA ; d/c planning in progress

## 2020-01-30 NOTE — PROGRESS NOTE ADULT - SUBJECTIVE AND OBJECTIVE BOX
Patient is a 86y old  Female who presents with a chief complaint of R Knee pain (30 Jan 2020 12:00)      SUBJECTIVE / OVERNIGHT EVENTS:    MEDICATIONS  (STANDING):  allopurinol 300 milliGRAM(s) Oral <User Schedule>  artificial tears (preservative free) Ophthalmic Solution 1 Drop(s) Both EYES two times a day  chlorhexidine 4% Liquid 1 Application(s) Topical daily  crizotinib 200 milliGRAM(s) Oral two times a day  heparin  Infusion.  Unit(s)/Hr (14 mL/Hr) IV Continuous <Continuous>  lidocaine   Patch 1 Patch Transdermal daily  midodrine. 10 milliGRAM(s) Oral three times a day  pantoprazole    Tablet 40 milliGRAM(s) Oral before breakfast  polyethylene glycol 3350 17 Gram(s) Oral every 12 hours  sevelamer carbonate 800 milliGRAM(s) Oral three times a day with meals    MEDICATIONS  (PRN):  acetaminophen   Tablet .. 650 milliGRAM(s) Oral every 6 hours PRN Mild Pain (1 - 3)  aluminum hydroxide/magnesium hydroxide/simethicone Suspension 30 milliLiter(s) Oral every 6 hours PRN Dyspepsia  artificial  tears Solution 1 Drop(s) Both EYES every 6 hours PRN Dry Eyes  bisacodyl 5 milliGRAM(s) Oral every 12 hours PRN Constipation  calcium carbonate    500 mG (Tums) Chewable 1 Tablet(s) Chew every 8 hours PRN Heartburn  heparin  Injectable 6500 Unit(s) IV Push every 6 hours PRN For aPTT less than 40  heparin  Injectable 3000 Unit(s) IV Push every 6 hours PRN For aPTT between 40 - 57  senna 2 Tablet(s) Oral at bedtime PRN Constipation      Vital Signs Last 24 Hrs  T(C): 36.3 (30 Jan 2020 11:45), Max: 36.8 (30 Jan 2020 08:45)  T(F): 97.4 (30 Jan 2020 11:45), Max: 98.2 (30 Jan 2020 08:45)  HR: 94 (30 Jan 2020 11:45) (60 - 94)  BP: 104/54 (30 Jan 2020 11:45) (104/54 - 117/66)  BP(mean): --  RR: 17 (30 Jan 2020 11:45) (17 - 19)  SpO2: 97% (30 Jan 2020 11:45) (95% - 97%)  CAPILLARY BLOOD GLUCOSE        I&O's Summary    29 Jan 2020 07:01  -  30 Jan 2020 07:00  --------------------------------------------------------  IN: 480 mL / OUT: 0 mL / NET: 480 mL    30 Jan 2020 07:01  -  30 Jan 2020 13:48  --------------------------------------------------------  IN: 120 mL / OUT: 500 mL / NET: -380 mL        PHYSICAL EXAM:  GENERAL: NAD, well-developed  HEAD:  Atraumatic, Normocephalic  EYES: EOMI, PERRLA, conjunctiva and sclera clear  NECK: Supple, No JVD  CHEST/LUNG: Clear to auscultation bilaterally; No wheeze  HEART: Regular rate and rhythm; No murmurs, rubs, or gallops  ABDOMEN: Soft, Nontender, Nondistended; Bowel sounds present  EXTREMITIES:  2+ Peripheral Pulses, No clubbing, cyanosis, or edema  PSYCH: AAOx3  NEUROLOGY: non-focal  SKIN: No rashes or lesions    LABS:                        12.2   8.10  )-----------( 278      ( 30 Jan 2020 07:10 )             42.6           PT/INR - ( 30 Jan 2020 09:12 )   PT: 17.8 sec;   INR: 1.54 ratio         PTT - ( 30 Jan 2020 09:12 )  PTT:63.7 sec          RADIOLOGY & ADDITIONAL TESTS:    Imaging Personally Reviewed:    Consultant(s) Notes Reviewed:      Care Discussed with Consultants/Other Providers: Patient is a 86y old  Female who presents with a chief complaint of R Knee pain (30 Jan 2020 12:00)      SUBJECTIVE / OVERNIGHT EVENTS: Pt seen and examined. No acute events overnight. Denies dizziness, cp, sob, RLE pain. s/p HD today.    MEDICATIONS  (STANDING):  allopurinol 300 milliGRAM(s) Oral <User Schedule>  artificial tears (preservative free) Ophthalmic Solution 1 Drop(s) Both EYES two times a day  chlorhexidine 4% Liquid 1 Application(s) Topical daily  crizotinib 200 milliGRAM(s) Oral two times a day  heparin  Infusion.  Unit(s)/Hr (14 mL/Hr) IV Continuous <Continuous>  lidocaine   Patch 1 Patch Transdermal daily  midodrine. 10 milliGRAM(s) Oral three times a day  pantoprazole    Tablet 40 milliGRAM(s) Oral before breakfast  polyethylene glycol 3350 17 Gram(s) Oral every 12 hours  sevelamer carbonate 800 milliGRAM(s) Oral three times a day with meals    MEDICATIONS  (PRN):  acetaminophen   Tablet .. 650 milliGRAM(s) Oral every 6 hours PRN Mild Pain (1 - 3)  aluminum hydroxide/magnesium hydroxide/simethicone Suspension 30 milliLiter(s) Oral every 6 hours PRN Dyspepsia  artificial  tears Solution 1 Drop(s) Both EYES every 6 hours PRN Dry Eyes  bisacodyl 5 milliGRAM(s) Oral every 12 hours PRN Constipation  calcium carbonate    500 mG (Tums) Chewable 1 Tablet(s) Chew every 8 hours PRN Heartburn  heparin  Injectable 6500 Unit(s) IV Push every 6 hours PRN For aPTT less than 40  heparin  Injectable 3000 Unit(s) IV Push every 6 hours PRN For aPTT between 40 - 57  senna 2 Tablet(s) Oral at bedtime PRN Constipation      Vital Signs Last 24 Hrs  T(C): 36.3 (30 Jan 2020 11:45), Max: 36.8 (30 Jan 2020 08:45)  T(F): 97.4 (30 Jan 2020 11:45), Max: 98.2 (30 Jan 2020 08:45)  HR: 94 (30 Jan 2020 11:45) (60 - 94)  BP: 104/54 (30 Jan 2020 11:45) (104/54 - 117/66)  BP(mean): --  RR: 17 (30 Jan 2020 11:45) (17 - 19)  SpO2: 97% (30 Jan 2020 11:45) (95% - 97%)  CAPILLARY BLOOD GLUCOSE        I&O's Summary    29 Jan 2020 07:01  -  30 Jan 2020 07:00  --------------------------------------------------------  IN: 480 mL / OUT: 0 mL / NET: 480 mL    30 Jan 2020 07:01  -  30 Jan 2020 13:48  --------------------------------------------------------  IN: 120 mL / OUT: 500 mL / NET: -380 mL        PHYSICAL EXAM:  GENERAL: NAD, up in chair,  anicteric, afebrile  RESPIRATORY: lungs are clear to auscultation bilaterally  CARDIOVASCULAR: Regular rate and rhythm, normal S1 and S2, no murmur/rub/gallop  ABDOMEN: Nontender to palpation, normoactive bowel sounds, no rebound/guarding; No hepatosplenomegaly  MUSCULOSKELETAL: no clubbing or cyanosis of digits; no joint swelling or tenderness to palpation  PSYCH: affect appropriate  NEUROLOGY : AAO X 3,  non focal  EXTREMETIES :  No lower extremity edema; Peripheral pulses are 2+ bilaterally    LABS:                        12.2   8.10  )-----------( 278      ( 30 Jan 2020 07:10 )             42.6           PT/INR - ( 30 Jan 2020 09:12 )   PT: 17.8 sec;   INR: 1.54 ratio         PTT - ( 30 Jan 2020 09:12 )  PTT:63.7 sec          RADIOLOGY & ADDITIONAL TESTS:    Imaging Personally Reviewed:    Consultant(s) Notes Reviewed:      Care Discussed with Consultants/Other Providers:

## 2020-01-30 NOTE — PROGRESS NOTE ADULT - SUBJECTIVE AND OBJECTIVE BOX
S: Seen at HD. Denies chest pain or SOB. Review of systems otherwise (-)      MEDICATIONS  (STANDING):  allopurinol 300 milliGRAM(s) Oral <User Schedule>  artificial tears (preservative free) Ophthalmic Solution 1 Drop(s) Both EYES two times a day  chlorhexidine 4% Liquid 1 Application(s) Topical daily  crizotinib 200 milliGRAM(s) Oral two times a day  heparin  Infusion.  Unit(s)/Hr (14 mL/Hr) IV Continuous <Continuous>  lidocaine   Patch 1 Patch Transdermal daily  midodrine. 10 milliGRAM(s) Oral three times a day  pantoprazole    Tablet 40 milliGRAM(s) Oral before breakfast  polyethylene glycol 3350 17 Gram(s) Oral every 12 hours  sevelamer carbonate 800 milliGRAM(s) Oral three times a day with meals    MEDICATIONS  (PRN):  acetaminophen   Tablet .. 650 milliGRAM(s) Oral every 6 hours PRN Mild Pain (1 - 3)  aluminum hydroxide/magnesium hydroxide/simethicone Suspension 30 milliLiter(s) Oral every 6 hours PRN Dyspepsia  artificial  tears Solution 1 Drop(s) Both EYES every 6 hours PRN Dry Eyes  bisacodyl 5 milliGRAM(s) Oral every 12 hours PRN Constipation  calcium carbonate    500 mG (Tums) Chewable 1 Tablet(s) Chew every 8 hours PRN Heartburn  heparin  Injectable 6500 Unit(s) IV Push every 6 hours PRN For aPTT less than 40  heparin  Injectable 3000 Unit(s) IV Push every 6 hours PRN For aPTT between 40 - 57  senna 2 Tablet(s) Oral at bedtime PRN Constipation      LABS:                            12.2   8.10  )-----------( 278      ( 30 Jan 2020 07:10 )             42.6     Hemoglobin: 12.2 g/dL (01-30 @ 07:10)  Hemoglobin: 12.4 g/dL (01-29 @ 07:46)  Hemoglobin: 10.5 g/dL (01-28 @ 11:18)  Hemoglobin: 12.6 g/dL (01-27 @ 08:38)  Hemoglobin: 12.4 g/dL (01-26 @ 09:08)          Creatinine Trend: 8.76<--, 7.02<--, 5.52<--, 7.43<--, 8.08<--, 6.38<--   PT/INR - ( 30 Jan 2020 09:12 )   PT: 17.8 sec;   INR: 1.54 ratio         PTT - ( 30 Jan 2020 09:12 )  PTT:63.7 sec          01-29-20 @ 07:01  -  01-30-20 @ 07:00  --------------------------------------------------------  IN: 480 mL / OUT: 0 mL / NET: 480 mL        PHYSICAL EXAM  Vital Signs Last 24 Hrs  T(C): 36.8 (30 Jan 2020 08:45), Max: 36.8 (30 Jan 2020 08:45)  T(F): 98.2 (30 Jan 2020 08:45), Max: 98.2 (30 Jan 2020 08:45)  HR: 76 (30 Jan 2020 08:45) (60 - 76)  BP: 112/65 (30 Jan 2020 08:45) (106/63 - 117/66)  BP(mean): --  RR: 18 (30 Jan 2020 08:45) (18 - 19)  SpO2: 95% (30 Jan 2020 08:45) (95% - 96%)      Gen: Appears well in NAD  HEENT:  (-)icterus (-)pallor  CV: N S1 S2 1/6 GARY (+)2 Pulses B/l  Resp:  Clear to auscultation B/L, normal effort  GI: (+) BS Soft, NT, ND  Lymph:  (-) Edema, (-)obvious lymphadenopathy  Skin: Warm to touch, Normal turgor  Psych: Appropriate mood and affect      TELEMETRY: None	        ASSESSMENT/PLAN:   85 y/o female known to our office with PMHx of ESRD on HD, persistent Afib (on Coumadin), s/p Bio MVR, Tricuspid valve repair and ASD closure in 2016, hx complete heart block s/p PPM, Lung CA, COPD, remote colon CA (s/p R hemicolectomy), had normal nuclear stress test and TTE with normal LV/RV with well seated bio MV with normal function both in 7/2019 who presented to ED with worsening right hip pain s/p recent mechanical fall found to have right hip fracture and supratherapeutic INR requiring reversal for OR. Cardiology consulted for preop clearance. Patient underwent right femoral intramedullary rodding 1/15/20 .     - No evidence of clinical HF or anginal symptoms  - Continue BP support with midodrine per renal  - HD per renal  - Denies LOC/syncope -- PPM interrogation noted, episodes of likely SVT but no correlating events to recent fall  - Normal Nuclear stress and TTE with normal LV/RV with well seated bio MV with normal function both in 7/2019  - Continue hep gtt bridge to Coumadin for lifelong CVA prevention if no contraindications  - No further inpatient cardiac w/u needed at this time  - Patient to follow up in our [place] office with Dr. Seay on 2/24 at 2 PM ([address], [address], [place] [address] - Office #[phone])    Adam Jim PA-C  [facility]  Pager: [phone]

## 2020-01-30 NOTE — PROGRESS NOTE ADULT - SUBJECTIVE AND OBJECTIVE BOX
[facility] - KAYLEE Meier / KAYLEE Matthew / GRETTA Gillette/ KAYLEE Galeano/ KAYLEE Schaefer/ TRE Leary / ANTWON Serrano / RAGHU Alarcon  ---------------------------------------------------------------------------------------------------------------  seen and examined today for ESRD on HD  Interval : NAD  VITALS:  T(F): 98.2 (01-30-20 @ 08:45), Max: 99.5 (01-29-20 @ 11:40)  HR: 76 (01-30-20 @ 08:45)  BP: 112/65 (01-30-20 @ 08:45)  RR: 18 (01-30-20 @ 08:45)  SpO2: 95% (01-30-20 @ 08:45)  Wt(kg): --    01-29 @ 07:01  -  01-30 @ 07:00  --------------------------------------------------------  IN: 480 mL / OUT: 0 mL / NET: 480 mL      Physical Exam :-  Constitutional: NAD  Neck: Supple.  Respiratory: Bilateral equal breath sounds,  Cardiovascular: S1, S2 normal,  Gastrointestinal: Bowel Sounds present, soft, non tender.  Extremities: No edema  Neurological: Alert and Oriented x 3, no focal deficits  Psychiatric: Normal mood, normal affect  Data:-  Allergies :   No Known Allergies    Hospital Medications:   MEDICATIONS  (STANDING):  allopurinol 300 milliGRAM(s) Oral <User Schedule>  artificial tears (preservative free) Ophthalmic Solution 1 Drop(s) Both EYES two times a day  chlorhexidine 4% Liquid 1 Application(s) Topical daily  crizotinib 200 milliGRAM(s) Oral two times a day  heparin  Infusion.  Unit(s)/Hr (14 mL/Hr) IV Continuous <Continuous>  lidocaine   Patch 1 Patch Transdermal daily  midodrine. 10 milliGRAM(s) Oral three times a day  pantoprazole    Tablet 40 milliGRAM(s) Oral before breakfast  polyethylene glycol 3350 17 Gram(s) Oral every 12 hours  sevelamer carbonate 800 milliGRAM(s) Oral three times a day with meals          Creatinine Trend: 8.76 <--, 7.02 <--, 5.52 <--, 7.43 <--                        12.2   8.10  )-----------( 278      ( 30 Jan 2020 07:10 )             42.6

## 2020-01-31 NOTE — PROGRESS NOTE ADULT - PROBLEM SELECTOR PLAN 1
Plan for routine hd tomm  midodrine for chronic hypotension  renal restrictions in diet  dose all meds for ESRD  trend bmp

## 2020-01-31 NOTE — PROGRESS NOTE ADULT - SUBJECTIVE AND OBJECTIVE BOX
Patient is a 86y old  Female who presents with a chief complaint of R Knee pain (30 Jan 2020 13:47)      SUBJECTIVE / OVERNIGHT EVENTS:    MEDICATIONS  (STANDING):  allopurinol 300 milliGRAM(s) Oral <User Schedule>  artificial tears (preservative free) Ophthalmic Solution 1 Drop(s) Both EYES two times a day  chlorhexidine 4% Liquid 1 Application(s) Topical daily  crizotinib 200 milliGRAM(s) Oral two times a day  heparin  Infusion.  Unit(s)/Hr (14 mL/Hr) IV Continuous <Continuous>  lidocaine   Patch 1 Patch Transdermal daily  midodrine. 10 milliGRAM(s) Oral three times a day  pantoprazole    Tablet 40 milliGRAM(s) Oral before breakfast  polyethylene glycol 3350 17 Gram(s) Oral every 12 hours  sevelamer carbonate 800 milliGRAM(s) Oral three times a day with meals    MEDICATIONS  (PRN):  acetaminophen   Tablet .. 650 milliGRAM(s) Oral every 6 hours PRN Mild Pain (1 - 3)  aluminum hydroxide/magnesium hydroxide/simethicone Suspension 30 milliLiter(s) Oral every 6 hours PRN Dyspepsia  artificial  tears Solution 1 Drop(s) Both EYES every 6 hours PRN Dry Eyes  bisacodyl 5 milliGRAM(s) Oral every 12 hours PRN Constipation  calcium carbonate    500 mG (Tums) Chewable 1 Tablet(s) Chew every 8 hours PRN Heartburn  heparin  Injectable 6500 Unit(s) IV Push every 6 hours PRN For aPTT less than 40  heparin  Injectable 3000 Unit(s) IV Push every 6 hours PRN For aPTT between 40 - 57  senna 2 Tablet(s) Oral at bedtime PRN Constipation      Vital Signs Last 24 Hrs  T(C): 36.6 (31 Jan 2020 04:34), Max: 37.3 (30 Jan 2020 22:15)  T(F): 97.9 (31 Jan 2020 04:34), Max: 99.1 (30 Jan 2020 22:15)  HR: 63 (31 Jan 2020 04:34) (62 - 94)  BP: 102/57 (31 Jan 2020 04:34) (82/50 - 138/58)  BP(mean): --  RR: 18 (31 Jan 2020 04:34) (17 - 20)  SpO2: 97% (31 Jan 2020 04:34) (94% - 98%)  CAPILLARY BLOOD GLUCOSE        I&O's Summary    30 Jan 2020 07:01  -  31 Jan 2020 07:00  --------------------------------------------------------  IN: 408 mL / OUT: 500 mL / NET: -92 mL        PHYSICAL EXAM:  GENERAL: NAD, well-developed  HEAD:  Atraumatic, Normocephalic  EYES: EOMI, PERRLA, conjunctiva and sclera clear  NECK: Supple, No JVD  CHEST/LUNG: Clear to auscultation bilaterally; No wheeze  HEART: Regular rate and rhythm; No murmurs, rubs, or gallops  ABDOMEN: Soft, Nontender, Nondistended; Bowel sounds present  EXTREMITIES:  2+ Peripheral Pulses, No clubbing, cyanosis, or edema  PSYCH: AAOx3  NEUROLOGY: non-focal  SKIN: No rashes or lesions    LABS:                        12.2   8.10  )-----------( 278      ( 30 Jan 2020 07:10 )             42.6     01-31    127<L>  |  86<L>  |  34<H>  ----------------------------<  80  4.6   |  25  |  5.25<H>    Ca    9.6      31 Jan 2020 06:52  Mg     2.7     01-31      PT/INR - ( 31 Jan 2020 08:10 )   PT: 19.3 sec;   INR: 1.68 ratio         PTT - ( 31 Jan 2020 08:10 )  PTT:39.9 sec          RADIOLOGY & ADDITIONAL TESTS:    Imaging Personally Reviewed:    Consultant(s) Notes Reviewed:      Care Discussed with Consultants/Other Providers: Patient is a 86y old  Female who presents with a chief complaint of R Knee pain (30 Jan 2020 13:47)      SUBJECTIVE / OVERNIGHT EVENTS:  Pt seen and examined. Had an episode of vomiting yesterday; pt reports that she drank some milk which did not taste good. Denies fever/chills, abd pain. She denies n/v today. Tolerated breakfast this AM.    MEDICATIONS  (STANDING):  allopurinol 300 milliGRAM(s) Oral <User Schedule>  artificial tears (preservative free) Ophthalmic Solution 1 Drop(s) Both EYES two times a day  chlorhexidine 4% Liquid 1 Application(s) Topical daily  crizotinib 200 milliGRAM(s) Oral two times a day  heparin  Infusion.  Unit(s)/Hr (14 mL/Hr) IV Continuous <Continuous>  lidocaine   Patch 1 Patch Transdermal daily  midodrine. 10 milliGRAM(s) Oral three times a day  pantoprazole    Tablet 40 milliGRAM(s) Oral before breakfast  polyethylene glycol 3350 17 Gram(s) Oral every 12 hours  sevelamer carbonate 800 milliGRAM(s) Oral three times a day with meals    MEDICATIONS  (PRN):  acetaminophen   Tablet .. 650 milliGRAM(s) Oral every 6 hours PRN Mild Pain (1 - 3)  aluminum hydroxide/magnesium hydroxide/simethicone Suspension 30 milliLiter(s) Oral every 6 hours PRN Dyspepsia  artificial  tears Solution 1 Drop(s) Both EYES every 6 hours PRN Dry Eyes  bisacodyl 5 milliGRAM(s) Oral every 12 hours PRN Constipation  calcium carbonate    500 mG (Tums) Chewable 1 Tablet(s) Chew every 8 hours PRN Heartburn  heparin  Injectable 6500 Unit(s) IV Push every 6 hours PRN For aPTT less than 40  heparin  Injectable 3000 Unit(s) IV Push every 6 hours PRN For aPTT between 40 - 57  senna 2 Tablet(s) Oral at bedtime PRN Constipation      Vital Signs Last 24 Hrs  T(C): 36.6 (31 Jan 2020 04:34), Max: 37.3 (30 Jan 2020 22:15)  T(F): 97.9 (31 Jan 2020 04:34), Max: 99.1 (30 Jan 2020 22:15)  HR: 63 (31 Jan 2020 04:34) (62 - 94)  BP: 102/57 (31 Jan 2020 04:34) (82/50 - 138/58)  BP(mean): --  RR: 18 (31 Jan 2020 04:34) (17 - 20)  SpO2: 97% (31 Jan 2020 04:34) (94% - 98%)  CAPILLARY BLOOD GLUCOSE        I&O's Summary    30 Jan 2020 07:01  -  31 Jan 2020 07:00  --------------------------------------------------------  IN: 408 mL / OUT: 500 mL / NET: -92 mL        PHYSICAL EXAM:  GENERAL: NAD, up in chair,  anicteric, afebrile  RESPIRATORY: lungs are clear to auscultation bilaterally  CARDIOVASCULAR: Regular rate and rhythm, normal S1 and S2, no murmur/rub/gallop  ABDOMEN: Nontender to palpation, normoactive bowel sounds, no rebound/guarding; No hepatosplenomegaly  MUSCULOSKELETAL: no clubbing or cyanosis of digits; no joint swelling or tenderness to palpation  PSYCH: affect appropriate  NEUROLOGY : AAO X 3,  non focal  EXTREMETIES :  No lower extremity edema; Peripheral pulses are 2+ bilaterally    LABS:                        12.2   8.10  )-----------( 278      ( 30 Jan 2020 07:10 )             42.6     01-31    127<L>  |  86<L>  |  34<H>  ----------------------------<  80  4.6   |  25  |  5.25<H>    Ca    9.6      31 Jan 2020 06:52  Mg     2.7     01-31      PT/INR - ( 31 Jan 2020 08:10 )   PT: 19.3 sec;   INR: 1.68 ratio         PTT - ( 31 Jan 2020 08:10 )  PTT:39.9 sec

## 2020-01-31 NOTE — PROGRESS NOTE ADULT - SUBJECTIVE AND OBJECTIVE BOX
S: No complaints. Denies chest pain or SOB. Review of systems otherwise (-)      MEDICATIONS  (STANDING):  allopurinol 300 milliGRAM(s) Oral <User Schedule>  artificial tears (preservative free) Ophthalmic Solution 1 Drop(s) Both EYES two times a day  chlorhexidine 4% Liquid 1 Application(s) Topical daily  crizotinib 200 milliGRAM(s) Oral two times a day  epoetin myles Injectable 3000 Unit(s) IV Push <User Schedule>  heparin  Infusion.  Unit(s)/Hr (14 mL/Hr) IV Continuous <Continuous>  lidocaine   Patch 1 Patch Transdermal daily  midodrine. 10 milliGRAM(s) Oral three times a day  pantoprazole    Tablet 40 milliGRAM(s) Oral before breakfast  polyethylene glycol 3350 17 Gram(s) Oral every 12 hours  sevelamer carbonate 800 milliGRAM(s) Oral three times a day with meals    MEDICATIONS  (PRN):  acetaminophen   Tablet .. 650 milliGRAM(s) Oral every 6 hours PRN Mild Pain (1 - 3)  aluminum hydroxide/magnesium hydroxide/simethicone Suspension 30 milliLiter(s) Oral every 6 hours PRN Dyspepsia  artificial  tears Solution 1 Drop(s) Both EYES every 6 hours PRN Dry Eyes  bisacodyl 5 milliGRAM(s) Oral every 12 hours PRN Constipation  calcium carbonate    500 mG (Tums) Chewable 1 Tablet(s) Chew every 8 hours PRN Heartburn  heparin  Injectable 6500 Unit(s) IV Push every 6 hours PRN For aPTT less than 40  heparin  Injectable 3000 Unit(s) IV Push every 6 hours PRN For aPTT between 40 - 57  senna 2 Tablet(s) Oral at bedtime PRN Constipation      LABS:                            10.0   9.14  )-----------( 277      ( 31 Jan 2020 10:47 )             34.2     Hemoglobin: 10.0 g/dL (01-31 @ 10:47)  Hemoglobin: 12.2 g/dL (01-30 @ 07:10)  Hemoglobin: 12.4 g/dL (01-29 @ 07:46)  Hemoglobin: 10.5 g/dL (01-28 @ 11:18)  Hemoglobin: 12.6 g/dL (01-27 @ 08:38)    01-31    127<L>  |  86<L>  |  34<H>  ----------------------------<  80  4.6   |  25  |  5.25<H>    Ca    9.6      31 Jan 2020 06:52  Mg     2.7     01-31      Creatinine Trend: 5.25<--, 8.76<--, 7.02<--, 5.52<--, 7.43<--, 8.08<--   PT/INR - ( 31 Jan 2020 08:10 )   PT: 19.3 sec;   INR: 1.68 ratio         PTT - ( 31 Jan 2020 08:10 )  PTT:39.9 sec          01-30-20 @ 07:01  -  01-31-20 @ 07:00  --------------------------------------------------------  IN: 408 mL / OUT: 500 mL / NET: -92 mL    01-31-20 @ 07:01  -  01-31-20 @ 14:23  --------------------------------------------------------  IN: 240 mL / OUT: 0 mL / NET: 240 mL        PHYSICAL EXAM  Vital Signs Last 24 Hrs  T(C): 36.7 (31 Jan 2020 14:05), Max: 37.3 (30 Jan 2020 22:15)  T(F): 98.1 (31 Jan 2020 14:05), Max: 99.1 (30 Jan 2020 22:15)  HR: 66 (31 Jan 2020 14:05) (62 - 66)  BP: 101/69 (31 Jan 2020 14:05) (82/50 - 138/58)  BP(mean): --  RR: 18 (31 Jan 2020 14:05) (18 - 20)  SpO2: 96% (31 Jan 2020 14:05) (95% - 98%)        Gen: Appears well in NAD  HEENT:  (-)icterus (-)pallor  CV: N S1 S2 1/6 GARY (+)2 Pulses B/l  Resp:  Clear to auscultation B/L, normal effort  GI: (+) BS Soft, NT, ND  Lymph:  (-) Edema, (-)obvious lymphadenopathy  Skin: Warm to touch, Normal turgor  Psych: Appropriate mood and affect      TELEMETRY: None	        ASSESSMENT/PLAN:   85 y/o female known to our office with PMHx of ESRD on HD, persistent Afib (on Coumadin), s/p Bio MVR, Tricuspid valve repair and ASD closure in 2016, hx complete heart block s/p PPM, Lung CA, COPD, remote colon CA (s/p R hemicolectomy), had normal nuclear stress test and TTE with normal LV/RV with well seated bio MV with normal function both in 7/2019 who presented to ED with worsening right hip pain s/p recent mechanical fall found to have right hip fracture and supratherapeutic INR requiring reversal for OR. Cardiology consulted for preop clearance. Patient underwent right femoral intramedullary rodding 1/15/20 .     - No evidence of clinical HF or anginal symptoms  - Continue BP support with midodrine per renal  - Continue HD per renal  - Denies LOC/syncope -- PPM interrogation noted, episodes of likely SVT but no correlating events to recent fall  - Normal Nuclear stress and TTE with normal LV/RV with well seated bio MV with normal function both in 7/2019  - Continue hep gtt bridge to Coumadin for lifelong CVA prevention if no contraindications - INR remains subtherapeutic  - No further inpatient cardiac w/u needed at this time  - Eventual D/C planning [facility] per primary team  - Patient to follow up in our [place] office with Dr. Seay on 2/24 at 2 PM ([address], [address], [place] [address] - Office #[phone])    Adam Jim PA-C  [facility]  Pager: [phone]

## 2020-01-31 NOTE — PROGRESS NOTE ADULT - PROBLEM SELECTOR PLAN 1
CT abd and pelvis x-ray showed right femur intertochanteric pathologic Fx  - S/p OR for RIMN 1/15  - c/w PRN Tylenol ; pain is controlled   - Palliative input appreciated  - PT reccs [facility] ; d/c planning in progress CT abd and pelvis x-ray showed right femur intertochanteric pathologic Fx  - S/p OR for RIMN 1/15  - c/w PRN Tylenol ; pain is controlled   - Palliative input appreciated  - PT reccs [facility] ; d/c planning in progress  - will likely be discharged to [facility] on Monday 2/3/2020

## 2020-01-31 NOTE — PROGRESS NOTE ADULT - SUBJECTIVE AND OBJECTIVE BOX
Patient seen and examined  no complaints    No Known Allergies    Hospital Medications:   MEDICATIONS  (STANDING):  allopurinol 300 milliGRAM(s) Oral <User Schedule>  artificial tears (preservative free) Ophthalmic Solution 1 Drop(s) Both EYES two times a day  chlorhexidine 4% Liquid 1 Application(s) Topical daily  crizotinib 200 milliGRAM(s) Oral two times a day  heparin  Infusion.  Unit(s)/Hr (14 mL/Hr) IV Continuous <Continuous>  lidocaine   Patch 1 Patch Transdermal daily  midodrine. 10 milliGRAM(s) Oral three times a day  pantoprazole    Tablet 40 milliGRAM(s) Oral before breakfast  polyethylene glycol 3350 17 Gram(s) Oral every 12 hours  sevelamer carbonate 800 milliGRAM(s) Oral three times a day with meals    VITALS:  T(F): 98.1 (01-31-20 @ 09:23), Max: 99.1 (01-30-20 @ 22:15)  HR: 66 (01-31-20 @ 09:23)  BP: 121/61 (01-31-20 @ 09:23)  RR: 18 (01-31-20 @ 09:23)  SpO2: 95% (01-31-20 @ 09:23)  Wt(kg): --    01-30 @ 07:01  -  01-31 @ 07:00  --------------------------------------------------------  IN: 408 mL / OUT: 500 mL / NET: -92 mL    01-31 @ 07:01 - 01-31 @ 12:31  --------------------------------------------------------  IN: 120 mL / OUT: 0 mL / NET: 120 mL    Physical Exam :-  Constitutional: NAD  Neck: Supple.  Respiratory: Bilateral equal breath sounds,  Cardiovascular: S1, S2 normal,  Gastrointestinal: Bowel Sounds present, soft, non tender.  Extremities: No edema  Neurological: Alert and Oriented x 3, no focal deficits  Psychiatric: Normal mood, normal affect    LABS:  01-31    127<L>  |  86<L>  |  34<H>  ----------------------------<  80  4.6   |  25  |  5.25<H>    Ca    9.6      31 Jan 2020 06:52  Mg     2.7     01-31      Creatinine Trend: 5.25 <--, 8.76 <--, 7.02 <--, 5.52 <--, 7.43 <--                        10.0   9.14  )-----------( 277      ( 31 Jan 2020 10:47 )             34.2     Urine Studies:      RADIOLOGY & ADDITIONAL STUDIES:

## 2020-01-31 NOTE — PROGRESS NOTE ADULT - NSHPATTENDINGPLANDISCUSS_GEN_ALL_CORE
pt
Cardiology, Renal
Medicine TYSON Parisi
Medicine TYSON Connelly
Medicine NP Zeta
Medicine TYSON Hernandez
Medicine TYSON Hernandez

## 2020-02-01 NOTE — PROGRESS NOTE ADULT - PROBLEM SELECTOR PLAN 2
Lung cancer/mass  -Pt with hypoxic episode likely due to right middle lobe collapse, Lung cancer   -CT A/P with  Right middle lobe lobar atelectasis.  - RVP negative  - LE dopplers negative  -saturating well off O2  -incentive spirometry  - seen by Pulm. worsening over last few days, hypochloremia, will check urine lytes, HD today getting back some fluids, recheck in am  Currently asymptomatic

## 2020-02-01 NOTE — PROGRESS NOTE ADULT - SUBJECTIVE AND OBJECTIVE BOX
PROGRESS NOTE:   Authoted by Dr. Eleni Bradley MD  Pager [phone]     Patient is a 86y old  Female who presents with a chief complaint of R Knee pain (31 Jan 2020 14:23)      SUBJECTIVE / OVERNIGHT EVENTS: Patient seen and examined at bedside - patient in HD, doing well, no new complaints, in HD BP slightly low, received some fluids, no improved    ADDITIONAL REVIEW OF SYSTEMS: as above    MEDICATIONS  (STANDING):  allopurinol 300 milliGRAM(s) Oral <User Schedule>  artificial tears (preservative free) Ophthalmic Solution 1 Drop(s) Both EYES two times a day  chlorhexidine 4% Liquid 1 Application(s) Topical daily  crizotinib 200 milliGRAM(s) Oral two times a day  epoetin myles Injectable 3000 Unit(s) IV Push <User Schedule>  lidocaine   Patch 1 Patch Transdermal daily  midodrine. 10 milliGRAM(s) Oral three times a day  pantoprazole    Tablet 40 milliGRAM(s) Oral before breakfast  sevelamer carbonate 800 milliGRAM(s) Oral three times a day with meals    MEDICATIONS  (PRN):  acetaminophen   Tablet .. 650 milliGRAM(s) Oral every 6 hours PRN Mild Pain (1 - 3)  aluminum hydroxide/magnesium hydroxide/simethicone Suspension 30 milliLiter(s) Oral every 6 hours PRN Dyspepsia  artificial  tears Solution 1 Drop(s) Both EYES every 6 hours PRN Dry Eyes  bisacodyl 5 milliGRAM(s) Oral every 12 hours PRN Constipation  calcium carbonate    500 mG (Tums) Chewable 1 Tablet(s) Chew every 8 hours PRN Heartburn  polyethylene glycol 3350 17 Gram(s) Oral daily PRN Constipation  senna 2 Tablet(s) Oral at bedtime PRN Constipation      CAPILLARY BLOOD GLUCOSE        I&O's Summary    31 Jan 2020 07:01  -  01 Feb 2020 07:00  --------------------------------------------------------  IN: 1200.8 mL / OUT: 0 mL / NET: 1200.8 mL        PHYSICAL EXAM:  Vital Signs Last 24 Hrs  T(C): 36.4 (01 Feb 2020 08:55), Max: 37.3 (31 Jan 2020 20:25)  T(F): 97.6 (01 Feb 2020 08:55), Max: 99.1 (31 Jan 2020 20:25)  HR: 68 (01 Feb 2020 08:55) (60 - 80)  BP: 104/49 (01 Feb 2020 08:55) (101/69 - 119/79)  BP(mean): --  RR: 18 (01 Feb 2020 08:55) (16 - 18)  SpO2: 97% (01 Feb 2020 08:55) (96% - 97%)    PHYSICAL EXAM:  GENERAL: NAD, up in chair,  anicteric, afebrile  RESPIRATORY: lungs are clear to auscultation bilaterally  CARDIOVASCULAR: Regular rate and rhythm, normal S1 and S2, no murmur/rub/gallop  ABDOMEN: Nontender to palpation, normoactive bowel sounds, no rebound/guarding; No hepatosplenomegaly  MUSCULOSKELETAL: no clubbing or cyanosis of digits; no joint swelling or tenderness to palpation  PSYCH: affect appropriate  NEUROLOGY : AAO X 3,  non focal  EXTREMETIES :  No lower extremity edema; Peripheral pulses are 2+ bilaterally      LABS:                        9.8    5.99  )-----------( 262      ( 01 Feb 2020 09:51 )             33.4     02-01    126<L>  |  85<L>  |  50<H>  ----------------------------<  120<H>  4.1   |  27  |  6.88<H>    Ca    9.4      01 Feb 2020 09:51  Mg     2.7     01-31      PT/INR - ( 31 Jan 2020 08:10 )   PT: 19.3 sec;   INR: 1.68 ratio         PTT - ( 01 Feb 2020 03:46 )  PTT:193.5 sec            RADIOLOGY & ADDITIONAL TESTS:  Results Reviewed:   Imaging Personally Reviewed:  Electrocardiogram Personally Reviewed:    COORDINATION OF CARE:  Care Discussed with Consultants/Other Providers [Y/N]:  Prior or Outpatient Records Reviewed [Y/N]:

## 2020-02-01 NOTE — PROGRESS NOTE ADULT - PROBLEM SELECTOR PLAN 6
- resolved  - c/w  puree diet with thins, assistance with meals, alternate liquid/solid   - c/w maalox and PPI w/ monitoring and crush meds  - c/w senna, bisacodyl prn Lung cancer/mass  -Pt with hypoxic episode likely due to right middle lobe collapse, Lung cancer   -CT A/P with  Right middle lobe lobar atelectasis.  - RVP negative  - LE dopplers negative  -saturating well off O2  -incentive spirometry  - seen by Pulm.

## 2020-02-01 NOTE — PROGRESS NOTE ADULT - PROBLEM SELECTOR PLAN 8
Hb stable   Does have hx of GI bleed 2/2 Dieulafoy lesion ; FOBT neg on this admission. 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 Pathologic fracture s/p IM 1/15.  c/w Crizotinib  Follows with Onc Dr. Travis.  seen by Rad onc ; no role for radiation at this time   - follow up as outpatient

## 2020-02-01 NOTE — PROGRESS NOTE ADULT - PROBLEM SELECTOR PLAN 3
- has been subtherapeutic, INR trending up  PTT elevated, stop heparin as INR trending up, wait for INR today  - dose Coumadin accordingly - BP improving ; however tends to drop on HD days  - c/w Midodrine 10mg po tid

## 2020-02-01 NOTE — PROGRESS NOTE ADULT - PROBLEM SELECTOR PLAN 7
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 Pathologic fracture s/p IM 1/15.  c/w Crizotinib  Follows with Onc Dr. Travis.  seen by Rad onc ; no role for radiation at this time   - follow up as outpatient - resolved  - c/w  puree diet with thins, assistance with meals, alternate liquid/solid   - c/w maalox and PPI w/ monitoring and crush meds  - c/w senna, bisacodyl prn

## 2020-02-01 NOTE — PROGRESS NOTE ADULT - ASSESSMENT
86F PMH ESRD (HD on T/Th/Sat) via left forearm AVF, stage IV squamous cell lung cancer (diagnosed 10/2019, mets to bone, on crizotinib), afib (on Coumadin), PPM, MS/TR (s/p annuloplasty in 2016), chronic hypotension (on midodrine HD days), remote colon CA (s/p R hemicolectomy), GI bleed (2/2 Dieulafoy lesion), anemia, pulmonary HTN, COPD, and restrictive lung disease, presented to ED with R knee found to have  pathologic intertrochanteric fracture of the right femur, s/p R IMN for metastatic femur. Renal following for ESRD MX.    ·	End Stage Renal Disease on Dialysis on Tuesday, Thursday and Saturday - tolerating hemodialysis well, lower blood pressure today, decrease Ultrafiltration on Dialysis

## 2020-02-01 NOTE — PROGRESS NOTE ADULT - ASSESSMENT
86yoF w/ PMHx significant for ESRD on HD on T/Th/Sat, Afib (on Coumadin), PPM Implanted, Mitral stenosis/Tricuspid Regurgitation s/p annuloplasty (2016), chronic hypotension, remote hx of colon Ca s/p resection, pulmonary HTN, COPD, restrictive lung disease, anemia, RML lung mass and metastatic disease to bone s/p IR biopsy of R femur bone biopsy consistent with squamous cell carcinoma a/w hemoptysis likely due to metastatic lung cancer and supratherapeutic INR and found with pathologic R femur intertroch fracture s/p R IMN 1/15. Course complicated by immobility, orthostatic hypotension, now resolved for d/c to [facility] Monday

## 2020-02-01 NOTE — PROGRESS NOTE ADULT - PROBLEM SELECTOR PLAN 1
CT abd and pelvis x-ray showed right femur intertochanteric pathologic Fx  - S/p OR for RIMN 1/15  - c/w PRN Tylenol ; pain is controlled   - Palliative input appreciated  - PT reccs [facility] ; d/c planning in progress  - will likely be discharged to [facility] on Monday 2/3/2020

## 2020-02-01 NOTE — PROGRESS NOTE ADULT - PROBLEM SELECTOR PLAN 9
Transitions of Care Status:  1.  Name of PCP: Dr Espana  2.  PCP Contacted on Admission: [ ] Y    [ ] N    3.  PCP contacted at Discharge: [ ] Y    [ ] N    [ ] N/A  4.  Post-Discharge Appointment Date and Location:  5.  Summary of Handoff given to PCP: Hb stable   Does have hx of GI bleed 2/2 Dieulafoy lesion ; FOBT neg on this admission.

## 2020-02-01 NOTE — PROGRESS NOTE ADULT - PROBLEM SELECTOR PLAN 5
- follows with Dr Kamilla Galeano  - gets HD T/Th/Sat outpatient  - HD per Renal - has been subtherapeutic, INR trending up  PTT elevated, stop heparin as INR trending up, wait for INR today  - dose Coumadin accordingly

## 2020-02-01 NOTE — CHART NOTE - NSCHARTNOTEFT_GEN_A_CORE
Notified by RN of aPTT: . Patient started on a  heparin gtt for  on .   Patient sleeping, asymptomatic.  VSS. No active signs of bleeding noted.  Heparin normogram followed. Hep gtt held x 1 hour. Rate was reduced from  mL/hr to 13 mL/hr  Will continue to monitor  Will endorse to AM team      Yamile Valencia PA-C  #

## 2020-02-01 NOTE — PROGRESS NOTE ADULT - SUBJECTIVE AND OBJECTIVE BOX
[facility] : Ans Serv [phone], Office [phone]  Dr Gillette/Dr Meier / Dr Tiff ALCAZAR /Dr Nabeel jain /Dr INGE Galeano/Dr Chiki Schaefer/Dr John Leary /Dr CARA Serrano  _______________________________________________________________________________________________    seen and examined today for End Stage Renal Disease on Dialysis - seen on hemodialysis   Interval : lower blood pressure on hemodialysis,  VITALS:  T(F): 97.6 (02-01-20 @ 08:55), Max: 99.1 (01-31-20 @ 20:25)  HR: 68 (02-01-20 @ 08:55)  BP: 104/49 (02-01-20 @ 08:55)  RR: 18 (02-01-20 @ 08:55)  SpO2: 97% (02-01-20 @ 08:55)    01-31 @ 07:01  -  02-01 @ 07:00  --------------------------------------------------------  IN: 1200.8 mL / OUT: 0 mL / NET: 1200.8 mL  Physical Exam :-  Constitutional: NAD  Neck: Supple.  Respiratory: Bilateral equal breath sounds, few Crackles present.  Cardiovascular: S1, S2 normal, positive Murmur  Gastrointestinal: Bowel Sounds present, soft, non tender  Neurological: Alert   Data:-  Allergies :   No Known Allergies  ospital Medications:   MEDICATIONS  (STANDING):  allopurinol 300 milliGRAM(s) Oral <User Schedule>  artificial tears (preservative free) Ophthalmic Solution 1 Drop(s) Both EYES two times a day  chlorhexidine 4% Liquid 1 Application(s) Topical daily  crizotinib 200 milliGRAM(s) Oral two times a day  epoetin myles Injectable 3000 Unit(s) IV Push <User Schedule>  lidocaine   Patch 1 Patch Transdermal daily  midodrine. 10 milliGRAM(s) Oral three times a day  pantoprazole    Tablet 40 milliGRAM(s) Oral before breakfast  sevelamer carbonate 800 milliGRAM(s) Oral three times a day with meals    02-01    126<L>  |  85<L>  |  50<H>  ----------------------------<  120<H>  4.1   |  27  |  6.88<H>    Ca    9.4      01 Feb 2020 09:51  Mg     2.7     01-31      Creatinine Trend: 6.88 <--, 5.25 <--, 8.76 <--, 7.02 <--, 5.52 <--                        9.8    5.99  )-----------( 262      ( 01 Feb 2020 09:51 )             33.4

## 2020-02-01 NOTE — PROGRESS NOTE ADULT - SUBJECTIVE AND OBJECTIVE BOX
S: No complaints. Denies chest pain or SOB. Review of systems otherwise (-)      MEDICATIONS  (STANDING):  allopurinol 300 milliGRAM(s) Oral <User Schedule>  artificial tears (preservative free) Ophthalmic Solution 1 Drop(s) Both EYES two times a day  chlorhexidine 4% Liquid 1 Application(s) Topical daily  crizotinib 200 milliGRAM(s) Oral two times a day  epoetin myles Injectable 3000 Unit(s) IV Push <User Schedule>  lidocaine   Patch 1 Patch Transdermal daily  midodrine. 10 milliGRAM(s) Oral three times a day  pantoprazole    Tablet 40 milliGRAM(s) Oral before breakfast  sevelamer carbonate 800 milliGRAM(s) Oral three times a day with meals  warfarin 2 milliGRAM(s) Oral once    MEDICATIONS  (PRN):  acetaminophen   Tablet .. 650 milliGRAM(s) Oral every 6 hours PRN Mild Pain (1 - 3)  aluminum hydroxide/magnesium hydroxide/simethicone Suspension 30 milliLiter(s) Oral every 6 hours PRN Dyspepsia  artificial  tears Solution 1 Drop(s) Both EYES every 6 hours PRN Dry Eyes  bisacodyl 5 milliGRAM(s) Oral every 12 hours PRN Constipation  calcium carbonate    500 mG (Tums) Chewable 1 Tablet(s) Chew every 8 hours PRN Heartburn  polyethylene glycol 3350 17 Gram(s) Oral daily PRN Constipation  senna 2 Tablet(s) Oral at bedtime PRN Constipation      LABS:                     9.8    5.99  )-----------( 262      ( 01 Feb 2020 09:51 )             33.4     126<L>  |  85<L>  |  50<H>  ----------------------------<  120<H>  4.1   |  27  |  6.88<H>    Ca    9.4      01 Feb 2020 09:51  Mg     2.7     01-31    Creatinine Trend: 6.88<--, 5.25<--, 8.76<--, 7.02<--, 5.52<--, 7.43<--   PT/INR - ( 01 Feb 2020 11:56 )   PT: 22.8 sec;   INR: 1.96 ratio    PTT - ( 01 Feb 2020 11:56 )  PTT:40.7 sec    PHYSICAL EXAM  Vital Signs Last 24 Hrs  T(C): 36.6 (01 Feb 2020 13:57), Max: 37.3 (31 Jan 2020 20:25)  T(F): 97.9 (01 Feb 2020 13:57), Max: 99.1 (31 Jan 2020 20:25)  HR: 76 (01 Feb 2020 13:57) (60 - 80)  BP: 104/72 (01 Feb 2020 13:57) (104/49 - 119/79)  RR: 18 (01 Feb 2020 13:57) (16 - 18)  SpO2: 95% (01 Feb 2020 13:57) (95% - 97%)    Gen: Appears well in NAD  HEENT:  (-)icterus (-)pallor  CV: N S1 S2 1/6 GARY (+)2 Pulses B/l  Resp:  Clear to auscultation B/L, normal effort  GI: (+) BS Soft, NT, ND  Lymph:  (-) Edema, (-)obvious lymphadenopathy  Skin: Warm to touch, Normal turgor  Psych: Appropriate mood and affect    TELEMETRY: None	        ASSESSMENT/PLAN:   85 y/o female known to our office with PMHx of ESRD on HD, persistent Afib (on Coumadin), s/p Bio MVR, Tricuspid valve repair and ASD closure in 2016, hx complete heart block s/p PPM, Lung CA, COPD, remote colon CA (s/p R hemicolectomy), had normal nuclear stress test and TTE with normal LV/RV with well seated bio MV with normal function both in 7/2019 who presented to ED with worsening right hip pain s/p recent mechanical fall found to have right hip fracture and supratherapeutic INR requiring reversal for OR. Cardiology consulted for preop clearance. Patient underwent right femoral intramedullary rodding 1/15/20 .     - No evidence of clinical HF or anginal symptoms  - Continue BP support with midodrine per renal  - Continue HD per renal  - Denies LOC/syncope -- PPM interrogation noted, episodes of likely SVT but no correlating events to recent fall  - Normal Nuclear stress and TTE with normal LV/RV with well seated bio MV with normal function both in 7/2019  - Continue hep gtt bridge to Coumadin for lifelong CVA prevention if no contraindications - INR remains subtherapeutic  - No further inpatient cardiac w/u needed at this time  - Eventual D/C planning [facility] per primary team  - Patient to follow up in our [place] office with Dr. Seay on 2/24 at 2 PM ([address], [address], [place] [address] - Office #[phone])

## 2020-02-02 NOTE — PROGRESS NOTE ADULT - PROBLEM SELECTOR PLAN 2
today improving 131, was worsening over last few days, hypochloremia  HD today getting back some fluids, recheck in am  Currently asymptomatic

## 2020-02-02 NOTE — PROGRESS NOTE ADULT - PROBLEM SELECTOR PLAN 1
CT abd and pelvis x-ray showed right femur intertochanteric pathologic Fx  - S/p OR for RIMN 1/15  - c/w PRN Tylenol ; pain is controlled   - Palliative input appreciated  - PT reccs [facility] ; d/c planning in progress  - will likely be discharged to [facility] on Monday 2/3/2020

## 2020-02-02 NOTE — PROGRESS NOTE ADULT - SUBJECTIVE AND OBJECTIVE BOX
PROGRESS NOTE:   Authoted by Dr. Eleni Bradley MD  Pager [phone]     Patient is a 86y old  Female who presents with a chief complaint of R Knee pain (02 Feb 2020 04:48)      SUBJECTIVE / OVERNIGHT EVENTS: Patient seen and examined at bedside - patient doing well, sitting up in bed, smiling, good mood, she says she is going to get out of here soon.    ADDITIONAL REVIEW OF SYSTEMS: as above    MEDICATIONS  (STANDING):  allopurinol 300 milliGRAM(s) Oral <User Schedule>  artificial tears (preservative free) Ophthalmic Solution 1 Drop(s) Both EYES two times a day  chlorhexidine 4% Liquid 1 Application(s) Topical daily  crizotinib 200 milliGRAM(s) Oral two times a day  epoetin myles Injectable 3000 Unit(s) IV Push <User Schedule>  lidocaine   Patch 1 Patch Transdermal daily  midodrine. 10 milliGRAM(s) Oral three times a day  pantoprazole    Tablet 40 milliGRAM(s) Oral before breakfast  sevelamer carbonate 800 milliGRAM(s) Oral three times a day with meals  warfarin 2 milliGRAM(s) Oral once    MEDICATIONS  (PRN):  acetaminophen   Tablet .. 650 milliGRAM(s) Oral every 6 hours PRN Mild Pain (1 - 3)  aluminum hydroxide/magnesium hydroxide/simethicone Suspension 30 milliLiter(s) Oral every 6 hours PRN Dyspepsia  artificial  tears Solution 1 Drop(s) Both EYES every 6 hours PRN Dry Eyes  bisacodyl 5 milliGRAM(s) Oral every 12 hours PRN Constipation  calcium carbonate    500 mG (Tums) Chewable 1 Tablet(s) Chew every 8 hours PRN Heartburn  polyethylene glycol 3350 17 Gram(s) Oral daily PRN Constipation  senna 2 Tablet(s) Oral at bedtime PRN Constipation      CAPILLARY BLOOD GLUCOSE        I&O's Summary    01 Feb 2020 07:01  -  02 Feb 2020 07:00  --------------------------------------------------------  IN: 960 mL / OUT: 700 mL / NET: 260 mL    02 Feb 2020 07:01  -  02 Feb 2020 10:16  --------------------------------------------------------  IN: 250 mL / OUT: 0 mL / NET: 250 mL        PHYSICAL EXAM:  Vital Signs Last 24 Hrs  T(C): 36.7 (02 Feb 2020 06:36), Max: 36.8 (02 Feb 2020 00:20)  T(F): 98.1 (02 Feb 2020 06:36), Max: 98.2 (02 Feb 2020 00:20)  HR: 60 (02 Feb 2020 06:36) (60 - 76)  BP: 120/58 (02 Feb 2020 06:36) (103/63 - 123/66)  BP(mean): --  RR: 16 (02 Feb 2020 06:36) (16 - 18)  SpO2: 97% (02 Feb 2020 06:36) (94% - 97%)    PHYSICAL EXAM:  GENERAL: NAD, frail  RESPIRATORY: lungs are clear to auscultation bilaterally  CARDIOVASCULAR: Regular rate and rhythm, normal S1 and S2, no murmur/rub/gallop  ABDOMEN: Nontender to palpation, normoactive bowel sounds, no rebound/guarding; No hepatosplenomegaly  MUSCULOSKELETAL: no clubbing or cyanosis of digits; no joint swelling or tenderness to palpation  PSYCH: affect appropriate  NEUROLOGY : AAO X 3,  non focal  EXTREMETIES :  No lower extremity edema; Peripheral pulses are 2+ bilaterally      LABS:                        10.6   7.31  )-----------( 273      ( 02 Feb 2020 06:25 )             37.1     02-02    131<L>  |  91<L>  |  25<H>  ----------------------------<  78  4.2   |  27  |  4.25<H>    Ca    9.4      02 Feb 2020 06:25      PT/INR - ( 02 Feb 2020 09:05 )   PT: 26.2 sec;   INR: 2.22 ratio         PTT - ( 01 Feb 2020 11:56 )  PTT:40.7 sec            RADIOLOGY & ADDITIONAL TESTS:  Results Reviewed:   Imaging Personally Reviewed:  Electrocardiogram Personally Reviewed:    COORDINATION OF CARE:  Care Discussed with Consultants/Other Providers [Y/N]:  Prior or Outpatient Records Reviewed [Y/N]:

## 2020-02-02 NOTE — PROGRESS NOTE ADULT - PROBLEM SELECTOR PLAN 10
Transitions of Care Status:  1.  Name of PCP: Dr Espana  2.  PCP Contacted on Admission: [ ] Y    [ ] N    3.  PCP contacted at Discharge: [ ] Y    [ ] N    [ ] N/A  4.  Post-Discharge Appointment Date and Location:  5.  Summary of Handoff given to PCP:
Transitions of Care Status:  1.  Name of PCP: Dr Espana  2.  PCP Contacted on Admission: [ ] Y    [ ] N    3.  PCP contacted at Discharge: [ ] Y    [ ] N    [ ] N/A  4.  Post-Discharge Appointment Date and Location:  5.  Summary of Handoff given to PCP:

## 2020-02-02 NOTE — PROGRESS NOTE ADULT - SUBJECTIVE AND OBJECTIVE BOX
pt seen and examined, no complaints, ROS - .          acetaminophen   Tablet .. 650 milliGRAM(s) Oral every 6 hours PRN  allopurinol 300 milliGRAM(s) Oral <User Schedule>  aluminum hydroxide/magnesium hydroxide/simethicone Suspension 30 milliLiter(s) Oral every 6 hours PRN  artificial  tears Solution 1 Drop(s) Both EYES every 6 hours PRN  artificial tears (preservative free) Ophthalmic Solution 1 Drop(s) Both EYES two times a day  bisacodyl 5 milliGRAM(s) Oral every 12 hours PRN  calcium carbonate    500 mG (Tums) Chewable 1 Tablet(s) Chew every 8 hours PRN  chlorhexidine 4% Liquid 1 Application(s) Topical daily  crizotinib 200 milliGRAM(s) Oral two times a day  epoetin myles Injectable 3000 Unit(s) IV Push <User Schedule>  lidocaine   Patch 1 Patch Transdermal daily  midodrine. 10 milliGRAM(s) Oral three times a day  pantoprazole    Tablet 40 milliGRAM(s) Oral before breakfast  polyethylene glycol 3350 17 Gram(s) Oral daily PRN  senna 2 Tablet(s) Oral at bedtime PRN  sevelamer carbonate 800 milliGRAM(s) Oral three times a day with meals                            9.8    5.99  )-----------( 262      ( 01 Feb 2020 09:51 )             33.4       Hemoglobin: 9.8 g/dL (02-01 @ 09:51)  Hemoglobin: 10.0 g/dL (01-31 @ 10:47)  Hemoglobin: 12.2 g/dL (01-30 @ 07:10)  Hemoglobin: 12.4 g/dL (01-29 @ 07:46)  Hemoglobin: 10.5 g/dL (01-28 @ 11:18)      02-01    126<L>  |  85<L>  |  50<H>  ----------------------------<  120<H>  4.1   |  27  |  6.88<H>    Ca    9.4      01 Feb 2020 09:51  Mg     2.7     01-31      Creatinine Trend: 6.88<--, 5.25<--, 8.76<--, 7.02<--, 5.52<--, 7.43<--    COAGS: PT/INR - ( 01 Feb 2020 11:56 )   PT: 22.8 sec;   INR: 1.96 ratio         PTT - ( 01 Feb 2020 11:56 )  PTT:40.7 sec          T(C): 36.8 (02-02-20 @ 00:20), Max: 36.8 (02-02-20 @ 00:20)  HR: 62 (02-02-20 @ 00:20) (60 - 80)  BP: 123/66 (02-02-20 @ 00:20) (103/63 - 123/66)  RR: 18 (02-02-20 @ 00:20) (16 - 18)  SpO2: 94% (02-02-20 @ 00:20) (94% - 97%)  Wt(kg): --    I&O's Summary    31 Jan 2020 07:01  -  01 Feb 2020 07:00  --------------------------------------------------------  IN: 1200.8 mL / OUT: 0 mL / NET: 1200.8 mL    01 Feb 2020 07:01  -  02 Feb 2020 04:49  --------------------------------------------------------  IN: 820 mL / OUT: 700 mL / NET: 120 mL    Gen: Appears well in NAD  HEENT:  (-)icterus (-)pallor  CV: N S1 S2 1/6 GARY (+)2 Pulses B/l  Resp:  Clear to auscultation B/L, normal effort  GI: (+) BS Soft, NT, ND  Lymph:  (-) Edema, (-)obvious lymphadenopathy  Skin: Warm to touch, Normal turgor  Psych: Appropriate mood and affect    TELEMETRY: None	        ASSESSMENT/PLAN:   85 y/o female known to our office with PMHx of ESRD on HD, persistent Afib (on Coumadin), s/p Bio MVR, Tricuspid valve repair and ASD closure in 2016, hx complete heart block s/p PPM, Lung CA, COPD, remote colon CA (s/p R hemicolectomy), had normal nuclear stress test and TTE with normal LV/RV with well seated bio MV with normal function both in 7/2019 who presented to ED with worsening right hip pain s/p recent mechanical fall found to have right hip fracture and supratherapeutic INR requiring reversal for OR. Cardiology consulted for preop clearance. Patient underwent right femoral intramedullary rodding 1/15/20 .     - No evidence of clinical HF or anginal symptoms  - Continue BP support with midodrine per renal  - Continue HD per renal  - Denies LOC/syncope -- PPM interrogation noted, episodes of likely SVT but no correlating events to recent fall  - Normal Nuclear stress and TTE with normal LV/RV with well seated bio MV with normal function both in 7/2019  - A/C with Coumadin ,    - No further inpatient cardiac w/u needed at this time  - Eventual D/C planning SVETLANA per primary team  - Patient to follow up in our [place] office with Dr. Seay on 2/24 at 2 PM ([address], [address], [place] [address] - Office #[phone])

## 2020-02-02 NOTE — PROGRESS NOTE ADULT - ATTENDING COMMENTS
Agree with above assessment and plan as outlined above.    - No need for further inpatient cardiac work up.    Zheng Patel MD, [facility]  BEEPER [phone]

## 2020-02-03 NOTE — PROGRESS NOTE ADULT - ATTENDING COMMENTS
Patient seen and examined.  Agree with above.   No further inpatient cardiac workup needed at this time.   DC planning per primary team    Poncho Alba MD

## 2020-02-03 NOTE — PROGRESS NOTE ADULT - PROVIDER SPECIALTY LIST ADULT
Cardiology
Heme/Onc
Hospitalist
Internal Medicine
Nephrology
Orthopedics
Palliative Care
Pulmonology
Hospitalist

## 2020-02-03 NOTE — PROGRESS NOTE ADULT - SUBJECTIVE AND OBJECTIVE BOX
Patient seen and examined  no complaints    No Known Allergies    Hospital Medications:   MEDICATIONS  (STANDING):  allopurinol 300 milliGRAM(s) Oral <User Schedule>  artificial tears (preservative free) Ophthalmic Solution 1 Drop(s) Both EYES two times a day  chlorhexidine 4% Liquid 1 Application(s) Topical daily  crizotinib 200 milliGRAM(s) Oral two times a day  epoetin myles Injectable 3000 Unit(s) IV Push <User Schedule>  lidocaine   Patch 1 Patch Transdermal daily  midodrine. 10 milliGRAM(s) Oral three times a day  pantoprazole    Tablet 40 milliGRAM(s) Oral before breakfast  sevelamer carbonate 800 milliGRAM(s) Oral three times a day with meals  warfarin 2 milliGRAM(s) Oral once      VITALS:  T(F): 98 (02-03-20 @ 11:10), Max: 98.3 (02-02-20 @ 17:07)  HR: 60 (02-03-20 @ 11:10)  BP: 117/69 (02-03-20 @ 11:10)  RR: 18 (02-03-20 @ 11:10)  SpO2: 99% (02-03-20 @ 11:10)  Wt(kg): --    02-02 @ 07:01  -  02-03 @ 07:00  --------------------------------------------------------  IN: 1320 mL / OUT: 0 mL / NET: 1320 mL    02-03 @ 07:01  -  02-03 @ 14:04  --------------------------------------------------------  IN: 120 mL / OUT: 0 mL / NET: 120 mL      Physical Exam :-  Constitutional: NAD  Neck: Supple.  Respiratory: Bilateral equal breath sounds, few Crackles present.  Cardiovascular: S1, S2 normal, positive Murmur  Gastrointestinal: Bowel Sounds present, soft, non tender  Neurological: Alert     LABS:  02-03    129<L>  |  88<L>  |  38<H>  ----------------------------<  89  4.5   |  24  |  5.92<H>    Ca    9.3      03 Feb 2020 11:16      Creatinine Trend: 5.92 <--, 4.25 <--, 6.88 <--, 5.25 <--, 8.76 <--                        10.2   6.31  )-----------( 261      ( 03 Feb 2020 11:16 )             34.8     Urine Studies:      RADIOLOGY & ADDITIONAL STUDIES:

## 2020-02-03 NOTE — PROGRESS NOTE ADULT - PROBLEM SELECTOR PROBLEM 1
ESRD on hemodialysis
Pain
Pathologic fracture

## 2020-02-03 NOTE — PROGRESS NOTE ADULT - ASSESSMENT
86F PMH ESRD (HD on T/Th/Sat) via left forearm AVF, stage IV squamous cell lung cancer (diagnosed 10/2019, mets to bone, on crizotinib), afib (on Coumadin), PPM, MS/TR (s/p annuloplasty in 2016), chronic hypotension (on midodrine HD days), remote colon CA (s/p R hemicolectomy), GI bleed (2/2 Dieulafoy lesion), anemia, pulmonary HTN, COPD, and restrictive lung disease, presented to ED with R knee found to have  pathologic intertrochanteric fracture of the right femur, s/p R IMN for metastatic femur. Renal following for ESRD MX.    ·	End Stage Renal Disease on Dialysis on Tuesday, Thursday and Saturday

## 2020-02-03 NOTE — DISCHARGE NOTE NURSING/CASE MANAGEMENT/SOCIAL WORK - PATIENT PORTAL LINK FT
You can access the FollowMyHealth Patient Portal offered by [facility] by registering at the following website: [email or web address]. By joining [employer]’s FollowMyHealth portal, you will also be able to view your health information using other applications (apps) compatible with our system.

## 2020-02-03 NOTE — PROGRESS NOTE ADULT - SUBJECTIVE AND OBJECTIVE BOX
S: Denies chest pain or SOB. Review of systems otherwise (-)      MEDICATIONS  (STANDING):  allopurinol 300 milliGRAM(s) Oral <User Schedule>  artificial tears (preservative free) Ophthalmic Solution 1 Drop(s) Both EYES two times a day  chlorhexidine 4% Liquid 1 Application(s) Topical daily  crizotinib 200 milliGRAM(s) Oral two times a day  epoetin myles Injectable 3000 Unit(s) IV Push <User Schedule>  lidocaine   Patch 1 Patch Transdermal daily  midodrine. 10 milliGRAM(s) Oral three times a day  pantoprazole    Tablet 40 milliGRAM(s) Oral before breakfast  sevelamer carbonate 800 milliGRAM(s) Oral three times a day with meals  warfarin 1 milliGRAM(s) Oral once    MEDICATIONS  (PRN):  acetaminophen   Tablet .. 650 milliGRAM(s) Oral every 6 hours PRN Mild Pain (1 - 3)  aluminum hydroxide/magnesium hydroxide/simethicone Suspension 30 milliLiter(s) Oral every 6 hours PRN Dyspepsia  artificial  tears Solution 1 Drop(s) Both EYES every 6 hours PRN Dry Eyes  bisacodyl 5 milliGRAM(s) Oral every 12 hours PRN Constipation  calcium carbonate    500 mG (Tums) Chewable 1 Tablet(s) Chew every 8 hours PRN Heartburn  polyethylene glycol 3350 17 Gram(s) Oral daily PRN Constipation  senna 2 Tablet(s) Oral at bedtime PRN Constipation      LABS:                            10.2   6.31  )-----------( 261      ( 03 Feb 2020 11:16 )             34.8     Hemoglobin: 10.2 g/dL (02-03 @ 11:16)  Hemoglobin: 10.6 g/dL (02-02 @ 06:25)  Hemoglobin: 9.8 g/dL (02-01 @ 09:51)  Hemoglobin: 10.0 g/dL (01-31 @ 10:47)  Hemoglobin: 12.2 g/dL (01-30 @ 07:10)    02-03    129<L>  |  88<L>  |  38<H>  ----------------------------<  89  4.5   |  24  |  5.92<H>    Ca    9.3      03 Feb 2020 11:16      Creatinine Trend: 5.92<--, 4.25<--, 6.88<--, 5.25<--, 8.76<--, 7.02<--   PT/INR - ( 03 Feb 2020 11:16 )   PT: 32.3 sec;   INR: 2.72 ratio                   02-02-20 @ 07:01  -  02-03-20 @ 07:00  --------------------------------------------------------  IN: 1320 mL / OUT: 0 mL / NET: 1320 mL    02-03-20 @ 07:01  -  02-03-20 @ 15:25  --------------------------------------------------------  IN: 180 mL / OUT: 21 mL / NET: 159 mL        PHYSICAL EXAM  Vital Signs Last 24 Hrs  T(C): 36.7 (03 Feb 2020 11:10), Max: 36.8 (02 Feb 2020 17:07)  T(F): 98 (03 Feb 2020 11:10), Max: 98.3 (02 Feb 2020 17:07)  HR: 60 (03 Feb 2020 11:10) (60 - 65)  BP: 117/69 (03 Feb 2020 11:10) (102/50 - 128/65)  BP(mean): --  RR: 18 (03 Feb 2020 11:10) (18 - 18)  SpO2: 99% (03 Feb 2020 11:10) (95% - 99%)      Gen: Appears well in NAD  HEENT:  (-)icterus (-)pallor  CV: N S1 S2 1/6 GARY (+)2 Pulses B/l  Resp:  Clear to auscultation B/L, normal effort  GI: (+) BS Soft, NT, ND  Lymph:  (-) Edema, (-)obvious lymphadenopathy  Skin: Warm to touch, Normal turgor  Psych: Appropriate mood and affect    TELEMETRY: None	        ASSESSMENT/PLAN:   87 y/o female known to our office with PMHx of ESRD on HD, persistent Afib (on Coumadin), s/p Bio MVR, Tricuspid valve repair and ASD closure in 2016, hx complete heart block s/p PPM, Lung CA, COPD, remote colon CA (s/p R hemicolectomy), had normal nuclear stress test and TTE with normal LV/RV with well seated bio MV with normal function both in 7/2019 who presented to ED with worsening right hip pain s/p recent mechanical fall found to have right hip fracture and supratherapeutic INR requiring reversal for OR. Cardiology consulted for preop clearance. Patient underwent right femoral intramedullary rodding 1/15/20 .     - No anginal symptoms, not in clinical HF  - Continue BP support with midodrine and HD per renal  - Denies LOC/syncope -- PPM interrogation noted, episodes of likely SVT but no correlating events to recent fall  - Normal Nuclear stress and TTE with normal LV/RV with well seated bio MV with normal function both in 7/2019  - Continue A/C with Coumadin for CVA prevention if no contraindications  - No further inpatient cardiac w/u needed at this time  - D/C planning SVETLANA per primary team  - Patient to follow up in our [place] office with Dr. Seay on 2/24 at 2 PM ([address], [address], [place] [address] - Office #[phone])

## 2020-02-03 NOTE — PROGRESS NOTE ADULT - REASON FOR ADMISSION
R Knee pain

## 2020-02-04 PROBLEM — C34.91 MALIGNANT NEOPLASM OF UNSPECIFIED PART OF RIGHT BRONCHUS OR LUNG: Chronic | Status: ACTIVE | Noted: 2020-01-01

## 2020-02-12 NOTE — VITALS
[Maximal Pain Intensity: 6/10] : 6/10 [Pain Duration: ___] : Pain duration: [unfilled] [Pain Description/Quality: ___] : Pain description/quality: [unfilled] [Pain Location: ___] : Pain Location: [unfilled] [Pain Interferes with ADLs] : Pain interferes with activities of daily living. [Opioid] : opioid [70: Cares for self; unalbe to carry on normal activity or do active work.] : 70: Cares for self; unable to carry on normal activity or do active work. [ECOG Performance Status: 3 - Capable of only limited self care, confined to bed or chair more than 50% of waking hours] : Performance Status: 3 - Capable of only limited self care, confined to bed or chair more than 50% of waking hours

## 2020-02-13 PROBLEM — C34.91 PRIMARY MALIGNANT NEOPLASM OF RIGHT LUNG METASTATIC TO OTHER SITE: Status: ACTIVE | Noted: 2020-01-01

## 2020-02-21 NOTE — PHYSICAL EXAM
[General Appearance - In No Acute Distress] : in no acute distress [General Appearance - Well Nourished] : well nourished [General Appearance - Alert] : alert [Sclera] : the sclera and conjunctiva were normal [Extraocular Movements] : extraocular movements were intact [Abdomen Soft] : soft [Heart Rate And Rhythm] : heart rate and rhythm were normal [Abdomen Tenderness] : non-tender [Normal] : no palpable adenopathy [] : no hepato-splenomegaly [Oriented To Time, Place, And Person] : oriented to person, place, and time [de-identified] : decreased breath sounds in the right mid-lower lung posteriorly [de-identified] : patient is in wheelchair but can stand as needed [de-identified] : PPM

## 2020-02-21 NOTE — REVIEW OF SYSTEMS
[Cough] : cough [SOB on Exertion] : shortness of breath during exertion [Constipation] : constipation [Muscle Pain] : muscle pain [Joint Pain] : joint pain [Gait Disturbance] : gait disturbance [Difficulty Walking] : difficulty walking [Negative] : Cardiovascular [Constipation: Grade 1 - Occasional or intermittent symptoms; occasional use of stool softeners, laxatives, dietary modification, or enema] : Constipation: Grade 1 - Occasional or intermittent symptoms; occasional use of stool softeners, laxatives, dietary modification, or enema [Diarrhea: Grade 0] : Diarrhea: Grade 0 [Nausea: Grade 0] : Nausea: Grade 0 [Vomiting: Grade 0] : Vomiting: Grade 0 [Fatigue: Grade 1 - Fatigue relieved by rest] : Fatigue: Grade 1 - Fatigue relieved by rest [Cough: Grade 1 - Mild symptoms; nonprescription intervention indicated] : Cough: Grade 1 - Mild symptoms; nonprescription intervention indicated [Dyspnea: Grade 1 - Shortness of breath with moderate exertion] : Dyspnea: Grade 1 - Shortness of breath with moderate exertion [Abdominal Pain] : no abdominal pain [Wheezing] : no wheezing [Vomiting] : no vomiting [Diarrhea] : no diarrhea [Fainting] : no fainting [Confused] : no confusion [Dizziness] : no dizziness

## 2020-02-21 NOTE — HISTORY OF PRESENT ILLNESS
[FreeTextEntry1] : Ms. Holt is a 86 years old female with metastatic lung cancer, referred here for postoperative RT s/p intramedullary nailing of right femur.\par \par She is a non-smoker.  PMHx includes ESRD on Hemodialysis since 2006, history of AFIB (on Coumadin), PPM Implanted, Mitral stenosis/Tricuspid Regurgitation s/p annuloplasty (2016), chronic hypotension, pulmonary HTN, COPD, restrictive lung disease and anemia. \par \par Oncologic history:  She presented to her PCP in June 2019 with complaint of cough which was productive of brown sputum and was sent for chest xray which revealed a lung mass. Subsequent Chest CT and PET/CT were done. \par \par PET/CT 6/24/19 revealed a spiculated right middle lobe lung mass and a minimally expansile lytic focus in the lesser trochanter of the right hip.\par \par CT chest on 7/22/19 indicated increased size masslike consolidation right middle lobe when compared to the PETCT of 6/24/19, most compatible with malignancy. The lesion extended toward the right hilum, resulting in regional pleural and fissural thickening and retraction. No pleural effusion was seen. Mildly enlarged regional and mediastinal nodes were unchanged. No distant involvement was seen.  Bronchial mural thickening with bronchiectatic changes. Mild pulmonary fibrotic changes noted. A small consolidation within the right posterior medial costophrenic angle is unchanged from 2015.  Post TVR and MVR. Pulse generator associated with epicardial leads. Other findings include heterogeneous thyroid gland, cholecystolithiasis and atrophic changes upper pole both kidneys was also noted.\par \par PET/CT done on 8/23/19 showed hypermetabolic right middle lobe pulmonary mass which is consistent with a primary bronchogenic malignancy, hypermetabolic lytic lesion in right femur. Focal hypermetabolic activity in soft tissue nodule in the isthmus of the thyroid. Mildly hypermetabolic in left axillary lymph node. Small hypermetabolic soft tissue nodule in the anterior abdominal wall. Focal hypermetabolic activity in the cartilage anterior to the right sixth rib. Mild hypermetabolic activity in nodular bilateral adrenal gland. Otherwise, no abnormal hypermetabolic activity to suggest malignancy.\par \par Patient was seen by Dr. Clemente in September 2019 who recommended bronchoscopy, possible EBUS and possible mediastinoscopy. \par \par She underwent right middle lung bronchoalveolar lavage on 10/2/19, pathology was negative for malignant cells.\par \par Patient fell in September while getting out of [employer].  She was evaluated at [facility] the following day.  Xrays of left wrist showed acute slightly impacted distal radial metaphyseal fracture and slightly displaced ulnar styloid process base fracture. She was placed on a short arm splint. She also complaint of right knee pain. She was referred to IR for right femur bone biopsy.\par \par She underwent right femur bone biopsy on 11/1/19.  Pathology was positive for malignant cells, consistent with metastatic non-small cell carcinoma, favoring squamous cell carcinoma.\par \par Patient underwent CT head on 11/13/19 which showed no acute hemorrhage, mass effect, or displaced calvarial fracture. Bilateral periorbital soft tissue swelling, left greater than right with large frontal hematoma. Maxillofacial bone CT was negative and cervical spine CT indicated multilevel degenerative changes. No acute fracture or traumatic subluxation.\par \par CT abdomen and pelvis on 1/12/20 demonstrated right femoral intertrochanteric fracture with findings raising a question of pathologic fracture. No evidence of retroperitoneal hematoma was noted. Right middle lobe lobar atelectasis was found.\par \par CT of the pelvis on 1/12/20 showed basicervical fracture of the right femoral neck with mild proximal migration. Lytic lesions are identified in the bilateral proximal femora corresponding with areas of increased uptake in lytic lesion seen on prior PET/CT. Lytic lesions appear to have increased in size when compared to prior study.\par \par CT brain done on 1/12/20 showed markedly limited evaluation of the posterior fossa due to metallic streak artifact. No CT evidence of acute intracranial hemorrhage or mass effect was noted.  A repeat CT brain done on 1/26/20 showed age appropriate involutional and ischemic gliotic changes. Old right cerebellar infarct, no change since 11/13/19. No hemorrhage.\par \par Patient underwent right hip surgery / intramedullary nailing of right femur on 1/15/20 (Dr. Dean).   She did well with her surgery and is recovering appropriately. She is under the care of Dr. Travis and has been on Crizotinib since December.\par \par Patient presents today to discuss radiation therapy to her right hip.  She reports right hip and right knee pain, rated pain 6 out of 10. She takes Tylenol and uses Fentanyl patch.  She denies pain or symptoms in the area of left hip / leg.  Also endorsed occasional cough, dyspnea on exertion and dizziness post dialysis. She has left upper arm AV fistula and go for dialysis on Tuesday, Thursday and Saturday.  She is currently at rehab and getting regular physical therapy.

## 2020-03-17 NOTE — REVIEW OF SYSTEMS
[Fatigue: Grade 1 - Fatigue relieved by rest] : Fatigue: Grade 1 - Fatigue relieved by rest [Pruritus: Grade 0] : Pruritus: Grade 0 [Skin Induration: Grade 0] : Skin Induration: Grade 0 [Dermatitis Radiation: Grade 0] : Dermatitis Radiation: Grade 0

## 2020-03-17 NOTE — REASON FOR VISIT
[Routine On-Treatment] : a routine on-treatment visit for [Rectal Cancer] : cancer [Other: _____] : [unfilled]

## 2020-03-18 NOTE — DISEASE MANAGEMENT
[Clinical] : TNM Stage: c [IV] : IV [TTNM] : x [NTNM] : x [MTNM] : 1 [de-identified] : Right upper femur AP/PA

## 2020-03-18 NOTE — HISTORY OF PRESENT ILLNESS
[FreeTextEntry1] : Patient is an 86 year old female with metastatic non-small cell lung cancer with known osseous metastatic disease. She is s/p intramedullary nailing of the right femur fracture.\par \par 3/17/20-4/5 fx\par Tolerating RT. Notes stable fatigue. Denies pain.  At rehab.

## 2020-03-18 NOTE — PHYSICAL EXAM
[General Appearance - Alert] : alert [General Appearance - In No Acute Distress] : in no acute distress [Skin Color & Pigmentation] : normal skin color and pigmentation [Oriented To Time, Place, And Person] : oriented to person, place, and time [de-identified] : u

## 2020-05-06 NOTE — ASU PREOP CHECKLIST - SPO2 (%)
Vidya Kind verbally {consents to a Virtual/Telephone Check-In service on 5/6/20.   Patient understands and accepts financial responsibility for any deductible, co-insurance and/or co-pays associated with this service 95

## 2020-07-31 ENCOUNTER — APPOINTMENT (OUTPATIENT)
Dept: RADIATION ONCOLOGY | Facility: CLINIC | Age: 85
End: 2020-07-31

## 2021-01-27 NOTE — PROGRESS NOTE ADULT - PROBLEM SELECTOR PROBLEM 6
Constipation Cimzia Pregnancy And Lactation Text: This medication crosses the placenta but can be considered safe in certain situations. Cimzia may be excreted in breast milk.

## 2021-01-27 NOTE — ED ADULT NURSE NOTE - NSHOSCREENINGQ1_ED_ALL_ED
On Treatment Visit       Patient: Kylie García   YOB: 1968   Medical Record Number: [id number]     Date of Visit  January 27, 2021   Primary Diagnosis: Cancer Staging  Malignant neoplasm of right female breast (CMS/HCC)  Staging form: Breast, AJCC V7  - Clinical: Stage IV (M1) - Signed by Ovidio Meadows MD on 7/21/2017     PMH:Mrs. García is a 52-year-old female who is being seen as a new patient in the radiation oncology for evaluation of a metastatic lymph node involving her right axilla.  The primary source of her malignancy is a right breast cancer.     Mrs. García's history of breast cancer is long.  It dates back to 2007.  The records of her initial surgery, augmentation mammoplasty, lumpectomy, breast reconstruction,, partial mastectomy, and later chemo and hormonal therapy have all been reviewed.  Most recently she has become aware of a growing mass within her right axilla.  It is not painful at rest but is tender upon touch.  She has noticed some skin changes and redness.  There has been no bleeding or purulent drainage.  She has noticed no other areas of adenopathy.  She has noticed no changes within her breast.     was seen today for an on treatment visit.  She is receiving radiation therapy to the LEFT Axilla. She  has received 3 cGy in 16 fractions out of a planned dose of 798 cGy in 4256 fractions.    She has an approximately 2.5 cm deep right axillary incision that is open and draining for quite some time.  No acute changes noted with that.    Today on exam the patient is tolerating radiation therapy well and has no new disease or treatment-related complaints.        There were no vitals filed for this visit.        Plan: We plan to continue radiation therapy as prescribed.  Utilize creams to area.        Electronically signed by ELANA Tiwari DO/ANNIE 1/27/2021  09:39 CST                               No

## 2021-03-02 NOTE — CONSULT NOTE ADULT - PROBLEM/RECOMMENDATION-2
COVID-19 (Coronavirus Disease 2019)   AMBULATORY CARE:   Coronavirus disease 2019 (COVID-19)  is the disease caused by the novel (new) coronavirus first discovered in December 2019  Coronaviruses generally cause upper respiratory (nose, throat, and lung) infections, such as a cold  The new virus can also cause serious lower respiratory conditions, such as pneumonia or acute respiratory distress syndrome (ARDS)  Anyone can develop serious problems from the new virus, but your risk is higher if you are 65 or older  A weak immune system, diabetes, or a heart or lung condition can also increase your risk  Signs and symptoms: You may not develop any signs or symptoms  Signs and symptoms that do develop usually start about 5 days after infection but can take 2 to 14 days  Signs and symptoms range from mild to severe  You may feel like you have the flu or a bad cold  Information on COVID-19 is still being learned  Tell your healthcare provider if you think you were infected but develop signs or symptoms not listed below:  · A cough    · Shortness of breath or trouble breathing that may become severe    · A fever of at least 100 4°F, or 38°C (may be lower in adults 65 or older)    · Chills that might include shaking    · Muscle pain, body aches, or a headache    · A sore throat    · Suddenly not being able to taste or smell anything    · Feeling mentally and physically tired (fatigue)    · Congestion (stuffy head and nose), or a runny nose    · Diarrhea, nausea, or vomiting    If you think you or someone you know may be infected:  Do the following to protect others:  · If emergency care is needed,  tell the  about the possible infection, or call ahead and tell the emergency department  · Call a healthcare provider  for instructions if symptoms are mild  Anyone who may be infected should not  arrive without calling first  The provider will need to protect staff members and other patients      · The person who may be infected needs to wear a face covering  while getting medical care  This will help lower the risk of infecting others  Coverings are not used for anyone who is younger than 2 years, has breathing problems, or cannot remove it  The provider can give you instructions for anyone who cannot wear a covering  Call your local emergency number (911 in the [address]) or go to the emergency department if:   · You have trouble breathing or shortness of breath at rest     · You have chest pain or pressure that lasts longer than 5 minutes  · You become confused or hard to wake  · Your lips or face are blue  · You have a fever of 104°F (40°C) or higher  Call your doctor if:   · You do not  have symptoms of COVID-19 but had close physical contact within 14 days with someone who tested positive  · You have questions or concerns about your condition or care  How COVID-19 is diagnosed: If you think you have COVID-19, call your healthcare provider  In some areas, testing is only done if a person has severe symptoms or is hospitalized  Testing is done more widely in other places  Your provider will tell you what to do based on your symptoms and the rules in your area  In general, the following may be used:  · A viral test  shows if you have a current infection  Samples are taken from your nose and throat, usually with swabs  You may need to wait several days to get the test results  Your healthcare provider will tell you how to get your results  You will need to quarantine (stay physically away from others) until you get your results  If results show you have COVID-19, you will need to quarantine until you are well  Your provider or other health official may give you more directions  You will also need to prevent another infection until it is known if you can get COVID-19 again  · An antibody test  shows if you had a past infection   Blood samples are used for this test  Antibodies are made by your immune system to attack the virus that causes COVID-19  Antibodies will form 1 to 3 weeks after you are infected  It is not known if antibodies prevent a second infection, or for how long a person might be protected  If you have antibodies, you will still need to be careful around others until more is known  · CT scans or x-rays  may be used to check for signs of pneumonia  The 2019 coronavirus causes a specific kind of pneumonia, usually in both lungs  Treatment:  No medicine or specific treatment is currently approved for COVID-19  The following may be used to manage your symptoms or treat the effects of COVID-19:  · Mild symptoms  may get better on their own  If you do not need to be treated in a hospital, you will be given instructions to use at home  Your condition will be closely monitored  You will need to watch for worsening symptoms and seek immediate care if needed  Talk to your healthcare provider about the following:    ? Relieve your symptoms  To soothe a sore throat, gargle with warm salt water, or use throat lozenges or a throat spray  Your healthcare provider may recommend a cough medicine  Drink more liquids to thin and loosen mucus and to prevent dehydration  Use decongestants or saline drops as directed for nasal congestion  ? NSAIDs or acetaminophen  can help lower a fever and relieve body aches or a headache  Follow directions  If not taken correctly, NSAIDs can cause kidney damage and acetaminophen can cause liver damage  · Severe or life-threatening symptoms  are treated in the hospital  You may need a combination of the following:    ? Medicines  may be given to reduce inflammation or to fight the virus  You may also need blood thinners to prevent or treat blood clots  If you have a deep vein thrombosis (DVT) or pulmonary embolism (PE), you may need to keep using blood thinners for 3 months  ? Extra oxygen  may be given if you have respiratory failure   This means your lungs cannot get enough oxygen into your blood and out to your organs  Extra oxygen can help prevent organ failure  ? A ventilator  may be used to help you breathe  ? Convalescent plasma (part of blood)  from a patient who has recovered from COVID-19 may be used  The plasma contains antibodies that can help your body fight the infection  Convalescent plasma is only given to patients who have severe signs and symptoms  How the 2019 coronavirus spreads: The virus spreads quickly and easily  You can become infected if you are in contact with a large amount of the virus, even for a short time  You can also become infected by being around a small amount of virus for a long time  The following are ways the virus is thought to spread, but more information may be coming:  · Droplets are the most common way all coronaviruses spread  The virus can travel in droplets that form when a person talks, coughs, or sneezes  Anyone who breathes in the droplets or gets them in his or her eyes can become infected with the virus  Close personal contact with an infected person is thought to be the main way the virus spreads  Close personal contact means you are within 6 feet (2 meters) of the person  · Person-to-person contact can spread the virus  For example, a person with the virus on his or her hands can spread it by shaking hands with someone  At this time, it does not appear that the virus can be passed to a baby during pregnancy or delivery  The baby can be infected after he or she is born through person-to-person contact  The virus also does not appear to spread in breast milk  If you are pregnant or breastfeeding, talk to your healthcare provider or obstetrician about any concerns you have  · The virus can stay on objects and surfaces  A person can get the virus on his or her hands by touching the object or surface  Infection happens if the person then touches his or her eyes or mouth with unwashed hands   It is not yet known how long the virus can stay on an object or surface  That is why it is important to clean all surfaces that are used regularly  · An infected animal may be able to infect a person who touches it  This may happen at live markets or on a farm  How everyone can lower the risk for COVID-19:  The best way to prevent infection is to avoid anyone who is infected, but this can be hard to do  An infected person can spread the virus before signs or symptoms begin, or even if signs or symptoms never develop  The following can help lower the risk for infection:      · Wash your hands often throughout the day  Use soap and water  Rub your soapy hands together, lacing your fingers  Wash the front and back of each hand, and in between your fingers  Use the fingers of one hand to scrub under the fingernails of the other hand  Wash for at least 20 seconds  Rinse with warm, running water for several seconds  Then dry your hands with a clean towel or paper towel  Use hand  that contains alcohol if soap and water are not available  Do not touch your eyes, nose, or mouth without washing your hands first  Teach children how to wash their hands and use hand   · Cover a sneeze or cough  This prevents droplets from traveling from you to others  Turn your face away and cover your mouth and nose with a tissue  Throw the tissue away  Use the bend of your arm if a tissue is not available  Then wash your hands well with soap and water or use hand   Turn and cover your face if you are around someone who is sneezing or coughing  Teach children how to cover a cough or sneeze  · Follow worldwide, national, and local social distancing guidelines  Social distancing means people avoid close physical contact so the virus cannot spread from one person to another  Keep at least 6 feet (2 meters) between you and others   Also keep this distance from anyone who comes to your home, such as someone making a delivery  · Make a habit of not touching your face  It is not known how long the virus can stay on objects and surfaces  If you get the virus on your hands, you can transfer it to your eyes, nose, or mouth and become infected  You can also transfer it to objects, surfaces, or people  Be aware of what you touch when you go out  Examples include handrails and elevator buttons  Try not to touch anything with bare hands unless it is necessary  Wash your hands before you leave your home and when you return  · Clean and disinfect high-touch surfaces and objects often  Use a disinfecting solution or wipes  You can make a solution by diluting 4 teaspoons of bleach in 1 quart (4 cups) of water  Clean and disinfect even if you think no one living in or coming to your home is infected with the virus  You can wipe items with a disinfecting cloth before you bring them into your home  Wash your hands after you handle anything you bring into your home  · Make your immune system as healthy as possible  A weakened immune system makes you more vulnerable to the new coronavirus  No COVID-19 vaccine is available yet  Vaccines such as the flu and pneumonia vaccines can help your immune system  Your healthcare provider can tell you which vaccines to get, and when to get them  Keep your immune system as strong as possible  Do not smoke  Eat healthy foods, exercise regularly, and try to manage stress  Go to bed and wake up at the same times each day  Social distancing guidelines:  National and local social distancing rules vary  Rules may change over time as restrictions are lifted  Restrictions may return if an outbreak happens where you live  It is important to know and follow all current social distancing rules in your area  The following are general guidelines:  · Limit trips out of your home  You may be able to have food, medicines, and other supplies delivered   If possible, have delivered items left at your door or DISPLAY PLAN FREE TEXT other area  Try not to have someone hand you an item  You will be so close to the person that the virus can spread between you  · Do not have close physical contact with anyone who does not live in your home  Do not shake hands with, hug, or kiss a person as a greeting  Stand or walk as far from others as possible  If you must use public transportation (such as a bus or subway), try to sit or stand away from others  You can stay safely connected with others through phone calls, e-mail messages, social media websites, and video chats  Check in on anyone who may be having a hard time socially distancing, or who lives alone  Ask administrators at nursing homes or long-term care facilities how you can safely communicate with someone living there  · Wear a cloth face covering around others who do not live in your home  Face coverings help prevent the virus from spreading to others in droplets  You can use a clear face covering if someone needs to read your lips  This is a cloth covering that has plastic over the mouth area so your lips can be seen  Do not use coverings that have breathing valves or vents  The virus can travel out of the valve or vent and be spread to others  Do not take your covering off to talk, cough, or sneeze  Do not use coverings on children younger than 2 years or on anyone who has breathing problems or cannot remove it  · Only allow medical or other necessary professionals into your home  Wear your face covering, and remind professionals to wear a face covering  Remind them to wash their hands when they arrive and before they leave  Do not  let anyone who does not live in your home in, even if the person is not sick  A person can pass the virus to others before symptoms of COVID-19 begin  Some people never even develop symptoms  Children commonly have mild symptoms or no symptoms  It may be hard to tell a child not to hug or kiss you   Explain that this is how he or she can help you stay healthy  · Do not go to someone else's home unless it is necessary  Do not go over to visit, even if the person is lonely  Only go if you need to help him or her  Make sure you both wear face coverings while you are there  · Avoid large gatherings and crowds  Gatherings or crowds of 10 or more individuals can cause the virus to spread  Examples of gatherings include parties, sporting events, [employer] services, and conferences  Crowds may form at beaches, collazo, and tourist attractions  Protect yourself by staying away from large gatherings and crowds  · Ask your healthcare provider for other ways to have appointments  You may be able to have appointments without having to go into the provider's office  Some providers offer phone, video, or other types of appointments  You may also be able to get prescriptions for a few months of your medicines at a time  · Stay safe if you must go out to work  You may have a job that can only be done outside your home  Keep physical distance between you and other workers as much as possible  Follow your employer's rules so everyone stays safe  If you have COVID-19 and are recovering at home:  Healthcare providers will give you specific instructions to follow  The following are general guidelines to remind you how to keep others safe until you are well:  · Wash your hands often  Use soap and water as much as possible  You can use hand  that contains alcohol if soap and water are not available  Do not share towels with anyone  If you use paper towels, throw them away in a lined trash can kept in your room or area  Use a covered trash can, if possible  · Do not go out of your home unless it is necessary  You may have to go to your healthcare provider's office for check-ups or to get prescription refills  Do not arrive at the provider's office without an appointment  Providers have to make their offices safe for staff and other patients      · Do not have close physical contact with anyone unless it is necessary  Only have close physical contact with a person giving direct care, or a baby or child you must care for  Family members and friends should not visit you  If possible, stay in a separate area or room of your home if you live with others  No one should go into the area or room except to give you care  You can visit with others by phone, video chat, e-mail, or similar systems  It is important to stay connected with others in your life while you recover  · Wear a face covering while others are near you  This can help prevent droplets from spreading the virus when you talk, sneeze, or cough  Put the covering on before anyone comes into your room or area  Remind the person to cover his or her nose and mouth before going in to provide care for you  · Do not share items  Do not share dishes, towels, or other items with anyone  Items need to be washed after you use them  · Protect your baby  Wash your hands with soap and water often throughout the day  Wear a clean face covering while you breastfeed, or while you express or pump breast milk  If possible, ask someone who is well to care for your baby  You can put breast milk in bottles for the person to use, if needed  Talk to your healthcare provider if you have any questions or concerns about caring for or bonding with your baby  He or she will tell you when to bring your baby in for check-ups and vaccines  He or she will also tell you what to do if you think your baby was infected with the new virus  · Do not handle live animals  Until more is known, it is best not to touch, play with, or handle live animals  Some animals, including pets, have been infected with the new coronavirus  Do not handle or care for animals until you are well  Care includes feeding, petting, and cuddling your pet  Do not let your pet lick you or share your food   Ask someone who is not infected to take care of your pet, if possible  If you must care for a pet, wear a face covering  Wash your hands before and after you give care  · Follow directions from your healthcare provider for being around others after you recover  You will need to wait at least 10 days after symptoms first appeared  Then you will need to have no fever for 24 hours without fever medicine, and no other symptoms  A loss of taste or smell may continue for several months  It is considered okay to be around others if this is your only symptom  It is not known for sure if or for how long a recovered person can pass the virus to others  Your provider may give you instructions, such as continuing social distancing or wearing a face covering around others  How to take care of someone who has COVID-19:  If the person lives in another home, arrange for a time to give care  Remember to bring a few pairs of disposable gloves and a cloth face covering  The following are general guidelines to help you safely care for anyone who has COVID-19:  · Wash your hands often  Wash before and after you go into the person's home, area, or room  Throw paper towels away in a lined trash can that has a lid, if possible  · Do not allow others to go near the person  No one should come into the person's home unless it is necessary  If possible, the person should be in a separate area or room if he or she lives with others  Keep the room's door shut unless you need to go in or out  Have others call, video chat, or e-mail the person if he or she is feeling well enough  The person may feel lonely if he or she is kept separate for a long period of time  Safe communication can help him or her stay connected to family and friends  · Make sure the person's room has good air flow  You may be able to open the window if the weather allows  An air conditioner can also be turned on to help air move  · Contact the person before you go in to give care    Make sure the person is wearing a face covering  Remind him or her to wash his or her hands with soap and water  He or she can use hand  that contains alcohol if soap and water are not available  Put on a face covering before you go in to give care  · Wear gloves while you give care and clean  Clean items the person uses often  Clean countertops, cooking surfaces, and the fronts and insides of the microwave and refrigerator  Clean the shower, toilet, the area around the toilet, the sink, the area around the sink, and faucets  Gather used laundry or bedding  Wash and dry items on the warmest settings the fabric allows  Wash dishes and silverware in hot, soapy water or in a   · Anything you throw away needs to go into a lined trash can  When you need to empty the trash, close the open end of the lining and tie it closed  This helps prevent items the virus is on from spilling out of the trash  Remove your gloves and throw them away  Wash your hands  Follow up with your doctor as directed:  Write down your questions so you remember to ask them during your visits  For more information:   · Centers for Disease Control and Prevention  [address] , [address]  Phone: 7- [phone] [phone]  Web Address: [employer] com br    © Copyright [address] Information is for End User's use only and may not be sold, redistributed or otherwise used for commercial purposes  All illustrations and images included in CareNotes® are the copyrighted property of A D A M , Inc  or [facility] Kayley Stuart   The above information is an  only  It is not intended as medical advice for individual conditions or treatments  Talk to your doctor, nurse or pharmacist before following any medical regimen to see if it is safe and effective for you

## 2021-12-02 NOTE — ED PROVIDER NOTE - TEMPLATE
OT IRP Treatment         Expected discharge date:        SUBJECTIVE: Subjective: \"I hope Im doing all the right things\" (12/02/21 1100)  Subjective/Objective Comments: Pt up in chair with alarm and all needs within reach, CNA setting up lunch tray (12/02/21 1400)    OBJECTIVE:  Precautions  Other Precautions: Fall risk due to left hemiplegia (12/01/21 1001)    See below for current functional status overview.  See OT flowsheet for full details regarding the OT therapy provided.    ASSESSMENT:   Pt tolerates Bioness estim to LUE for grasp and release training. Pt reports no pain with task. 60 reps completed. Cues for visualization and tracking of LUE during neuro re-ed with good carry over.   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 Facilitation of GH joint approximation with [place] tri-pull taping technique. Pt tolerates well so far.          This patient participated in all scheduled occupational therapy time with this therapist today.    EDUCATION:  No new education at this time    PLAN:   Continue skilled OT, including the following Treatment Interventions: Neuro muscular reeducation;Patient/Family training (12/02/21 1400)    , Frequency Comments: 90  min at least 5 days/wl (12/01/21 1100)    Treatment Plan for Next Session: AM:resting hand orthosis  Additional Plan Considerations: PM: e stim and low plane reaching, proximal strengthening supine       RECOMMENDATIONS FOR DISCHARGE:  Recommendations for Discharge: OT WI: Home therapy    PT/OT Mobility Equipment for Discharge: continue to assess (12/02/21 1301)         FUNCTIONAL DATA OVERVIEW LAST 24 HOURS  ADLs        Household mobility       Home Management       Tolerance  OT Activity Tolerance  Activity Tolerance: 1:1 Activity to rest (12/02/21 1100)  Activity Tolerance Comments: fair (12/02/21 1100)    Cognition       Interventions     Other Interventions 2: Bioness Estim to LUE- grasp and release training at table top 60 reps. assist  for gross arm placement. Parameters Ext 22 and Flex 20. Pt tolerates well (12/02/21 1100)  Interventions Comments: proximal strengthening with shldr shrugs and scapular retraction with assist (12/02/21 1100)     Orthopedic

## 2022-04-21 NOTE — H&P ADULT - NSCORESITESY/N_GEN_A_CORE_RD
April 21, 2022      [facility]  [address]  [place] [address]  Phone: [phone]  Fax: [phone]       Patient: Eugenie Conde   YOB: 2014  Date of Visit: 04/21/2022    To Whom It May Concern:    Evie Conde  was at [facility] on 04/21/2022. The patient may return to work/school on 4-25-22 with no restrictions. If you have any questions or concerns, or if I can be of further assistance, please do not hesitate to contact me. Please excuse days missed this week.     Sincerely,    Gisel Garza, NP     
No

## 2022-10-24 NOTE — PATIENT PROFILE ADULT - NSPROMEDSBROUGHTTOHOSP_GEN_A_NUR
[Well Developed] : well developed [Well Nourished] : well nourished [No Acute Distress] : no acute distress [Normal Conjunctiva] : normal conjunctiva [Normal Venous Pressure] : normal venous pressure [No Carotid Bruit] : no carotid bruit [Normal S1, S2] : normal S1, S2 [No Murmur] : no murmur [No Rub] : no rub [No Gallop] : no gallop [Clear Lung Fields] : clear lung fields [Good Air Entry] : good air entry [No Respiratory Distress] : no respiratory distress  [Soft] : abdomen soft [Non Tender] : non-tender [Normal Gait] : normal gait [No Cyanosis] : no cyanosis [No Clubbing] : no clubbing [No Varicosities] : no varicosities [No Rash] : no rash [No Skin Lesions] : no skin lesions [Moves all extremities] : moves all extremities [No Focal Deficits] : no focal deficits [Normal Speech] : normal speech [Alert and Oriented] : alert and oriented [Normal memory] : normal memory [de-identified] : Uncle obesity [de-identified] : Subcutaneous fat. no

## 2022-11-08 NOTE — PROCEDURE NOTE - INTERROGATION NOTE: UNDERLYING RHYTHM
GENERAL SURGERY OP NOTE    Preop diagnosis is lumbar disc disease  Postop diagnosis same   Operation left retroperitoneal exploration with anterior diskectomy and cage fusion L3-L4, L4-L5 in a patient with a transitional segment  \General surgeon-Arpita  Orthopedic surgeon Herbert  Anesthesia-general  Complications-none  Findings-as per Orthopedics  Drains-none  Estimated blood loss-75cc  The tolerated procedure well and now to undergo posterior spinal surgery as well      OPERATIVE DICTATION       The patient is brought to the operating room and given a general endotracheal anesthetic.  She was placed with her left side up.  Padding is provided by Anesthesia.  Intravenous antibiotics were given.  The entire chest abdomen back and flank were then prepped and draped into a sterile field.  Using fluoroscopy the lumbar spine is marked with a magic marker on the patient's skin.  A curvilinear incision is then made in the left flank and deepened through the subcutaneous tissues down to the muscles.  The external oblique internal oblique and transverse abdominis are opened bluntly.  The retroperitoneum was entered.  Dissection was carried to the retroperitoneum mobilizing all structures medially.  The psoas muscle is exposed.  Blunt dissection within the psoas is used to identify the L3-L4 and L4-L5 disc spaces.  Needles were placed in these areas and under fluoroscopy  confirmed that these are the levels that he wants to perform diskectomy and fusion.  Dr. Godinez will dictate his portion in which he removes the discs at L3-L4 and L4-L5 infuse disease with cage.  After this has been accomplished hemostasis appears adequate.  All structures are patent and intact without injury.  Care was taken to preserve all neural structures.  The transverse abdominis internal oblique and external obliques are individually reapproximated with interrupted 1. Vicryl suture.  Subcutaneous tissues were closed in layers the skin is  Vpaced 60's closed with skin clips.  Sponge lap instrument counts were correct x2.  Patient tolerated the procedure and is to now undergo posterior spinal surgery by Dr. Godinez.  Estimated blood loss was approximately 75 cc

## 2022-12-29 NOTE — GOALS OF CARE CONVERSATION - ADVANCED CARE PLANNING - CONVERSATION DETAILS
Discussed extensively with 3 daughters regarding hospital course, prognosis and treatment options.  They are aware that any immunotherapy to be offered is not curative, but will help keep cancer at bay. They are requesting to have mom go to [facility] to get stronger and they will continue to discuss whether or not they would pursue immunotherapy.  Hospice was also discussed and they are not quite ready to commit to hospice, especially since mom is on HD and they wuold not abruptly withdraw HD. 90.2

## 2023-03-21 NOTE — HISTORY OF PRESENT ILLNESS
Physical Therapy Evaluation    Visit Type: Initial Evaluation  Visit: 1  Referring Provider: CITLALLI Joyce  Medical Diagnosis (from order): Diagnosis Information    Diagnosis  840.9 (ICD-9-CM) - S46.911A (ICD-10-CM) - Strain of right shoulder, initial encounter  726.2 (ICD-9-CM) - M75.41 (ICD-10-CM) - Impingement syndrome of right shoulder  840.9 (ICD-9-CM) - S46.912A (ICD-10-CM) - Strain of left shoulder, initial encounter       Treatment Diagnosis: right shoulder, left shoulder with increased pain/symptoms, impaired posture, impaired range of motion, impaired joint play/mobility, impaired mobility, impaired strength, impaired tissue mobility, impaired body mechanics and impaired activity tolerance.  Onset  - Date of onset: 2/24/2023  Chart reviewed at time of initial evaluation (relevant co-morbidities, allergies, tests and medications listed):   PMH Reviewed       SUBJECTIVE                                                                                                               Patient presents for evaluation of bilateral shoulder pain which was reportedly injured at work on 2/24/23. 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 and was pushing a cart with one arm, pulling with another and had a sudden stop which injured both shoudlers. The bins are approximately 400#. His right shoulder is more painful than the left. He has been using the pool/hot tub to ease some of the pain and to keep motion. Has been able to move shoulders a bit more since onset of injury, but still limited.    Pain / Symptoms  - Pain rating (out of 10): Current: 10 (patient does not demonstrate pain behaviors consistent with rating)   - Location: Anterior/lateral shoulders right > left   - Quality / Description: ache, sharp  - Alleviating Factors: rest, heat  - Progression since onset: improved    Function:   Limitations / Exacerbation Factors:   - Patient reports pain and difficulty with function reported  below.  - sleep disturbed, upper body dressing, lower body dressing, work - limited or modified, grocery shopping, house/yard work, lifting/carrying, reaching, pushing/pulling and bending/squatting/lifting  Prior Level of Function: pain free ADLs and IADLs,    Patient Goals: increased motion, return to work, independence with ADLs/IADLs and decreased pain.    Prior treatment  - no therapies  - Discharged from hospital, home health, or skilled nursing facility in last 30 days: no  Home Environment   - Patient lives with: significant other and adult children  - Type of home: multiple level home  - Assistance available: as needed  - Denies 2 or more falls or an unexplained fall with injury in the last year.  - Feel safe at home / work / school: yes    Worker's Compensation Information  Occupation Information  - Current Employer:  [employer]   - Occupation: Distributer  - Employer:[employer] [facility] [address]     - Restrictions: Mostly left handed work, no reaching over head, no lift/push/pull > 15 lbs   - Current Work Status: working, restricted duty due to current condition  - Full Duty Work Demands: heavy (more than 50 lbs)  - rotational/twisting motions, lifting from floor, work below knee level, chest height lifting, standing >50% of the day and lifting overhead       OBJECTIVE                                                                                                                    Posture:  - Seated: fair  - Standing: fair  Forward head, rounded shoulders, guarding of right upper extremity       Range of Motion (ROM)   (degrees unless noted; active unless noted; norms in ( ); negative=lacking to 0, positive=beyond 0)  Shoulder:    - Flexion (180):        • Left:140  Pain         • Right: 83 pain   Passive: 35    - Abduction (180):        • Left: 120 pain        • Right: 100 pain   Passive: 108    - External Rotation:        • Right: pain       - at 0°:             • Left: 50           • Right: 28  Comments: Significant guarding with right upper extremity flexion PROM    Strength  (out of 5 unless noted, standard test position unless noted)   Shoulder:     - Flexion:         • Left: 4, pain         • Right: pain, 3-     - Abduction:        • Left: 4, pain        • Right: pain, 3-    - Internal Rotation:          • Left (at 0°): 4, pain        • Right (at 0°): 4+    - External Rotation:         • Left (at 0°): 4, pain        • Right (at 0°) :4, pain   Elbow/Forearm:    - Flexion:        • Left: 4+, pain        • Right: 4, pain     - Extension:        • Left: 4+        • Right: 4+, pain          Palpation  Left  - Cervical Paraspinals: no palpable tenderness  - Levator Scapulae: hypertonic  - Upper Trapezius: hypertonic  - Infraspinatus: hypertonic  - Supraspinatus: hypertonic  Right  - Cervical Paraspinals: no palpable tenderness  - Levator Scapulae: hypertonic  - Upper Trapezius: hypertonic  - Infraspinatus: hypertonic  - Supraspinatus: hypertonic              Outcome/Assessments  Outcome Measures:   Quick Disabilities of the Arm, Shoulder and Hand: QuickDash Total Score (Score will not calculate if more then 2 questions are left blank): 77.27  (scored 0-100; a higher score indicates greater disability) see flowsheet for additional documentation          Treatment     Therapeutic Exercise  Upper trap stretch 3 x 10 sec hold  levator stretch 3 x 10 sec hold   Seated table slides 1 x 3 each  - bilateral shoulder flexion  - shoulder abduction      Skilled input: verbal instruction/cues and tactile instruction/cues    Writer verbally educated and received verbal consent for hand placement, positioning of patient, and techniques to be performed today from patient for therapist position for techniques and hand placement and palpation for techniques as described above and how they are pertinent to the patient's plan of care.    Home Exercise Program  Access Code:  [email or web address]  URL: [email or web address]  Date: 03/21/2023  Prepared by: Rosemary Jack    Exercises  ? Seated Gentle Upper Trapezius Stretch - 1 x daily - 7 x weekly - 1 sets - 3 reps - 10 sec hold  ? Gentle Levator Scapulae Stretch - 1 x daily - 7 x weekly - 1 sets - 3 reps - 10 sec hold  ? Seated Bilateral Shoulder Flexion Towel Slide at Table Top - 1 x daily - 7 x weekly - 3 sets - 10 reps  ? Seated Shoulder Abduction Towel Slide at Table Top with Forearm in Neutral - 1 x daily - 7 x weekly - 3 sets - 10 reps           ASSESSMENT                                                                                                          44 year old patient has reported functional limitations listed above impacted by signs and symptoms consistent with treatment diagnosis below.  Treatment Diagnosis:   - Involved: right shoulder, left shoulder.  - Symptoms/impairments: increased pain/symptoms, impaired posture, impaired range of motion, impaired joint play/mobility, impaired mobility, impaired strength, impaired tissue mobility, impaired body mechanics and impaired activity tolerance.    Patient's frequency and duration to be tapered based on patient progress and/or therapist's clinical judgement with additional PRN visits following appropriate tapering. Patient's frequency and duration may also be impacted by a public health emergency, illness, transportation issues, weather, holidays, and other unforeseen patient scheduling conflicts.      Prognosis: Patient will benefit from skilled therapy.  Rehabilitative potential is: good.  Predicted patient presentation: Moderate (evolving) - Patient comorbidities and complexities, as defined above, may have varying impact on steady progress for prescribed plan of care.    Education:   - Present and ready to learn: patient  - Results of above outlined education: Verbalizes understanding and Needs reinforcement    PLAN                                           [de-identified] : Patient is a RHD 86 year female who presents for a No Fault followup evaluation involving the left wrist after a fall that occurred on 04/04/2019. The patient states that she was getting out of an [employer] vehicle when she tripped over the lift and fell. She was evaluated the following day at [facility] where xrays of the left wrist were taken and revealed an acute slightly impacted distal radial metaphyseal fracture and slightly displaced ulnar styloid process base fracture. She was placed in a short arm splint and advised to followup here. She has d/c use of the splint and has since been wearing a rigid support wrist brace. The patient is still having some pain in the wrist, although it is of a much lesser severity than initially. She presents today with her daughters for repeat xrays. \par Of note, she has an AV fistula on the left forearm for her 3x a week dialysis treatment.                                                                                 The following skilled interventions to be implemented to achieve goals listed below:  Neuromuscular Re-Education (93729)  Therapeutic Exercise (15988)  Electrical Stimulation Unattended (50743 or )  Heat/Cold (41084)  Dry Needling  Manual Therapy (53455)  Therapeutic Activity (34987)  Electrical Stimulation Attended (16570)  Mechanical Traction-15506  Vasopneumatic Device (19763)  Ultrasound/Phonophoresis (87359)    Frequency / Duration  2 times per week tapering as patient progresses for 8 weeks for an estimated total of 16 visits    Patient involved in and agreed to plan of care and goals.  Patient given attendance policy at time of initial evaluation. and Attendance policy reviewed with patient.    Suggestions for next session as indicated: Progress per plan of care      Goals  Decrease pain/symptoms to 3/10  Improve involved strength to 5/5  Improve involved ROM to 150 degrees  The above improvements in impairments to assist in obtaining goals listed below  Long Term Goals: to be met by end of plan of care  1. Patient/Client will be able to lift 50 pounds from floor to waist with proper body mechanics to assist with lifting activities at work.   2. Patient/Client will be able to push/pull 40 pounds force 20 feet to assist with pushing and pulling activities at work.  3. Patient/Client will be able to carry 50 pounds 20 feet to allow them to complete daily activities at work.  4. Patient will be independent with progressed and modified home exercise program.  5. Quick DASH: Patient will complete form to reflect an improved calculated score to less than or equal to 50 to indicate patient reported improvement in function/disability/impairment. (minimal clinically important difference = 15.91)  Initial: 77.27    Care approved by and performed under the direction of on-site therapist. Student’s note read and approved.  Rosemary FUENTES  FAM Jack        Therapy procedure time and total treatment time can be found documented on the Time Entry flowsheet

## 2023-03-27 NOTE — PHYSICAL THERAPY INITIAL EVALUATION ADULT - IMPAIRMENTS CONTRIBUTING TO GAIT DEVIATIONS, PT EVAL
Pharmacy faxed in a request for prior authorization on:    Medication: semaglutide   Dosage:     0.25 or 0.5 mg/DOSE, (Ozempic, 0.25 or 0.5 MG/DOSE,) (1.34 mg/ml) 0.25 or 0.5 MG/DOSE injection  Quantity requested:  3 ml  Pharmacy for prescription has been selected.    Prior authorization request has been initiated and sent for completion.     decreased strength/impaired balance

## 2023-05-27 NOTE — ED PROVIDER NOTE - CONTACT TIME
Problem: At Risk for Falls  Goal: # Patient does not fall  Outcome: Outcome Met, Continue evaluating goal progress toward completion  Goal: # Takes action to control fall-related risks  Outcome: Outcome Met, Continue evaluating goal progress toward completion  Goal: # Verbalizes understanding of fall risk/precautions  Description: Document education using the patient education activity  Outcome: Outcome Met, Continue evaluating goal progress toward completion     Problem: VTE, Risk for  Goal: # No s/s of VTE  Outcome: Outcome Met, Continue evaluating goal progress toward completion  Goal: # Verbalizes understanding of VTE risk factors and prevention  Description: Document education using the patient education activity.   Outcome: Outcome Met, Continue evaluating goal progress toward completion     Problem: Pain  Goal: #Acceptable pain level achieved/maintained at rest using NRS/Faces  Description: This goal is used for patients who can self-report.  Acceptable means the level is at or below the identified comfort/function goal.  Outcome: Outcome Met, Continue evaluating goal progress toward completion  Goal: # Acceptable pain level achieved/maintained with activity using NRS/Faces  Description: This goal is used for patients who can self-report and are not achieving acceptable pain control during activity.  Outcome: Outcome Met, Continue evaluating goal progress toward completion  Goal: # Verbalizes understanding of pain management  Description: Documented in Patient Education Activity  Outcome: Outcome Met, Continue evaluating goal progress toward completion     Problem: Delirium, Risk for  Goal: # No symptoms of delirium  Description: Evaluate delirium symptoms under active problem when present  Outcome: Outcome Met, Continue evaluating goal progress toward completion  Goal: # Verbalizes understanding of delirium preventive strategies  Description: Document on Patient Education Activity   Outcome: Outcome Met,  Continue evaluating goal progress toward completion     Problem: Diabetes  Goal: Achieves glycemic balance  Description: Goal is to maintain blood sugar within range with no episodes of hypoglycemia  Outcome: Outcome Not Met, Continue to Monitor     Problem: Hemodialysis  Goal: Fistula/graft intact as evidenced by presence of bruit & thrill  Outcome: Outcome Met, Continue evaluating goal progress toward completion  Goal: Verbalizes understanding of hemodialysis care  Description: Document on Patient Education Activity  Outcome: Outcome Met, Continue evaluating goal progress toward completion     Problem: Hemodialysis  Goal: Fistula/graft intact as evidenced by presence of bruit & thrill  Outcome: Outcome Met, Continue evaluating goal progress toward completion  Goal: Verbalizes understanding of hemodialysis care  Description: Document on Patient Education Activity  Outcome: Outcome Met, Continue evaluating goal progress toward completion     Problem: Hemodialysis  Goal: Fistula/graft intact as evidenced by presence of bruit & thrill  Outcome: Outcome Met, Continue evaluating goal progress toward completion  Goal: Verbalizes understanding of hemodialysis care  Description: Document on Patient Education Activity  Outcome: Outcome Met, Continue evaluating goal progress toward completion      05-Apr-2019 14:27

## 2023-09-14 NOTE — PROVIDER CONTACT NOTE (OTHER) - NAME OF MD/NP/PA/DO NOTIFIED:
Dr. Alice Castaneda Modified Advancement Flap Text: The defect edges were debeveled with a #15 scalpel blade.  Given the location of the defect, shape of the defect and the proximity to free margins a modified advancement flap was deemed most appropriate.  Using a sterile surgical marker, an appropriate advancement flap was drawn incorporating the defect and placing the expected incisions within the relaxed skin tension lines where possible.    The area thus outlined was incised deep to adipose tissue with a #15 scalpel blade.  The skin margins were undermined to an appropriate distance in all directions utilizing iris scissors.

## 2024-02-26 NOTE — PATIENT PROFILE ADULT - BRADEN FRICTION AND SHEAR
Detail Level: Zone Continue Regimen: opzelura Render In Strict Bullet Format?: No (3) no apparent problem

## 2024-03-14 NOTE — PHYSICAL THERAPY INITIAL EVALUATION ADULT - MD/RN NOTIFIED
Patient tolerated treatment without incident. Peripheral IV removed. Next appointment for treatment confirmed for 4/4/2024 at 1200. AVS offered and declined..   yes

## 2024-04-01 NOTE — PATIENT PROFILE ADULT - NSPROMUTINFOINDIVIDFT_GEN_A_NUR
----- Message from Jessy Newell MA sent at 4/1/2024 12:49 PM CDT -----  Patient is calling stating she is on moujaro.  Needs refill but is also requesting mg increase.    [facility]  Pt: [phone]  -DN     Patient prefers family at the bedside

## 2024-05-18 NOTE — PATIENT PROFILE ADULT - OVER THE PAST TWO WEEKS, HAVE YOU FELT LITTLE INTEREST OR PLEASURE IN DOING THINGS?
Problem: Adult Inpatient Plan of Care  Goal: Plan of Care Review  Outcome: Progressing  Goal: Patient-Specific Goal (Individualized)  Outcome: Progressing     Problem: Fall Injury Risk  Goal: Absence of Fall and Fall-Related Injury  Outcome: Progressing  Intervention: Identify and Manage Contributors  Flowsheets (Taken 5/17/2024 2221)  Self-Care Promotion:   independence encouraged   BADL personal objects within reach   BADL personal routines maintained   adaptive equipment use encouraged  Medication Review/Management: medications reviewed  Intervention: Promote Injury-Free Environment  Flowsheets (Taken 5/17/2024 2221)  Safety Promotion/Fall Prevention:   assistive device/personal item within reach   bed alarm set   diversional activities provided   side rails raised x 3   medications reviewed   muscle strengthening facilitated   nonskid shoes/socks when out of bed   instructed to call staff for mobility     Problem: Skin Injury Risk Increased  Goal: Skin Health and Integrity  Outcome: Progressing      no

## 2024-05-30 NOTE — PATIENT PROFILE ADULT. - IS PATIENT PREGNANT?
[facility]   Physical Therapy Treatment Note      Name: Mazin Gunn  Clinic Number: [id number]    Therapy Diagnosis:   Encounter Diagnoses   Name Primary?    Chronic pain of both knees Yes    Decreased range of motion of both knees      Physician: Clara De Leon DO    Visit Date: 5/30/2024    Physician Orders: PT Eval and Treat   Medical Diagnosis from Referral: Pain in both lower extremities [M79.604, M79.605]   Evaluation Date: 5/14/2024  Authorization Period Expiration: 4/8/25  Plan of Care Expiration: 11/1/24  Progress Note Due: 6/14/24  Date of Surgery: n/a  Visit # / Visits authorized: 2/20   FOTO: 1/ 3     Precautions: Standard      Time In: 9:00  Time Out: 10:08  Total Billable Time: 60 minutes    PTA Visit #: 0/5     Subjective     Pt reports: doing okay, a little sore from getting out of the car. A little better after last time.   He was compliant with home exercise program.  Response to previous treatment: ongoing  Functional change: ongoing    Pain: 2/10  Location: bilateral knee      Objective      Objective Measures updated at progress report unless specified.     Treatment     **All exercises billed as therapeutic exercises per medicaid guidelines**    PT technician assisted with treatment under direct supervision of PT       Mazin received the treatments listed below:      therapeutic exercises to develop strength, endurance, and ROM for 55 minutes including:  Stationary bike level 2 for 8'  Knee assessment   Lorraine hamstring stretch 3x1'  Active hamstring stretch 2x10  Leg ext machine 15# 3x15  Leg curl machine 35# 3x15  Sled push 25# 3 laps  Lateral band walks green 3 laps  Pt education      manual therapy techniques: Soft tissue Mobilization were applied to the: posterior legs for 10 minutes, including:  ASTYM to lorraine posterior calf/hamstrings    Patient Education and Home Exercises       Education provided:   - HEP  - importance of stretching    Written Home  Exercises Provided: YES. Exercises were reviewed and Mazin was able to demonstrate them prior to the end of the session.  Mazin demonstrated good understanding of the education provided. See EMR under Patient Instructions for exercises provided during therapy sessions    Assessment     Mazin slightly improved symptoms and tolerated session well today. Was challenged with some strengthening exercises and worked with functional exercises. Added sled today with some challenge. Will continue to progress.     Mazin Is progressing well towards his goals.   Pt prognosis is Good.     Pt will continue to benefit from skilled outpatient physical therapy to address the deficits listed in the problem list box on initial evaluation, provide pt/family education and to maximize pt's level of independence in the home and community environment.     Pt's spiritual, cultural and educational needs considered and pt agreeable to plan of care and goals.     Anticipated barriers to physical therapy: scheduling    GOALS: Short Term Goals:  2-6 weeks - progressing  1.Report decreased knee pain  < / =  2/10  to increase tolerance for stairs  2. Increase knee ROM to 5-10 in order to be able to perform ADLs without difficulty.  3. Increase strength by 1/3 MMT grade in quad  to increase tolerance for ADL and work activities.  4. Pt to tolerate HEP to improve ROM and independence with ADL's     Long Term Goals: 6-26 weeks - progressing  1.Report decreased knee pain < / = 0/10  to increase tolerance for running  2.Patient goal: return to full running and PLOF  3.Increase strength to >/= 4+/5 in quad  to increase tolerance for ADL and work activities.  4. Pt will report at CJ level (20-40% impaired) on FOTO knee to demonstrate increase in LE function with every day tasks.     Plan     Continue with POC    Joseph Gamez, PT, DPT, OCS   no

## 2025-01-09 NOTE — H&P ADULT - NSHPATTENDINGPLANDISCUSS_GEN_ALL_CORE
Goal Outcome Evaluation:   VSS. Aox4. Up w/stand by assist. No subcutaneous emphysema present. Pain controlled per mar w/alternating po medications. Chest tube to -20 suction. Safety measures in place. Care plan ongoing.                                           Dr. Melquiades Mckeon

## 2025-03-21 NOTE — H&P ADULT - NSHPSOURCEINFORD_GEN_ALL_CORE
Losartan was sent to the pharmacy for 1 refill.    PSR please call this patient and schedule a physical with Dr. Bradley for 4/16/25 or after. Patient will need to be seen for further refills.    Child/Patient

## 2025-03-29 NOTE — H&P ADULT - NSCORESITESY/N_GEN_A_CORE_RD
A High Calorie/High Protein diet is recommended to meet your nutritional needs:  Recommend taking two (2) protein shakes daily for one month following discharge from hospital for muscle preservation and healing promotion. Protein shake should contain at least 18 grams protein per shake or more.   Consider an unflavored protein powder (Vital Protein, UNJury, Isopure or similar store-brand powders) dissolved into hot/warm fluids like tea/coffee, broth, soups, etc.  Consider a high protein/high calorie shake (Ensure Complete, Ensure Plus, Boost High Protein, Boost Very High Calorie or store brand equivalents)    Aim to eat 4-6 smaller, protein-containing meals/snacks daily if you have a smaller appetite to encourage calories and protein throughout the day. Protein sources include eggs, chicken/turkey, fish, lean meats, dairy (if tolerated) - Greek yogurt has more protein than regular yogurt, nuts/nut butters, etc.    A dietitian can help personalize your nutrition plan if you are on a special diet or have difficulty swallowing.    Nutrition-Related Questions: outpatient RD (Dietitian)  Phone: [phone]    
No

## 2025-04-21 NOTE — PROGRESS NOTE ADULT - PROBLEM SELECTOR PLAN 8
Medication: oxycodone-acetaminophen (PERCOCET) 7.5-325 MG per tablet         Dispensed: 42 tablets on 04/14/25  Day supply: 10 days  Is the patient due for refill of this medication(s): Yes  Forwarded to Physician/GIANCARLO for signature and review.     Medication: gabapentin (NEURONTIN) 300 MG capsule   Dispensed: 90 with 1 refill tablets on 2/17/25  Day supply: 60 days  Is the patient due for refill of this medication(s): yes   LOV 2/27/25     Plan:  Percocet for pain  Continue gabapentin  Resume formal physical therapy  MRI cervical spine  Return following MRI for reevaluation        Follow up: None scheduled   Transitions of Care Status:  1.  Name of PCP: Dr. Espana  2.  PCP Contacted on Admission: [ ] Y    [x] N    3.  PCP contacted at Discharge: [ ] Y    [ ] N    [ ] N/A  4.  Post-Discharge Appointment Date and Location:  5.  Summary of Handoff given to PCP:

## 2025-05-30 NOTE — PROGRESS NOTE ADULT - PROBLEM SELECTOR PLAN 2
Operative Note    Patient: Eugenia Ruby 57 year old female    MRN: [id number]    Surgeon(s): Jg Arroyo MD  Phone Number: [phone]                       Surgeons and Role:     * Jg Arroyo MD - Primary    Assistant(s):     Pre-Op Diagnosis: Combined forms of age-related cataract of right eye [H25.811]     Post-Op Diagnosis: SAME     Procedure: Procedure(s):  PHACOEMULSIFICATION OF CATARACT WITH INTRAOCULAR LENS IMPLANT, RIGHT EYE    Anesthesia Type: MAC                                   Complications: None    Specimens Removed: No specimens collected     Estimated Blood Loss: NoNE    Implants:   Implant Name Type Inv. Item Serial No.  Lot No. LRB No. Used Action   INTRAOCULAR [employer] 0DEG +20.0 D BCNVX - [id number] Lens INTRAOCULAR [employer] 0DEG +20.0 D BCNVX [id number] [employer]  Right 1 Implanted     CNA0T0 20    INDICATIONS FOR PROCEDURE:    This is a patient who presented to the eye clinic complaining of painless progressive loss of vision of the right eye. Preoperatively, the patient was found to have a visually significant cataract of the right eye. The risks, benefits and alternatives of cataract surgery were discussed at length with the patient including infection, bleeding, retinal detachment, and potential need for additional surgeries. Written informed consent was obtained.    DESCRIPTION OF PROCEDURE:    On the day of surgery, the patient was met in the preoperative holding area. The right eye was marked and confirmed to be the appropriate operative site. The patient was then brought to the operating suite and placed in the supine position. Topical tetracaine were instilled on the eye for anesthesia. The eye was prepped and draped in the usual sterile manner for ophthalmic surgery.    A Santos lid speculum was used to provide exposure of the eye. 0.12 forceps and a side port blade were used to  create 2 paracentesis. Epi-Shugarcaine was injected into the paracentesis followed Viscoat to form the anterior chamber and the 2.4 mm micro-keratome blade was used to create a full-thickness biplanar clear corneal incision. The cystotome needle was used to initiate the capsulorrhexis flap and the Utrata forceps were used to complete a continuous curvilinear capsulorrhexis. BSS on a hydrodissection cannula was used to perform gentle hydrodissection. The lens was found to rotate freely.    Phacoemulsification was then used to remove the nucleus and epinucleus with a CDE of 5.7. The remaining cortical material was removed using irrigation/aspiration. Posterior capsule was then polished. Provisc was injected into the capsular bag and then a  CNA0T0  intraocular lens was placed in the capsular bag and rotated into proper position. The irrigation/aspiration was used to remove the remaining viscoelastic material. Intracameral moxifloxacin was injected into the anterior chamber via the paracentesis. BSS on a 30-gauge cannula was used to hydrate the incisions. The incisions were found to be watertight. The eye had adequate pressure. The lens remained centered. Povidone was applied and then rinsed with BSS.  The speculum and drapes were removed. The eye was shielded and the patient was brought to the recovery room in good condition having tolerated the procedure well. I performed the entire procedure    Jg London MD        hgb above goal  trend hgb

## 2025-06-18 NOTE — ASU PREOP CHECKLIST - HEIGHT IN INCHES
Patient called and and is asking if the office can give her a call to schedule an appointment with a new therapist.    3